# Patient Record
Sex: MALE | Race: WHITE | NOT HISPANIC OR LATINO | Employment: FULL TIME | URBAN - NONMETROPOLITAN AREA
[De-identification: names, ages, dates, MRNs, and addresses within clinical notes are randomized per-mention and may not be internally consistent; named-entity substitution may affect disease eponyms.]

---

## 2017-06-28 ENCOUNTER — TELEPHONE (OUTPATIENT)
Dept: GASTROENTEROLOGY | Facility: CLINIC | Age: 57
End: 2017-06-28

## 2017-06-28 ENCOUNTER — OFFICE VISIT (OUTPATIENT)
Dept: GASTROENTEROLOGY | Facility: CLINIC | Age: 57
End: 2017-06-28

## 2017-06-28 VITALS
RESPIRATION RATE: 16 BRPM | SYSTOLIC BLOOD PRESSURE: 135 MMHG | TEMPERATURE: 97.8 F | BODY MASS INDEX: 23.49 KG/M2 | WEIGHT: 141 LBS | HEART RATE: 77 BPM | HEIGHT: 65 IN | DIASTOLIC BLOOD PRESSURE: 81 MMHG

## 2017-06-28 DIAGNOSIS — E03.9 HYPOTHYROIDISM, UNSPECIFIED TYPE: Primary | ICD-10-CM

## 2017-06-28 PROCEDURE — 99215 OFFICE O/P EST HI 40 MIN: CPT | Performed by: INTERNAL MEDICINE

## 2017-06-28 RX ORDER — LEVOTHYROXINE SODIUM 0.05 MG/1
25 TABLET ORAL DAILY
Qty: 30 TABLET | Refills: 0 | Status: SHIPPED | OUTPATIENT
Start: 2017-06-28 | End: 2017-07-26

## 2017-06-28 RX ORDER — PANTOPRAZOLE SODIUM 40 MG/1
TABLET, DELAYED RELEASE ORAL
Refills: 0 | COMMUNITY
Start: 2017-06-05 | End: 2017-12-27

## 2017-06-28 RX ORDER — FERROUS SULFATE 325(65) MG
325 TABLET ORAL
Refills: 0 | COMMUNITY
Start: 2017-06-19 | End: 2017-08-04 | Stop reason: HOSPADM

## 2017-06-28 RX ORDER — MELOXICAM 15 MG/1
TABLET ORAL
Refills: 0 | COMMUNITY
Start: 2017-05-12 | End: 2017-12-27

## 2017-06-28 RX ORDER — LEVOTHYROXINE SODIUM 0.12 MG/1
TABLET ORAL
Refills: 0 | COMMUNITY
Start: 2017-06-19

## 2017-06-28 RX ORDER — TESTOSTERONE CYPIONATE 200 MG/ML
INJECTION, SOLUTION INTRAMUSCULAR
Refills: 0 | COMMUNITY
Start: 2017-06-26

## 2017-06-28 RX ORDER — DICLOFENAC SODIUM 75 MG/1
TABLET, DELAYED RELEASE ORAL
Refills: 0 | Status: ON HOLD | COMMUNITY
Start: 2017-06-26 | End: 2017-08-02

## 2017-06-28 NOTE — PATIENT INSTRUCTIONS
1. Obtain records.  The patient claims that he had some laboratory tests done about a month ago and Dr. Corbett's office.  2. The patient may need a CMP, TSH and B12 as well as MMA.  3. We'll restart levothyroxine.  Started at 50 µg by mouth every morning on an empty stomach.  The patient has been advised to wait for at least half hour and then take pantoprazole 40 mg, wait for half hour and then eat.  4. Pantoprazole 40 mg 1 by mouth every morning one half hour before breakfast.  5. Generally is recommended that the patient should avoid NSAIDs unless the patient cannot do without in terms of arthritis.  The patient may try taking Tylenol arthritis 650 mg one every 8 hours.  6. Colonoscopy: Description of the procedure, risks benefits alternatives and options including non-operative options were discussed with the patient in detail.  The patient understands and wishes to proceed.  7. Discussed with the patient in detail.  Opportunity was given to ask questions.      Note:  Total time spent 40 minutes.  More than half of the time was spent face-to-face discussion.

## 2017-06-28 NOTE — PROGRESS NOTES
"Chief Complaint   Patient presents with   • Follow-up     History of Present Illness     The patient came back for follow visit today. He feels okay.   He empties his ileostomy bag perhaps 10-15 times a day (just for precautions). Is no bright red blood in the ileostomy bag. There is no history suggestive of melena. The patient denies dysphagia or odynophagia. There is history of some mucoid discharge per rectum. There is no further bright red blood per rectum. There is no rectal pain. There is no recent weight loss. Rather the patient has gained about 5 pounds since his last visit in January 2015.    The patient has history of hypothyroidism.  Previously the patient was noted to have significant elevation of TSH.  Unfortunately, the patient ran out of Synthyroid about a month or so ago and currently not taking one.    The patient has a history of reflux off and on for the last several years perhaps 10-12 years.  The reflux is moderately severe.  Symptoms are described as retrosternal burning sensation, and indigestion.  There is history of occasional regurgitative symptoms.  Frequency being several times per week.  The symptoms are worse at night.  The patient takes acid suppressive therapy with reasonable control of his symptoms.    As you recall , the patient has history of long-standing \"Crohn's disease\" perhaps from early 90s. The patient has been treated with sulfasalazine in the past. He also has received steroids. The patient was followed up by Dr. Sow. The patient was followed up in [place]. In 1995 the patient claims that he got very sick he was followed up by Dr. Kris talavera. In 2000 the patient claims that he ruptured his colon. He was treated by Dr. Lew. The patient had small bowel resection, and ileostomy. The patient has also undergone hemorrhoidectomy by Dr. Jewell a few years ago. The patient has history of some bright red blood per rectum, and mucoid discharge from the rectum. The patient " "underwent a colonoscopy by Dr. Lew in August 2014. According to the notes colonoscope could not be used. An EGD scope was used. The patient was noted to have \"significant inflammatory changes\" in the colon all the way to \"transverse colon\". It appears that the patient had left-sided diverticulosis. According to the report no polyps were seen. The patient had undergone \"random biopsies from the colon\". Biopsies were negative for inflammatory changes. The patient was noted to have hyperplastic polyp without dysplastic change. The patient was noted to have significant retroperitoneal air. He was treated conservatively with resolution. The patient was told to have \"residual colon polyps\". The patient has not received B12 supplements as such other than what he consumes orally as over-the-counter vitamins. According to the patient is B12 is good.     Review of Systems   Constitutional: Negative for appetite change, chills, fatigue, fever and unexpected weight change.   HENT: Negative for mouth sores, nosebleeds and trouble swallowing.    Eyes: Negative for discharge and redness.   Respiratory: Negative for apnea, cough and shortness of breath.    Cardiovascular: Negative for chest pain, palpitations and leg swelling.   Gastrointestinal: Negative for abdominal distention, abdominal pain, anal bleeding, blood in stool, constipation, diarrhea, nausea and vomiting.   Endocrine: Negative for cold intolerance, heat intolerance and polydipsia.   Genitourinary: Negative for dysuria, hematuria and urgency.   Musculoskeletal: Positive for arthralgias. Negative for joint swelling and myalgias.   Skin: Negative for rash.   Allergic/Immunologic: Negative for food allergies and immunocompromised state.   Neurological: Negative for dizziness, seizures, syncope and headaches.   Hematological: Negative for adenopathy. Does not bruise/bleed easily.   Psychiatric/Behavioral: Negative for dysphoric mood. The patient is not nervous/anxious " "and is not hyperactive.      There is no problem list on file for this patient.    Past Medical History:   Diagnosis Date   • Anemia    • Hypothyroid      Past Surgical History:   Procedure Laterality Date   • HERNIA REPAIR     • SMALL INTESTINE SURGERY     • WRIST SURGERY       Family History   Problem Relation Age of Onset   • Colon cancer Neg Hx      Social History   Substance Use Topics   • Smoking status: Former Smoker   • Smokeless tobacco: Never Used      Comment: QUIT 30 PLUS YEARS AGO   • Alcohol use No      Comment: QUIT 30 PLUS YEARS       Current Outpatient Prescriptions:   •  aspirin 81 MG tablet, Take 81 mg by mouth Daily., Disp: , Rfl:   •  diclofenac (VOLTAREN) 75 MG EC tablet, take 1 tablet by mouth twice a day, Disp: , Rfl: 0  •  ferrous sulfate 325 (65 FE) MG tablet, , Disp: , Rfl: 0  •  levothyroxine (SYNTHROID, LEVOTHROID) 200 MCG tablet, take 1 tablet by mouth once daily, Disp: , Rfl: 0  •  meloxicam (MOBIC) 15 MG tablet, take 1 tablet by mouth once daily, Disp: , Rfl: 0  •  Multiple Vitamin (MULTI VITAMIN MENS PO), Take  by mouth., Disp: , Rfl:   •  pantoprazole (PROTONIX) 40 MG EC tablet, take 1 tablet by mouth once daily, Disp: , Rfl: 0  •  Testosterone Cypionate (DEPOTESTOTERONE CYPIONATE) 200 MG/ML injection, inject 1 milliliter every 2 weeks, Disp: , Rfl: 0  •  levothyroxine (SYNTHROID, LEVOTHROID) 50 MCG tablet, Take 0.5 tablets by mouth Daily., Disp: 30 tablet, Rfl: 0    Allergies   Allergen Reactions   • Latex Rash       Blood pressure 135/81, pulse 77, temperature 97.8 °F (36.6 °C), resp. rate 16, height 65\" (165.1 cm), weight 141 lb (64 kg).    Physical Exam   Constitutional: He is oriented to person, place, and time. He appears well-developed and well-nourished. No distress.   HENT:   Head: Normocephalic and atraumatic.   Right Ear: Hearing and external ear normal.   Left Ear: Hearing and external ear normal.   Nose: Nose normal.   Mouth/Throat: Oropharynx is clear and moist and " mucous membranes are normal. Mucous membranes are not pale, not dry and not cyanotic. No oral lesions. No oropharyngeal exudate.   Eyes: Conjunctivae and EOM are normal. Right eye exhibits no discharge. Left eye exhibits no discharge. No scleral icterus.   Neck: Trachea normal. Neck supple. No JVD present. No edema present. No thyroid mass and no thyromegaly present.   Cardiovascular: Normal rate, regular rhythm, S2 normal and normal heart sounds.  Exam reveals no gallop, no S3 and no friction rub.    No murmur heard.  Pulmonary/Chest: Effort normal and breath sounds normal. No respiratory distress. He has no wheezes. He has no rales. He exhibits no tenderness.   Abdominal: Soft. Normal appearance and bowel sounds are normal. He exhibits no distension, no ascites and no mass. There is no splenomegaly or hepatomegaly. There is no tenderness. There is no rigidity, no rebound and no guarding. No hernia.   Ostomy bag in place.   Musculoskeletal: He exhibits no tenderness or deformity.       Vascular Status -  His exam exhibits no right foot edema. His exam exhibits no left foot edema.  Lymphadenopathy:     He has no cervical adenopathy.        Left: No supraclavicular adenopathy present.   Neurological: He is alert and oriented to person, place, and time. He has normal strength. No cranial nerve deficit or sensory deficit. He exhibits normal muscle tone. Coordination normal.   Skin: No rash noted. He is not diaphoretic. No cyanosis. No pallor. Nails show no clubbing.   Psychiatric: He has a normal mood and affect. His behavior is normal. Judgment and thought content normal.   Nursing note and vitals reviewed.    Stigmata of chronic liver disease:  None.  Asterixis:  None.    Assessment and Plan:      Jorge was seen today for follow-up.    Diagnoses and all orders for this visit:    Hypothyroidism, unspecified type  -     levothyroxine (SYNTHROID, LEVOTHROID) 50 MCG tablet; Take 0.5 tablets by mouth Daily.        Plan   and Patient Instructions:    Patient Instructions     1. Obtain records.  The patient claims that he had some laboratory tests done about a month ago and Dr. Corbett's office.  2. The patient may need a CMP, TSH and B12 as well as MMA.  3. We'll restart levothyroxine.  Started at 50 µg by mouth every morning on an empty stomach.  The patient has been advised to wait for at least half hour and then take pantoprazole 40 mg, wait for half hour and then eat.  4. Pantoprazole 40 mg 1 by mouth every morning one half hour before breakfast.  5. Generally is recommended that the patient should avoid NSAIDs unless the patient cannot do without in terms of arthritis.  The patient may try taking Tylenol arthritis 650 mg one every 8 hours.  6. Colonoscopy: Description of the procedure, risks benefits alternatives and options including non-operative options were discussed with the patient in detail.  The patient understands and wishes to proceed.  7. Discussed with the patient in detail.  Opportunity was given to ask questions.      Note:  Total time spent 40 minutes.  More than half of the time was spent face-to-face discussion.        Vincenzo Galan MD

## 2017-06-28 NOTE — TELEPHONE ENCOUNTER
Called and spoke with Diann at pharmacy.  Changed original script of Levothyroxine 50 mcg 1/2 tablet daily to Levothyroxine 50 mcg, 1 tablet daily.

## 2017-07-18 ENCOUNTER — PREP FOR SURGERY (OUTPATIENT)
Dept: OTHER | Facility: HOSPITAL | Age: 57
End: 2017-07-18

## 2017-07-18 DIAGNOSIS — Z12.11 COLON CANCER SCREENING: ICD-10-CM

## 2017-07-18 DIAGNOSIS — Z87.19 HISTORY OF CROHN'S DISEASE: Primary | ICD-10-CM

## 2017-07-18 RX ORDER — SODIUM CHLORIDE 0.9 % (FLUSH) 0.9 %
1-10 SYRINGE (ML) INJECTION AS NEEDED
Status: CANCELLED | OUTPATIENT
Start: 2017-07-18

## 2017-07-18 RX ORDER — SODIUM CHLORIDE 9 MG/ML
70 INJECTION, SOLUTION INTRAVENOUS CONTINUOUS PRN
Status: CANCELLED | OUTPATIENT
Start: 2017-07-18

## 2017-07-31 ENCOUNTER — HOSPITAL ENCOUNTER (INPATIENT)
Facility: HOSPITAL | Age: 57
LOS: 4 days | Discharge: HOME | End: 2017-08-04
Attending: INTERNAL MEDICINE | Admitting: INTERNAL MEDICINE

## 2017-07-31 ENCOUNTER — ANESTHESIA (OUTPATIENT)
Dept: GASTROENTEROLOGY | Facility: HOSPITAL | Age: 57
End: 2017-07-31

## 2017-07-31 ENCOUNTER — ANESTHESIA EVENT (OUTPATIENT)
Dept: GASTROENTEROLOGY | Facility: HOSPITAL | Age: 57
End: 2017-07-31

## 2017-07-31 ENCOUNTER — APPOINTMENT (OUTPATIENT)
Dept: GENERAL RADIOLOGY | Facility: HOSPITAL | Age: 57
End: 2017-07-31

## 2017-07-31 ENCOUNTER — APPOINTMENT (OUTPATIENT)
Dept: CT IMAGING | Facility: HOSPITAL | Age: 57
End: 2017-07-31

## 2017-07-31 DIAGNOSIS — Z12.11 COLON CANCER SCREENING: ICD-10-CM

## 2017-07-31 DIAGNOSIS — Z87.19 HISTORY OF CROHN'S DISEASE: ICD-10-CM

## 2017-07-31 LAB
ALBUMIN SERPL-MCNC: 3.9 G/DL (ref 3.5–5)
ALBUMIN/GLOB SERPL: 1.5 G/DL (ref 1–2)
ALP SERPL-CCNC: 61 U/L (ref 38–126)
ALT SERPL W P-5'-P-CCNC: 45 U/L (ref 13–69)
ANION GAP SERPL CALCULATED.3IONS-SCNC: 13.5 MMOL/L
AST SERPL-CCNC: 41 U/L (ref 15–46)
BASOPHILS # BLD AUTO: 0.03 10*3/MM3 (ref 0–0.2)
BASOPHILS NFR BLD AUTO: 0.2 % (ref 0–2.5)
BILIRUB SERPL-MCNC: 0.8 MG/DL (ref 0.2–1.3)
BUN BLD-MCNC: 13 MG/DL (ref 7–20)
BUN/CREAT SERPL: 8.7 (ref 6.3–21.9)
CALCIUM SPEC-SCNC: 8.6 MG/DL (ref 8.4–10.2)
CHLORIDE SERPL-SCNC: 106 MMOL/L (ref 98–107)
CO2 SERPL-SCNC: 23 MMOL/L (ref 26–30)
CREAT BLD-MCNC: 1.5 MG/DL (ref 0.6–1.3)
DEPRECATED RDW RBC AUTO: 46.4 FL (ref 37–54)
EOSINOPHIL # BLD AUTO: 0.03 10*3/MM3 (ref 0–0.7)
EOSINOPHIL NFR BLD AUTO: 0.2 % (ref 0–7)
ERYTHROCYTE [DISTWIDTH] IN BLOOD BY AUTOMATED COUNT: 13.5 % (ref 11.5–14.5)
GFR SERPL CREATININE-BSD FRML MDRD: 49 ML/MIN/1.73
GLOBULIN UR ELPH-MCNC: 2.6 GM/DL
GLUCOSE BLD-MCNC: 103 MG/DL (ref 74–98)
HCT VFR BLD AUTO: 44 % (ref 42–52)
HGB BLD-MCNC: 15.1 G/DL (ref 14–18)
IMM GRANULOCYTES # BLD: 0.03 10*3/MM3 (ref 0–0.06)
IMM GRANULOCYTES NFR BLD: 0.2 % (ref 0–0.6)
LYMPHOCYTES # BLD AUTO: 0.4 10*3/MM3 (ref 0.6–3.4)
LYMPHOCYTES NFR BLD AUTO: 2.9 % (ref 10–50)
MCH RBC QN AUTO: 32.1 PG (ref 27–31)
MCHC RBC AUTO-ENTMCNC: 34.3 G/DL (ref 30–37)
MCV RBC AUTO: 93.6 FL (ref 80–94)
MONOCYTES # BLD AUTO: 0.52 10*3/MM3 (ref 0–0.9)
MONOCYTES NFR BLD AUTO: 3.8 % (ref 0–12)
NEUTROPHILS # BLD AUTO: 12.72 10*3/MM3 (ref 2–6.9)
NEUTROPHILS NFR BLD AUTO: 92.7 % (ref 37–80)
NRBC BLD MANUAL-RTO: 0 /100 WBC (ref 0–0)
PLATELET # BLD AUTO: 153 10*3/MM3 (ref 130–400)
PMV BLD AUTO: 10.4 FL (ref 6–12)
POTASSIUM BLD-SCNC: 3.5 MMOL/L (ref 3.5–5.1)
PROT SERPL-MCNC: 6.5 G/DL (ref 6.3–8.2)
RBC # BLD AUTO: 4.7 10*6/MM3 (ref 4.7–6.1)
SODIUM BLD-SCNC: 139 MMOL/L (ref 137–145)
TSH SERPL DL<=0.05 MIU/L-ACNC: 0.79 MIU/ML (ref 0.47–4.68)
WBC NRBC COR # BLD: 13.73 10*3/MM3 (ref 4.8–10.8)

## 2017-07-31 PROCEDURE — 25010000002 PROPOFOL 200 MG/20ML EMULSION: Performed by: NURSE ANESTHETIST, CERTIFIED REGISTERED

## 2017-07-31 PROCEDURE — 71020 HC CHEST PA AND LATERAL: CPT

## 2017-07-31 PROCEDURE — 74000 HC ABDOMEN KUB: CPT

## 2017-07-31 PROCEDURE — 25010000002 ONDANSETRON PER 1 MG: Performed by: INTERNAL MEDICINE

## 2017-07-31 PROCEDURE — 84443 ASSAY THYROID STIM HORMONE: CPT | Performed by: INTERNAL MEDICINE

## 2017-07-31 PROCEDURE — 44382 SMALL BOWEL ENDOSCOPY: CPT | Performed by: INTERNAL MEDICINE

## 2017-07-31 PROCEDURE — 25010000002 MORPHINE PER 10 MG: Performed by: INTERNAL MEDICINE

## 2017-07-31 PROCEDURE — 45380 COLONOSCOPY AND BIOPSY: CPT | Performed by: INTERNAL MEDICINE

## 2017-07-31 PROCEDURE — 80053 COMPREHEN METABOLIC PANEL: CPT | Performed by: INTERNAL MEDICINE

## 2017-07-31 PROCEDURE — 99223 1ST HOSP IP/OBS HIGH 75: CPT | Performed by: INTERNAL MEDICINE

## 2017-07-31 PROCEDURE — 0DBB8ZX EXCISION OF ILEUM, VIA NATURAL OR ARTIFICIAL OPENING ENDOSCOPIC, DIAGNOSTIC: ICD-10-PCS | Performed by: INTERNAL MEDICINE

## 2017-07-31 PROCEDURE — S0260 H&P FOR SURGERY: HCPCS | Performed by: INTERNAL MEDICINE

## 2017-07-31 PROCEDURE — 25010000002 LEVOFLOXACIN PER 250 MG: Performed by: INTERNAL MEDICINE

## 2017-07-31 PROCEDURE — 0DBM8ZX EXCISION OF DESCENDING COLON, VIA NATURAL OR ARTIFICIAL OPENING ENDOSCOPIC, DIAGNOSTIC: ICD-10-PCS | Performed by: INTERNAL MEDICINE

## 2017-07-31 PROCEDURE — 0DBN8ZX EXCISION OF SIGMOID COLON, VIA NATURAL OR ARTIFICIAL OPENING ENDOSCOPIC, DIAGNOSTIC: ICD-10-PCS | Performed by: INTERNAL MEDICINE

## 2017-07-31 PROCEDURE — 85025 COMPLETE CBC W/AUTO DIFF WBC: CPT | Performed by: INTERNAL MEDICINE

## 2017-07-31 PROCEDURE — 0DBP8ZX EXCISION OF RECTUM, VIA NATURAL OR ARTIFICIAL OPENING ENDOSCOPIC, DIAGNOSTIC: ICD-10-PCS | Performed by: INTERNAL MEDICINE

## 2017-07-31 PROCEDURE — 74176 CT ABD & PELVIS W/O CONTRAST: CPT

## 2017-07-31 RX ORDER — PANTOPRAZOLE SODIUM 40 MG/10ML
40 INJECTION, POWDER, LYOPHILIZED, FOR SOLUTION INTRAVENOUS
Status: DISCONTINUED | OUTPATIENT
Start: 2017-07-31 | End: 2017-08-04 | Stop reason: HOSPADM

## 2017-07-31 RX ORDER — SODIUM CHLORIDE 0.9 % (FLUSH) 0.9 %
1-10 SYRINGE (ML) INJECTION AS NEEDED
Status: DISCONTINUED | OUTPATIENT
Start: 2017-07-31 | End: 2017-08-04 | Stop reason: HOSPADM

## 2017-07-31 RX ORDER — DEXTROSE, SODIUM CHLORIDE, AND POTASSIUM CHLORIDE 5; .45; .15 G/100ML; G/100ML; G/100ML
100 INJECTION INTRAVENOUS CONTINUOUS
Status: DISCONTINUED | OUTPATIENT
Start: 2017-07-31 | End: 2017-08-04 | Stop reason: HOSPADM

## 2017-07-31 RX ORDER — PROPOFOL 10 MG/ML
INJECTION, EMULSION INTRAVENOUS AS NEEDED
Status: DISCONTINUED | OUTPATIENT
Start: 2017-07-31 | End: 2017-07-31 | Stop reason: SURG

## 2017-07-31 RX ORDER — MORPHINE SULFATE 2 MG/ML
1 INJECTION, SOLUTION INTRAMUSCULAR; INTRAVENOUS EVERY 4 HOURS PRN
Status: DISCONTINUED | OUTPATIENT
Start: 2017-07-31 | End: 2017-07-31 | Stop reason: SDUPTHER

## 2017-07-31 RX ORDER — SODIUM CHLORIDE 9 MG/ML
70 INJECTION, SOLUTION INTRAVENOUS CONTINUOUS PRN
Status: DISCONTINUED | OUTPATIENT
Start: 2017-07-31 | End: 2017-08-04 | Stop reason: HOSPADM

## 2017-07-31 RX ORDER — MORPHINE SULFATE 2 MG/ML
1 INJECTION, SOLUTION INTRAMUSCULAR; INTRAVENOUS EVERY 4 HOURS PRN
Status: DISCONTINUED | OUTPATIENT
Start: 2017-07-31 | End: 2017-08-01

## 2017-07-31 RX ORDER — LEVOFLOXACIN 5 MG/ML
500 INJECTION, SOLUTION INTRAVENOUS EVERY 24 HOURS
Status: DISCONTINUED | OUTPATIENT
Start: 2017-07-31 | End: 2017-08-04 | Stop reason: HOSPADM

## 2017-07-31 RX ORDER — MORPHINE SULFATE 2 MG/ML
2 INJECTION, SOLUTION INTRAMUSCULAR; INTRAVENOUS EVERY 4 HOURS PRN
Status: DISCONTINUED | OUTPATIENT
Start: 2017-07-31 | End: 2017-07-31

## 2017-07-31 RX ORDER — NALOXONE HCL 0.4 MG/ML
0.4 VIAL (ML) INJECTION
Status: DISCONTINUED | OUTPATIENT
Start: 2017-07-31 | End: 2017-07-31 | Stop reason: SDUPTHER

## 2017-07-31 RX ORDER — NALOXONE HCL 0.4 MG/ML
0.4 VIAL (ML) INJECTION
Status: DISCONTINUED | OUTPATIENT
Start: 2017-07-31 | End: 2017-08-04 | Stop reason: HOSPADM

## 2017-07-31 RX ORDER — ONDANSETRON 2 MG/ML
4 INJECTION INTRAMUSCULAR; INTRAVENOUS EVERY 6 HOURS PRN
Status: DISCONTINUED | OUTPATIENT
Start: 2017-07-31 | End: 2017-08-04 | Stop reason: HOSPADM

## 2017-07-31 RX ADMIN — PROPOFOL 50 MG: 10 INJECTION, EMULSION INTRAVENOUS at 10:25

## 2017-07-31 RX ADMIN — PROPOFOL 50 MG: 10 INJECTION, EMULSION INTRAVENOUS at 10:15

## 2017-07-31 RX ADMIN — METRONIDAZOLE 500 MG: 500 INJECTION, SOLUTION INTRAVENOUS at 22:59

## 2017-07-31 RX ADMIN — ONDANSETRON 4 MG: 2 INJECTION INTRAMUSCULAR; INTRAVENOUS at 20:19

## 2017-07-31 RX ADMIN — PANTOPRAZOLE SODIUM 40 MG: 40 INJECTION, POWDER, FOR SOLUTION INTRAVENOUS at 15:54

## 2017-07-31 RX ADMIN — PROPOFOL 50 MG: 10 INJECTION, EMULSION INTRAVENOUS at 10:20

## 2017-07-31 RX ADMIN — METRONIDAZOLE 500 MG: 500 INJECTION, SOLUTION INTRAVENOUS at 15:44

## 2017-07-31 RX ADMIN — PROPOFOL 50 MG: 10 INJECTION, EMULSION INTRAVENOUS at 10:00

## 2017-07-31 RX ADMIN — DEXTROSE MONOHYDRATE, SODIUM CHLORIDE, AND POTASSIUM CHLORIDE 100 ML/HR: 50; 4.5; 1.49 INJECTION, SOLUTION INTRAVENOUS at 15:43

## 2017-07-31 RX ADMIN — LEVOFLOXACIN 500 MG: 5 INJECTION, SOLUTION INTRAVENOUS at 15:44

## 2017-07-31 RX ADMIN — SODIUM CHLORIDE 70 ML/HR: 9 INJECTION, SOLUTION INTRAVENOUS at 08:42

## 2017-07-31 RX ADMIN — ONDANSETRON 4 MG: 2 INJECTION INTRAMUSCULAR; INTRAVENOUS at 17:42

## 2017-07-31 RX ADMIN — MORPHINE SULFATE 1 MG: 2 INJECTION, SOLUTION INTRAMUSCULAR; INTRAVENOUS at 20:19

## 2017-07-31 RX ADMIN — PROPOFOL 50 MG: 10 INJECTION, EMULSION INTRAVENOUS at 10:06

## 2017-07-31 RX ADMIN — PROPOFOL 50 MG: 10 INJECTION, EMULSION INTRAVENOUS at 10:30

## 2017-07-31 RX ADMIN — MORPHINE SULFATE 1 MG: 2 INJECTION, SOLUTION INTRAMUSCULAR; INTRAVENOUS at 22:58

## 2017-07-31 RX ADMIN — PROPOFOL 50 MG: 10 INJECTION, EMULSION INTRAVENOUS at 10:10

## 2017-07-31 RX ADMIN — MORPHINE SULFATE 1 MG: 2 INJECTION, SOLUTION INTRAMUSCULAR; INTRAVENOUS at 15:58

## 2017-07-31 NOTE — ANESTHESIA POSTPROCEDURE EVALUATION
Patient: Jorge Mclaughlin    Procedure Summary     Date Anesthesia Start Anesthesia Stop Room / Location    07/31/17 0958 1048 [facility] ENDOSCOPY 2 / [facility] ENDOSCOPY       Procedure Diagnosis Surgeon Provider    Colonoscopy through ostomy site and rectum with biopsies (N/A Anus) Colon cancer screening; History of Crohn's disease  (Colon cancer screening [Z12.11]; History of Crohn's disease [Z87.19]) MD Piotr Wallace CRNA          Anesthesia Type: MAC  Last vitals  BP        Temp        Pulse       Resp        SpO2          Post Anesthesia Care and Evaluation    Patient location during evaluation: PACU  Patient participation: complete - patient participated  Level of consciousness: awake and alert  Pain score: 0  Pain management: satisfactory to patient  Airway patency: patent  Anesthetic complications: No anesthetic complications  PONV Status: none  Cardiovascular status: stable and acceptable  Respiratory status: acceptable  Hydration status: acceptable

## 2017-07-31 NOTE — ANESTHESIA PREPROCEDURE EVALUATION
Anesthesia Evaluation     Patient summary reviewed and Nursing notes reviewed   NPO Solid Status: > 8 hours  NPO Liquid Status: > 8 hours     Airway   Mallampati: II  TM distance: >3 FB  Neck ROM: full  no difficulty expected  Dental      Pulmonary - normal exam   Cardiovascular   Exercise tolerance: good (4-7 METS)    Rhythm: regular  Rate: normal        Neuro/Psych  (+) headaches,    GI/Hepatic/Renal/Endo    (+)  GERD, hypothyroidism,     Musculoskeletal     Abdominal    Substance History      OB/GYN          Other   (+) arthritis                                     Anesthesia Plan    ASA 2     MAC     intravenous induction   Anesthetic plan and risks discussed with patient.    Plan discussed with CRNA.

## 2017-08-01 ENCOUNTER — APPOINTMENT (OUTPATIENT)
Dept: GENERAL RADIOLOGY | Facility: HOSPITAL | Age: 57
End: 2017-08-01

## 2017-08-01 LAB
ALBUMIN SERPL-MCNC: 3.2 G/DL (ref 3.5–5)
ALBUMIN/GLOB SERPL: 1.3 G/DL (ref 1–2)
ALP SERPL-CCNC: 44 U/L (ref 38–126)
ALT SERPL W P-5'-P-CCNC: 39 U/L (ref 13–69)
ANION GAP SERPL CALCULATED.3IONS-SCNC: 12.7 MMOL/L
AST SERPL-CCNC: 20 U/L (ref 15–46)
BASOPHILS # BLD AUTO: 0.02 10*3/MM3 (ref 0–0.2)
BASOPHILS NFR BLD AUTO: 0.2 % (ref 0–2.5)
BILIRUB SERPL-MCNC: 0.9 MG/DL (ref 0.2–1.3)
BUN BLD-MCNC: 13 MG/DL (ref 7–20)
BUN/CREAT SERPL: 7.6 (ref 6.3–21.9)
CALCIUM SPEC-SCNC: 7.9 MG/DL (ref 8.4–10.2)
CHLORIDE SERPL-SCNC: 109 MMOL/L (ref 98–107)
CO2 SERPL-SCNC: 23 MMOL/L (ref 26–30)
CREAT BLD-MCNC: 1.7 MG/DL (ref 0.6–1.3)
DEPRECATED RDW RBC AUTO: 48.2 FL (ref 37–54)
EOSINOPHIL # BLD AUTO: 0.14 10*3/MM3 (ref 0–0.7)
EOSINOPHIL NFR BLD AUTO: 1.4 % (ref 0–7)
ERYTHROCYTE [DISTWIDTH] IN BLOOD BY AUTOMATED COUNT: 14 % (ref 11.5–14.5)
GFR SERPL CREATININE-BSD FRML MDRD: 42 ML/MIN/1.73
GLOBULIN UR ELPH-MCNC: 2.4 GM/DL
GLUCOSE BLD-MCNC: 101 MG/DL (ref 74–98)
HCT VFR BLD AUTO: 37.9 % (ref 42–52)
HGB BLD-MCNC: 13.1 G/DL (ref 14–18)
IMM GRANULOCYTES # BLD: 0.04 10*3/MM3 (ref 0–0.06)
IMM GRANULOCYTES NFR BLD: 0.4 % (ref 0–0.6)
LYMPHOCYTES # BLD AUTO: 0.62 10*3/MM3 (ref 0.6–3.4)
LYMPHOCYTES NFR BLD AUTO: 6.2 % (ref 10–50)
MCH RBC QN AUTO: 32.3 PG (ref 27–31)
MCHC RBC AUTO-ENTMCNC: 34.6 G/DL (ref 30–37)
MCV RBC AUTO: 93.3 FL (ref 80–94)
MONOCYTES # BLD AUTO: 0.61 10*3/MM3 (ref 0–0.9)
MONOCYTES NFR BLD AUTO: 6.1 % (ref 0–12)
NEUTROPHILS # BLD AUTO: 8.62 10*3/MM3 (ref 2–6.9)
NEUTROPHILS NFR BLD AUTO: 85.7 % (ref 37–80)
NRBC BLD MANUAL-RTO: 0 /100 WBC (ref 0–0)
PLATELET # BLD AUTO: 135 10*3/MM3 (ref 130–400)
PMV BLD AUTO: 11.1 FL (ref 6–12)
POTASSIUM BLD-SCNC: 3.7 MMOL/L (ref 3.5–5.1)
PROT SERPL-MCNC: 5.6 G/DL (ref 6.3–8.2)
RBC # BLD AUTO: 4.06 10*6/MM3 (ref 4.7–6.1)
SODIUM BLD-SCNC: 141 MMOL/L (ref 137–145)
WBC NRBC COR # BLD: 10.05 10*3/MM3 (ref 4.8–10.8)

## 2017-08-01 PROCEDURE — 25010000002 MORPHINE PER 10 MG: Performed by: INTERNAL MEDICINE

## 2017-08-01 PROCEDURE — 85025 COMPLETE CBC W/AUTO DIFF WBC: CPT | Performed by: INTERNAL MEDICINE

## 2017-08-01 PROCEDURE — 71020 HC CHEST PA AND LATERAL: CPT

## 2017-08-01 PROCEDURE — 80053 COMPREHEN METABOLIC PANEL: CPT | Performed by: INTERNAL MEDICINE

## 2017-08-01 PROCEDURE — 25010000002 LEVOFLOXACIN PER 250 MG: Performed by: INTERNAL MEDICINE

## 2017-08-01 PROCEDURE — 74000 HC ABDOMEN KUB: CPT

## 2017-08-01 PROCEDURE — 25010000002 ONDANSETRON PER 1 MG: Performed by: INTERNAL MEDICINE

## 2017-08-01 PROCEDURE — 99232 SBSQ HOSP IP/OBS MODERATE 35: CPT | Performed by: INTERNAL MEDICINE

## 2017-08-01 PROCEDURE — 25010000002 MEPERIDINE PER 100 MG: Performed by: INTERNAL MEDICINE

## 2017-08-01 RX ORDER — MEPERIDINE HYDROCHLORIDE 50 MG/ML
50 INJECTION INTRAMUSCULAR; INTRAVENOUS; SUBCUTANEOUS
Status: DISCONTINUED | OUTPATIENT
Start: 2017-08-01 | End: 2017-08-04 | Stop reason: HOSPADM

## 2017-08-01 RX ADMIN — MEPERIDINE HYDROCHLORIDE 50 MG: 50 INJECTION, SOLUTION INTRAMUSCULAR; INTRAVENOUS; SUBCUTANEOUS at 20:43

## 2017-08-01 RX ADMIN — METRONIDAZOLE 500 MG: 500 INJECTION, SOLUTION INTRAVENOUS at 15:26

## 2017-08-01 RX ADMIN — MORPHINE SULFATE 1 MG: 2 INJECTION, SOLUTION INTRAMUSCULAR; INTRAVENOUS at 00:59

## 2017-08-01 RX ADMIN — MEPERIDINE HYDROCHLORIDE 50 MG: 50 INJECTION, SOLUTION INTRAMUSCULAR; INTRAVENOUS; SUBCUTANEOUS at 23:35

## 2017-08-01 RX ADMIN — METRONIDAZOLE 500 MG: 500 INJECTION, SOLUTION INTRAVENOUS at 22:39

## 2017-08-01 RX ADMIN — MORPHINE SULFATE 1 MG: 2 INJECTION, SOLUTION INTRAMUSCULAR; INTRAVENOUS at 15:25

## 2017-08-01 RX ADMIN — PANTOPRAZOLE SODIUM 40 MG: 40 INJECTION, POWDER, FOR SOLUTION INTRAVENOUS at 05:05

## 2017-08-01 RX ADMIN — ONDANSETRON 4 MG: 2 INJECTION INTRAMUSCULAR; INTRAVENOUS at 15:35

## 2017-08-01 RX ADMIN — LEVOFLOXACIN 500 MG: 5 INJECTION, SOLUTION INTRAVENOUS at 18:16

## 2017-08-01 RX ADMIN — MORPHINE SULFATE 1 MG: 2 INJECTION, SOLUTION INTRAMUSCULAR; INTRAVENOUS at 02:45

## 2017-08-01 RX ADMIN — METRONIDAZOLE 500 MG: 500 INJECTION, SOLUTION INTRAVENOUS at 07:00

## 2017-08-01 RX ADMIN — MORPHINE SULFATE 1 MG: 2 INJECTION, SOLUTION INTRAMUSCULAR; INTRAVENOUS at 06:51

## 2017-08-01 RX ADMIN — ONDANSETRON 4 MG: 2 INJECTION INTRAMUSCULAR; INTRAVENOUS at 23:43

## 2017-08-01 RX ADMIN — MORPHINE SULFATE 1 MG: 2 INJECTION, SOLUTION INTRAMUSCULAR; INTRAVENOUS at 10:50

## 2017-08-01 RX ADMIN — MEPERIDINE HYDROCHLORIDE 50 MG: 50 INJECTION, SOLUTION INTRAMUSCULAR; INTRAVENOUS; SUBCUTANEOUS at 18:16

## 2017-08-01 RX ADMIN — Medication 10 ML: at 18:17

## 2017-08-01 NOTE — DISCHARGE INSTR - OTHER ORDERS
If you have any questions about your recovery, please call the [facility] Nurse Call Center at [phone]. A registered nurse is available 24 hours a day 7 days a week to assist you.

## 2017-08-01 NOTE — PROGRESS NOTES
Discharge Planning Assessment  [facility]     Patient Name: Jorge Mclaughlin  MRN: [id number]  Today's Date: 8/1/2017    Admit Date: 7/31/2017          Discharge Needs Assessment       08/01/17 1459    Living Environment    Lives With child(loan), dependent;spouse    Living Arrangements house    Home Accessibility no concerns    Stair Railings at Home none    Type of Financial/Environmental Concern none    Transportation Available family or friend will provide;car    Living Environment    Provides Primary Care For no one    Quality Of Family Relationships supportive    Able to Return to Prior Living Arrangements yes    Discharge Needs Assessment    Concerns To Be Addressed denies needs/concerns at this time    Readmission Within The Last 30 Days no previous admission in last 30 days    Anticipated Changes Related to Illness none    Equipment Currently Used at Home colostomy/ostomy supplies    Equipment Needed After Discharge none            Discharge Plan       08/01/17 1528    Case Management/Social Work Plan    Plan Home    Patient/Family In Agreement With Plan yes    Final Note    Final Note Spoke with pt and wife in room regarding DCP.  Pt states he lives in a house with his wife and son.  He has an ileostomy for 18 years, but denies any needs at this time.  He reports he is independent and plans to return home at discharge.  He denies affiliations with HH and  DME.  CM will continue to follow and assist with discharge as needed.        Discharge Placement     No information found        Expected Discharge Date and Time     Expected Discharge Date Expected Discharge Time    Jul 31, 2017               Demographic Summary       08/01/17 1451    Referral Information    Admission Type inpatient    Arrived From home or self-care    Referral Source admission list    Reason For Consult discharge planning    Primary Care Physician Information    Name Yazan Corbett MD            Functional Status       08/01/17 1455     Functional Status Prior    Ambulation 0-->independent    Transferring 0-->independent    Toileting 0-->independent    Bathing 0-->independent    Dressing 0-->independent    Eating 0-->independent    Communication 0-->understands/communicates without difficulty    Swallowing 0-->swallows foods/liquids without difficulty    IADL    Medications independent    Meal Preparation independent    Housekeeping independent    Laundry independent    Shopping independent    Oral Care independent            Psychosocial     None            Abuse/Neglect     None            Legal     None            Substance Abuse     None            Patient Forms     None          Chiquita Medel

## 2017-08-01 NOTE — PAYOR COMM NOTE
"TO:PADMA MK  FROM:CHARLEEN MATOS PHONE [phone]  INPT NOTIFICATION AND CLINICALS    Jorge Ramos (57 y.o. Male)     Date of Birth Social Security Number Address Home Phone MRN    1960  [address] [place] [address] [phone] [id number]    [employer] Mar[employer] Status          Muslim        Admission Date Admission Type Admitting Provider Attending Provider Department, Room/Bed    7/31/17 Elective Vincenzo Galan MD Khan, Nadeem A, MD [facility] MED SURG  4, 424/1    Discharge Date Discharge Disposition Discharge Destination         Home or Self Care             Attending Provider: Vincenzo Galan MD     Allergies:  Latex    Isolation:  None   Infection:  MRSA/History Only (07/31/17)   Code Status:  FULL    Ht:  65\" (165.1 cm)   Wt:  151 lb (68.5 kg)    Admission Cmt:  None   Principal Problem:  None                Active Insurance as of 7/31/2017     Primary Coverage     Payor Plan Insurance Group Employer/Plan Group    [employer] MEDICAID [id number]     Payor Plan Address Payor Plan Phone Number Effective From Effective To    PO  [phone] 7/26/2017     [place] [address]       Subscriber Name Subscriber Birth Date Member ID       JORGE RAMOS 1960 [id number]                 Emergency Contacts      (Rel.) Home Phone Work Phone Mobile Phone    Evelio Ramos (Son) [phone] -- --               History & Physical      Vincenzo Galan MD at 7/31/2017  9:54 AM             He empties his ileostomy bag perhaps 10-15 times a day (just for precautions). Is no bright red blood in the ileostomy bag. There is no history suggestive of melena. The patient denies dysphagia or odynophagia. There is history of some mucoid discharge per rectum. There is no further bright red blood per rectum. There is no rectal pain. There is no recent weight loss. Rather the patient has gained about 5 pounds since his last visit in " "January 2015.     The patient has history of hypothyroidism.  Previously the patient was noted to have significant elevation of TSH.  Unfortunately, the patient ran out of Synthyroid about a month or so ago and currently not taking one.     The patient has a history of reflux off and on for the last several years perhaps 10-12 years.  The reflux is moderately severe.  Symptoms are described as retrosternal burning sensation, and indigestion.  There is history of occasional regurgitative symptoms.  Frequency being several times per week.  The symptoms are worse at night.  The patient takes acid suppressive therapy with reasonable control of his symptoms.     As you recall , the patient has history of long-standing \"Crohn's disease\" perhaps from early 90s. The patient has been treated with sulfasalazine in the past. He also has received steroids. The patient was followed up by Dr. Sow. The patient was followed up in [place]. In 1995 the patient claims that he got very sick he was followed up by Dr. Kris talavera. In 2000 the patient claims that he ruptured his colon. He was treated by Dr. Lew. The patient had small bowel resection, and ileostomy. The patient has also undergone hemorrhoidectomy by Dr. Jewell a few years ago. The patient has history of some bright red blood per rectum, and mucoid discharge from the rectum. The patient underwent a colonoscopy by Dr. Lew in August 2014. According to the notes colonoscope could not be used. An EGD scope was used. The patient was noted to have \"significant inflammatory changes\" in the colon all the way to \"transverse colon\". It appears that the patient had left-sided diverticulosis. According to the report no polyps were seen. The patient had undergone \"random biopsies from the colon\". Biopsies were negative for inflammatory changes. The patient was noted to have hyperplastic polyp without dysplastic change. The patient was noted to have significant retroperitoneal " "air. He was treated conservatively with resolution. The patient was told to have \"residual colon polyps\". The patient has not received B12 supplements as such other than what he consumes orally as over-the-counter vitamins. According to the patient is B12 is good.      Review of Systems   Constitutional: Negative for appetite change, chills, fatigue, fever and unexpected weight change.   HENT: Negative for mouth sores, nosebleeds and trouble swallowing.    Eyes: Negative for discharge and redness.   Respiratory: Negative for apnea, cough and shortness of breath.    Cardiovascular: Negative for chest pain, palpitations and leg swelling.   Gastrointestinal: Negative for abdominal distention, abdominal pain, anal bleeding, blood in stool, constipation, diarrhea, nausea and vomiting.   Endocrine: Negative for cold intolerance, heat intolerance and polydipsia.   Genitourinary: Negative for dysuria, hematuria and urgency.   Musculoskeletal: Positive for arthralgias. Negative for joint swelling and myalgias.   Skin: Negative for rash.   Allergic/Immunologic: Negative for food allergies and immunocompromised state.   Neurological: Negative for dizziness, seizures, syncope and headaches.   Hematological: Negative for adenopathy. Does not bruise/bleed easily.   Psychiatric/Behavioral: Negative for dysphoric mood. The patient is not nervous/anxious and is not hyperactive.       There is no problem list on file for this patient.      Medical History         Past Medical History:   Diagnosis Date   • Anemia     • Hypothyroid            Surgical History          Past Surgical History:   Procedure Laterality Date   • HERNIA REPAIR       • SMALL INTESTINE SURGERY       • WRIST SURGERY                   Family History   Problem Relation Age of Onset   • Colon cancer Neg Hx               Social History   Substance Use Topics   • Smoking status: Former Smoker   • Smokeless tobacco: Never Used         Comment: QUIT 30 PLUS YEARS AGO " "  • Alcohol use No         Comment: QUIT 30 PLUS YEARS         Current Outpatient Prescriptions:   •  aspirin 81 MG tablet, Take 81 mg by mouth Daily., Disp: , Rfl:   •  diclofenac (VOLTAREN) 75 MG EC tablet, take 1 tablet by mouth twice a day, Disp: , Rfl: 0  •  ferrous sulfate 325 (65 FE) MG tablet, , Disp: , Rfl: 0  •  levothyroxine (SYNTHROID, LEVOTHROID) 200 MCG tablet, take 1 tablet by mouth once daily, Disp: , Rfl: 0  •  meloxicam (MOBIC) 15 MG tablet, take 1 tablet by mouth once daily, Disp: , Rfl: 0  •  Multiple Vitamin (MULTI VITAMIN MENS PO), Take  by mouth., Disp: , Rfl:   •  pantoprazole (PROTONIX) 40 MG EC tablet, take 1 tablet by mouth once daily, Disp: , Rfl: 0  •  Testosterone Cypionate (DEPOTESTOTERONE CYPIONATE) 200 MG/ML injection, inject 1 milliliter every 2 weeks, Disp: , Rfl: 0  •  levothyroxine (SYNTHROID, LEVOTHROID) 50 MCG tablet, Take 0.5 tablets by mouth Daily., Disp: 30 tablet, Rfl: 0          Allergies   Allergen Reactions   • Latex Rash      Blood pressure 147/84, pulse 95, temperature 97 °F (36.1 °C), temperature source Temporal Artery , resp. rate 20, height 65.5\" (166.4 cm), weight 150 lb (68 kg), SpO2 98 %.         Physical Exam   Constitutional: He is oriented to person, place, and time. He appears well-developed and well-nourished. No distress.   HENT:   Head: Normocephalic and atraumatic.   Right Ear: Hearing and external ear normal.   Left Ear: Hearing and external ear normal.   Nose: Nose normal.   Mouth/Throat: Oropharynx is clear and moist and mucous membranes are normal. Mucous membranes are not pale, not dry and not cyanotic. No oral lesions. No oropharyngeal exudate.   Eyes: Conjunctivae and EOM are normal. Right eye exhibits no discharge. Left eye exhibits no discharge. No scleral icterus.   Neck: Trachea normal. Neck supple. No JVD present. No edema present. No thyroid mass and no thyromegaly present.   Cardiovascular: Normal rate, regular rhythm, S2 normal and normal " heart sounds.  Exam reveals no gallop, no S3 and no friction rub.    No murmur heard.  Pulmonary/Chest: Effort normal and breath sounds normal. No respiratory distress. He has no wheezes. He has no rales. He exhibits no tenderness.   Abdominal: Soft. Normal appearance and bowel sounds are normal. He exhibits no distension, no ascites and no mass. There is no splenomegaly or hepatomegaly. There is no tenderness. There is no rigidity, no rebound and no guarding. No hernia.   Ostomy bag in place.   Musculoskeletal: He exhibits no tenderness or deformity.       Vascular Status -  His exam exhibits no right foot edema. His exam exhibits no left foot edema.  Lymphadenopathy:     He has no cervical adenopathy.        Left: No supraclavicular adenopathy present.   Neurological: He is alert and oriented to person, place, and time. He has normal strength. No cranial nerve deficit or sensory deficit. He exhibits normal muscle tone. Coordination normal.   Skin: No rash noted. He is not diaphoretic. No cyanosis. No pallor. Nails show no clubbing.   Psychiatric: He has a normal mood and affect. His behavior is normal. Judgment and thought content normal.   Nursing note and vitals reviewed.     Stigmata of chronic liver disease:  None.  Asterixis:  None.     Assessment and Plan:      Hypothyroidism, unspecified type.  The patient has been taking thyroid replacement therapy.  History of Crohn's disease.  The patient has diversion ileostomy.  History of some mucoid secretions and bright red blood per rectum.  Colon cancer screening.    Plan         1. Colonoscopy: Description of the procedure, risks benefits alternatives and options including non-operative options were discussed with the patient in detail.  The patient understands and wishes to proceed.  The patient will need ileoscopy colostomy.   2. Discussed with the patient in detail.  Opportunity was given to ask questions.  3. Of interest the patient has been ordered enema  "which was not given earlier on.  The patient was explained again.  He wishes to follow the instructions and was seen at the test.         Electronically signed by Vincenzo Galan MD at 7/31/2017 10:00 AM        Hospital Medications (active)       Dose Frequency Start End    dextrose 5 % and sodium chloride 0.45 % with KCl 20 mEq/L infusion 100 mL/hr Continuous 7/31/2017     Sig - Route: Infuse 100 mL/hr into a venous catheter Continuous. - Intravenous    levoFLOXacin (LEVAQUIN) 500 mg/100 mL D5W (premix) (LEVAQUIN) 500 mg 500 mg Every 24 Hours 7/31/2017     Sig - Route: Infuse 100 mL into a venous catheter Daily. - Intravenous    metroNIDAZOLE (FLAGYL) IVPB 500 mg 500 mg Every 8 Hours 7/31/2017     Sig - Route: Infuse 100 mL into a venous catheter Every 8 (Eight) Hours. - Intravenous    morphine injection 1 mg 1 mg Every 4 Hours PRN 7/31/2017 8/10/2017    Sig - Route: Infuse 0.5 mL into a venous catheter Every 4 (Four) Hours As Needed for Moderate Pain (4-6). - Intravenous    Linked Group 1:  \"And\" Linked Group Details        naloxone (NARCAN) injection 0.4 mg 0.4 mg Every 5 Minutes PRN 7/31/2017     Sig - Route: Infuse 1 mL into a venous catheter Every 5 (Five) Minutes As Needed for Respiratory Depression. - Intravenous    Linked Group 1:  \"And\" Linked Group Details        ondansetron (ZOFRAN) injection 4 mg 4 mg Every 6 Hours PRN 7/31/2017     Sig - Route: Infuse 2 mL into a venous catheter Every 6 (Six) Hours As Needed for Nausea or Vomiting. - Intravenous    pantoprazole (PROTONIX) injection 40 mg 40 mg Every Early Morning 7/31/2017     Sig - Route: Infuse 10 mL into a venous catheter Every Morning. - Intravenous    sodium chloride 0.9 % flush 1-10 mL 1-10 mL As Needed 7/31/2017     Sig - Route: Infuse 1-10 mL into a venous catheter As Needed for Line Care. - Intravenous    sodium chloride 0.9 % flush 1-10 mL 1-10 mL As Needed 7/31/2017     Sig - Route: Infuse 1-10 mL into a venous catheter As Needed for Line " "Care. - Intravenous    sodium chloride 0.9 % infusion 70 mL/hr Continuous PRN 7/31/2017     Sig - Route: Infuse 70 mL/hr into a venous catheter Continuous As Needed (Start Prior to Procedure). - Intravenous    morphine injection 2 mg (Discontinued) 2 mg Every 4 Hours PRN 7/31/2017 7/31/2017    Sig - Route: Infuse 1 mL into a venous catheter Every 4 (Four) Hours As Needed for Moderate Pain (4-6) or Severe Pain  (7-10). - Intravenous    Morphine sulfate (PF) injection 1 mg (Discontinued) 1 mg Every 4 Hours PRN 7/31/2017 7/31/2017    Sig - Route: Infuse 0.5 mL into a venous catheter Every 4 (Four) Hours As Needed for Moderate Pain (4-6). - Intravenous    Reason for Discontinue: Reorder    Linked Group 2:  \"And\" Linked Group Details        naloxone (NARCAN) injection 0.4 mg (Discontinued) 0.4 mg Every 5 Minutes PRN 7/31/2017 7/31/2017    Sig - Route: Infuse 1 mL into a venous catheter Every 5 (Five) Minutes As Needed for Respiratory Depression. - Intravenous    Reason for Discontinue: Reorder    Linked Group 2:  \"And\" Linked Group Details                Lab Results (last 24 hours)     Procedure Component Value Units Date/Time    Tissue Pathology Exam [[id number]] Collected:  07/31/17 1008    Specimen:  Tissue from Small Intestine, Ileum; Tissue from Large Intestine, Left / Descending Colon; Tissue from Large Intestine, Sigmoid Colon; Tissue from Large Intestine, Rectum Updated:  07/31/17 1458    CBC Auto Differential [[id number]]  (Abnormal) Collected:  07/31/17 1445    Specimen:  Blood Updated:  07/31/17 1501     WBC 13.73 (H) 10*3/mm3      RBC 4.70 10*6/mm3      Hemoglobin 15.1 g/dL      Hematocrit 44.0 %      MCV 93.6 fL      MCH 32.1 (H) pg      MCHC 34.3 g/dL      RDW 13.5 %      RDW-SD 46.4 fl      MPV 10.4 fL      Platelets 153 10*3/mm3      Neutrophil % 92.7 (H) %      Lymphocyte % 2.9 (L) %      Monocyte % 3.8 %      Eosinophil % 0.2 %      Basophil % 0.2 %      Immature Grans % 0.2 %      Neutrophils, Absolute " 12.72 (H) 10*3/mm3      Lymphocytes, Absolute 0.40 (L) 10*3/mm3      Monocytes, Absolute 0.52 10*3/mm3      Eosinophils, Absolute 0.03 10*3/mm3      Basophils, Absolute 0.03 10*3/mm3      Immature Grans, Absolute 0.03 10*3/mm3      nRBC 0.0 /100 WBC     Comprehensive Metabolic Panel [[id number]]  (Abnormal) Collected:  07/31/17 1445    Specimen:  Blood Updated:  07/31/17 1515     Glucose 103 (H) mg/dL      BUN 13 mg/dL      Creatinine 1.50 (H) mg/dL      Sodium 139 mmol/L      Potassium 3.5 mmol/L      Chloride 106 mmol/L      CO2 23.0 (L) mmol/L      Calcium 8.6 mg/dL      Total Protein 6.5 g/dL      Albumin 3.90 g/dL      ALT (SGPT) 45 U/L      AST (SGOT) 41 U/L      Alkaline Phosphatase 61 U/L      Total Bilirubin 0.8 mg/dL      eGFR Non African Amer 49 (L) mL/min/1.73      Globulin 2.6 gm/dL      A/G Ratio 1.5 g/dL      BUN/Creatinine Ratio 8.7     Anion Gap 13.5 mmol/L     Narrative:       Abnormal estimated GFR should be followed by more specific studies to confirm end stage chronic renal disease. The equation used for calculation may not be accurate for patients less than 19 years old, greater than 70 years old, patients at extremes of weight, malnutrition, or with acute renal dysfunction.    TSH [[id number]]  (Normal) Collected:  07/31/17 1445    Specimen:  Blood Updated:  07/31/17 1557     TSH 0.789 mIU/mL     CBC & Differential [[id number]] Collected:  08/01/17 0522    Specimen:  Blood Updated:  08/01/17 0647    Narrative:       The following orders were created for panel order CBC & Differential.  Procedure                               Abnormality         Status                     ---------                               -----------         ------                     CBC Auto Differential[[id number]]        Abnormal            Final result                 Please view results for these tests on the individual orders.    CBC Auto Differential [[id number]]  (Abnormal) Collected:  08/01/17 0522    Specimen:   Blood Updated:  08/01/17 0647     WBC 10.05 10*3/mm3      RBC 4.06 (L) 10*6/mm3      Hemoglobin 13.1 (L) g/dL      Hematocrit 37.9 (L) %      MCV 93.3 fL      MCH 32.3 (H) pg      MCHC 34.6 g/dL      RDW 14.0 %      RDW-SD 48.2 fl      MPV 11.1 fL      Platelets 135 10*3/mm3      Neutrophil % 85.7 (H) %      Lymphocyte % 6.2 (L) %      Monocyte % 6.1 %      Eosinophil % 1.4 %      Basophil % 0.2 %      Immature Grans % 0.4 %      Neutrophils, Absolute 8.62 (H) 10*3/mm3      Lymphocytes, Absolute 0.62 10*3/mm3      Monocytes, Absolute 0.61 10*3/mm3      Eosinophils, Absolute 0.14 10*3/mm3      Basophils, Absolute 0.02 10*3/mm3      Immature Grans, Absolute 0.04 10*3/mm3      nRBC 0.0 /100 WBC     Comprehensive Metabolic Panel [[id number]]  (Abnormal) Collected:  08/01/17 0522    Specimen:  Blood Updated:  08/01/17 0649     Glucose 101 (H) mg/dL      BUN 13 mg/dL      Creatinine 1.70 (H) mg/dL      Sodium 141 mmol/L      Potassium 3.7 mmol/L      Chloride 109 (H) mmol/L      CO2 23.0 (L) mmol/L      Calcium 7.9 (L) mg/dL      Total Protein 5.6 (L) g/dL      Albumin 3.20 (L) g/dL      ALT (SGPT) 39 U/L      AST (SGOT) 20 U/L      Alkaline Phosphatase 44 U/L      Total Bilirubin 0.9 mg/dL      eGFR Non African Amer 42 (L) mL/min/1.73      Globulin 2.4 gm/dL      A/G Ratio 1.3 g/dL      BUN/Creatinine Ratio 7.6     Anion Gap 12.7 mmol/L     Narrative:       Abnormal estimated GFR should be followed by more specific studies to confirm end stage chronic renal disease. The equation used for calculation may not be accurate for patients less than 19 years old, greater than 70 years old, patients at extremes of weight, malnutrition, or with acute renal dysfunction.        Operative/Procedure Notes (last 24 hours) (Notes from 7/31/2017 12:18 PM through 8/1/2017 12:18 PM)     No notes of this type exist for this encounter.        Physician Progress Notes (last 24 hours) (Notes from 7/31/2017 12:18 PM through 8/1/2017 12:18 PM)      No notes of this type exist for this encounter.        Consult Notes (last 24 hours) (Notes from 7/31/2017 12:18 PM through 8/1/2017 12:18 PM)     No notes of this type exist for this encounter.

## 2017-08-01 NOTE — PROGRESS NOTES
"  SUBJECTIVE:  Feels better.  The patient has been passing flatus.  Nausea has improved.  Abdominal pain has also improved. There is no history of overt GI bleed (Hematemesis, melena or hematochezia).  The patient denies reflux.  No dysphagia or odynophagia.    UNDERLYING CHRONIC ILLNESS:  Hypothyroidism.    REVIEW OF SYSTEMS:    Constitutional: No fever or chills.  Complains of some headache.  CNS: No seizures or stroke.  Cardiovascular: No chest pains.  No palpitations.  Pulmonary: No shortness of breath.  No cough.  Liver: No jaundice.  Rheumatologic: Some right wrist pain.  Skin: No skin rash.  Renal: Making urine.  Denies hematuria or dysuria.  Psychiatric: Denies depression or anxiety.  Nutrition: Nothing by mouth.  Activities: The patient has used the bathroom and has walked in the hallway.    OBJECTIVE:  Alert and oriented ×3.  No apparent distress.    Vital signs:    Blood pressure 137/67, pulse 98, temperature 98.6 °F (37 °C), temperature source Oral, resp. rate 16, height 65\" (165.1 cm), weight 151 lb (68.5 kg), SpO2 100 %.    HEENT: No icterus.  No pallor.  Oral mucosa moist.  Neck: Supple.  Chest: Clear to auscultation.  Cardiovascular: No S3 no murmurs.  Abdomen: Soft.  Non distended.  No significant abdominal tenderness.  No guarding or rigidity.  No rebound tenderness..  No hepatosplenomegaly.  No mass.  No ascites.Bowel sounds present.  Right-sided ileostomy intact.   Extremities: No edema.  Rectal: Deferred.  Stigmata of chronic liver disease: None.  Neurological: No focal motor neurological deficit.  Rheumatologic: No obvious joint swelling.    Laboratory tests:  Sodium 141 potassium 3.7 chloride 109 CO2 23 BUN 13 creatinine 1.7 and glucose 101.  T bili 0.9 AST 20 ALT 39 alkaline phosphatase 44.  WBC is 10.05 hemoglobin 13.1 hematocrit 37.9 and platelet count 135 K.    Abdominal imaging:  Dated July 31, 2017 CT of abdomen and pelvis without contrast revealed clear lung bases. The heart size is " normal. Note is made of numerous mediastinum. The limited noncontrast images of the  liver are normal. Stones are present within the gallbladder. The spleen is normal. No adrenal masses are seen.  The pancreas has an unremarkable unenhanced appearance.. The aorta is normal in caliber. There is no significant free fluid or adenopathy.  There is a suspected 3.8 cm mass in the superior pole of the right kidney. There is a non obstructing stone in the inferior pole of the left kidney. There is no  nephrolithiasis on the right. There is no hydronephrosis. There are postsurgical changes with ileostomy formation in the right hemiabdomen. There is extensive retroperitoneal and free intraperitoneal air. There  is pneumatosis involving the small bowel. The patient is status post prior posterior right hemicolectomy.  The remainder of the colon is normal. The urinary bladder is unremarkable. There is no significant fluid or adenopathy. Evaluation of the extraperitoneal soft tissues reveals subcutaneous emphysema in the  left anterior and lateral body wall. This is seen to a lesser extent on the right.      KUB:  Dated August 1, 2017 persistent retroperitoneal and free intra-abdominal air.    Assessment and Plan:       1. Retroperitoneal and Free intra-abdominal air.  Post-ileoscopy and limited colonoscopy.  The patient is noted to have mucosal friability and easy mucosal bleeding.  A possible translocation of air across the mucosa remains a possibility.  Clinically it does not appear to be adolph perforation with leakage of fluids.  The patient does not have peritoneal signs.    2. Hypothyroidism, unspecified type.  The patient has been taking thyroid replacement therapy.  3. History of Crohn's disease.  The patient has diversion ileostomy.  4. Left-sided diverticulosis.    5. Mucosal friability and easy bleeding within the left colon, and distal ileum.       Plan      1. Nothing by mouth  2. Unfortunately, despite a long  discussion with the patient and his wife last evening  (spent over 20 minutes) the patient refused NG tube placement.    3. IV fluids.  4. Anti-emetics.  5. Pain control.  6. IV antibiotics.  7. Acid suppressive therapy.  8. Follow-up labs.  9. Close clinical follow-up, if needed a possible surgical evaluation.  10. Discussed with the patient and his wife in detail.  Opportunity was given to ask questions.

## 2017-08-02 ENCOUNTER — APPOINTMENT (OUTPATIENT)
Dept: GENERAL RADIOLOGY | Facility: HOSPITAL | Age: 57
End: 2017-08-02

## 2017-08-02 LAB
ALBUMIN SERPL-MCNC: 3.2 G/DL (ref 3.5–5)
ALBUMIN/GLOB SERPL: 1.2 G/DL (ref 1–2)
ALP SERPL-CCNC: 48 U/L (ref 38–126)
ALT SERPL W P-5'-P-CCNC: 36 U/L (ref 13–69)
ANION GAP SERPL CALCULATED.3IONS-SCNC: 12.9 MMOL/L
AST SERPL-CCNC: 16 U/L (ref 15–46)
BASOPHILS # BLD AUTO: 0.01 10*3/MM3 (ref 0–0.2)
BASOPHILS NFR BLD AUTO: 0.1 % (ref 0–2.5)
BILIRUB SERPL-MCNC: 0.9 MG/DL (ref 0.2–1.3)
BUN BLD-MCNC: 12 MG/DL (ref 7–20)
BUN/CREAT SERPL: 7.5 (ref 6.3–21.9)
CALCIUM SPEC-SCNC: 8.5 MG/DL (ref 8.4–10.2)
CHLORIDE SERPL-SCNC: 107 MMOL/L (ref 98–107)
CO2 SERPL-SCNC: 25 MMOL/L (ref 26–30)
CREAT BLD-MCNC: 1.6 MG/DL (ref 0.6–1.3)
DEPRECATED RDW RBC AUTO: 49.6 FL (ref 37–54)
EOSINOPHIL # BLD AUTO: 0.16 10*3/MM3 (ref 0–0.7)
EOSINOPHIL NFR BLD AUTO: 1.8 % (ref 0–7)
ERYTHROCYTE [DISTWIDTH] IN BLOOD BY AUTOMATED COUNT: 14 % (ref 11.5–14.5)
GFR SERPL CREATININE-BSD FRML MDRD: 45 ML/MIN/1.73
GLOBULIN UR ELPH-MCNC: 2.7 GM/DL
GLUCOSE BLD-MCNC: 118 MG/DL (ref 74–98)
HCT VFR BLD AUTO: 38.1 % (ref 42–52)
HGB BLD-MCNC: 12.8 G/DL (ref 14–18)
IMM GRANULOCYTES # BLD: 0.05 10*3/MM3 (ref 0–0.06)
IMM GRANULOCYTES NFR BLD: 0.6 % (ref 0–0.6)
LYMPHOCYTES # BLD AUTO: 0.52 10*3/MM3 (ref 0.6–3.4)
LYMPHOCYTES NFR BLD AUTO: 5.8 % (ref 10–50)
MCH RBC QN AUTO: 32 PG (ref 27–31)
MCHC RBC AUTO-ENTMCNC: 33.6 G/DL (ref 30–37)
MCV RBC AUTO: 95.3 FL (ref 80–94)
MONOCYTES # BLD AUTO: 0.84 10*3/MM3 (ref 0–0.9)
MONOCYTES NFR BLD AUTO: 9.3 % (ref 0–12)
NEUTROPHILS # BLD AUTO: 7.46 10*3/MM3 (ref 2–6.9)
NEUTROPHILS NFR BLD AUTO: 82.4 % (ref 37–80)
NRBC BLD MANUAL-RTO: 0 /100 WBC (ref 0–0)
PLATELET # BLD AUTO: 137 10*3/MM3 (ref 130–400)
PMV BLD AUTO: 10.7 FL (ref 6–12)
POTASSIUM BLD-SCNC: 3.9 MMOL/L (ref 3.5–5.1)
PROT SERPL-MCNC: 5.9 G/DL (ref 6.3–8.2)
RBC # BLD AUTO: 4 10*6/MM3 (ref 4.7–6.1)
SODIUM BLD-SCNC: 141 MMOL/L (ref 137–145)
WBC NRBC COR # BLD: 9.04 10*3/MM3 (ref 4.8–10.8)

## 2017-08-02 PROCEDURE — 25010000002 ONDANSETRON PER 1 MG: Performed by: INTERNAL MEDICINE

## 2017-08-02 PROCEDURE — 74000 HC ABDOMEN KUB: CPT

## 2017-08-02 PROCEDURE — 93005 ELECTROCARDIOGRAM TRACING: CPT | Performed by: INTERNAL MEDICINE

## 2017-08-02 PROCEDURE — 99225 PR SBSQ OBSERVATION CARE/DAY 25 MINUTES: CPT | Performed by: INTERNAL MEDICINE

## 2017-08-02 PROCEDURE — 80053 COMPREHEN METABOLIC PANEL: CPT | Performed by: INTERNAL MEDICINE

## 2017-08-02 PROCEDURE — 85025 COMPLETE CBC W/AUTO DIFF WBC: CPT | Performed by: INTERNAL MEDICINE

## 2017-08-02 PROCEDURE — 25010000002 MEPERIDINE PER 100 MG: Performed by: INTERNAL MEDICINE

## 2017-08-02 PROCEDURE — 25010000002 LEVOFLOXACIN PER 250 MG: Performed by: INTERNAL MEDICINE

## 2017-08-02 RX ADMIN — MEPERIDINE HYDROCHLORIDE 50 MG: 50 INJECTION, SOLUTION INTRAMUSCULAR; INTRAVENOUS; SUBCUTANEOUS at 18:09

## 2017-08-02 RX ADMIN — ONDANSETRON 4 MG: 2 INJECTION INTRAMUSCULAR; INTRAVENOUS at 18:30

## 2017-08-02 RX ADMIN — DEXTROSE MONOHYDRATE, SODIUM CHLORIDE, AND POTASSIUM CHLORIDE 100 ML/HR: 50; 4.5; 1.49 INJECTION, SOLUTION INTRAVENOUS at 21:04

## 2017-08-02 RX ADMIN — ONDANSETRON 4 MG: 2 INJECTION INTRAMUSCULAR; INTRAVENOUS at 05:46

## 2017-08-02 RX ADMIN — MEPERIDINE HYDROCHLORIDE 50 MG: 50 INJECTION, SOLUTION INTRAMUSCULAR; INTRAVENOUS; SUBCUTANEOUS at 08:35

## 2017-08-02 RX ADMIN — METRONIDAZOLE 500 MG: 500 INJECTION, SOLUTION INTRAVENOUS at 23:39

## 2017-08-02 RX ADMIN — MEPERIDINE HYDROCHLORIDE 50 MG: 50 INJECTION, SOLUTION INTRAMUSCULAR; INTRAVENOUS; SUBCUTANEOUS at 03:02

## 2017-08-02 RX ADMIN — METRONIDAZOLE 500 MG: 500 INJECTION, SOLUTION INTRAVENOUS at 14:52

## 2017-08-02 RX ADMIN — MEPERIDINE HYDROCHLORIDE 50 MG: 50 INJECTION, SOLUTION INTRAMUSCULAR; INTRAVENOUS; SUBCUTANEOUS at 05:47

## 2017-08-02 RX ADMIN — LEVOFLOXACIN 500 MG: 5 INJECTION, SOLUTION INTRAVENOUS at 16:48

## 2017-08-02 RX ADMIN — ONDANSETRON 4 MG: 2 INJECTION INTRAMUSCULAR; INTRAVENOUS at 11:37

## 2017-08-02 RX ADMIN — MEPERIDINE HYDROCHLORIDE 50 MG: 50 INJECTION, SOLUTION INTRAMUSCULAR; INTRAVENOUS; SUBCUTANEOUS at 21:24

## 2017-08-02 RX ADMIN — DEXTROSE MONOHYDRATE, SODIUM CHLORIDE, AND POTASSIUM CHLORIDE 100 ML/HR: 50; 4.5; 1.49 INJECTION, SOLUTION INTRAVENOUS at 08:37

## 2017-08-02 RX ADMIN — MEPERIDINE HYDROCHLORIDE 50 MG: 50 INJECTION, SOLUTION INTRAMUSCULAR; INTRAVENOUS; SUBCUTANEOUS at 11:37

## 2017-08-02 RX ADMIN — PANTOPRAZOLE SODIUM 40 MG: 40 INJECTION, POWDER, FOR SOLUTION INTRAVENOUS at 05:46

## 2017-08-02 RX ADMIN — ONDANSETRON 4 MG: 2 INJECTION INTRAMUSCULAR; INTRAVENOUS at 21:24

## 2017-08-02 RX ADMIN — METRONIDAZOLE 500 MG: 500 INJECTION, SOLUTION INTRAVENOUS at 07:37

## 2017-08-02 RX ADMIN — MEPERIDINE HYDROCHLORIDE 50 MG: 50 INJECTION, SOLUTION INTRAMUSCULAR; INTRAVENOUS; SUBCUTANEOUS at 14:52

## 2017-08-02 NOTE — PROGRESS NOTES
"SUBJECTIVE:  Overall feels better.  The patient has been passing flatus.  Nausea has improved.  Abdominal pain has also improved. There is no history of overt GI bleed (Hematemesis, melena or hematochezia). The patient denies reflux.  No dysphagia or odynophagia.      UNDERLYING CHRONIC ILLNESS:  Hypothyroidism.     REVIEW OF SYSTEMS:     Constitutional: No fever or chills.  Complains of some headache.  CNS: No seizures or stroke.  Cardiovascular: No chest pains.  No palpitations.  History of irregularity.  Pulmonary: No shortness of breath.  No cough.  Liver: No jaundice.  Rheumatologic: Pain in both wrists.  Skin: No skin rash.  Renal: Making urine.  Denies hematuria or dysuria.  Psychiatric: Denies depression or anxiety.  Nutrition: Nothing by mouth.  Activities: The patient has used the bathroom and has walked in the hallway.     OBJECTIVE:  Alert and oriented ×3.  No apparent distress.     Vital signs:    Blood pressure 114/65, pulse 91, temperature 97.9 °F (36.6 °C), temperature source Oral, resp. rate 16, height 65\" (165.1 cm), weight 151 lb (68.5 kg), SpO2 100 %.       HEENT: No icterus.  No pallor.  Oral mucosa moist.  Neck: Supple.  Chest: Clear to auscultation.  Cardiovascular: No S3 no murmurs.  Abdomen: Soft.  Non distended.  No significant abdominal tenderness.  No guarding or rigidity.  No rebound tenderness..  No hepatosplenomegaly.  No mass.  No ascites.Bowel sounds present.  Right-sided ileostomy intact.   Extremities: No edema.  Rectal: Deferred.  Stigmata of chronic liver disease: None.  Neurological: No focal motor neurological deficit.  Rheumatologic: No obvious joint swelling.     Laboratory tests:  Sodium 141 potassium 3.7 chloride 109 CO2 23 BUN 13 creatinine 1.7 and glucose 101.  T bili 0.9 AST 20 ALT 39 alkaline phosphatase 44.  WBC is 10.05 hemoglobin 13.1 hematocrit 37.9 and platelet count 135 K.     Abdominal imaging:  Dated July 31, 2017 CT of abdomen and pelvis without contrast " revealed clear lung bases. The heart size is normal. Note is made of numerous mediastinum. The limited noncontrast images of the  liver are normal. Stones are present within the gallbladder. The spleen is normal. No adrenal masses are seen.  The pancreas has an unremarkable unenhanced appearance.. The aorta is normal in caliber. There is no significant free fluid or adenopathy.  There is a suspected 3.8 cm mass in the superior pole of the right kidney. There is a non obstructing stone in the inferior pole of the left kidney. There is no  nephrolithiasis on the right. There is no hydronephrosis. There are postsurgical changes with ileostomy formation in the right hemiabdomen. There is extensive retroperitoneal and free intraperitoneal air. There  is pneumatosis involving the small bowel. The patient is status post prior posterior right hemicolectomy.  The remainder of the colon is normal. The urinary bladder is unremarkable. There is no significant fluid or adenopathy. Evaluation of the extraperitoneal soft tissues reveals subcutaneous emphysema in the  left anterior and lateral body wall. This is seen to a lesser extent on the right.      KUB:  Dated August 2, 2017 persistent retroperitoneal and free intra-abdominal air.    12-lead EKG:  Dated August 2, 2017 PVCs.     Assessment and Plan:        1. Retroperitoneal and Free intra-abdominal air.  Post-ileoscopy and limited colonoscopy.  The patient is noted to have mucosal friability and easy mucosal bleeding.  A possible translocation of air across the mucosa remains a possibility.  Clinically it does not appear to be adolph perforation with leakage of fluids.  The patient does not have peritoneal signs.    2. Hypothyroidism, unspecified type.  The patient has been taking thyroid replacement therapy.  3. History of Crohn's disease.  The patient has diversion ileostomy.  4. Left-sided diverticulosis.    5. Mucosal friability and easy bleeding within the left colon, and  distal ileum.  Biopsies pending.      Plan       1. Nothing by mouth  2. Unfortunately, the patient has refused NG tube.     3. IV fluids.  4. Anti-emetics.  5. Pain control.  6. IV antibiotics.  7. Acid suppressive therapy.  8. Follow-up labs.  9. CT of abdomen and pelvis with oral contrast only in a.m.  10. Close clinical follow-up, if needed a possible surgical evaluation.  11. Telemetry.  12. Discussed with the patient and his wife in detail.  Opportunity was given to ask questions.

## 2017-08-02 NOTE — PLAN OF CARE
Problem: Pain, Acute (Adult)  Goal: Identify Related Risk Factors and Signs and Symptoms  Outcome: Ongoing (interventions implemented as appropriate)    08/02/17 0142   Pain, Acute   Related Risk Factors (Acute Pain) persistent pain;procedure/treatment   Signs and Symptoms (Acute Pain) facial mask of pain/grimace;verbalization of pain descriptors;sleep pattern alteration       Goal: Acceptable Pain Control/Comfort Level  Outcome: Ongoing (interventions implemented as appropriate)    08/02/17 0142   Pain, Acute (Adult)   Acceptable Pain Control/Comfort Level making progress toward outcome

## 2017-08-02 NOTE — PROGRESS NOTES
"Adult Nutrition  Assessment/PES    Patient Name:  Jorge Mclaughlin  YOB: 1960  MRN: [id number]  Admit Date:  7/31/2017    Assessment Date:  8/2/2017        Reason for Assessment       08/02/17 1546    Reason for Assessment    Reason For Assessment/Visit NPO/Clr Policy    Diagnosis Diagnosis    Gastrointestinal Ileostomy;Crohn's disease                Anthropometrics       08/02/17 1547    Anthropometrics    Height Method Stated    Height 165.1 cm (65\")    Weight Method Bed scale    Weight 68.5 kg (151 lb)    Ideal Body Weight (IBW)    Ideal Body Weight (IBW), Male (kg) 62.51    % Ideal Body Weight 109.8    Body Mass Index (BMI)    BMI (kg/m2) 25.18    BMI Grade 25 - 29.9 - overweight            Labs/Tests/Procedures/Meds       08/02/17 1547    Labs/Tests/Procedures/Meds    Labs/Tests Review Reviewed   Low: Alb   High: Glu, Creat    Medication Review Reviewed, pertinent;Antibiotic              Estimated/Assessed Needs       08/02/17 1548    Calculation Measurements    Weight Used For Calculations 68.5 kg (151 lb)   Actual    Height Used for Calculations 1.651 m (5' 5\")    Estimated/Assessed Energy Needs    Energy Need Method Meitu-Xumiior    Age 57    RMR (Meitu-St. Daricor Equation) 1446.8    Activity Factors (Meitu St Daricor)  Confined to bed  1.2    Estimated Kcal Range  ~7328-9402 kcal    Estimated/Assessed Protein Needs    Weight Used for Protein Calculation 68.5 kg (151 lb)    Protein (gm/kg) 1.2    1.2 Gm Protein (gm) 82.19    Estimated Protein Range ~68-82 gm    Estimated/Assessed Fluid Needs    Fluid Need Method RDA method    RDA Method (mL)  2000            Nutrition Prescription Ordered       08/02/17 1549    Nutrition Prescription PO    Current PO Diet NPO   NPO x3 days            Evaluation of Received Nutrient/Fluid Intake       08/02/17 1548    PO Evaluation    Number of Days PO Intake Evaluated Insufficient Data   Pt NPOx3 days              Problem/Interventions:        Problem 1  "      08/02/17 1556    Nutrition Diagnoses Problem 1    Problem 1 Altered GI Function    Etiology (related to) Medical Diagnosis    Gastrointestinal Crohn's disease    Signs/Symptoms (evidenced by) Report/Observation    Reported GI Symptoms Other (comment)   mucoid discharge per rectum                    Intervention Goal       08/02/17 1601    Intervention Goal    General Meet nutritional needs for age/condition    PO Meet estimated needs;Advance diet    Weight Maintain weight            Nutrition Intervention       08/02/17 1601    Nutrition Intervention    RD/Tech Action Follow Tx progress;Care plan reviewd;Await begin PO;Recommend/ordered    Recommended/Ordered Diet            Nutrition Prescription       08/02/17 1601    Nutrition Prescription PO    PO Prescription Begin/change diet   When medically feasible    Begin/Change Diet to Clear Liquid    New PO Prescription Ordered? No, recommended    Other Orders    Obtain Weight Daily    Obtain Weight Ordered? No, recommended    Supplement Vitamin mineral supplement    Supplement Ordered? No, recommended            Education/Evaluation       08/02/17 1602    Education    Education Will Instruct as appropriate    Monitor/Evaluation    Monitor Per protocol;I&O;Pertinent labs;Weight        Comments:  Rec #1: Consider advancing diet to clear liquids when medically appropriate to clear liquids. Pt currently NPO x 3 days. If pt to remain NPO greater than 5 days, consider nutritional support. Rec #2: Consider MVI with minerals daily. RD to follow pt. Consult RD PRN.    Electronically signed by:  Leelee Perales RD  08/02/17 4:02 PM

## 2017-08-03 ENCOUNTER — APPOINTMENT (OUTPATIENT)
Dept: CT IMAGING | Facility: HOSPITAL | Age: 57
End: 2017-08-03

## 2017-08-03 ENCOUNTER — APPOINTMENT (OUTPATIENT)
Dept: GENERAL RADIOLOGY | Facility: HOSPITAL | Age: 57
End: 2017-08-03

## 2017-08-03 LAB
ANION GAP SERPL CALCULATED.3IONS-SCNC: 14.4 MMOL/L
BASOPHILS # BLD AUTO: 0.01 10*3/MM3 (ref 0–0.2)
BASOPHILS NFR BLD AUTO: 0.1 % (ref 0–2.5)
BUN BLD-MCNC: 14 MG/DL (ref 7–20)
BUN/CREAT SERPL: 10 (ref 6.3–21.9)
CALCIUM SPEC-SCNC: 8.8 MG/DL (ref 8.4–10.2)
CHLORIDE SERPL-SCNC: 106 MMOL/L (ref 98–107)
CO2 SERPL-SCNC: 24 MMOL/L (ref 26–30)
CREAT BLD-MCNC: 1.4 MG/DL (ref 0.6–1.3)
DEPRECATED RDW RBC AUTO: 49.9 FL (ref 37–54)
EOSINOPHIL # BLD AUTO: 0.25 10*3/MM3 (ref 0–0.7)
EOSINOPHIL NFR BLD AUTO: 3.6 % (ref 0–7)
ERYTHROCYTE [DISTWIDTH] IN BLOOD BY AUTOMATED COUNT: 13.9 % (ref 11.5–14.5)
GFR SERPL CREATININE-BSD FRML MDRD: 53 ML/MIN/1.73
GLUCOSE BLD-MCNC: 96 MG/DL (ref 74–98)
HCT VFR BLD AUTO: 38.9 % (ref 42–52)
HGB BLD-MCNC: 13.3 G/DL (ref 14–18)
IMM GRANULOCYTES # BLD: 0.02 10*3/MM3 (ref 0–0.06)
IMM GRANULOCYTES NFR BLD: 0.3 % (ref 0–0.6)
LAB AP CASE REPORT: NORMAL
LYMPHOCYTES # BLD AUTO: 0.39 10*3/MM3 (ref 0.6–3.4)
LYMPHOCYTES NFR BLD AUTO: 5.6 % (ref 10–50)
Lab: NORMAL
MCH RBC QN AUTO: 32.8 PG (ref 27–31)
MCHC RBC AUTO-ENTMCNC: 34.2 G/DL (ref 30–37)
MCV RBC AUTO: 96 FL (ref 80–94)
MONOCYTES # BLD AUTO: 0.61 10*3/MM3 (ref 0–0.9)
MONOCYTES NFR BLD AUTO: 8.7 % (ref 0–12)
NEUTROPHILS # BLD AUTO: 5.7 10*3/MM3 (ref 2–6.9)
NEUTROPHILS NFR BLD AUTO: 81.7 % (ref 37–80)
NRBC BLD MANUAL-RTO: 0 /100 WBC (ref 0–0)
NT-PROBNP SERPL-MCNC: 674 PG/ML (ref 0–125)
PATH REPORT.FINAL DX SPEC: NORMAL
PLATELET # BLD AUTO: 133 10*3/MM3 (ref 130–400)
PMV BLD AUTO: 10.4 FL (ref 6–12)
POTASSIUM BLD-SCNC: 4.4 MMOL/L (ref 3.5–5.1)
RBC # BLD AUTO: 4.05 10*6/MM3 (ref 4.7–6.1)
SODIUM BLD-SCNC: 140 MMOL/L (ref 137–145)
WBC NRBC COR # BLD: 6.98 10*3/MM3 (ref 4.8–10.8)

## 2017-08-03 PROCEDURE — 25010000002 ONDANSETRON PER 1 MG: Performed by: INTERNAL MEDICINE

## 2017-08-03 PROCEDURE — 0 DIATRIZOATE MEGLUMINE & SODIUM PER 1 ML: Performed by: INTERNAL MEDICINE

## 2017-08-03 PROCEDURE — 25010000002 LEVOFLOXACIN PER 250 MG: Performed by: INTERNAL MEDICINE

## 2017-08-03 PROCEDURE — 80048 BASIC METABOLIC PNL TOTAL CA: CPT | Performed by: INTERNAL MEDICINE

## 2017-08-03 PROCEDURE — 99232 SBSQ HOSP IP/OBS MODERATE 35: CPT | Performed by: INTERNAL MEDICINE

## 2017-08-03 PROCEDURE — 83880 ASSAY OF NATRIURETIC PEPTIDE: CPT | Performed by: INTERNAL MEDICINE

## 2017-08-03 PROCEDURE — 85025 COMPLETE CBC W/AUTO DIFF WBC: CPT | Performed by: INTERNAL MEDICINE

## 2017-08-03 PROCEDURE — 25010000002 MEPERIDINE PER 100 MG: Performed by: INTERNAL MEDICINE

## 2017-08-03 PROCEDURE — 74176 CT ABD & PELVIS W/O CONTRAST: CPT

## 2017-08-03 RX ORDER — OXYCODONE HYDROCHLORIDE AND ACETAMINOPHEN 5; 325 MG/1; MG/1
1 TABLET ORAL EVERY 6 HOURS PRN
Status: DISCONTINUED | OUTPATIENT
Start: 2017-08-03 | End: 2017-08-04 | Stop reason: HOSPADM

## 2017-08-03 RX ADMIN — METRONIDAZOLE 500 MG: 500 INJECTION, SOLUTION INTRAVENOUS at 22:36

## 2017-08-03 RX ADMIN — LEVOFLOXACIN 500 MG: 5 INJECTION, SOLUTION INTRAVENOUS at 16:44

## 2017-08-03 RX ADMIN — ONDANSETRON 4 MG: 2 INJECTION INTRAMUSCULAR; INTRAVENOUS at 10:04

## 2017-08-03 RX ADMIN — MEPERIDINE HYDROCHLORIDE 50 MG: 50 INJECTION, SOLUTION INTRAMUSCULAR; INTRAVENOUS; SUBCUTANEOUS at 13:05

## 2017-08-03 RX ADMIN — ONDANSETRON 4 MG: 2 INJECTION INTRAMUSCULAR; INTRAVENOUS at 16:45

## 2017-08-03 RX ADMIN — OXYCODONE AND ACETAMINOPHEN 1 TABLET: 5; 325 TABLET ORAL at 22:36

## 2017-08-03 RX ADMIN — MEPERIDINE HYDROCHLORIDE 50 MG: 50 INJECTION, SOLUTION INTRAMUSCULAR; INTRAVENOUS; SUBCUTANEOUS at 06:25

## 2017-08-03 RX ADMIN — METRONIDAZOLE 500 MG: 500 INJECTION, SOLUTION INTRAVENOUS at 06:25

## 2017-08-03 RX ADMIN — ONDANSETRON 4 MG: 2 INJECTION INTRAMUSCULAR; INTRAVENOUS at 03:26

## 2017-08-03 RX ADMIN — DIATRIZOATE MEGLUMINE AND DIATRIZOATE SODIUM 30 ML: 660; 100 LIQUID ORAL; RECTAL at 10:00

## 2017-08-03 RX ADMIN — PANTOPRAZOLE SODIUM 40 MG: 40 INJECTION, POWDER, FOR SOLUTION INTRAVENOUS at 06:24

## 2017-08-03 RX ADMIN — MEPERIDINE HYDROCHLORIDE 50 MG: 50 INJECTION, SOLUTION INTRAMUSCULAR; INTRAVENOUS; SUBCUTANEOUS at 09:52

## 2017-08-03 RX ADMIN — MEPERIDINE HYDROCHLORIDE 50 MG: 50 INJECTION, SOLUTION INTRAMUSCULAR; INTRAVENOUS; SUBCUTANEOUS at 03:27

## 2017-08-03 RX ADMIN — METRONIDAZOLE 500 MG: 500 INJECTION, SOLUTION INTRAVENOUS at 15:32

## 2017-08-03 RX ADMIN — MEPERIDINE HYDROCHLORIDE 50 MG: 50 INJECTION, SOLUTION INTRAMUSCULAR; INTRAVENOUS; SUBCUTANEOUS at 00:29

## 2017-08-03 RX ADMIN — OXYCODONE AND ACETAMINOPHEN 1 TABLET: 5; 325 TABLET ORAL at 16:44

## 2017-08-03 RX ADMIN — ONDANSETRON 4 MG: 2 INJECTION INTRAMUSCULAR; INTRAVENOUS at 22:34

## 2017-08-03 RX ADMIN — DEXTROSE MONOHYDRATE, SODIUM CHLORIDE, AND POTASSIUM CHLORIDE 100 ML/HR: 50; 4.5; 1.49 INJECTION, SOLUTION INTRAVENOUS at 22:35

## 2017-08-03 RX ADMIN — DEXTROSE MONOHYDRATE, SODIUM CHLORIDE, AND POTASSIUM CHLORIDE 100 ML/HR: 50; 4.5; 1.49 INJECTION, SOLUTION INTRAVENOUS at 09:54

## 2017-08-03 NOTE — PROGRESS NOTES
"SUBJECTIVE:  Overall feels better.  The patient has been passing flatus.  Nausea has improved.  The patient is emptied the ileostomy bag.  Abdominal pain has also improved. There is no history of overt GI bleed (Hematemesis, melena or hematochezia). The patient denies reflux.  No dysphagia or odynophagia.       UNDERLYING CHRONIC ILLNESS:  Hypothyroidism.      REVIEW OF SYSTEMS:      Constitutional: No fever or chills.  The patient had significant headache earlier on.  Now improved.  CNS: No seizures or stroke.  Cardiovascular: No chest pains.  No palpitations.  History of irregularity.  Pulmonary: No shortness of breath.  No cough.  Liver: No jaundice.  Rheumatologic: Pain in both wrists.  Improved.  Skin: No skin rash.  Renal: Making urine.  Denies hematuria or dysuria.  Psychiatric: Denies depression or anxiety.  Nutrition: Clear liquid diet.  Activities: The patient has used the bathroom and has walked in the hallway.      OBJECTIVE:  Alert and oriented ×3.  No apparent distress.      Blood pressure 140/90, pulse 76, temperature 97.6 °F (36.4 °C), temperature source Oral, resp. rate 16, height 65\" (165.1 cm), weight 143 lb 1.6 oz (64.9 kg), SpO2 97 %.     HEENT: No icterus.  No pallor.  Oral mucosa moist.  Neck: Supple.  Chest: Clear to auscultation.  Cardiovascular: No S3 no murmurs.  Abdomen: Soft.  Non distended.  No significant abdominal tenderness.  No guarding or rigidity.  No rebound tenderness..  No hepatosplenomegaly.  No mass.  No ascites.Bowel sounds present.  Right-sided ileostomy intact.   Extremities: No edema.  Rectal: Deferred.  Stigmata of chronic liver disease: None.  Neurological: No focal motor neurological deficit.  Rheumatologic: No obvious joint swelling.      Laboratory tests:  Sodium 141 potassium 3.7 chloride 109 CO2 23 BUN 13 creatinine 1.7 and glucose 101.  T bili 0.9 AST 20 ALT 39 alkaline phosphatase 44.  WBC is 10.05 hemoglobin 13.1 hematocrit 37.9 and platelet count 135 " K.     Dated August 3, 2017 sodium 140 potassium 4.4 chloride 106 CO2 24 BUN 14 creatinine 1.4 and glucose 96.  White count 6.98 hemoglobin 13.3 hematocrit 38.9 and platelet count 133.       Abdominal imaging:  Dated July 31, 2017 CT of abdomen and pelvis without contrast revealed clear lung bases. The heart size is normal. Note is made of numerous mediastinum. The limited noncontrast images of the  liver are normal. Stones are present within the gallbladder. The spleen is normal. No adrenal masses are seen.  The pancreas has an unremarkable unenhanced appearance.. The aorta is normal in caliber. There is no significant free fluid or adenopathy.  There is a suspected 3.8 cm mass in the superior pole of the right kidney. There is a non obstructing stone in the inferior pole of the left kidney. There is no  nephrolithiasis on the right. There is no hydronephrosis. There are postsurgical changes with ileostomy formation in the right hemiabdomen. There is extensive retroperitoneal and free intraperitoneal air. There  is pneumatosis involving the small bowel. The patient is status post prior posterior right hemicolectomy.  The remainder of the colon is normal. The urinary bladder is unremarkable. There is no significant fluid or adenopathy. Evaluation of the extraperitoneal soft tissues reveals subcutaneous emphysema in the  left anterior and lateral body wall. This is seen to a lesser extent on the right.      Dated August 2, 2017 KUB revealed persistent retroperitoneal and free intra-abdominal air.    Dated August 3, 2017 CT of abdomen and pelvis with oral contrast only revealed interval improvement in the patient's small bowel pneumatosis and  some improvement in the retroperitoneal and mediastinal air. There is persistent free intraperitoneal air which has not changed significantly.  Areas of wall thickening involving the distal small bowel consistent with the given history of Crohn's disease.  Gallstones.  3.8 cm  right renal mass       12-lead EKG:  Dated August 2, 2017 PVCs.    Pathology:  Dated 7/31/2017 Biopsies from terminal ileum revealed inflamed granulation ulcerated tissue intermixed with fragments of benign mucosa showing changes consistent with treated Crohn's disease.  No mucosal dysplasia or neoplasia was identified.  Areas of normal small bowel mucosa were also seen.  Biopsies from descending colon stricture, sigmoid colon and rectal biopsies revealed changes benign colonic mucosa with histological changes consistent with treated Crohn's disease.  No mucosal dysplasia or invasive neoplasia was seen.      Assessment and Plan:        1. Retroperitoneal and Free intra-abdominal air.  Post-ileoscopy and limited colonoscopy.  The patient is noted to have mucosal friability and easy mucosal bleeding.  A possible translocation of air across the mucosa remains a possibility.  Clinically it does not appear to be adolph perforation with leakage of fluids. The patient does not have peritoneal signs.    2. Hypothyroidism, unspecified type.  The patient has been taking thyroid replacement therapy.  3. History of Crohn's disease.  The patient has diversion ileostomy.  4. Left-sided diverticulosis.    5. Mucosal friability and easy bleeding within the left colon, and distal ileum.   6. Acute kidney injury.  Improved.  Plan       1. Clear liquid diet.  2. IV fluids.  3. Anti-emetics as needed.   4. Pain control.  5. IV antibiotics.  6. Acid suppressive therapy.  7. Follow-up labs.  8. Close clinical follow-up, if needed a possible surgical evaluation.  9. Telemetry.  10. Hopefully if the patient continues to do well he might be able to go home tomorrow.  11. Discussed with the patient in detail.  Opportunity was given to ask questions.

## 2017-08-03 NOTE — PLAN OF CARE
Problem: Pain, Acute (Adult)  Goal: Identify Related Risk Factors and Signs and Symptoms  Outcome: Outcome(s) achieved Date Met:  08/03/17 08/03/17 0601   Pain, Acute   Related Risk Factors (Acute Pain) disease process;persistent pain   Signs and Symptoms (Acute Pain) nausea/vomiting/anorexia;sleep pattern alteration;verbalization of pain descriptors;facial mask of pain/grimace       Goal: Acceptable Pain Control/Comfort Level  Outcome: Ongoing (interventions implemented as appropriate)

## 2017-08-04 ENCOUNTER — APPOINTMENT (OUTPATIENT)
Dept: GENERAL RADIOLOGY | Facility: HOSPITAL | Age: 57
End: 2017-08-04

## 2017-08-04 VITALS
BODY MASS INDEX: 23.84 KG/M2 | TEMPERATURE: 97.7 F | SYSTOLIC BLOOD PRESSURE: 138 MMHG | HEIGHT: 65 IN | WEIGHT: 143.1 LBS | DIASTOLIC BLOOD PRESSURE: 80 MMHG | RESPIRATION RATE: 18 BRPM | HEART RATE: 84 BPM | OXYGEN SATURATION: 95 %

## 2017-08-04 LAB
ANION GAP SERPL CALCULATED.3IONS-SCNC: 12 MMOL/L
BUN BLD-MCNC: 9 MG/DL (ref 7–20)
BUN/CREAT SERPL: 6.4 (ref 6.3–21.9)
CALCIUM SPEC-SCNC: 8.5 MG/DL (ref 8.4–10.2)
CHLORIDE SERPL-SCNC: 100 MMOL/L (ref 98–107)
CO2 SERPL-SCNC: 30 MMOL/L (ref 26–30)
CREAT BLD-MCNC: 1.4 MG/DL (ref 0.6–1.3)
DEPRECATED RDW RBC AUTO: 45.4 FL (ref 37–54)
ERYTHROCYTE [DISTWIDTH] IN BLOOD BY AUTOMATED COUNT: 13.2 % (ref 11.5–14.5)
GFR SERPL CREATININE-BSD FRML MDRD: 53 ML/MIN/1.73
GLUCOSE BLD-MCNC: 103 MG/DL (ref 74–98)
HCT VFR BLD AUTO: 38.5 % (ref 42–52)
HGB BLD-MCNC: 13.3 G/DL (ref 14–18)
MCH RBC QN AUTO: 32.4 PG (ref 27–31)
MCHC RBC AUTO-ENTMCNC: 34.5 G/DL (ref 30–37)
MCV RBC AUTO: 93.7 FL (ref 80–94)
PLATELET # BLD AUTO: 146 10*3/MM3 (ref 130–400)
PMV BLD AUTO: 10.6 FL (ref 6–12)
POTASSIUM BLD-SCNC: 4 MMOL/L (ref 3.5–5.1)
RBC # BLD AUTO: 4.11 10*6/MM3 (ref 4.7–6.1)
SODIUM BLD-SCNC: 138 MMOL/L (ref 137–145)
WBC NRBC COR # BLD: 4.73 10*3/MM3 (ref 4.8–10.8)

## 2017-08-04 PROCEDURE — 85027 COMPLETE CBC AUTOMATED: CPT | Performed by: INTERNAL MEDICINE

## 2017-08-04 PROCEDURE — 80048 BASIC METABOLIC PNL TOTAL CA: CPT | Performed by: INTERNAL MEDICINE

## 2017-08-04 PROCEDURE — 99239 HOSP IP/OBS DSCHRG MGMT >30: CPT | Performed by: INTERNAL MEDICINE

## 2017-08-04 PROCEDURE — 25010000002 PROMETHAZINE PER 50 MG: Performed by: INTERNAL MEDICINE

## 2017-08-04 PROCEDURE — 71010 HC CHEST PA OR AP: CPT

## 2017-08-04 PROCEDURE — 74000 HC ABDOMEN KUB: CPT

## 2017-08-04 RX ORDER — LEVOFLOXACIN 500 MG/1
500 TABLET, FILM COATED ORAL DAILY
Qty: 3 TABLET | Refills: 0 | Status: ON HOLD | OUTPATIENT
Start: 2017-08-04 | End: 2017-08-09 | Stop reason: SDUPTHER

## 2017-08-04 RX ORDER — METRONIDAZOLE 250 MG/1
250 TABLET ORAL 3 TIMES DAILY
Qty: 9 TABLET | Refills: 0 | Status: SHIPPED | OUTPATIENT
Start: 2017-08-04 | End: 2017-08-09 | Stop reason: HOSPADM

## 2017-08-04 RX ADMIN — PROMETHAZINE HYDROCHLORIDE 12.5 MG: 25 INJECTION INTRAMUSCULAR; INTRAVENOUS at 09:01

## 2017-08-04 RX ADMIN — PROMETHAZINE HYDROCHLORIDE 12.5 MG: 25 INJECTION INTRAMUSCULAR; INTRAVENOUS at 00:54

## 2017-08-04 RX ADMIN — METRONIDAZOLE 500 MG: 500 INJECTION, SOLUTION INTRAVENOUS at 06:48

## 2017-08-04 RX ADMIN — PANTOPRAZOLE SODIUM 40 MG: 40 INJECTION, POWDER, FOR SOLUTION INTRAVENOUS at 05:49

## 2017-08-04 NOTE — DISCHARGE SUMMARY
DISCHARGE SUMMARY.    Referring physician: MAYANK MCLEAN M.D.    DATE OF ADMISSION: July 31, 2017.  DATE OF DISCHARGE: August 4, 2017.    DISCHARGE DIAGNOSES:  1.  Free intra-abdominal and retroperitoneal air likely secondary to mucosal friability during insufflation.  Improving.  No no leakage of secretions or contrast.  2.  Strictures in the distal ileum, and proximal descending colon at splenic flexure.  3.  Crohn's disease.  4.  Arthritis.  5.  Headaches likely migraine headaches.  6.  Acute kidney injury.  Improved.  7.  Left-sided diverticulosis.  8.  Asymptomatic PVCs.  9.  Nausea secondary to headaches.  Improved.    PROCEDURES:  1.  Ileoscopy through the ileostomy with biopsies.  2.  Limited colonoscopy from the anus to splenic flexure with biopsies.    Consultants:  None.    History and hospital course:  This is a 57-year-old white male with history of long-standing Crohn's.  The patient has right-sided ileostomy done several years ago.  The patient had perforation at that time.  He had a colonoscopy done by Dr. Lew in 2014 which was complicated by free intra-abdominal air, and retroperitoneal air.  The patient has history of some mucoid secretions and bright red blood per rectum.  There is history of colon polyps.  The patient was told about a residual polyps.  On 7/31/2017 the patient underwent a elective colonoscopy and ileoscopy.  This was again complicated by free intra-abdominal air, and retroperitoneal and small mediastinal air.  The patient was hospitalized.  He did not have significant abdominal pain or peritoneal signs.  The patient was advised regarding NG tube suctioning however the patient despite long discussion refused it.  He was given IV fluids, acid suppressive therapy, and IV antibiotics.  The patient did well conservatively.  The patient had undergone a CT of abdomen and pelvis without contrast which did not reveal intra-abdominal or pelvic fluid.  The patient was followed with  "abdominal and chest films.  Another CT with oral contrast on 8/3/2017 revealed interval improvement of the area.  The patient again did not have free fluid.  His white count remained normal.  The patient had increased creatinine.  He was given IV fluids with improvement.  Of interest the patient was not taking supplemental thyroids with significant elevation of TSH.  He has started taking thyroid.  Currently his TSH is normal.  The patient has been complaining of some pain in his wrists which is old.  Of interest he takes Mobic and other NSAIDs off and on.  The patient also had some nausea and migraine headaches.  These have improved.  During his stay the patient was noted to have PVCs.  He has history of such irregularities in the past.  The patient remained asymptomatic.  He was monitored on telemetry.    On the day of discharge     SUBJECTIVE:  Overall feels better.  The patient has been passing flatus.  Nausea has improved.  The patient is emptied the ileostomy bag.  Abdominal pain has also improved. There is no history of overt GI bleed (Hematemesis, melena or hematochezia). The patient denies reflux.  No dysphagia or odynophagia.       UNDERLYING CHRONIC ILLNESS:  Hypothyroidism.      REVIEW OF SYSTEMS:      Constitutional: No fever or chills.  The patient had significant headache earlier on.  Now improved.  CNS: No seizures or stroke.  Cardiovascular: No chest pains.  No palpitations.  History of irregularity.  Pulmonary: No shortness of breath.  No cough.  Liver: No jaundice.  Rheumatologic: Pain in both wrists.  Improved.  Skin: No skin rash.  Renal: Making urine.  Denies hematuria or dysuria.  Psychiatric: Denies depression or anxiety.  Nutrition: Clear liquid diet.  Activities: The patient has used the bathroom and has walked in the hallway.      OBJECTIVE:  Alert and oriented ×3.  No apparent distress.    Blood pressure 138/80, pulse 84, temperature 97.7 °F (36.5 °C), resp. rate 18, height 65\" (165.1 " cm), weight 143 lb 1.6 oz (64.9 kg), SpO2 95 %.        HEENT: No icterus.  No pallor.  Oral mucosa moist.  Neck: Supple.  Chest: Clear to auscultation.  Cardiovascular: No S3 no murmurs.  Abdomen: Soft.  Non distended.  No significant abdominal tenderness.  No guarding or rigidity.  No rebound tenderness..  No hepatosplenomegaly.  No mass.  No ascites.Bowel sounds present.  Right-sided ileostomy intact.   Extremities: No edema.  Rectal: Deferred.  Stigmata of chronic liver disease: None.  Neurological: No focal motor neurological deficit.  Rheumatologic: No obvious joint swelling.      Laboratory tests:  Sodium 141 potassium 3.7 chloride 109 CO2 23 BUN 13 creatinine 1.7 and glucose 101.  T bili 0.9 AST 20 ALT 39 alkaline phosphatase 44.  WBC is 10.05 hemoglobin 13.1 hematocrit 37.9 and platelet count 135 K.     Dated August 3, 2017 sodium 140 potassium 4.4 chloride 106 CO2 24 BUN 14 creatinine 1.4 and glucose 96.  White count 6.98 hemoglobin 13.3 hematocrit 38.9 and platelet count 133.       Abdominal imaging:  Dated July 31, 2017 CT of abdomen and pelvis without contrast revealed clear lung bases. The heart size is normal. Note is made of numerous mediastinum. The limited noncontrast images of the  liver are normal. Stones are present within the gallbladder. The spleen is normal. No adrenal masses are seen.  The pancreas has an unremarkable unenhanced appearance.. The aorta is normal in caliber. There is no significant free fluid or adenopathy.  There is a suspected 3.8 cm mass in the superior pole of the right kidney. There is a non obstructing stone in the inferior pole of the left kidney. There is no  nephrolithiasis on the right. There is no hydronephrosis. There are postsurgical changes with ileostomy formation in the right hemiabdomen. There is extensive retroperitoneal and free intraperitoneal air. There  is pneumatosis involving the small bowel. The patient is status post prior posterior right  hemicolectomy.  The remainder of the colon is normal. The urinary bladder is unremarkable. There is no significant fluid or adenopathy. Evaluation of the extraperitoneal soft tissues reveals subcutaneous emphysema in the  left anterior and lateral body wall. This is seen to a lesser extent on the right.      Dated August 2, 2017 KUB revealed persistent retroperitoneal and free intra-abdominal air.    Dated August 3, 2017 CT of abdomen and pelvis with oral contrast only revealed interval improvement in the patient's small bowel pneumatosis and  some improvement in the retroperitoneal and mediastinal air. There is persistent free intraperitoneal air which has not changed significantly.  Areas of wall thickening involving the distal small bowel consistent with the given history of Crohn's disease.  Gallstones.  3.8 cm right renal mass       12-lead EKG:  Dated August 2, 2017 PVCs.    Pathology:  Dated 7/31/2017 Biopsies from terminal ileum revealed inflamed granulation ulcerated tissue intermixed with fragments of benign mucosa showing changes consistent with treated Crohn's disease.  No mucosal dysplasia or neoplasia was identified.  Areas of normal small bowel mucosa were also seen.  Biopsies from descending colon stricture, sigmoid colon and rectal biopsies revealed changes benign colonic mucosa with histological changes consistent with treated Crohn's disease.  No mucosal dysplasia or invasive neoplasia was seen.      Assessment and Plan:        1. Retroperitoneal and Free intra-abdominal air.  Post-ileoscopy and limited colonoscopy.  The patient is noted to have mucosal friability and easy mucosal bleeding.  A possible translocation of air across the mucosa remains a possibility.  Clinically it does not appear to be adolph perforation with leakage of fluids. The patient does not have peritoneal signs.  Improving.  2. Hypothyroidism, unspecified type.  The patient has been taking thyroid replacement  therapy.  3. History of Crohn's disease.  The patient has diversion ileostomy.  4. Left-sided diverticulosis.    5. Mucosal friability and easy bleeding within the left colon, and distal ileum.   6. Acute kidney injury.  Improved.  7. Migraine type headaches improved.    Plan       Low lactose-low residue diet for 1 week.   Levaquin 500 mg 1 by mouth daily for 3 days.  Flagyl 250 mg by mouth 3 times a day for 3 days.  Nexium 20 mg over-the-counter 1 by mouth daily half an hour before breakfast.  Phenergan 12.5 mg 1 by mouth every 8 hours when necessary nausea.    DIET: low lactose-low residue diet for 1 week.      ACTIVITIES: As tolerated. However the patient has been advised to avoid strenuous activity, excessive bending and lifting weights one week.      DISCHARGE MEDICATIONS:  As per conciliation form.      DISCONTINUED MEDICATIONS:  Iron supplements.    NEW MEDICATIONS STARTED:  1. Levaquin 500 mg 1 by mouth daily for 3 days.  2. Flagyl 250 mg by mouth 3 times a day for 3 days.  3. Pantoprazole 40 mg 1 by mouth every morning one half hour before breakfast.    4. Phenergan 12.5 mg 1 by mouth every 8 hours when necessary nausea.  5. The patient may take Tylenol 500 mg 1 by mouth every 6 hours when necessary pain.  6.   DISPOSITION: The patient will be discharged home in stable condition.    FOLLOW-UP:    Dr. CRYSTAL Galan August 7, 2017 with KUB, and chest x-ray.

## 2017-08-04 NOTE — PLAN OF CARE
Problem: Patient Care Overview (Adult)  Goal: Plan of Care Review    08/04/17 0357   Coping/Psychosocial Response Interventions   Plan Of Care Reviewed With patient;spouse   Patient Care Overview   Progress no change   Outcome Evaluation   Outcome Summary/Follow up Plan Patients headaches continue to come intermittently, other than that patient denies pain. continuing flagyl and protonics and clear liquid diet.          08/04/17 0357   Coping/Psychosocial Response Interventions   Plan Of Care Reviewed With patient;spouse   Patient Care Overview   Progress no change   Outcome Evaluation   Outcome Summary/Follow up Plan Patients headaches continue to come intermittently along with nausea, other than that patient denies pain. continuing flagyl and protonics and clear liquid diet.        Goal: Adult Individualization and Mutuality  Outcome: Ongoing (interventions implemented as appropriate)    08/04/17 0357   Individualization   Patient Specific Goals get headache pain and nausea under control   Patient Specific Interventions pain meds and nausea medication given       Goal: Discharge Needs Assessment  Outcome: Ongoing (interventions implemented as appropriate)    08/01/17 1459 08/04/17 0357   Discharge Needs Assessment   Concerns To Be Addressed --  denies needs/concerns at this time   Readmission Within The Last 30 Days --  no previous admission in last 30 days   Discharge Disposition --  still a patient   Current Health   Anticipated Changes Related to Illness --  none   Living Environment   Transportation Available family or friend will provide;car --    Self-Care   Equipment Currently Used at Home colostomy/ostomy supplies --          Problem: Pain, Acute (Adult)  Goal: Acceptable Pain Control/Comfort Level  Outcome: Ongoing (interventions implemented as appropriate)    08/04/17 0357   Pain, Acute (Adult)   Acceptable Pain Control/Comfort Level making progress toward outcome

## 2017-08-06 ENCOUNTER — HOSPITAL ENCOUNTER (INPATIENT)
Facility: HOSPITAL | Age: 57
LOS: 3 days | Discharge: HOME | End: 2017-08-09
Attending: EMERGENCY MEDICINE | Admitting: INTERNAL MEDICINE

## 2017-08-06 ENCOUNTER — APPOINTMENT (OUTPATIENT)
Dept: CT IMAGING | Facility: HOSPITAL | Age: 57
End: 2017-08-06

## 2017-08-06 ENCOUNTER — APPOINTMENT (OUTPATIENT)
Dept: GENERAL RADIOLOGY | Facility: HOSPITAL | Age: 57
End: 2017-08-06

## 2017-08-06 DIAGNOSIS — R10.13 EPIGASTRIC ABDOMINAL PAIN: Primary | ICD-10-CM

## 2017-08-06 DIAGNOSIS — J98.2 PNEUMOMEDIASTINUM (HCC): ICD-10-CM

## 2017-08-06 DIAGNOSIS — M25.572 ACUTE LEFT ANKLE PAIN: ICD-10-CM

## 2017-08-06 DIAGNOSIS — K66.8 PNEUMOPERITONEUM: ICD-10-CM

## 2017-08-06 DIAGNOSIS — N28.89 RIGHT RENAL MASS: ICD-10-CM

## 2017-08-06 LAB
ALBUMIN SERPL-MCNC: 3.9 G/DL (ref 3.5–5)
ALBUMIN/GLOB SERPL: 1.2 G/DL (ref 1–2)
ALP SERPL-CCNC: 104 U/L (ref 38–126)
ALT SERPL W P-5'-P-CCNC: 36 U/L (ref 13–69)
ANION GAP SERPL CALCULATED.3IONS-SCNC: 15.4 MMOL/L
AST SERPL-CCNC: 27 U/L (ref 15–46)
BACTERIA UR QL AUTO: ABNORMAL /HPF
BASOPHILS # BLD AUTO: 0.03 10*3/MM3 (ref 0–0.2)
BASOPHILS NFR BLD AUTO: 0.2 % (ref 0–2.5)
BILIRUB SERPL-MCNC: 0.8 MG/DL (ref 0.2–1.3)
BILIRUB UR QL STRIP: ABNORMAL
BUN BLD-MCNC: 22 MG/DL (ref 7–20)
BUN/CREAT SERPL: 14.7 (ref 6.3–21.9)
CALCIUM SPEC-SCNC: 9.6 MG/DL (ref 8.4–10.2)
CHLORIDE SERPL-SCNC: 101 MMOL/L (ref 98–107)
CLARITY UR: CLEAR
CO2 SERPL-SCNC: 23 MMOL/L (ref 26–30)
COLOR UR: YELLOW
CREAT BLD-MCNC: 1.5 MG/DL (ref 0.6–1.3)
DEPRECATED RDW RBC AUTO: 44.5 FL (ref 37–54)
EOSINOPHIL # BLD AUTO: 0.33 10*3/MM3 (ref 0–0.7)
EOSINOPHIL NFR BLD AUTO: 2.2 % (ref 0–7)
ERYTHROCYTE [DISTWIDTH] IN BLOOD BY AUTOMATED COUNT: 13.2 % (ref 11.5–14.5)
GFR SERPL CREATININE-BSD FRML MDRD: 49 ML/MIN/1.73
GLOBULIN UR ELPH-MCNC: 3.2 GM/DL
GLUCOSE BLD-MCNC: 115 MG/DL (ref 74–98)
GLUCOSE UR STRIP-MCNC: NEGATIVE MG/DL
HCT VFR BLD AUTO: 42 % (ref 42–52)
HGB BLD-MCNC: 14.7 G/DL (ref 14–18)
HGB UR QL STRIP.AUTO: NEGATIVE
HYALINE CASTS UR QL AUTO: ABNORMAL /LPF
IMM GRANULOCYTES # BLD: 0.1 10*3/MM3 (ref 0–0.06)
IMM GRANULOCYTES NFR BLD: 0.7 % (ref 0–0.6)
KETONES UR QL STRIP: ABNORMAL
LEUKOCYTE ESTERASE UR QL STRIP.AUTO: NEGATIVE
LIPASE SERPL-CCNC: 524 U/L (ref 23–300)
LYMPHOCYTES # BLD AUTO: 1.01 10*3/MM3 (ref 0.6–3.4)
LYMPHOCYTES NFR BLD AUTO: 6.7 % (ref 10–50)
MCH RBC QN AUTO: 32 PG (ref 27–31)
MCHC RBC AUTO-ENTMCNC: 35 G/DL (ref 30–37)
MCV RBC AUTO: 91.5 FL (ref 80–94)
MONOCYTES # BLD AUTO: 1.38 10*3/MM3 (ref 0–0.9)
MONOCYTES NFR BLD AUTO: 9.1 % (ref 0–12)
MUCOUS THREADS URNS QL MICRO: ABNORMAL /HPF
NEUTROPHILS # BLD AUTO: 12.25 10*3/MM3 (ref 2–6.9)
NEUTROPHILS NFR BLD AUTO: 81.1 % (ref 37–80)
NITRITE UR QL STRIP: NEGATIVE
NRBC BLD MANUAL-RTO: 0 /100 WBC (ref 0–0)
PH UR STRIP.AUTO: 6 [PH] (ref 5–8)
PLATELET # BLD AUTO: 200 10*3/MM3 (ref 130–400)
PMV BLD AUTO: 10 FL (ref 6–12)
POTASSIUM BLD-SCNC: 3.4 MMOL/L (ref 3.5–5.1)
PROT SERPL-MCNC: 7.1 G/DL (ref 6.3–8.2)
PROT UR QL STRIP: ABNORMAL
RBC # BLD AUTO: 4.59 10*6/MM3 (ref 4.7–6.1)
RBC # UR: ABNORMAL /HPF
REF LAB TEST METHOD: ABNORMAL
SODIUM BLD-SCNC: 136 MMOL/L (ref 137–145)
SP GR UR STRIP: 1.02 (ref 1–1.03)
SQUAMOUS #/AREA URNS HPF: ABNORMAL /HPF
TROPONIN I SERPL-MCNC: <0.012 NG/ML (ref 0–0.03)
URATE SERPL-MCNC: 6.6 MG/DL (ref 2.5–8.5)
UROBILINOGEN UR QL STRIP: ABNORMAL
WBC NRBC COR # BLD: 15.1 10*3/MM3 (ref 4.8–10.8)
WBC UR QL AUTO: ABNORMAL /HPF

## 2017-08-06 PROCEDURE — 25010000002 PROMETHAZINE PER 50 MG: Performed by: PHYSICIAN ASSISTANT

## 2017-08-06 PROCEDURE — 25010000002 PIPERACILLIN SOD-TAZOBACTAM PER 1 G: Performed by: PHYSICIAN ASSISTANT

## 2017-08-06 PROCEDURE — 93005 ELECTROCARDIOGRAM TRACING: CPT | Performed by: PHYSICIAN ASSISTANT

## 2017-08-06 PROCEDURE — 74000 HC ABDOMEN KUB: CPT

## 2017-08-06 PROCEDURE — 83690 ASSAY OF LIPASE: CPT | Performed by: EMERGENCY MEDICINE

## 2017-08-06 PROCEDURE — 80053 COMPREHEN METABOLIC PANEL: CPT | Performed by: EMERGENCY MEDICINE

## 2017-08-06 PROCEDURE — 84484 ASSAY OF TROPONIN QUANT: CPT | Performed by: PHYSICIAN ASSISTANT

## 2017-08-06 PROCEDURE — 74176 CT ABD & PELVIS W/O CONTRAST: CPT

## 2017-08-06 PROCEDURE — 85025 COMPLETE CBC W/AUTO DIFF WBC: CPT | Performed by: EMERGENCY MEDICINE

## 2017-08-06 PROCEDURE — 71010 HC CHEST PA OR AP: CPT

## 2017-08-06 PROCEDURE — 81003 URINALYSIS AUTO W/O SCOPE: CPT | Performed by: EMERGENCY MEDICINE

## 2017-08-06 PROCEDURE — 81001 URINALYSIS AUTO W/SCOPE: CPT | Performed by: EMERGENCY MEDICINE

## 2017-08-06 PROCEDURE — 99222 1ST HOSP IP/OBS MODERATE 55: CPT | Performed by: INTERNAL MEDICINE

## 2017-08-06 PROCEDURE — 99285 EMERGENCY DEPT VISIT HI MDM: CPT

## 2017-08-06 PROCEDURE — 84550 ASSAY OF BLOOD/URIC ACID: CPT | Performed by: EMERGENCY MEDICINE

## 2017-08-06 PROCEDURE — 25010000002 MORPHINE PER 10 MG: Performed by: EMERGENCY MEDICINE

## 2017-08-06 PROCEDURE — 73610 X-RAY EXAM OF ANKLE: CPT

## 2017-08-06 RX ORDER — ONDANSETRON 2 MG/ML
4 INJECTION INTRAMUSCULAR; INTRAVENOUS ONCE
Status: DISCONTINUED | OUTPATIENT
Start: 2017-08-06 | End: 2017-08-06

## 2017-08-06 RX ORDER — MORPHINE SULFATE 4 MG/ML
4 INJECTION, SOLUTION INTRAMUSCULAR; INTRAVENOUS ONCE
Status: COMPLETED | OUTPATIENT
Start: 2017-08-06 | End: 2017-08-06

## 2017-08-06 RX ORDER — PROMETHAZINE HYDROCHLORIDE 25 MG/ML
12.5 INJECTION, SOLUTION INTRAMUSCULAR; INTRAVENOUS ONCE
Status: COMPLETED | OUTPATIENT
Start: 2017-08-06 | End: 2017-08-06

## 2017-08-06 RX ADMIN — MORPHINE SULFATE 4 MG: 4 INJECTION, SOLUTION INTRAMUSCULAR; INTRAVENOUS at 22:08

## 2017-08-06 RX ADMIN — MORPHINE SULFATE 4 MG: 4 INJECTION, SOLUTION INTRAMUSCULAR; INTRAVENOUS at 20:17

## 2017-08-06 RX ADMIN — PIPERACILLIN SODIUM AND TAZOBACTAM SODIUM 3.38 G: 3; .375 INJECTION, POWDER, FOR SOLUTION INTRAVENOUS at 21:58

## 2017-08-06 RX ADMIN — PROMETHAZINE HYDROCHLORIDE 12.5 MG: 25 INJECTION INTRAMUSCULAR; INTRAVENOUS at 20:13

## 2017-08-07 ENCOUNTER — APPOINTMENT (OUTPATIENT)
Dept: ULTRASOUND IMAGING | Facility: HOSPITAL | Age: 57
End: 2017-08-07

## 2017-08-07 PROBLEM — E03.9 ACQUIRED HYPOTHYROIDISM: Status: ACTIVE | Noted: 2017-08-07

## 2017-08-07 PROBLEM — N18.30 CHRONIC KIDNEY DISEASE, STAGE III (MODERATE): Status: ACTIVE | Noted: 2017-08-07

## 2017-08-07 PROBLEM — K50.119 CROHN'S DISEASE OF LARGE INTESTINE WITH COMPLICATION: Status: ACTIVE | Noted: 2017-08-07

## 2017-08-07 PROBLEM — M19.90 INFLAMMATORY ARTHRITIS: Status: ACTIVE | Noted: 2017-08-07

## 2017-08-07 LAB
ANION GAP SERPL CALCULATED.3IONS-SCNC: 16 MMOL/L
BASOPHILS # BLD AUTO: 0.05 10*3/MM3 (ref 0–0.2)
BASOPHILS NFR BLD AUTO: 0.4 % (ref 0–2.5)
BUN BLD-MCNC: 24 MG/DL (ref 7–20)
BUN/CREAT SERPL: 14.1 (ref 6.3–21.9)
C DIFF TOX A+B STL QL IA: NEGATIVE
CALCIUM SPEC-SCNC: 9.3 MG/DL (ref 8.4–10.2)
CHLORIDE SERPL-SCNC: 99 MMOL/L (ref 98–107)
CO2 SERPL-SCNC: 27 MMOL/L (ref 26–30)
CREAT BLD-MCNC: 1.7 MG/DL (ref 0.6–1.3)
DEPRECATED RDW RBC AUTO: 46.1 FL (ref 37–54)
EOSINOPHIL # BLD AUTO: 0.36 10*3/MM3 (ref 0–0.7)
EOSINOPHIL NFR BLD AUTO: 2.7 % (ref 0–7)
ERYTHROCYTE [DISTWIDTH] IN BLOOD BY AUTOMATED COUNT: 13.5 % (ref 11.5–14.5)
GFR SERPL CREATININE-BSD FRML MDRD: 42 ML/MIN/1.73
GLUCOSE BLD-MCNC: 145 MG/DL (ref 74–98)
GLUCOSE BLDC GLUCOMTR-MCNC: 121 MG/DL (ref 70–130)
HCT VFR BLD AUTO: 43.8 % (ref 42–52)
HGB BLD-MCNC: 15.1 G/DL (ref 14–18)
IMM GRANULOCYTES # BLD: 0.1 10*3/MM3 (ref 0–0.06)
IMM GRANULOCYTES NFR BLD: 0.7 % (ref 0–0.6)
LYMPHOCYTES # BLD AUTO: 0.9 10*3/MM3 (ref 0.6–3.4)
LYMPHOCYTES NFR BLD AUTO: 6.7 % (ref 10–50)
MAGNESIUM SERPL-MCNC: 1 MG/DL (ref 1.6–2.3)
MCH RBC QN AUTO: 32.3 PG (ref 27–31)
MCHC RBC AUTO-ENTMCNC: 34.5 G/DL (ref 30–37)
MCV RBC AUTO: 93.6 FL (ref 80–94)
MONOCYTES # BLD AUTO: 1.41 10*3/MM3 (ref 0–0.9)
MONOCYTES NFR BLD AUTO: 10.5 % (ref 0–12)
NEUTROPHILS # BLD AUTO: 10.6 10*3/MM3 (ref 2–6.9)
NEUTROPHILS NFR BLD AUTO: 79 % (ref 37–80)
NRBC BLD MANUAL-RTO: 0 /100 WBC (ref 0–0)
PLATELET # BLD AUTO: 174 10*3/MM3 (ref 130–400)
PMV BLD AUTO: 10.4 FL (ref 6–12)
POTASSIUM BLD-SCNC: 4 MMOL/L (ref 3.5–5.1)
RBC # BLD AUTO: 4.68 10*6/MM3 (ref 4.7–6.1)
SODIUM BLD-SCNC: 138 MMOL/L (ref 137–145)
TROPONIN I SERPL-MCNC: <0.012 NG/ML (ref 0–0.03)
TSH SERPL DL<=0.05 MIU/L-ACNC: 29.2 MIU/ML (ref 0.47–4.68)
WBC NRBC COR # BLD: 13.42 10*3/MM3 (ref 4.8–10.8)
WBC STL QL MICRO: NORMAL

## 2017-08-07 PROCEDURE — 87324 CLOSTRIDIUM AG IA: CPT | Performed by: INTERNAL MEDICINE

## 2017-08-07 PROCEDURE — 84443 ASSAY THYROID STIM HORMONE: CPT | Performed by: INTERNAL MEDICINE

## 2017-08-07 PROCEDURE — 25010000002 ENOXAPARIN PER 10 MG: Performed by: INTERNAL MEDICINE

## 2017-08-07 PROCEDURE — 85025 COMPLETE CBC W/AUTO DIFF WBC: CPT | Performed by: INTERNAL MEDICINE

## 2017-08-07 PROCEDURE — 25010000002 MAGNESIUM SULFATE 2 GM/50ML SOLUTION: Performed by: INTERNAL MEDICINE

## 2017-08-07 PROCEDURE — 25010000002 MORPHINE PER 10 MG: Performed by: INTERNAL MEDICINE

## 2017-08-07 PROCEDURE — 80048 BASIC METABOLIC PNL TOTAL CA: CPT | Performed by: INTERNAL MEDICINE

## 2017-08-07 PROCEDURE — 83735 ASSAY OF MAGNESIUM: CPT | Performed by: INTERNAL MEDICINE

## 2017-08-07 PROCEDURE — 87045 FECES CULTURE AEROBIC BACT: CPT | Performed by: INTERNAL MEDICINE

## 2017-08-07 PROCEDURE — 87046 STOOL CULTR AEROBIC BACT EA: CPT | Performed by: INTERNAL MEDICINE

## 2017-08-07 PROCEDURE — 99232 SBSQ HOSP IP/OBS MODERATE 35: CPT | Performed by: INTERNAL MEDICINE

## 2017-08-07 PROCEDURE — 25010000002 PIPERACILLIN SOD-TAZOBACTAM PER 1 G: Performed by: INTERNAL MEDICINE

## 2017-08-07 PROCEDURE — 87106 FUNGI IDENTIFICATION YEAST: CPT | Performed by: INTERNAL MEDICINE

## 2017-08-07 PROCEDURE — 87205 SMEAR GRAM STAIN: CPT | Performed by: INTERNAL MEDICINE

## 2017-08-07 PROCEDURE — 25010000002 METHYLPREDNISOLONE PER 125 MG: Performed by: INTERNAL MEDICINE

## 2017-08-07 PROCEDURE — 84484 ASSAY OF TROPONIN QUANT: CPT | Performed by: INTERNAL MEDICINE

## 2017-08-07 PROCEDURE — 99254 IP/OBS CNSLTJ NEW/EST MOD 60: CPT | Performed by: INTERNAL MEDICINE

## 2017-08-07 PROCEDURE — 99254 IP/OBS CNSLTJ NEW/EST MOD 60: CPT | Performed by: SURGERY

## 2017-08-07 PROCEDURE — 76775 US EXAM ABDO BACK WALL LIM: CPT

## 2017-08-07 PROCEDURE — 82962 GLUCOSE BLOOD TEST: CPT

## 2017-08-07 PROCEDURE — 25010000002 METHYLPREDNISOLONE PER 40 MG: Performed by: INTERNAL MEDICINE

## 2017-08-07 RX ORDER — NALOXONE HCL 0.4 MG/ML
0.4 VIAL (ML) INJECTION
Status: DISCONTINUED | OUTPATIENT
Start: 2017-08-07 | End: 2017-08-09 | Stop reason: HOSPADM

## 2017-08-07 RX ORDER — ONDANSETRON 2 MG/ML
4 INJECTION INTRAMUSCULAR; INTRAVENOUS EVERY 6 HOURS PRN
Status: DISCONTINUED | OUTPATIENT
Start: 2017-08-07 | End: 2017-08-09 | Stop reason: HOSPADM

## 2017-08-07 RX ORDER — PANTOPRAZOLE SODIUM 40 MG/1
40 TABLET, DELAYED RELEASE ORAL
Status: DISCONTINUED | OUTPATIENT
Start: 2017-08-07 | End: 2017-08-09 | Stop reason: HOSPADM

## 2017-08-07 RX ORDER — MORPHINE SULFATE 2 MG/ML
1 INJECTION, SOLUTION INTRAMUSCULAR; INTRAVENOUS EVERY 4 HOURS PRN
Status: DISCONTINUED | OUTPATIENT
Start: 2017-08-07 | End: 2017-08-09 | Stop reason: HOSPADM

## 2017-08-07 RX ORDER — IBUPROFEN 400 MG/1
400 TABLET ORAL ONCE
Status: DISCONTINUED | OUTPATIENT
Start: 2017-08-07 | End: 2017-08-07

## 2017-08-07 RX ORDER — ONDANSETRON 4 MG/1
4 TABLET, FILM COATED ORAL EVERY 6 HOURS PRN
Status: DISCONTINUED | OUTPATIENT
Start: 2017-08-07 | End: 2017-08-09 | Stop reason: HOSPADM

## 2017-08-07 RX ORDER — MAGNESIUM SULFATE HEPTAHYDRATE 40 MG/ML
2 INJECTION, SOLUTION INTRAVENOUS ONCE
Status: DISCONTINUED | OUTPATIENT
Start: 2017-08-07 | End: 2017-08-07 | Stop reason: SDUPTHER

## 2017-08-07 RX ORDER — METHYLPREDNISOLONE SODIUM SUCCINATE 125 MG/2ML
60 INJECTION, POWDER, LYOPHILIZED, FOR SOLUTION INTRAMUSCULAR; INTRAVENOUS ONCE
Status: COMPLETED | OUTPATIENT
Start: 2017-08-07 | End: 2017-08-07

## 2017-08-07 RX ORDER — IBUPROFEN 400 MG/1
400 TABLET ORAL ONCE
Status: COMPLETED | OUTPATIENT
Start: 2017-08-07 | End: 2017-08-07

## 2017-08-07 RX ORDER — SODIUM CHLORIDE 0.9 % (FLUSH) 0.9 %
1-10 SYRINGE (ML) INJECTION AS NEEDED
Status: DISCONTINUED | OUTPATIENT
Start: 2017-08-07 | End: 2017-08-09 | Stop reason: HOSPADM

## 2017-08-07 RX ORDER — OXYCODONE HYDROCHLORIDE AND ACETAMINOPHEN 5; 325 MG/1; MG/1
1 TABLET ORAL EVERY 4 HOURS PRN
Status: DISCONTINUED | OUTPATIENT
Start: 2017-08-07 | End: 2017-08-08

## 2017-08-07 RX ORDER — MELOXICAM 7.5 MG/1
15 TABLET ORAL DAILY
Status: DISCONTINUED | OUTPATIENT
Start: 2017-08-07 | End: 2017-08-09 | Stop reason: HOSPADM

## 2017-08-07 RX ORDER — MAGNESIUM SULFATE HEPTAHYDRATE 40 MG/ML
2 INJECTION, SOLUTION INTRAVENOUS ONCE
Status: COMPLETED | OUTPATIENT
Start: 2017-08-07 | End: 2017-08-07

## 2017-08-07 RX ORDER — ONDANSETRON 4 MG/1
4 TABLET, ORALLY DISINTEGRATING ORAL EVERY 6 HOURS PRN
Status: DISCONTINUED | OUTPATIENT
Start: 2017-08-07 | End: 2017-08-09 | Stop reason: HOSPADM

## 2017-08-07 RX ORDER — ACETAMINOPHEN 325 MG/1
650 TABLET ORAL EVERY 4 HOURS PRN
Status: DISCONTINUED | OUTPATIENT
Start: 2017-08-07 | End: 2017-08-09 | Stop reason: HOSPADM

## 2017-08-07 RX ORDER — LEVOTHYROXINE SODIUM 0.1 MG/1
200 TABLET ORAL
Status: DISCONTINUED | OUTPATIENT
Start: 2017-08-07 | End: 2017-08-09 | Stop reason: HOSPADM

## 2017-08-07 RX ORDER — METHYLPREDNISOLONE SODIUM SUCCINATE 40 MG/ML
40 INJECTION, POWDER, LYOPHILIZED, FOR SOLUTION INTRAMUSCULAR; INTRAVENOUS EVERY 8 HOURS
Status: DISCONTINUED | OUTPATIENT
Start: 2017-08-07 | End: 2017-08-08

## 2017-08-07 RX ADMIN — PIPERACILLIN SODIUM AND TAZOBACTAM SODIUM 3.38 G: 3; .375 INJECTION, POWDER, FOR SOLUTION INTRAVENOUS at 22:34

## 2017-08-07 RX ADMIN — MORPHINE SULFATE 1 MG: 2 INJECTION, SOLUTION INTRAMUSCULAR; INTRAVENOUS at 06:34

## 2017-08-07 RX ADMIN — PANTOPRAZOLE SODIUM 40 MG: 40 TABLET, DELAYED RELEASE ORAL at 05:32

## 2017-08-07 RX ADMIN — OXYCODONE AND ACETAMINOPHEN 1 TABLET: 5; 325 TABLET ORAL at 18:04

## 2017-08-07 RX ADMIN — MAGNESIUM SULFATE HEPTAHYDRATE 2 G: 40 INJECTION, SOLUTION INTRAVENOUS at 08:43

## 2017-08-07 RX ADMIN — MORPHINE SULFATE 1 MG: 2 INJECTION, SOLUTION INTRAMUSCULAR; INTRAVENOUS at 23:06

## 2017-08-07 RX ADMIN — MELOXICAM 15 MG: 7.5 TABLET ORAL at 08:41

## 2017-08-07 RX ADMIN — PIPERACILLIN SODIUM AND TAZOBACTAM SODIUM 3.38 G: 3; .375 INJECTION, POWDER, FOR SOLUTION INTRAVENOUS at 05:33

## 2017-08-07 RX ADMIN — OXYCODONE AND ACETAMINOPHEN 1 TABLET: 5; 325 TABLET ORAL at 00:47

## 2017-08-07 RX ADMIN — MORPHINE SULFATE 1 MG: 2 INJECTION, SOLUTION INTRAMUSCULAR; INTRAVENOUS at 19:04

## 2017-08-07 RX ADMIN — OXYCODONE AND ACETAMINOPHEN 1 TABLET: 5; 325 TABLET ORAL at 21:55

## 2017-08-07 RX ADMIN — OXYCODONE AND ACETAMINOPHEN 1 TABLET: 5; 325 TABLET ORAL at 04:38

## 2017-08-07 RX ADMIN — OXYCODONE AND ACETAMINOPHEN 1 TABLET: 5; 325 TABLET ORAL at 14:03

## 2017-08-07 RX ADMIN — OXYCODONE AND ACETAMINOPHEN 1 TABLET: 5; 325 TABLET ORAL at 08:42

## 2017-08-07 RX ADMIN — METHYLPREDNISOLONE SODIUM SUCCINATE 40 MG: 40 INJECTION, POWDER, FOR SOLUTION INTRAMUSCULAR; INTRAVENOUS at 18:04

## 2017-08-07 RX ADMIN — PIPERACILLIN SODIUM AND TAZOBACTAM SODIUM 3.38 G: 3; .375 INJECTION, POWDER, FOR SOLUTION INTRAVENOUS at 18:05

## 2017-08-07 RX ADMIN — MORPHINE SULFATE 1 MG: 2 INJECTION, SOLUTION INTRAMUSCULAR; INTRAVENOUS at 14:53

## 2017-08-07 RX ADMIN — MORPHINE SULFATE 1 MG: 2 INJECTION, SOLUTION INTRAMUSCULAR; INTRAVENOUS at 10:44

## 2017-08-07 RX ADMIN — IBUPROFEN 400 MG: 400 TABLET, FILM COATED ORAL at 01:33

## 2017-08-07 RX ADMIN — LEVOTHYROXINE SODIUM 200 MCG: 100 TABLET ORAL at 05:32

## 2017-08-07 RX ADMIN — MORPHINE SULFATE 1 MG: 2 INJECTION, SOLUTION INTRAMUSCULAR; INTRAVENOUS at 02:36

## 2017-08-07 RX ADMIN — ENOXAPARIN SODIUM 40 MG: 40 INJECTION SUBCUTANEOUS at 08:45

## 2017-08-07 RX ADMIN — PIPERACILLIN SODIUM AND TAZOBACTAM SODIUM 3.38 G: 3; .375 INJECTION, POWDER, FOR SOLUTION INTRAVENOUS at 12:02

## 2017-08-07 RX ADMIN — METHYLPREDNISOLONE SODIUM SUCCINATE 60 MG: 125 INJECTION, POWDER, FOR SOLUTION INTRAMUSCULAR; INTRAVENOUS at 10:46

## 2017-08-07 NOTE — ED PROVIDER NOTES
"Subjective   HPI Comments: 57-year-old male with history of Crohn's disease.  Status post colonoscopy with an apparent perforation causing pneumoperitoneum and pneumomediastinum on Monday, July 31.  He spent several days in the hospital and was discharged on August 4 17.  He is now here with a one-day history of epigastric burning into his lower sternum described as indigestion as well as severe, sharp, achy type pain to his left lateral deltoid region as well as similar pain to his left anterior ankle and foot dorsum with associated redness and heat to the left foot and ankle.  No injury to his shoulder or ankle/foot.  Also complains of being short of breath.  No nausea, vomiting or diaphoresis.  He also complains of chronic diarrhea in his ileostomy bag.  No history of gout.    Aggravating factors: Deep breathing.  Alleviating factors: None.  Treatment prior to arrival: OTC medications.      History provided by:  Patient  History limited by:  Acuity of condition   used: No        Review of Systems   Constitutional: Negative.    HENT: Negative.    Eyes: Negative.    Respiratory: Positive for shortness of breath.    Cardiovascular: Positive for chest pain.   Gastrointestinal: Positive for abdominal pain.   Endocrine: Negative.    Genitourinary: Negative for dysuria.   Musculoskeletal: Positive for joint swelling.        Redness and heat to left foot/ankle.   Skin: Negative.    Allergic/Immunologic: Negative.    Neurological: Negative.    Hematological: Negative.    Psychiatric/Behavioral: Negative.    All other systems reviewed and are negative.      Past Medical History:   Diagnosis Date   • Altered bowel elimination due to intestinal ostomy     PATIENT REPORTS SECONDARY TO A COLON RUPTURE APPROXIMATELY 16-17 YEARS AGO   • Anemia    • Arthritis     WRIST   • GERD (gastroesophageal reflux disease)    • History of MRSA infection     REPORTS HX OF \"NOSE\" 2-3 YEARS AGO, TREATED.   • History of " pneumonia    • Hypothyroid    • Spinal headache    • Wears glasses        Allergies   Allergen Reactions   • Latex Rash       Past Surgical History:   Procedure Laterality Date   • COLONOSCOPY     • COLONOSCOPY N/A 7/31/2017    Procedure: Colonoscopy through ostomy site and rectum with biopsies;  Surgeon: Vincenzo Galan MD;  Location: [facility] ENDOSCOPY;  Service:    • ENDOSCOPY     • HEMORRHOIDECTOMY      REPORTS APPROXIMATELY 20 YEARS AGO   • HERNIA REPAIR      UMBILICAL   • SMALL INTESTINE SURGERY      HX OF COLON RUPTURE WITH PLACEMENT OF OSTOMY   • WISDOM TOOTH EXTRACTION     • WRIST SURGERY Right        Family History   Problem Relation Age of Onset   • Colon cancer Neg Hx        Social History     Social History   • Marital status:      Spouse name: N/A   • Number of children: N/A   • Years of education: N/A     Social History Main Topics   • Smoking status: Former Smoker     Types: Cigarettes   • Smokeless tobacco: Never Used      Comment: QUIT 30 PLUS YEARS AGO   • Alcohol use No      Comment: QUIT 30 PLUS YEARS   • Drug use: No   • Sexual activity: Defer     Other Topics Concern   • None     Social History Narrative           Objective   Physical Exam   Constitutional: He is oriented to person, place, and time. He appears well-developed and well-nourished. No distress.   HENT:   Head: Normocephalic and atraumatic.   Right Ear: External ear normal.   Left Ear: External ear normal.   Eyes: EOM are normal. Pupils are equal, round, and reactive to light.   Neck: Normal range of motion. Neck supple.   Cardiovascular: Normal rate, regular rhythm and normal heart sounds.    Pulmonary/Chest: Effort normal and breath sounds normal. No stridor. He has no wheezes. He exhibits no tenderness.   Abdominal: Soft. He exhibits no distension. There is tenderness (epigastric region is tender to palpation.). There is no rebound and no guarding.   The patient has an ileostomy bag in the right lower quadrant.    Musculoskeletal: Normal range of motion. He exhibits no edema.   The left shoulder is tender to palpation over the lateral deltoid.  No redness or heat to the left shoulder.  Some pain on range of motion.  The patient has erythema and heat to the left anterior ankle and left dorsal foot and it is exquisitely tender to palpation.  No bony tenderness.  Neurovascular status is intact to the left arm and leg.   Neurological: He is alert and oriented to person, place, and time.   Skin: Skin is warm and dry. No rash noted. He is not diaphoretic.   Psychiatric: He has a normal mood and affect. His behavior is normal. Judgment and thought content normal.   Nursing note and vitals reviewed.      ECG 12 Lead    Date/Time: 8/6/2017 11:44 PM  Performed by: GREGORY MCDERMOTT  Authorized by: GREGORY MCDERMOTT   Comments: EKG: Sinus tachycardia rate of 118.  Nonspecific ST and T-wave changes.  No infarction or ischemia.  No ectopy.               ED Course  ED Course   Comment By Time   Nicole:  ct abd and pelvis with no contrast. Call back if fluid collection. Continue cipro and flagyl.  Zosyn here. Pt noncompliant with ng tube and other interventions in the past. Gregory Mcdermott PA-C 08/06 2137   I discussed the case with Dr. Rivera: He is aware of the CT report and renal mass.  No fluid collections.  Persistent pneumomediastinum and pneumoperitoneum.  Admit, Zosyn, NG tube, Phenergan.  IV fluids.  Nothing by mouth.  Admit to hospitalist service. Gregory Mcdermott PA-C 08/06 2302   I spoke to Dr. Johnson: Consult surgery first if they will see him in consultation, he will be happy to admit. Gregory Mcdermott PA-C 08/06 2312   I spoke with Dr. Garza: Will be available for consultation.  If Leslee Rivera thinks the patient needs surgery, he may need to be referred to [place]. Gregory Mcdermott PA-C 08/06 2320   Elizabeth: admit, tele. Gregory Mcdermott PA-C 08/06 2322   Patient refusing to have an NG tube inserted. Gregory Mcdermott PA-C 08/06 0844                   MDM  Number of Diagnoses or Management Options  Acute left ankle pain: new and requires workup  Epigastric abdominal pain: new and requires workup  Pneumomediastinum: new and requires workup  Pneumoperitoneum: new and requires workup  Right renal mass: new and requires workup     Amount and/or Complexity of Data Reviewed  Clinical lab tests: reviewed and ordered  Tests in the radiology section of CPT®: ordered and reviewed  Tests in the medicine section of CPT®: ordered and reviewed  Decide to obtain previous medical records or to obtain history from someone other than the patient: yes  Discuss the patient with other providers: yes  Independent visualization of images, tracings, or specimens: yes    Risk of Complications, Morbidity, and/or Mortality  Presenting problems: moderate  Management options: moderate  General comments: Patient being admitted for the diagnoses mentioned.  He was unaware of the right renal mass, which I made him aware of today.  He understands that he may need to have a biopsy of this at some point to rule out cancer.  Dr. Candelario and I have discussed this case in detail.  The left ankle pain may well very be from gout or cellulitis.  Left shoulder pain is likely from diaphragmatic irritation from pneumoperitoneum.    Patient Progress  Patient progress: stable      Final diagnoses:   Epigastric abdominal pain   Pneumoperitoneum   Pneumomediastinum   Acute left ankle pain   Right renal mass            Louie Gore PA-C  08/06/17 3158       Louie Gore PA-C  08/06/17 5706

## 2017-08-07 NOTE — CONSULTS
Jorge Mclaughlin    1960    Primary Care Provider: Yazan Corbett MD    Reason for Consultation: retroperitoneal air    Chief Complaint:   Chief Complaint   Patient presents with   • Ankle Pain   • Shoulder Pain       Subjective .     History of present illness:  I did see the patient today as a consultation for evaluation of retroperitoneal air.  Apparently the patient has a history significant for Crohn's disease and has had a ileostomy for the past 17 years after an episode of complicated Crohn's disease.  He has had previous colonoscopy in the past several years and did have an episode of free air and retroperitoneal air after endoscopy after it was found that he had a very friable mucosa at that time.    He did have a colonoscopy last week per Dr. Galan and was noted to have retroperitoneal air, he was discharged last week after appropriate treatment and observation.  He represented to the ER last pm with complaints of chest pain and arthritic type pain.  I have been asked to see the patient in referral.    Review of Systems:  Constitutional:  Negative for chills, fever, and unexpected weight change.  HENT: Negative for trouble swallowing and voice change.  Eyes:  Negative for visual disturbance.  Respiratory:  Negative for apnea, cough, chest tightness, shortness of breath, and wheezing.  Cardiovascular:  Negative for chest pain, palpitations, and leg swelling.  Gastrointestinal:  Negative for abdominal distention, abdominal pain, anal bleeding, blood in stool, constipation, diarrhea, nausea, rectal pain, and vomiting.  Musculoskeletal:  There is right wrist and shoulder pain, there is left ankle pain, there is associated swelling noted  Skin:  Negative for color change, rash, and wound  Neurological:  Negative for dizziness, syncope, speech difficulty, weakness, numbness, and headaches.  Hematological:  Negative for adenopathy.  Does not bruise/bleed easily.  Psychiatric/Behavioral:  Negative for  "confusion.  The patient is not nervous/anxious.        History:    Past Medical History:   Diagnosis Date   • Altered bowel elimination due to intestinal ostomy     PATIENT REPORTS SECONDARY TO A COLON RUPTURE APPROXIMATELY 16-17 YEARS AGO   • Anemia    • Arthritis     WRIST   • GERD (gastroesophageal reflux disease)    • History of MRSA infection     REPORTS HX OF \"NOSE\" 2-3 YEARS AGO, TREATED.   • History of pneumonia    • Hypothyroid    • Spinal headache    • Wears glasses        Past Surgical History:   Procedure Laterality Date   • COLONOSCOPY     • COLONOSCOPY N/A 7/31/2017    Procedure: Colonoscopy through ostomy site and rectum with biopsies;  Surgeon: Vincenzo Galan MD;  Location: [facility] ENDOSCOPY;  Service:    • ENDOSCOPY     • HEMORRHOIDECTOMY      REPORTS APPROXIMATELY 20 YEARS AGO   • HERNIA REPAIR      UMBILICAL   • SMALL INTESTINE SURGERY      HX OF COLON RUPTURE WITH PLACEMENT OF OSTOMY   • WISDOM TOOTH EXTRACTION     • WRIST SURGERY Right        Family History   Problem Relation Age of Onset   • Colon cancer Neg Hx        Social History     Social History   • Marital status:      Spouse name: N/A   • Number of children: N/A   • Years of education: N/A     Occupational History   • Not on file.     Social History Main Topics   • Smoking status: Former Smoker     Types: Cigarettes   • Smokeless tobacco: Never Used      Comment: QUIT 30 PLUS YEARS AGO   • Alcohol use No      Comment: QUIT 30 PLUS YEARS   • Drug use: No   • Sexual activity: Defer     Other Topics Concern   • Not on file     Social History Narrative       Allergies:  Allergies   Allergen Reactions   • Latex Rash       Medications:    Current Facility-Administered Medications:   •  acetaminophen (TYLENOL) tablet 650 mg, 650 mg, Oral, Q4H PRN, Yifan Johnson MD  •  enoxaparin (LOVENOX) syringe 40 mg, 40 mg, Subcutaneous, Daily, Yifan Johnson MD, 40 mg at 08/07/17 0845  •  levothyroxine (SYNTHROID, LEVOTHROID) tablet 200 mcg, 200 mcg, " Oral, Q AM, Yifan Johnson MD, 200 mcg at 08/07/17 0532  •  meloxicam (MOBIC) tablet 15 mg, 15 mg, Oral, Daily, Yifan Johnson MD, 15 mg at 08/07/17 0841  •  morphine injection 1 mg, 1 mg, Intravenous, Q4H PRN, 1 mg at 08/07/17 1453 **AND** naloxone (NARCAN) injection 0.4 mg, 0.4 mg, Intravenous, Q5 Min PRN, Yifan Johnson MD  •  ondansetron (ZOFRAN) tablet 4 mg, 4 mg, Oral, Q6H PRN **OR** ondansetron ODT (ZOFRAN-ODT) disintegrating tablet 4 mg, 4 mg, Oral, Q6H PRN **OR** ondansetron (ZOFRAN) injection 4 mg, 4 mg, Intravenous, Q6H PRN, Yifan Johnson MD  •  oxyCODONE-acetaminophen (PERCOCET) 5-325 MG per tablet 1 tablet, 1 tablet, Oral, Q4H PRN, Yifan Johnson MD, 1 tablet at 08/07/17 1403  •  pantoprazole (PROTONIX) EC tablet 40 mg, 40 mg, Oral, Q AM, Yifan Johnson MD, 40 mg at 08/07/17 0532  •  Pharmacy Meds to Bed Consult, , Does not apply, Daily, Daisy Jose, [facility]  •  piperacillin-tazobactam (ZOSYN) IVPB 3.375 g in 100 mL NS, 3.375 g, Intravenous, Q6H, Yifan Johnson MD, 3.375 g at 08/07/17 1202  •  sodium chloride 0.9 % flush 1-10 mL, 1-10 mL, Intravenous, PRN, Yifan Johnson MD    Objective     Vital Signs:   Temp:  [98.2 °F (36.8 °C)-99.8 °F (37.7 °C)] 98.8 °F (37.1 °C)  Heart Rate:  [] 83  Resp:  [16-20] 18  BP: ()/(60-95) 129/74    Physical Exam:   General Appearance:    Alert, cooperative, in no acute distress   Head:    Normocephalic, without obvious abnormality, atraumatic   Eyes:            Lids and lashes normal, conjunctivae and sclerae normal, no   icterus, no pallor, corneas clear, PERRLA   Throat:   No oral lesions, no thrush, oral mucosa moist   Neck:   No adenopathy, supple, trachea midline, no thyromegaly, no   carotid bruit, no JVD   Lungs:     Clear to auscultation,respirations regular, even and                  unlabored    Heart:    Regular rhythm and normal rate, normal S1 and S2, no            murmur, no gallop, no rub, no click   Chest Wall:    No abnormalities observed   Abdomen:      Normal bowel sounds, no masses, no organomegaly, soft        non-tender, non-distended, no guarding, no rebound                tenderness   Extremities:   Moves all extremities well, no edema, no cyanosis, no             redness   Pulses:   Pulses palpable and equal bilaterally   Skin:   No bleeding, bruising or rash, no evidence of subcutaneous emphysema noted   Lymph nodes:   No palpable adenopathy   Neurologic:   Cranial nerves 2 - 12 grossly intact, sensation intact, DTR       present and equal bilaterally   Results Review:   I reviewed the patient's new clinical results.  I reviewed the patient's new imaging results and agree with the interpretation.  I reviewed the patient's other test results and agree with the interpretation    Assessment/Plan     1. Epigastric abdominal pain    2. Pneumoperitoneum    3. Pneumomediastinum    4. Acute left ankle pain    5. Right renal mass        I did have a detailed discussion with the patient today and I do not think that he needs any surgical intervention at this time. He probably has a very thin and friable colon mucosa from chronic diversion and Crohn's disease, he may need future subtotal colectomy with possible proctectomy.    He does also have a right renal lesion that will need to be worked up further, there is no need for acute surgical intervention at this time and I think that the patient will be able to be treated in a conservative manner.    I discussed the patients findings and my recommendations with patient, nursing staff and primary care team.    Omega Garza MD  08/07/17  4:18 PM

## 2017-08-07 NOTE — PROGRESS NOTES
"Adult Nutrition  Assessment/PES    Patient Name:  Jorge Mclaughlin  YOB: 1960  MRN: [id number]  Admit Date:  8/6/2017    Assessment Date:  8/7/2017        Reason for Assessment       08/07/17 1359    Reason for Assessment    Reason For Assessment/Visit admission assessment    Identified At Risk By Screening Criteria diagnosis    Diagnosis Diagnosis    Endocrine Hypothyroid    Gastrointestinal Crohn's disease;Other (comment)   Pneumoperitoneum    Renal CKD;Other (comment)   Renal Mass                Anthropometrics       08/07/17 1401    Anthropometrics    Height Method Stated    Height 165.1 cm (65\")    Weight Method Bed scale    Weight 61.2 kg (134 lb 14.7 oz)    Ideal Body Weight (IBW)    Ideal Body Weight (IBW), Male (kg) 62.51    % Ideal Body Weight 98.11    Body Mass Index (BMI)    BMI (kg/m2) 22.5    BMI Grade 19.1 - 24.9 - normal            Labs/Tests/Procedures/Meds       08/07/17 1401    Labs/Tests/Procedures/Meds    Labs/Tests Review Reviewed;BUN;Creat;Mg++   High: BUN, Cr, TSH    Medication Review Reviewed, pertinent;Antibiotic              Estimated/Assessed Needs       08/07/17 1402    Calculation Measurements    Weight Used For Calculations 62.5 kg (137 lb 13 oz)    Height Used for Calculations 1.651 m (5' 5\")    Estimated/Assessed Energy Needs    Energy Need Method Collier-St Jeor    Age 57    RMR (Collier-St. Jeor Equation) 1386.98    Activity Factors (Collier St Jeor)  Out of bed, ambulatory  1.3    Estimated Kcal Range  ~2252-1036    Estimated/Assessed Protein Needs    Weight Used for Protein Calculation 62.5 kg (137 lb 13 oz)    Protein (gm/kg) 0.6    0.6 Gm Protein (gm) 37.51    Estimated Protein Range ~37.51-46.88 gm    Estimated/Assessed Fluid Needs    Fluid Need Method --   output + 1000 ml            Nutrition Prescription Ordered       08/07/17 1403    Nutrition Prescription PO    Current PO Diet Regular    Common Modifiers Cardiac            Evaluation of Received " Nutrient/Fluid Intake       08/07/17 1403    PO Evaluation    Number of Days PO Intake Evaluated 2 days    Number of Meals 3    % PO Intake 100              Problem/Interventions:        Problem 1       08/07/17 1404    Nutrition Diagnoses Problem 1    Problem 1 Altered Nutrition Related to Labs    Etiology (related to) Medical Diagnosis    Renal CKD    Signs/Symptoms (evidenced by) Biochemical    Labs Reviewed Done    Specific Labs Noted Mg++;BUN;Creatinine                    Intervention Goal       08/07/17 1404    Intervention Goal    General Meet nutritional needs for age/condition    PO PO intake (%)    PO Intake % 75 %    Weight No significant weight loss            Nutrition Intervention       08/07/17 1404    Nutrition Intervention    RD/Tech Action Recommend/ordered;Supplement provided;Follow Tx progress    Recommended/Ordered Supplement            Nutrition Prescription       08/07/17 1405    Nutrition Prescription PO    PO Prescription Begin/change supplement;Begin/change diet    Supplement Nepro    Supplement Frequency 2 times a day    Common Modifiers Renal    Other Orders    Obtain Weight Daily    Obtain Weight Ordered? No, recommended    Supplement Vitamin mineral supplement    Supplement Ordered? No, recommended    Other Continue to monitor/replace electrolytes            Education/Evaluation       08/07/17 1406    Education    Education Will Instruct as appropriate    Monitor/Evaluation    Monitor Per protocol;PO intake;Supplement intake;Pertinent labs;Weight        Comments:  Rec. #1: Consider adding renal to current diet order. Pt. Consuming ~100% of meals on average per NSG. Rec. #2: Once medically feasible consider adding renal MVI. RD added nepro BID. Rec. #3: Continue to monitor/replace electrolytes. RD to follow pt. Consult RD PRN.     Electronically signed by:  Halina Mclaughlin RD  08/07/17 2:06 PM

## 2017-08-07 NOTE — CONSULTS
Referring provider:  Harvey Chaney MD    Primary care provider: Yazan Corbett MD    Date of consultation:  August 7, 2017.    Reason for consultation : Abdominal air.  Crohn's disease.    History:  This is a 57-year-old white male who was recently discharged from [facility] on 8/4/2017 with history of free intra-peritoneal and retroperitoneal air.  The patient did not have peritoneal signs and was treated conservatively after Limited colonoscopy from anus and ileoscopy through ileostomy.  The patient was found to have a stricture at the ileum and what appeared to be splenic flexure.  The mucosa was noted to be friable with easy bleeding.  The patient continued to do fine in terms of abdomen however he had some indigestion.  The patient had significant pain in his left ankle, and left shoulder as well as right wrist.  The patient could not do weightbearing which prompted him to seek medical attention in the emergency room.  With significant pain in the left ankle the patient felt somewhat nauseated.  His left shoulder pain is mostly at the tip of the shoulder at a probably acromioclavicular joint which is significantly tender to touch.  The patient also had low-grade temperature of 99.4.  He denied vomiting.  The patient has been passing liquidy stools through the ileostomy bag.  He has also noticed small amount of rectal discharge. There is no history of overt GI bleed (Hematemesis, melena or hematochezia).  The patient also felt somewhat hot and uneasy before his presentation.    The patient has a history of long-standing Crohn's.  The patient has right-sided ileostomy done several years ago.  The patient had perforation at that time.  He had a colonoscopy done by Dr. Lew in 2014 which was complicated by free intra-abdominal air, and retroperitoneal air.  The patient has history of some mucoid secretions and bright red blood per rectum.  There is history of colon polyps.  The patient was told  about a residual polyps.  On 7/31/2017 the patient underwent a elective colonoscopy and ileoscopy.  This was again complicated by free intra-abdominal air, and retroperitoneal and small mediastinal air.  The patient was hospitalized.  He did not have significant abdominal pain or peritoneal signs.  The patient was advised regarding NG tube suctioning however the patient despite long discussion refused it.  He was given IV fluids, acid suppressive therapy, and IV antibiotics.  The patient did well conservatively.  The patient had undergone a CT of abdomen and pelvis without contrast which did not reveal intra-abdominal or pelvic fluid.  The patient was followed with abdominal and chest films.  Another CT with oral contrast on 8/3/2017 revealed interval improvement of the area.  The patient again did not have free fluid.  His white count remained normal.  The patient had increased creatinine.  He was given IV fluids with improvement.  Of interest the patient was not taking supplemental thyroids with significant elevation of TSH.  He has started taking thyroid.  Currently his TSH is normal.  The patient has been complaining of some pain in his wrists which is old.  Of interest he takes Mobic and other NSAIDs off and on.  The patient also had some nausea and migraine headaches.  These have improved.  During his stay the patient was noted to have PVCs.  He has history of such irregularities in the past.  The patient remained asymptomatic.  He was monitored on telemetry.  The CT also revealed renal mass unfortunately without contrast this could not be well evaluated.  Previously in 1990s the patient also had free intra-abdominal area.    There is no history of trauma to the ankle or shoulder.  There is no back pain.  The patient denies significant rash.  He denies eye symptoms.    Past Medical History:   Diagnosis Date   • Altered bowel elimination due to intestinal ostomy     PATIENT REPORTS SECONDARY TO A COLON RUPTURE  "APPROXIMATELY 16-17 YEARS AGO   • Anemia    • Arthritis     WRIST   • GERD (gastroesophageal reflux disease)    • History of MRSA infection     REPORTS HX OF \"NOSE\" 2-3 YEARS AGO, TREATED.   • History of pneumonia    • Hypothyroid    • Spinal headache    • Wears glasses        Past Surgical History:   Procedure Laterality Date   • COLONOSCOPY     • COLONOSCOPY N/A 7/31/2017    Procedure: Colonoscopy through ostomy site and rectum with biopsies;  Surgeon: Vincenzo Galan MD;  Location: [facility] ENDOSCOPY;  Service:    • ENDOSCOPY     • HEMORRHOIDECTOMY      REPORTS APPROXIMATELY 20 YEARS AGO   • HERNIA REPAIR      UMBILICAL   • SMALL INTESTINE SURGERY      HX OF COLON RUPTURE WITH PLACEMENT OF OSTOMY   • WISDOM TOOTH EXTRACTION     • WRIST SURGERY Right        Family History   Problem Relation Age of Onset   • Colon cancer Neg Hx        Social History   Substance Use Topics   • Smoking status: Former Smoker     Types: Cigarettes   • Smokeless tobacco: Never Used      Comment: QUIT 30 PLUS YEARS AGO   • Alcohol use No      Comment: QUIT 30 PLUS YEARS         Review of Systems   Constitutional: Negative for appetite change, chills, fatigue, fever and unexpected weight change.   HENT: Negative for mouth sores, nosebleeds and trouble swallowing.    Eyes: Negative for discharge and redness.   Respiratory: Negative for apnea, cough and shortness of breath.    Cardiovascular: Negative for chest pain, palpitations and leg swelling.   Gastrointestinal: Negative for abdominal distention, abdominal pain, anal bleeding, blood in stool, constipation, diarrhea, nausea and vomiting.   Endocrine: Negative for cold intolerance, heat intolerance and polydipsia.   Genitourinary: Negative for dysuria, hematuria and urgency.   Musculoskeletal: Positive for arthralgias. Negative for joint swelling and myalgias.   Skin: Negative for rash.   Allergic/Immunologic: Negative for food allergies and immunocompromised state.   Neurological: Negative " "for dizziness, seizures, syncope and headaches.   Hematological: Negative for adenopathy. Does not bruise/bleed easily.   Psychiatric/Behavioral: Negative for dysphoric mood. The patient is not nervous/anxious and is not hyperactive.        Allergies   Allergen Reactions   • Latex Rash         Current Facility-Administered Medications:   •  acetaminophen (TYLENOL) tablet 650 mg, 650 mg, Oral, Q4H PRN, Yifan Johnson MD  •  enoxaparin (LOVENOX) syringe 40 mg, 40 mg, Subcutaneous, Daily, Yifan Johnson MD, 40 mg at 08/07/17 0845  •  levothyroxine (SYNTHROID, LEVOTHROID) tablet 200 mcg, 200 mcg, Oral, Q AM, Yifan Johnson MD, 200 mcg at 08/07/17 0532  •  meloxicam (MOBIC) tablet 15 mg, 15 mg, Oral, Daily, Yifan Johnson MD, 15 mg at 08/07/17 0841  •  methylPREDNISolone sodium succinate (SOLU-Medrol) injection 40 mg, 40 mg, Intravenous, Q8H, Harvey Chaney MD, 40 mg at 08/07/17 1804  •  morphine injection 1 mg, 1 mg, Intravenous, Q4H PRN, 1 mg at 08/07/17 1453 **AND** naloxone (NARCAN) injection 0.4 mg, 0.4 mg, Intravenous, Q5 Min PRN, Yifan Johnson MD  •  ondansetron (ZOFRAN) tablet 4 mg, 4 mg, Oral, Q6H PRN **OR** ondansetron ODT (ZOFRAN-ODT) disintegrating tablet 4 mg, 4 mg, Oral, Q6H PRN **OR** ondansetron (ZOFRAN) injection 4 mg, 4 mg, Intravenous, Q6H PRN, Yifan Johnson MD  •  oxyCODONE-acetaminophen (PERCOCET) 5-325 MG per tablet 1 tablet, 1 tablet, Oral, Q4H PRN, Yifan Johnson MD, 1 tablet at 08/07/17 1804  •  pantoprazole (PROTONIX) EC tablet 40 mg, 40 mg, Oral, Q AM, Yifan Johnson MD, 40 mg at 08/07/17 0532  •  Pharmacy Meds to Bed Consult, , Does not apply, Daily, Daisy Jose, [facility]  •  piperacillin-tazobactam (ZOSYN) IVPB 3.375 g in 100 mL NS, 3.375 g, Intravenous, Q6H, Yifan Johnson MD, 3.375 g at 08/07/17 1805  •  sodium chloride 0.9 % flush 1-10 mL, 1-10 mL, Intravenous, PRN, Yifan Johnson MD    Blood pressure 124/81, pulse 103, temperature 98.7 °F (37.1 °C), temperature source Oral, resp. rate 18, height 65\" " (165.1 cm), weight 134 lb 14.7 oz (61.2 kg), SpO2 98 %.    Physical Exam   Constitutional: He is oriented to person, place, and time. He appears well-developed and well-nourished. No distress.   HENT:   Head: Normocephalic and atraumatic.   Right Ear: Hearing and external ear normal.   Left Ear: Hearing and external ear normal.   Nose: Nose normal.   Mouth/Throat: Oropharynx is clear and moist and mucous membranes are normal. Mucous membranes are not pale, not dry and not cyanotic. No oral lesions. No oropharyngeal exudate.   Eyes: Conjunctivae and EOM are normal. Right eye exhibits no discharge. Left eye exhibits no discharge. No scleral icterus.   Neck: Trachea normal. Neck supple. No JVD present. No edema present. No thyroid mass and no thyromegaly present.   Cardiovascular: Normal rate, regular rhythm, S2 normal and normal heart sounds.  Exam reveals no gallop, no S3 and no friction rub.    No murmur heard.  Pulmonary/Chest: Effort normal and breath sounds normal. No respiratory distress. He has no wheezes. He has no rales. He exhibits no tenderness.   Abdominal: Soft. Normal appearance and bowel sounds are normal. He exhibits no distension, no ascites and no mass. There is no splenomegaly or hepatomegaly. Tenderness: mild soreness in epigastrium and adjacent left upper quadrant. There is no rigidity, no rebound and no guarding. No hernia.       Musculoskeletal: He exhibits no tenderness or deformity.   Significant tenderness was noted in the left ankle, and left shoulder at acromioclavicular joint and right wrist.  No definite swelling of the joint was noted.       Vascular Status -  His exam exhibits no right foot edema. His exam exhibits no left foot edema.  Lymphadenopathy:     He has no cervical adenopathy.        Left: No supraclavicular adenopathy present.   Neurological: He is alert and oriented to person, place, and time. He has normal strength. No cranial nerve deficit or sensory deficit. He exhibits  normal muscle tone. Coordination normal.   Skin: No rash noted. He is not diaphoretic. No cyanosis. No pallor. Nails show no clubbing.   Mild erythema on left ankle which is warm to touch.   Psychiatric: He has a normal mood and affect. His behavior is normal. Judgment and thought content normal.   Nursing note and vitals reviewed.  Stigmata of chronic liver disease: None.  Asterixis: None.    Laboratory Tests:      Dated August 6, 2017 sodium 136 potassium 3.4 chloride 101 CO2 23 BUN 22 creatinine 1.5 and glucose 1:15.  Troponin 1 less than 0.012.  Calcium 9.6 albumen 3.9 unit acid 6.6 ALT 36 AST 20 7T bili 0.8.  Lipase 524.  White count 15.1 hemoglobin 14.7 hematocrit 42 and platelet count 200.  UA negative for nitrite and leukocyte esterase.  Ileostomy drainage negative for C. Difficile.    Dated August 7, 2017 sodium 138 potassium 4.0 chloride 99 CO2 27 BUN 24 creatinine 1.7 and glucose 145.  TSH 29.2.  Magnesium 1.0.  White count 13.4, hemoglobin 15.1 hematocrit 43 and platelet count 174.        Abdominal Imaging:    Dated August 6, 2017 CT of abdomen and pelvis without contrast which revealed pneumomediastinum, pneumoperitoneum, and retroperitoneal air.  No fluid collection noted.    Procedures:    Dated 7/31/2017 the patient had undergone ileoscopy which revealed a stricture and colonoscopy from anorectal area to what appears to be splenic flexure where a stricture was noted.  The patient was found to have left-sided diverticulosis.  Mucosa was noted to be friable, and  with tendency to bleed.  Biopsies from terminal ileum revealed inflamed granulation ulcerated tissue intermixed with fragments of benign mucosa showing changes consistent with treated Crohn's disease.  No mucosal dysplasia or neoplasia was identified.  Areas of normal small bowel mucosa were also seen.  Biopsies from descending colon stricture, sigmoid colon and rectal biopsies revealed changes benign colonic mucosa with histological changes  consistent with treated Crohn's disease.  No mucosal dysplasia or invasive neoplasia was seen.    1. Retroperitoneal and Free intra-abdominal air.  Post-ileoscopy and limited colonoscopy.  The patient is noted to have mucosal friability and easy mucosal bleeding.  A possible translocation of air across the mucosa remains a possibility.  Clinically it does not appear to be adolph perforation with leakage of fluids. The patient does not have peritoneal signs.    2. Hypothyroidism, unspecified type.  .  3. History of Crohn's disease.  The patient has diversion ileostomy.  4. Left-sided diverticulosis.    5. Mucosal friability and easy bleeding within the left colon, and distal ileum.   6. Acute kidney injury.   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  This type of arthritis can be associated with underlying Crohn's disease and may run parallel to disease activity.  8.  History of mildly elevated lipase (less than 2 times upper limit normal).  Significance is unclear.  This may represent tailend acute pancreatitis, however, time course is not consistent with acute pancreatitis.  If at all this appears to be mild and clinically resolved.    Plan   1. Currently the patient is eating solid food.    2. IV fluids.  3. Anti-emetics as needed.   4. Pain control.  5. IV antibiotics.  6. Acid suppressive therapy.  7. Follow-up labs.  8. Close clinical follow-up.  9. Surgical evaluation.    10. The patient has received a dose of Solu-Medrol earlier on.  This may be continued for 3 doses.  The ideal dose amount would be around 60 mg IV daily for 3 days in terms of active Crohn's.    11. He has also been getting NSAIDs.  12. Rheumatology consult would be beneficial.  13. Replacement of magnesium and potassium.    Discussed with the patient and his son in detail.  Opportunity was given to ask questions.    Vincenzo Galan MD.

## 2017-08-07 NOTE — PLAN OF CARE
Problem: Patient Care Overview (Adult)  Goal: Plan of Care Review  Outcome: Ongoing (interventions implemented as appropriate)    08/07/17 0504   Coping/Psychosocial Response Interventions   Plan Of Care Reviewed With patient;spouse   Patient Care Overview   Progress no change   Outcome Evaluation   Outcome Summary/Follow up Plan Continue to monitor pain measures, continue to complain of pain.       Goal: Discharge Needs Assessment  Outcome: Ongoing (interventions implemented as appropriate)    Problem: Pain, Acute (Adult)  Goal: Identify Related Risk Factors and Signs and Symptoms  Outcome: Outcome(s) achieved Date Met:  08/07/17  Goal: Acceptable Pain Control/Comfort Level  Outcome: Ongoing (interventions implemented as appropriate)

## 2017-08-07 NOTE — H&P
[facility]   HISTORY AND PHYSICAL      Name:  Jorge Mclaughlin   Age:  57 y.o.  Sex:  male  :  1960  MRN:  [id number]   Visit Number:  [id number]  Admission Date:  2017  Date Of Service:  17  Primary Care Physician:  Yazan Corbett MD   Primary Gastroenterologist: Vincenzo Galan MD    Chief Complaint:     Epigastric, left shoulder and left ankle pain since 3-4 days.    History Of Presenting Illness:      This is a 57-year-old unfortunate male with history of Crohn's disease, arthritis, gastroesophageal reflux disease and hypothyroidism was brought to the emergency room by his wife with above complaints.  Patient was recently in the hospital admitted on 2017 after perforation of the colon following colonoscopy.  Patient had pneumoperitoneum and pneumomediastinum.  He was managed conservatively especially since he has ileostomy for the last 18 years.  Patient was subsequently discharged home on 2017.    Patient states that he has been off his Mobic for the last 7-10 days.  He started having some right wrist pain several days ago followed by left ankle and left shoulder pain.  The patient states that the pain is really excruciating 10 out of 10 in intensity.  There is no involvement of the left big toe and the pain is mainly on the dorsum of the left foot.  No history of any small joint pains on the hands.  No history of trauma to the left ankle or left shoulder.  No history of fever.  He is also been complaining of some epigastric pain.  Patient was subsequently brought to the emergency room.    Patient was evaluated in the emergency room.  He was hemodynamically stable except for mild tachycardia in the 110s.  He was given morphine for his joint pain.  CT of the abdomen and pelvis done showed pneumomediastinum and pneumoperitoneum which was similar to his previous CT scan.  There was no fluid collection.  A call was placed to Dr. Galan who recommended IV  "antibiotic therapy with Zosyn and admission to the hospitalist service.  ED provider did call Dr. Garza from surgical services and he said that he was available in case consultation is needed.    Patient was seen and examined in the emergency room.  He is in mild distress because of the pain.  He states that his abdomen is doing better since discharge.  He has had an ileostomy for the last 18 years.  His biopsy from his recent colonoscopy did come back positive for Crohn's disease.    Review Of Systems:     General ROS: Patient denies any fevers, chills or loss of consciousness. Complains of generalized weakness.  Psychological ROS: No history of any hallucinations and delusions.  Ophthalmic ROS: No history of any diplopia or transient loss of vision.  ENT ROS: No history of sore throat, nasal congestion or ear pain.   Allergy and Immunology ROS: No history of rash or itching.  Hematological and Lymphatic ROS: No history of neck swelling or easy bleeding.  Endocrine ROS: No history of any recent unintentional weight gain or loss.  Respiratory ROS: No history of cough or shortness of breath.  Cardiovascular ROS: No history of chest pain or palpitations.  Gastrointestinal ROS: As per history of present illness.  Genito-Urinary ROS: No history of dysuria or hematuria.  Musculoskeletal ROS: Complains of left foot and left shoulder pain.  Complains of chronic back pain.  Neurological ROS: No history of any focal weakness. No loss of consciousness. Denies any numbness. Denies neck pain.  Dermatological ROS: No history of any redness or pruritis.     Past Medical History:    Past Medical History:   Diagnosis Date   • Altered bowel elimination due to intestinal ostomy     PATIENT REPORTS SECONDARY TO A COLON RUPTURE APPROXIMATELY 16-17 YEARS AGO   • Anemia    • Arthritis     WRIST   • GERD (gastroesophageal reflux disease)    • History of MRSA infection     REPORTS HX OF \"NOSE\" 2-3 YEARS AGO, TREATED.   • History of " pneumonia    • Hypothyroid    • Spinal headache    • Wears glasses        Past Surgical history:    Past Surgical History:   Procedure Laterality Date   • COLONOSCOPY     • COLONOSCOPY N/A 7/31/2017    Procedure: Colonoscopy through ostomy site and rectum with biopsies;  Surgeon: Vincenzo Galan MD;  Location: [facility] ENDOSCOPY;  Service:    • ENDOSCOPY     • HEMORRHOIDECTOMY      REPORTS APPROXIMATELY 20 YEARS AGO   • HERNIA REPAIR      UMBILICAL   • SMALL INTESTINE SURGERY      HX OF COLON RUPTURE WITH PLACEMENT OF OSTOMY   • WISDOM TOOTH EXTRACTION     • WRIST SURGERY Right        Social History:    Social History     Social History   • Marital status:      Spouse name: N/A   • Number of children: N/A   • Years of education: N/A     Occupational History   • Not on file.     Social History Main Topics   • Smoking status: Former Smoker     Types: Cigarettes   • Smokeless tobacco: Never Used      Comment: QUIT 30 PLUS YEARS AGO   • Alcohol use No      Comment: QUIT 30 PLUS YEARS   • Drug use: No   • Sexual activity: Defer     Other Topics Concern   • Not on file     Social History Narrative       Family History:    Family History   Problem Relation Age of Onset   • Colon cancer Neg Hx        Allergies:      Latex    Home Medications:    Prior to Admission Medications     Prescriptions Last Dose Informant Patient Reported? Taking?    levoFLOXacin (LEVAQUIN) 500 MG tablet   No No    Take 1 tablet by mouth Daily.    levothyroxine (SYNTHROID, LEVOTHROID) 200 MCG tablet  Self Yes No    take 1 tablet by mouth once daily    meloxicam (MOBIC) 15 MG tablet  Self Yes No    take 1 tablet by mouth once daily    metroNIDAZOLE (FLAGYL) 250 MG tablet   No No    Take 1 tablet by mouth 3 (Three) Times a Day.    Multiple Vitamin (MULTI VITAMIN MENS PO)  Self Yes No    Take 1 tablet by mouth Daily.    pantoprazole (PROTONIX) 40 MG EC tablet  Self Yes No    take 1 tablet by mouth once daily    promethazine (PHENERGAN) 12.5 MG  tablet   No No    Take 1 tablet by mouth Every 8 (Eight) Hours As Needed.    sodium chloride 0.9 % solution 50 mL with promethazine 25 MG/ML solution 12.5 mg   No No    Infuse 12.5 mg into a venous catheter Every 8 (Eight) Hours As Needed (Nausea).    Testosterone Cypionate (DEPOTESTOTERONE CYPIONATE) 200 MG/ML injection  Self Yes No    inject 1 milliliter every 2 weeks             ED Medications:    Medications   Morphine injection 4 mg (4 mg Intravenous Given 8/6/17 2017)   promethazine (PHENERGAN) injection 12.5 mg (12.5 mg Intravenous Given 8/6/17 2013)   piperacillin-tazobactam (ZOSYN) IVPB 3.375 g in 100 mL NS (0 g Intravenous Stopped 8/6/17 2236)   Morphine injection 4 mg (4 mg Intravenous Given 8/6/17 2208)       Vital Signs:    Temp:  [98.2 °F (36.8 °C)-99.8 °F (37.7 °C)] 99.8 °F (37.7 °C)  Heart Rate:  [101-118] 103  Resp:  [20] 20  BP: (120-128)/(82-95) 124/84    Last 3 weights    08/06/17 1939   Weight: 146 lb (66.2 kg)       Body mass index is 24.3 kg/(m^2).    Physical Exam:    General Appearance:  Alert and cooperative, in mild distress due to joint pains.   Head:  Atraumatic and normocephalic, without obvious abnormality.   Eyes:          PERRLA, conjunctivae and sclerae normal, no Icterus. No pallor. Extra-occular movements are within normal limits.   Ears:  Ears appear intact with no abnormalities noted.   Throat: No oral lesions, no thrush, oral mucosa moist.   Neck: Supple, trachea midline, no thyromegaly, no carotid bruit.   Back:   No kyphoscoliosis present. No tenderness to palpation,   range of motion normal.   Lungs:   Chest shape is normal. Breath sounds heard bilaterally equally.  No crackles or wheezing. No Pleural rub or bronchial breathing.   Heart:  Normal S1 and S2, no murmur, no gallop, no rub. No JVD.   Abdomen:   Normal bowel sounds, no masses, no organomegaly. Soft non-tender, non-distended, no guarding, no rebound tenderness.  Midline surgical scar is noted in the lower abdomen.   Ileostomy bag noted in the right lower quadrant.     Extremities: Moves all extremities well, no edema, no cyanosis, no clubbing.  Severe tenderness on minimal touch noted on the dorsum of the left foot with minimal redness.  Tenderness noted in the left shoulder without any swelling or redness.     Pulses: Pulses palpable and equal bilaterally.   Skin: No bleeding, bruising or rash.   Neurologic: Alert and oriented x 3. Moves all four limbs equally. No tremors. No facial asymetry.     Laboratory data:    I have reviewed the labs done in the emergency room.      Results from last 7 days  Lab Units 08/06/17 2013 08/04/17  0637 08/03/17  1208 08/02/17  0522 08/01/17  0522   SODIUM mmol/L 136* 138 140 141 141   POTASSIUM mmol/L 3.4* 4.0 4.4 3.9 3.7   CHLORIDE mmol/L 101 100 106 107 109*   CO2 mmol/L 23.0* 30.0 24.0* 25.0* 23.0*   BUN mg/dL 22* 9 14 12 13   CREATININE mg/dL 1.50* 1.40* 1.40* 1.60* 1.70*   CALCIUM mg/dL 9.6 8.5 8.8 8.5 7.9*   BILIRUBIN mg/dL 0.8  --   --  0.9 0.9   ALK PHOS U/L 104  --   --  48 44   ALT (SGPT) U/L 36  --   --  36 39   AST (SGOT) U/L 27  --   --  16 20   GLUCOSE mg/dL 115* 103* 96 118* 101*       Results from last 7 days  Lab Units 08/06/17 2013 08/04/17  0637 08/03/17  0737   WBC 10*3/mm3 15.10* 4.73* 6.98   HEMOGLOBIN g/dL 14.7 13.3* 13.3*   HEMATOCRIT % 42.0 38.5* 38.9*   PLATELETS 10*3/mm3 200 146 133           Results from last 7 days  Lab Units 08/06/17 2013   TROPONIN I ng/mL <0.012       Results from last 7 days  Lab Units 08/03/17  0737   PROBNP pg/mL 674.0*           Results from last 7 days  Lab Units 08/06/17 2013   LIPASE U/L 524*           Results from last 7 days  Lab Units 08/06/17  2250   COLOR UA  Yellow   GLUCOSE UA  Negative   KETONES UA  Trace*   LEUKOCYTES UA  Negative   PH, URINE  6.0   BILIRUBIN UA  Small (1+)*   UROBILINOGEN UA  0.2 E.U./dL           EKG:      EKG done in the emergency room was reviewed by.  It shows sinus tachycardia at 118 bpm.  Normal  axis.  Nonspecific ST-T changes noted in the lateral leads.    Radiology:    Imaging Results (last 72 hours)     Procedure Component Value Units Date/Time    XR Abdomen KUB [[id number]] Resulted:  08/06/17 2241     Updated:  08/06/17 2242    XR Ankle 3+ View Left [[id number]] Resulted:  08/06/17 2242     Updated:  08/06/17 2242    XR Chest 1 View [[id number]] Resulted:  08/06/17 2242     Updated:  08/06/17 2243    CT Abdomen Pelvis Without Contrast [[id number]] Collected:  08/06/17 2250     Updated:  08/06/17 2253    Narrative:       FINAL REPORT    TECHNIQUE:  Axial images through the abdomen and pelvis were obtained by computed tomography.  IV contrast was withheld.    CLINICAL HISTORY:  EPIGASTRIC ABDOMINAL PAIN, ELEVATED LIPASE, LEUKOCYTOSIS, KNOWN FREE AIR IN ABDOMEN AFTER COLONOSCOPY. LOOK FOR FLUID COLLECTIONS PLEASE    PRIOR SCAN 8-3-17 SENT    FINDINGS:  Abdomen: There is pneumomediastinum, but no pneumothorax or pleural effusion. There is large amount of pneumoperitoneum and extensive air within the retroperitoneal space of the abdomen and pelvis. There are gallstones in an otherwise normal gallbladder.   There is a nonobstructing left renal calculus. In the upper medial right kidney there is a solid-appearing mass worrisome for renal cell carcinoma approximately 3 cm in diameter. If not already evaluated, repeat CT with contrast is recommended for   further evaluation. The solid abdominal organs and ureters are otherwise unremarkable. There are surgical clips adjacent to the cecum. There is a right lower quadrant ileostomy. The GI tract is otherwise unremarkable.    Pelvis: The urinary bladder is unremarkable. Air dissects from the anterior abdominal wall to the scrotum. There is no pelvic or abdominal ascites, adenopathy or significant osseous abnormality.      Impression:       Pneumomediastinum, pneumoperitoneum and extensive pneumoretroperitoneum. No fluid collection is seen. ER was called and notified of  these findings.    Authenticated by Godwin Mejía M.D. on 08/06/2017 10:50:46 PM        Chest x-ray done in the emergency room was reviewed by me.  It shows emphysematous lung fields without any infiltrates.  Air under the diaphragm noted.    Left ankle x-ray was reviewed by me and it does not show any obvious fractures.  An official report is currently pending.    Assessment:    1.  Inflammatory arthropathy associated with Crohn's disease.  2.  Pneumoperitoneum and pneumomediastinum secondary to colonic perforation one week ago.  3.  Crohn's disease.  4.  Acquired hypothyroidism.  5.  Chronic kidney disease stage III.  6.  Right renal 3 cm mass, noted on CT scan, needs outpatient urology follow-up.    Plan:     Patient is currently being admitted to the medical floor with telemetry.  The exact etiology office joint pain is uncertain but likely related to inflammatory arthropathy related to inflammatory bowel disease.  Moreover, patient has been off his NSAIDs in the last one week which might be contributing to the flare.  I do not suspect gout or septic arthritis at this time.  I will hold off on steroid therapy at this time in view of the suspected infection. He will be placed on Percocet and morphine for pain control.  We will also restart his Mobic.    Patient does have leukocytosis but is afebrile.  However, in view of his recent colonic perforation and pneumoperitoneum, we will place him on IV antibiotic therapy with Zosyn.  A consult will be obtained from Dr. Galan from gastroenterology for further recommendations.  Since he has a nonfunctioning colon, I do not think he needs any surgical intervention at this time.  No abscesses or fluid collection was noted on the CT scan.  He may benefit from outpatient follow-up with rheumatology for the treatment of his Crohn's disease as well as arthritis with newer agents.  I have discussed the patient's condition with his wife and his sons were at the bedside.        Yifan  MD Elizabeth  08/06/17  11:28 PM    Portions of this note may have been completed with Dragon, a voice recognition program. Not all errors in transcription may have been detected prior to signing.

## 2017-08-07 NOTE — PROGRESS NOTES
[facility]    Name:  Jorge Mclaughlin   Age:  57 y.o.  Sex:  male  :  1960  MRN:  [id number]   Visit Number:  [id number]  Admission Date:  2017  Date Of Service:  17  Primary Care Physician:  Yazan Corbett MD     LOS: 1 day :  Patient Care Team:  Yazan Corbett MD as PCP - General:    Chief Complaint:      Left ankle pain, bilateral shoulder/hand pain.      Subjective / Interval History:     Patient with only mild diffuse abdominal soreness.  Patient with arthritic pain of the bilateral shoulders/hand and left ankle with no associated erythema/swelling.  He has had no fevers.  He reports the left ankle pain is so bad that he cant move his foot.  Xray of the left ankle showed no fracture.  Likely Enteropathic pain vs Pseudogout.  Will initiate a dose of IV Solumedrol.  Dr. Galan, GI to further evaluate.      I have reviewed labs/imaging/records from this hospitalization, including ER staff and admitting/attending physicians H/P's and progress notes to establish a comprehensive understanding of this patient's clinical hospital course, as well as to establish a transition of care appropriately.    Vital Signs:    Temp:  [98.2 °F (36.8 °C)-99.8 °F (37.7 °C)] 98.9 °F (37.2 °C)  Heart Rate:  [100-118] 104  Resp:  [16-20] 18  BP: ()/(60-95) 126/82    Intake and output:    Intake/Output       17 0700 - 17 0659    Intake (ml) 150    Output (ml) --    Net (ml) 150    Last Weight 135 lb (61.2 kg)          Physical Examination:    General Appearance:  Alert and cooperative, tenderness of the left ankle   Head:  Atraumatic and normocephalic, without obvious abnormality.   Eyes:      PERRLA, Extra-occular movements are within normal limits.   Lungs:   Chest shape is normal. Breath sounds heard bilaterally equally.  No crackles or wheezing.   Heart:  Normal S1 and S2, no murmur, no gallop, no rub.   Abdomen:   Mildly decreased bowel sounds,   Soft non-tender, distended, no guarding, no rebound tenderness, ileostomy with liquid stool   Extremities: No edema or erythema.  Tenderness to touch with decreased ROM of the shoulder, hands, and left ankle.                 Laboratory results:      Results from last 7 days  Lab Units 08/07/17  0559 08/06/17 2013 08/04/17 0637 08/02/17  0522 08/01/17  0522   SODIUM mmol/L 138 136* 138  < > 141 141   POTASSIUM mmol/L 4.0 3.4* 4.0  < > 3.9 3.7   CHLORIDE mmol/L 99 101 100  < > 107 109*   CO2 mmol/L 27.0 23.0* 30.0  < > 25.0* 23.0*   BUN mg/dL 24* 22* 9  < > 12 13   CREATININE mg/dL 1.70* 1.50* 1.40*  < > 1.60* 1.70*   CALCIUM mg/dL 9.3 9.6 8.5  < > 8.5 7.9*   BILIRUBIN mg/dL  --  0.8  --   --  0.9 0.9   ALK PHOS U/L  --  104  --   --  48 44   ALT (SGPT) U/L  --  36  --   --  36 39   AST (SGOT) U/L  --  27  --   --  16 20   GLUCOSE mg/dL 145* 115* 103*  < > 118* 101*   < > = values in this interval not displayed.    Results from last 7 days  Lab Units 08/07/17 0559 08/06/17 2013 08/04/17 0637   WBC 10*3/mm3 13.42* 15.10* 4.73*   HEMOGLOBIN g/dL 15.1 14.7 13.3*   HEMATOCRIT % 43.8 42.0 38.5*   PLATELETS 10*3/mm3 174 200 146       Results from last 7 days  Lab Units 08/07/17 0559 08/06/17 2013   TROPONIN I ng/mL <0.012 <0.012           I have reviewed the patient's laboratory results.    Radiology results:    Imaging Results (last 24 hours)     Procedure Component Value Units Date/Time    CT Abdomen Pelvis Without Contrast [[id number]] Collected:  08/06/17 2250     Updated:  08/06/17 2253    Narrative:       FINAL REPORT    TECHNIQUE:  Axial images through the abdomen and pelvis were obtained by computed tomography.  IV contrast was withheld.    CLINICAL HISTORY:  EPIGASTRIC ABDOMINAL PAIN, ELEVATED LIPASE, LEUKOCYTOSIS, KNOWN FREE AIR IN ABDOMEN AFTER COLONOSCOPY. LOOK FOR FLUID COLLECTIONS PLEASE    PRIOR SCAN 8-3-17 SENT    FINDINGS:  Abdomen: There is pneumomediastinum, but no pneumothorax or pleural effusion.  There is large amount of pneumoperitoneum and extensive air within the retroperitoneal space of the abdomen and pelvis. There are gallstones in an otherwise normal gallbladder.   There is a nonobstructing left renal calculus. In the upper medial right kidney there is a solid-appearing mass worrisome for renal cell carcinoma approximately 3 cm in diameter. If not already evaluated, repeat CT with contrast is recommended for   further evaluation. The solid abdominal organs and ureters are otherwise unremarkable. There are surgical clips adjacent to the cecum. There is a right lower quadrant ileostomy. The GI tract is otherwise unremarkable.    Pelvis: The urinary bladder is unremarkable. Air dissects from the anterior abdominal wall to the scrotum. There is no pelvic or abdominal ascites, adenopathy or significant osseous abnormality.      Impression:       Pneumomediastinum, pneumoperitoneum and extensive pneumoretroperitoneum. No fluid collection is seen. ER was called and notified of these findings.    Authenticated by Godwin Mejía M.D. on 08/06/2017 10:50:46 PM    XR Abdomen KUB [[id number]] Collected:  08/07/17 0729     Updated:  08/07/17 0736    Narrative:       PROCEDURE: XR ABDOMEN KUB-     HISTORY: Known pneumomediastinum and pneumoperitoneum after recent  colonoscopy and perforation     COMPARISON: August 2, 2017.     FINDINGS: An AP view of the abdomen and pelvis demonstrates extensive  pneumoperitoneum. Overlying the left kidney is a 5 to 6 mm calcification  likely representing a stone within the collecting system. Surgical clips  are seen overlying the right flank. There is extensive subcutaneous gas  also extending into the scrotum. Osseous structures are intact. No  abnormally dilated loops of bowel are appreciated..           Impression:       1. Extensive pneumoperitoneum   2. 5-6 mm calcification overlying the left renal shadow.  3. Extensive subcutaneous emphysema in the region of the scrotum     This  report was finalized on 8/7/2017 7:32 AM by Grayson Madison DO.    XR Chest 1 View [[id number]] Collected:  08/07/17 0733     Updated:  08/07/17 0737    Narrative:       PROCEDURE: XR CHEST 1 VW-     HISTORY: Known pneumomediastinum and pneumoperitoneum after recent  colonoscopy and perforation     COMPARISON: August 4, 2017.     FINDINGS: The heart is normal in size. There is evidence of  pneumomediastinum and free air beneath the diaphragm.. The lungs are  clear. Electrodes overlie the chest..  There are no acute osseous  abnormalities. Evidence of old calcified granulomatous disease.       Impression:       Pneumomediastinum and free air beneath the diaphragm..     Continued followup is recommended.     This report was finalized on 8/7/2017 7:34 AM by Grayson Madison DO.    XR Ankle 3+ View Left [[id number]] Collected:  08/07/17 0735     Updated:  08/07/17 0738    Narrative:       PROCEDURE: XR ANKLE 3+ VW LEFT-     History: Redness and swelling as well as pain     COMPARISON: None.     FINDINGS:  A 3 view exam demonstrates no acute fracture or dislocation.  The joint spaces are preserved. Mild soft tissue swelling is noted about  the ankle..           Impression:       No acute fracture.         If symptoms persist consider follow-up MRI.     This report was finalized on 8/7/2017 7:36 AM by Grayson Madison DO.          I have reviewed the patient's radiology reports.    Medication Review:     I have reviewed the patients active and prn medications.       Assessment/Plan:  -Chron's disease with ileostomy for which he follows Dr. Galan, GI  -Enteropathic arthritis, will initiate a course of steroids.    -Pneumoperitoneum and pneumomediastinum from perforation of the colon following colonoscopy on 7/31/2017, surgery to evaluate.  -Hypothyroidism, uncontrolled.  TSH is 29.  Patient reports that he has missed his synthroid for the past week, will restart, need repeat TFT's in 4-6 weeks.   -Left 3.8 cm right solid renal mass ,  worrisome for renal carcinoma.  Urology evaluation.   -Chronic gallstones, stable  -Non obstructing right renal calculus, stable       Harvey Chaney MD  08/07/17  10:18 AM

## 2017-08-07 NOTE — NURSING NOTE
Patient received via stretcher from ED. Alert and anxious. Patient complains of pain of 10/10 on pain scale. Oriented to unit and room. Discussion of MD orders completed. Food offered and accepted. Patient tolerating well.

## 2017-08-07 NOTE — PAYOR COMM NOTE
"TO:[facility]  FROM:CHARLEEN MATOS PHONE [phone]  INPT NOTIFICATION AND CLINICALS    Jorge Ramos (57 y.o. Male)     Date of Birth Social Security Number Address Home Phone MRN    1960  [address] [place] [address] [phone] [id number]    Confucianist Marital Status          Cheondoism        Admission Date Admission Type Admitting Provider Attending Provider Department, Room/Bed    17 Emergency Yifan Johnson MD Gaspar, Daniel F., MD [facility] MED SURG  3, 327/1    Discharge Date Discharge Disposition Discharge Destination                      Attending Provider: Harvey Chaney MD     Allergies:  Latex    Isolation:  None   Infection:  MRSA/History Only (17)   Code Status:  FULL    Ht:  65\" (165.1 cm)   Wt:  135 lb (61.2 kg)    Admission Cmt:  None   Principal Problem:  Inflammatory arthritis [M19.90]                 Active Insurance as of 2017     Primary Coverage     Payor Plan Insurance Group Employer/Plan Group    [employer]ICAID [employer] [facility]     Payor Plan Address Payor Plan Phone Number Effective From Effective To    PO  [phone] 2017     [place] [address]       Subscriber Name Subscriber Birth Date Member ID       JORGE RAMOS 1960 [id number]                 Emergency Contacts      (Rel.) Home Phone Work Phone Mobile Phone    Evelio Ramos (Son) [phone] -- --    Mellisa Ramos (Spouse) [phone] -- --               History & Physical      Yifan Johnson MD at 2017 11:28 PM              [facility] HOSPITALIST   HISTORY AND PHYSICAL      Name:  Jorge Ramos   Age:  57 y.o.  Sex:  male  :  1960  MRN:  [id number]   Visit Number:  [id number]  Admission Date:  2017  Date Of Service:  17  Primary Care Physician:  Yazan Corbett MD   Primary Gastroenterologist: Vincenzo Galan MD    Chief Complaint:     Epigastric, left shoulder " and left ankle pain since 3-4 days.    History Of Presenting Illness:      This is a 57-year-old unfortunate male with history of Crohn's disease, arthritis, gastroesophageal reflux disease and hypothyroidism was brought to the emergency room by his wife with above complaints.  Patient was recently in the hospital admitted on 7/31/2017 after perforation of the colon following colonoscopy.  Patient had pneumoperitoneum and pneumomediastinum.  He was managed conservatively especially since he has ileostomy for the last 18 years.  Patient was subsequently discharged home on 8/4/2017.    Patient states that he has been off his Mobic for the last 7-10 days.  He started having some right wrist pain several days ago followed by left ankle and left shoulder pain.  The patient states that the pain is really excruciating 10 out of 10 in intensity.  There is no involvement of the left big toe and the pain is mainly on the dorsum of the left foot.  No history of any small joint pains on the hands.  No history of trauma to the left ankle or left shoulder.  No history of fever.  He is also been complaining of some epigastric pain.  Patient was subsequently brought to the emergency room.    Patient was evaluated in the emergency room.  He was hemodynamically stable except for mild tachycardia in the 110s.  He was given morphine for his joint pain.  CT of the abdomen and pelvis done showed pneumomediastinum and pneumoperitoneum which was similar to his previous CT scan.  There was no fluid collection.  A call was placed to Dr. Galan who recommended IV antibiotic therapy with Zosyn and admission to the hospitalist service.  ED provider did call Dr. Garza from surgical services and he said that he was available in case consultation is needed.    Patient was seen and examined in the emergency room.  He is in mild distress because of the pain.  He states that his abdomen is doing better since discharge.  He has had an ileostomy for  "the last 18 years.  His biopsy from his recent colonoscopy did come back positive for Crohn's disease.    Review Of Systems:     General ROS: Patient denies any fevers, chills or loss of consciousness. Complains of generalized weakness.  Psychological ROS: No history of any hallucinations and delusions.  Ophthalmic ROS: No history of any diplopia or transient loss of vision.  ENT ROS: No history of sore throat, nasal congestion or ear pain.   Allergy and Immunology ROS: No history of rash or itching.  Hematological and Lymphatic ROS: No history of neck swelling or easy bleeding.  Endocrine ROS: No history of any recent unintentional weight gain or loss.  Respiratory ROS: No history of cough or shortness of breath.  Cardiovascular ROS: No history of chest pain or palpitations.  Gastrointestinal ROS: As per history of present illness.  Genito-Urinary ROS: No history of dysuria or hematuria.  Musculoskeletal ROS: Complains of left foot and left shoulder pain.  Complains of chronic back pain.  Neurological ROS: No history of any focal weakness. No loss of consciousness. Denies any numbness. Denies neck pain.  Dermatological ROS: No history of any redness or pruritis.     Past Medical History:    Past Medical History:   Diagnosis Date   • Altered bowel elimination due to intestinal ostomy     PATIENT REPORTS SECONDARY TO A COLON RUPTURE APPROXIMATELY 16-17 YEARS AGO   • Anemia    • Arthritis     WRIST   • GERD (gastroesophageal reflux disease)    • History of MRSA infection     REPORTS HX OF \"NOSE\" 2-3 YEARS AGO, TREATED.   • History of pneumonia    • Hypothyroid    • Spinal headache    • Wears glasses        Past Surgical history:    Past Surgical History:   Procedure Laterality Date   • COLONOSCOPY     • COLONOSCOPY N/A 7/31/2017    Procedure: Colonoscopy through ostomy site and rectum with biopsies;  Surgeon: Vincenzo Galan MD;  Location: [facility] ENDOSCOPY;  Service:    • ENDOSCOPY     • HEMORRHOIDECTOMY      REPORTS " APPROXIMATELY 20 YEARS AGO   • HERNIA REPAIR      UMBILICAL   • SMALL INTESTINE SURGERY      HX OF COLON RUPTURE WITH PLACEMENT OF OSTOMY   • WISDOM TOOTH EXTRACTION     • WRIST SURGERY Right        Social History:    Social History     Social History   • Marital status:      Spouse name: N/A   • Number of children: N/A   • Years of education: N/A     Occupational History   • Not on file.     Social History Main Topics   • Smoking status: Former Smoker     Types: Cigarettes   • Smokeless tobacco: Never Used      Comment: QUIT 30 PLUS YEARS AGO   • Alcohol use No      Comment: QUIT 30 PLUS YEARS   • Drug use: No   • Sexual activity: Defer     Other Topics Concern   • Not on file     Social History Narrative       Family History:    Family History   Problem Relation Age of Onset   • Colon cancer Neg Hx        Allergies:      Latex    Home Medications:    Prior to Admission Medications     Prescriptions Last Dose Informant Patient Reported? Taking?    levoFLOXacin (LEVAQUIN) 500 MG tablet   No No    Take 1 tablet by mouth Daily.    levothyroxine (SYNTHROID, LEVOTHROID) 200 MCG tablet  Self Yes No    take 1 tablet by mouth once daily    meloxicam (MOBIC) 15 MG tablet  Self Yes No    take 1 tablet by mouth once daily    metroNIDAZOLE (FLAGYL) 250 MG tablet   No No    Take 1 tablet by mouth 3 (Three) Times a Day.    Multiple Vitamin (MULTI VITAMIN MENS PO)  Self Yes No    Take 1 tablet by mouth Daily.    pantoprazole (PROTONIX) 40 MG EC tablet  Self Yes No    take 1 tablet by mouth once daily    promethazine (PHENERGAN) 12.5 MG tablet   No No    Take 1 tablet by mouth Every 8 (Eight) Hours As Needed.    sodium chloride 0.9 % solution 50 mL with promethazine 25 MG/ML solution 12.5 mg   No No    Infuse 12.5 mg into a venous catheter Every 8 (Eight) Hours As Needed (Nausea).    Testosterone Cypionate (DEPOTESTOTERONE CYPIONATE) 200 MG/ML injection  Self Yes No    inject 1 milliliter every 2 weeks             ED  Medications:    Medications   Morphine injection 4 mg (4 mg Intravenous Given 8/6/17 2017)   promethazine (PHENERGAN) injection 12.5 mg (12.5 mg Intravenous Given 8/6/17 2013)   piperacillin-tazobactam (ZOSYN) IVPB 3.375 g in 100 mL NS (0 g Intravenous Stopped 8/6/17 2236)   Morphine injection 4 mg (4 mg Intravenous Given 8/6/17 2208)       Vital Signs:    Temp:  [98.2 °F (36.8 °C)-99.8 °F (37.7 °C)] 99.8 °F (37.7 °C)  Heart Rate:  [101-118] 103  Resp:  [20] 20  BP: (120-128)/(82-95) 124/84    Last 3 weights    08/06/17 1939   Weight: 146 lb (66.2 kg)       Body mass index is 24.3 kg/(m^2).    Physical Exam:    General Appearance:  Alert and cooperative, in mild distress due to joint pains.   Head:  Atraumatic and normocephalic, without obvious abnormality.   Eyes:          PERRLA, conjunctivae and sclerae normal, no Icterus. No pallor. Extra-occular movements are within normal limits.   Ears:  Ears appear intact with no abnormalities noted.   Throat: No oral lesions, no thrush, oral mucosa moist.   Neck: Supple, trachea midline, no thyromegaly, no carotid bruit.   Back:   No kyphoscoliosis present. No tenderness to palpation,   range of motion normal.   Lungs:   Chest shape is normal. Breath sounds heard bilaterally equally.  No crackles or wheezing. No Pleural rub or bronchial breathing.   Heart:  Normal S1 and S2, no murmur, no gallop, no rub. No JVD.   Abdomen:   Normal bowel sounds, no masses, no organomegaly. Soft non-tender, non-distended, no guarding, no rebound tenderness.  Midline surgical scar is noted in the lower abdomen.  Ileostomy bag noted in the right lower quadrant.     Extremities: Moves all extremities well, no edema, no cyanosis, no clubbing.  Severe tenderness on minimal touch noted on the dorsum of the left foot with minimal redness.  Tenderness noted in the left shoulder without any swelling or redness.     Pulses: Pulses palpable and equal bilaterally.   Skin: No bleeding, bruising or rash.    Neurologic: Alert and oriented x 3. Moves all four limbs equally. No tremors. No facial asymetry.     Laboratory data:    I have reviewed the labs done in the emergency room.      Results from last 7 days  Lab Units 08/06/17 2013 08/04/17  0637 08/03/17  1208 08/02/17  0522 08/01/17  0522   SODIUM mmol/L 136* 138 140 141 141   POTASSIUM mmol/L 3.4* 4.0 4.4 3.9 3.7   CHLORIDE mmol/L 101 100 106 107 109*   CO2 mmol/L 23.0* 30.0 24.0* 25.0* 23.0*   BUN mg/dL 22* 9 14 12 13   CREATININE mg/dL 1.50* 1.40* 1.40* 1.60* 1.70*   CALCIUM mg/dL 9.6 8.5 8.8 8.5 7.9*   BILIRUBIN mg/dL 0.8  --   --  0.9 0.9   ALK PHOS U/L 104  --   --  48 44   ALT (SGPT) U/L 36  --   --  36 39   AST (SGOT) U/L 27  --   --  16 20   GLUCOSE mg/dL 115* 103* 96 118* 101*       Results from last 7 days  Lab Units 08/06/17 2013 08/04/17  0637 08/03/17  0737   WBC 10*3/mm3 15.10* 4.73* 6.98   HEMOGLOBIN g/dL 14.7 13.3* 13.3*   HEMATOCRIT % 42.0 38.5* 38.9*   PLATELETS 10*3/mm3 200 146 133           Results from last 7 days  Lab Units 08/06/17 2013   TROPONIN I ng/mL <0.012       Results from last 7 days  Lab Units 08/03/17  0737   PROBNP pg/mL 674.0*           Results from last 7 days  Lab Units 08/06/17 2013   LIPASE U/L 524*           Results from last 7 days  Lab Units 08/06/17  2250   COLOR UA  Yellow   GLUCOSE UA  Negative   KETONES UA  Trace*   LEUKOCYTES UA  Negative   PH, URINE  6.0   BILIRUBIN UA  Small (1+)*   UROBILINOGEN UA  0.2 E.U./dL           EKG:      EKG done in the emergency room was reviewed by.  It shows sinus tachycardia at 118 bpm.  Normal axis.  Nonspecific ST-T changes noted in the lateral leads.    Radiology:    Imaging Results (last 72 hours)     Procedure Component Value Units Date/Time    XR Abdomen KUB [[id number]] Resulted:  08/06/17 2241     Updated:  08/06/17 2242    XR Ankle 3+ View Left [[id number]] Resulted:  08/06/17 2242     Updated:  08/06/17 2242    XR Chest 1 View [[id number]] Resulted:  08/06/17 2242      Updated:  08/06/17 2243    CT Abdomen Pelvis Without Contrast [[id number]] Collected:  08/06/17 2250     Updated:  08/06/17 2253    Narrative:       FINAL REPORT    TECHNIQUE:  Axial images through the abdomen and pelvis were obtained by computed tomography.  IV contrast was withheld.    CLINICAL HISTORY:  EPIGASTRIC ABDOMINAL PAIN, ELEVATED LIPASE, LEUKOCYTOSIS, KNOWN FREE AIR IN ABDOMEN AFTER COLONOSCOPY. LOOK FOR FLUID COLLECTIONS PLEASE    PRIOR SCAN 8-3-17 SENT    FINDINGS:  Abdomen: There is pneumomediastinum, but no pneumothorax or pleural effusion. There is large amount of pneumoperitoneum and extensive air within the retroperitoneal space of the abdomen and pelvis. There are gallstones in an otherwise normal gallbladder.   There is a nonobstructing left renal calculus. In the upper medial right kidney there is a solid-appearing mass worrisome for renal cell carcinoma approximately 3 cm in diameter. If not already evaluated, repeat CT with contrast is recommended for   further evaluation. The solid abdominal organs and ureters are otherwise unremarkable. There are surgical clips adjacent to the cecum. There is a right lower quadrant ileostomy. The GI tract is otherwise unremarkable.    Pelvis: The urinary bladder is unremarkable. Air dissects from the anterior abdominal wall to the scrotum. There is no pelvic or abdominal ascites, adenopathy or significant osseous abnormality.      Impression:       Pneumomediastinum, pneumoperitoneum and extensive pneumoretroperitoneum. No fluid collection is seen. ER was called and notified of these findings.    Authenticated by Godwin Mejía M.D. on 08/06/2017 10:50:46 PM        Chest x-ray done in the emergency room was reviewed by me.  It shows emphysematous lung fields without any infiltrates.  Air under the diaphragm noted.    Left ankle x-ray was reviewed by me and it does not show any obvious fractures.  An official report is currently pending.    Assessment:    1.   Inflammatory arthropathy associated with Crohn's disease.  2.  Pneumoperitoneum and pneumomediastinum secondary to colonic perforation one week ago.  3.  Crohn's disease.  4.  Acquired hypothyroidism.  5.  Chronic kidney disease stage III.  6.  Right renal 3 cm mass, noted on CT scan, needs outpatient urology follow-up.    Plan:     Patient is currently being admitted to the medical floor with telemetry.  The exact etiology office joint pain is uncertain but likely related to inflammatory arthropathy related to inflammatory bowel disease.  Moreover, patient has been off his NSAIDs in the last one week which might be contributing to the flare.  I do not suspect gout or septic arthritis at this time.  I will hold off on steroid therapy at this time in view of the suspected infection. He will be placed on Percocet and morphine for pain control.  We will also restart his Mobic.    Patient does have leukocytosis but is afebrile.  However, in view of his recent colonic perforation and pneumoperitoneum, we will place him on IV antibiotic therapy with Zosyn.  A consult will be obtained from Dr. Galan from gastroenterology for further recommendations.  Since he has a nonfunctioning colon, I do not think he needs any surgical intervention at this time.  No abscesses or fluid collection was noted on the CT scan.  He may benefit from outpatient follow-up with rheumatology for the treatment of his Crohn's disease as well as arthritis with newer agents.  I have discussed the patient's condition with his wife and his sons were at the bedside.        Yifan Johnson MD  08/06/17  11:28 PM    Portions of this note may have been completed with Dragon, a voice recognition program. Not all errors in transcription may have been detected prior to signing.     Electronically signed by Yifan Johnson MD at 8/7/2017  6:59 AM           Emergency Department Notes      Louie Gore PA-C at 8/6/2017  8:04 PM      Procedure Orders:    1. ECG 12 Lead  [[id number]] ordered by Louie Gore PA-C at 08/06/17 2007           Attestation signed by Lissette Candelario MD at 8/7/2017 12:41 AM              For this patient encounter, I reviewed the NP or PA documentation, treatment plan, and medical decision making. Lissette Candelario MD 8/7/2017 12:41 AM                               Subjective   HPI Comments: 57-year-old male with history of Crohn's disease.  Status post colonoscopy with an apparent perforation causing pneumoperitoneum and pneumomediastinum on Monday, July 31.  He spent several days in the hospital and was discharged on August 4 17.  He is now here with a one-day history of epigastric burning into his lower sternum described as indigestion as well as severe, sharp, achy type pain to his left lateral deltoid region as well as similar pain to his left anterior ankle and foot dorsum with associated redness and heat to the left foot and ankle.  No injury to his shoulder or ankle/foot.  Also complains of being short of breath.  No nausea, vomiting or diaphoresis.  He also complains of chronic diarrhea in his ileostomy bag.  No history of gout.    Aggravating factors: Deep breathing.  Alleviating factors: None.  Treatment prior to arrival: OTC medications.      History provided by:  Patient  History limited by:  Acuity of condition   used: No        Review of Systems   Constitutional: Negative.    HENT: Negative.    Eyes: Negative.    Respiratory: Positive for shortness of breath.    Cardiovascular: Positive for chest pain.   Gastrointestinal: Positive for abdominal pain.   Endocrine: Negative.    Genitourinary: Negative for dysuria.   Musculoskeletal: Positive for joint swelling.        Redness and heat to left foot/ankle.   Skin: Negative.    Allergic/Immunologic: Negative.    Neurological: Negative.    Hematological: Negative.    Psychiatric/Behavioral: Negative.    All other systems reviewed and are negative.      Past Medical  "History:   Diagnosis Date   • Altered bowel elimination due to intestinal ostomy     PATIENT REPORTS SECONDARY TO A COLON RUPTURE APPROXIMATELY 16-17 YEARS AGO   • Anemia    • Arthritis     WRIST   • GERD (gastroesophageal reflux disease)    • History of MRSA infection     REPORTS HX OF \"NOSE\" 2-3 YEARS AGO, TREATED.   • History of pneumonia    • Hypothyroid    • Spinal headache    • Wears glasses        Allergies   Allergen Reactions   • Latex Rash       Past Surgical History:   Procedure Laterality Date   • COLONOSCOPY     • COLONOSCOPY N/A 7/31/2017    Procedure: Colonoscopy through ostomy site and rectum with biopsies;  Surgeon: Vincenzo Galan MD;  Location: [facility] ENDOSCOPY;  Service:    • ENDOSCOPY     • HEMORRHOIDECTOMY      REPORTS APPROXIMATELY 20 YEARS AGO   • HERNIA REPAIR      UMBILICAL   • SMALL INTESTINE SURGERY      HX OF COLON RUPTURE WITH PLACEMENT OF OSTOMY   • WISDOM TOOTH EXTRACTION     • WRIST SURGERY Right        Family History   Problem Relation Age of Onset   • Colon cancer Neg Hx        Social History     Social History   • Marital status:      Spouse name: N/A   • Number of children: N/A   • Years of education: N/A     Social History Main Topics   • Smoking status: Former Smoker     Types: Cigarettes   • Smokeless tobacco: Never Used      Comment: QUIT 30 PLUS YEARS AGO   • Alcohol use No      Comment: QUIT 30 PLUS YEARS   • Drug use: No   • Sexual activity: Defer     Other Topics Concern   • None     Social History Narrative           Objective   Physical Exam   Constitutional: He is oriented to person, place, and time. He appears well-developed and well-nourished. No distress.   HENT:   Head: Normocephalic and atraumatic.   Right Ear: External ear normal.   Left Ear: External ear normal.   Eyes: EOM are normal. Pupils are equal, round, and reactive to light.   Neck: Normal range of motion. Neck supple.   Cardiovascular: Normal rate, regular rhythm and normal heart sounds.  "   Pulmonary/Chest: Effort normal and breath sounds normal. No stridor. He has no wheezes. He exhibits no tenderness.   Abdominal: Soft. He exhibits no distension. There is tenderness (epigastric region is tender to palpation.). There is no rebound and no guarding.   The patient has an ileostomy bag in the right lower quadrant.   Musculoskeletal: Normal range of motion. He exhibits no edema.   The left shoulder is tender to palpation over the lateral deltoid.  No redness or heat to the left shoulder.  Some pain on range of motion.  The patient has erythema and heat to the left anterior ankle and left dorsal foot and it is exquisitely tender to palpation.  No bony tenderness.  Neurovascular status is intact to the left arm and leg.   Neurological: He is alert and oriented to person, place, and time.   Skin: Skin is warm and dry. No rash noted. He is not diaphoretic.   Psychiatric: He has a normal mood and affect. His behavior is normal. Judgment and thought content normal.   Nursing note and vitals reviewed.      ECG 12 Lead    Date/Time: 8/6/2017 11:44 PM  Performed by: GREGORY MCDERMOTT  Authorized by: GREGORY MCDERMOTT   Comments: EKG: Sinus tachycardia rate of 118.  Nonspecific ST and T-wave changes.  No infarction or ischemia.  No ectopy.              ED Course  ED Course   Comment By Time   Nicole:  ct abd and pelvis with no contrast. Call back if fluid collection. Continue cipro and flagyl.  Zosyn here. Pt noncompliant with ng tube and other interventions in the past. Gregory Mcdermott PA-C 08/06 8017   I discussed the case with Dr. Rivera: He is aware of the CT report and renal mass.  No fluid collections.  Persistent pneumomediastinum and pneumoperitoneum.  Admit, Zosyn, NG tube, Phenergan.  IV fluids.  Nothing by mouth.  Admit to hospitalist service. Gregory Mcdermott PA-C 08/06 2302   I spoke to Dr. Johnson: Consult surgery first if they will see him in consultation, he will be happy to admit. Gregory Mcdermott PA-C 08/06 1839    I spoke with Dr. Garza: Will be available for consultation.  If / Nicole thinks the patient needs surgery, he may need to be referred to [facility]. Louie Gore PA-C 08/06 2320   Elizabeth: admit, tele. Louie Gore PA-C 08/06 2322   Patient refusing to have an NG tube inserted. Louie Gore PA-C 08/06 2345                  MDM  Number of Diagnoses or Management Options  Acute left ankle pain: new and requires workup  Epigastric abdominal pain: new and requires workup  Pneumomediastinum: new and requires workup  Pneumoperitoneum: new and requires workup  Right renal mass: new and requires workup     Amount and/or Complexity of Data Reviewed  Clinical lab tests: reviewed and ordered  Tests in the radiology section of CPT®:  ordered and reviewed  Tests in the medicine section of CPT®:  ordered and reviewed  Decide to obtain previous medical records or to obtain history from someone other than the patient: yes  Discuss the patient with other providers: yes  Independent visualization of images, tracings, or specimens: yes    Risk of Complications, Morbidity, and/or Mortality  Presenting problems: moderate  Management options: moderate  General comments: Patient being admitted for the diagnoses mentioned.  He was unaware of the right renal mass, which I made him aware of today.  He understands that he may need to have a biopsy of this at some point to rule out cancer.  Dr. Candelario and I have discussed this case in detail.  The left ankle pain may well very be from gout or cellulitis.  Left shoulder pain is likely from diaphragmatic irritation from pneumoperitoneum.    Patient Progress  Patient progress: stable      Final diagnoses:   Epigastric abdominal pain   Pneumoperitoneum   Pneumomediastinum   Acute left ankle pain   Right renal mass            Louie Gore PA-C  08/06/17 2344       Louie Gore PA-C  08/06/17 2345       Electronically signed by Lissette Candelario MD at 8/7/2017 12:41 AM      April  "Gracemichael Brantley at 8/6/2017  9:34 PM          Contacted Dr. Galan per Yuan's request.     April Grace Brantley  08/06/17 2134       Electronically signed by April Grace Brantley at 8/6/2017  9:34 PM      April Grace Brantley at 8/6/2017 10:58 PM          Contacted Dr. Galan again per Yuan's request.     April Grace Fercho  08/06/17 2257       Electronically signed by April Grace Brantley at 8/6/2017 10:58 PM      April Grace Fercho at 8/6/2017 11:15 PM          Contacted Dr. Garza per Yuan Gore.     April Grace Fercho  08/06/17 2310       Electronically signed by April Grace Brantley at 8/6/2017 11:15 PM        Hospital Medications (active)       Dose Frequency Start End    acetaminophen (TYLENOL) tablet 650 mg 650 mg Every 4 Hours PRN 8/7/2017     Sig - Route: Take 2 tablets by mouth Every 4 (Four) Hours As Needed for Mild Pain (1-3). - Oral    enoxaparin (LOVENOX) syringe 40 mg 40 mg Daily 8/7/2017     Sig - Route: Inject 0.4 mL under the skin Daily. - Subcutaneous    ibuprofen (ADVIL,MOTRIN) tablet 400 mg 400 mg Once 8/7/2017 8/7/2017    Sig - Route: Take 1 tablet by mouth 1 (One) Time. - Oral    levothyroxine (SYNTHROID, LEVOTHROID) tablet 200 mcg 200 mcg Every Early Morning 8/7/2017     Sig - Route: Take 2 tablets by mouth Every Morning. - Oral    magnesium sulfate 2g/50 mL (PREMIX) infusion 2 g Once 8/7/2017 8/7/2017    Sig - Route: Infuse 50 mL into a venous catheter 1 (One) Time. - Intravenous    meloxicam (MOBIC) tablet 15 mg 15 mg Daily 8/7/2017     Sig - Route: Take 2 tablets by mouth Daily. - Oral    methylPREDNISolone sodium succinate (SOLU-Medrol) injection 60 mg 60 mg Once 8/7/2017     Sig - Route: Infuse 0.96 mL into a venous catheter 1 (One) Time. - Intravenous    morphine injection 1 mg 1 mg Every 4 Hours PRN 8/7/2017 8/17/2017    Sig - Route: Infuse 0.5 mL into a venous catheter Every 4 (Four) Hours As Needed for Moderate Pain (4-6). - Intravenous    Linked Group 1:  \"And\" " "Linked Group Details        Morphine injection 4 mg 4 mg Once 8/6/2017 8/6/2017    Sig - Route: Infuse 1 mL into a venous catheter 1 (One) Time. - Intravenous    Morphine injection 4 mg 4 mg Once 8/6/2017 8/6/2017    Sig - Route: Infuse 1 mL into a venous catheter 1 (One) Time. - Intravenous    naloxone (NARCAN) injection 0.4 mg 0.4 mg Every 5 Minutes PRN 8/7/2017     Sig - Route: Infuse 1 mL into a venous catheter Every 5 (Five) Minutes As Needed for Respiratory Depression. - Intravenous    Linked Group 1:  \"And\" Linked Group Details        ondansetron (ZOFRAN) injection 4 mg 4 mg Every 6 Hours PRN 8/7/2017     Sig - Route: Infuse 2 mL into a venous catheter Every 6 (Six) Hours As Needed for Nausea or Vomiting. - Intravenous    Linked Group 2:  \"Or\" Linked Group Details        ondansetron (ZOFRAN) tablet 4 mg 4 mg Every 6 Hours PRN 8/7/2017     Sig - Route: Take 1 tablet by mouth Every 6 (Six) Hours As Needed for Nausea or Vomiting. - Oral    Linked Group 2:  \"Or\" Linked Group Details        ondansetron ODT (ZOFRAN-ODT) disintegrating tablet 4 mg 4 mg Every 6 Hours PRN 8/7/2017     Sig - Route: Take 1 tablet by mouth Every 6 (Six) Hours As Needed for Nausea or Vomiting. - Oral    Linked Group 2:  \"Or\" Linked Group Details        oxyCODONE-acetaminophen (PERCOCET) 5-325 MG per tablet 1 tablet 1 tablet Every 4 Hours PRN 8/7/2017 8/17/2017    Sig - Route: Take 1 tablet by mouth Every 4 (Four) Hours As Needed for Moderate Pain (4-6). - Oral    pantoprazole (PROTONIX) EC tablet 40 mg 40 mg Every Early Morning 8/7/2017     Sig - Route: Take 1 tablet by mouth Every Morning. - Oral    piperacillin-tazobactam (ZOSYN) IVPB 3.375 g in 100 mL NS 3.375 g Once 8/6/2017 8/6/2017    Sig - Route: Infuse 3.375 g into a venous catheter 1 (One) Time. - Intravenous    Cosign for Ordering: Accepted by Lissette Candelario MD on 8/6/2017  9:47 PM    piperacillin-tazobactam (ZOSYN) IVPB 3.375 g in 100 mL NS 3.375 g Every 6 Hours " 8/7/2017     Sig - Route: Infuse 3.375 g into a venous catheter Every 6 (Six) Hours. - Intravenous    promethazine (PHENERGAN) injection 12.5 mg 12.5 mg Once 8/6/2017 8/6/2017    Sig - Route: Infuse 0.5 mL into a venous catheter 1 (One) Time. - Intravenous    Cosign for Ordering: Accepted by Lissette Candelario MD on 8/6/2017  9:30 PM    sodium chloride 0.9 % flush 1-10 mL 1-10 mL As Needed 8/7/2017     Sig - Route: Infuse 1-10 mL into a venous catheter As Needed for Line Care. - Intravenous    ibuprofen (ADVIL,MOTRIN) tablet 400 mg (Discontinued) 400 mg Once 8/7/2017 8/7/2017    Sig - Route: Take 1 tablet by mouth 1 (One) Time. - Oral    magnesium sulfate 2g/50 mL (PREMIX) infusion (Discontinued) 2 g Once 8/7/2017 8/7/2017    Sig - Route: Infuse 50 mL into a venous catheter 1 (One) Time. - Intravenous    Reason for Discontinue: Duplicate order    ondansetron (ZOFRAN) injection 4 mg (Discontinued) 4 mg Once 8/6/2017 8/6/2017    Sig - Route: Infuse 2 mL into a venous catheter 1 (One) Time. - Intravenous            Lab Results (last 24 hours)     Procedure Component Value Units Date/Time    CBC & Differential [[id number]] Collected:  08/06/17 2013    Specimen:  Blood Updated:  08/06/17 2033    Narrative:       The following orders were created for panel order CBC & Differential.  Procedure                               Abnormality         Status                     ---------                               -----------         ------                     CBC Auto Differential[[id number]]        Abnormal            Final result                 Please view results for these tests on the individual orders.    CBC Auto Differential [[id number]]  (Abnormal) Collected:  08/06/17 2013    Specimen:  Blood Updated:  08/06/17 2033     WBC 15.10 (H) 10*3/mm3      RBC 4.59 (L) 10*6/mm3      Hemoglobin 14.7 g/dL      Hematocrit 42.0 %      MCV 91.5 fL      MCH 32.0 (H) pg      MCHC 35.0 g/dL      RDW 13.2 %      RDW-SD 44.5 fl       MPV 10.0 fL      Platelets 200 10*3/mm3      Neutrophil % 81.1 (H) %      Lymphocyte % 6.7 (L) %      Monocyte % 9.1 %      Eosinophil % 2.2 %      Basophil % 0.2 %      Immature Grans % 0.7 (H) %      Neutrophils, Absolute 12.25 (H) 10*3/mm3      Lymphocytes, Absolute 1.01 10*3/mm3      Monocytes, Absolute 1.38 (H) 10*3/mm3      Eosinophils, Absolute 0.33 10*3/mm3      Basophils, Absolute 0.03 10*3/mm3      Immature Grans, Absolute 0.10 (H) 10*3/mm3      nRBC 0.0 /100 WBC     Lipase [[id number]]  (Abnormal) Collected:  08/06/17 2013    Specimen:  Blood Updated:  08/06/17 2050     Lipase 524 (H) U/L     Uric Acid [[id number]]  (Normal) Collected:  08/06/17 2013    Specimen:  Blood Updated:  08/06/17 2050     Uric Acid 6.6 mg/dL     Troponin [[id number]]  (Normal) Collected:  08/06/17 2013    Specimen:  Blood Updated:  08/06/17 2050     Troponin I <0.012 ng/mL     Narrative:       Normal Patient Upper Reference Limit (URL) (99th Percentile)=0.03 ng/mL   Non-AMI Illness Reference Limit=0.03-0.11 ng/mL   AMI Confirmation=0.12 ng/mL and above    Comprehensive Metabolic Panel [[id number]]  (Abnormal) Collected:  08/06/17 2013    Specimen:  Blood Updated:  08/06/17 2051     Glucose 115 (H) mg/dL      BUN 22 (H) mg/dL      Creatinine 1.50 (H) mg/dL      Sodium 136 (L) mmol/L      Potassium 3.4 (L) mmol/L      Chloride 101 mmol/L      CO2 23.0 (L) mmol/L      Calcium 9.6 mg/dL      Total Protein 7.1 g/dL      Albumin 3.90 g/dL      ALT (SGPT) 36 U/L      AST (SGOT) 27 U/L      Alkaline Phosphatase 104 U/L      Total Bilirubin 0.8 mg/dL      eGFR Non African Amer 49 (L) mL/min/1.73      Globulin 3.2 gm/dL      A/G Ratio 1.2 g/dL      BUN/Creatinine Ratio 14.7     Anion Gap 15.4 mmol/L     Narrative:       Abnormal estimated GFR should be followed by more specific studies to confirm end stage chronic renal disease. The equation used for calculation may not be accurate for patients less than 19 years old, greater than 70 years  old, patients at extremes of weight, malnutrition, or with acute renal dysfunction.    Urinalysis With / Culture If Indicated [[id number]]  (Abnormal) Collected:  08/06/17 2250    Specimen:  Urine from Urine, Clean Catch Updated:  08/06/17 2301     Color, UA Yellow     Appearance, UA Clear     pH, UA 6.0     Specific Gravity, UA 1.025     Glucose, UA Negative     Ketones, UA Trace (A)     Bilirubin, UA Small (1+) (A)     Blood, UA Negative     Protein,  mg/dL (2+) (A)     Leuk Esterase, UA Negative     Nitrite, UA Negative     Urobilinogen, UA 0.2 E.U./dL    Urinalysis, Microscopic Only [[id number]]  (Abnormal) Collected:  08/06/17 2250    Specimen:  Urine from Urine, Clean Catch Updated:  08/06/17 2313     RBC, UA None Seen /HPF      WBC, UA 0-2 (A) /HPF      Bacteria, UA None Seen /HPF      Squamous Epithelial Cells, UA None Seen /HPF      Hyaline Casts, UA None Seen /LPF      Mucus, UA Small/1+ (A) /HPF      Methodology Manual Light Microscopy    CBC Auto Differential [[id number]]  (Abnormal) Collected:  08/07/17 0559    Specimen:  Blood Updated:  08/07/17 0627     WBC 13.42 (H) 10*3/mm3      RBC 4.68 (L) 10*6/mm3      Hemoglobin 15.1 g/dL      Hematocrit 43.8 %      MCV 93.6 fL      MCH 32.3 (H) pg      MCHC 34.5 g/dL      RDW 13.5 %      RDW-SD 46.1 fl      MPV 10.4 fL      Platelets 174 10*3/mm3      Neutrophil % 79.0 %      Lymphocyte % 6.7 (L) %      Monocyte % 10.5 %      Eosinophil % 2.7 %      Basophil % 0.4 %      Immature Grans % 0.7 (H) %      Neutrophils, Absolute 10.60 (H) 10*3/mm3      Lymphocytes, Absolute 0.90 10*3/mm3      Monocytes, Absolute 1.41 (H) 10*3/mm3      Eosinophils, Absolute 0.36 10*3/mm3      Basophils, Absolute 0.05 10*3/mm3      Immature Grans, Absolute 0.10 (H) 10*3/mm3      nRBC 0.0 /100 WBC     POC Glucose Fingerstick [[id number]]  (Normal) Collected:  08/07/17 0623    Specimen:  Blood Updated:  08/07/17 0631     Glucose 121 mg/dL       Serial Number: [id number]:   [id number]       Basic Metabolic Panel [[id number]]  (Abnormal) Collected:  08/07/17 0559    Specimen:  Blood Updated:  08/07/17 0649     Glucose 145 (H) mg/dL      BUN 24 (H) mg/dL      Creatinine 1.70 (H) mg/dL      Sodium 138 mmol/L      Potassium 4.0 mmol/L      Chloride 99 mmol/L      CO2 27.0 mmol/L      Calcium 9.3 mg/dL      eGFR Non African Amer 42 (L) mL/min/1.73      BUN/Creatinine Ratio 14.1     Anion Gap 16.0 mmol/L     Narrative:       Abnormal estimated GFR should be followed by more specific studies to confirm end stage chronic renal disease. The equation used for calculation may not be accurate for patients less than 19 years old, greater than 70 years old, patients at extremes of weight, malnutrition, or with acute renal dysfunction.    Troponin [[id number]]  (Normal) Collected:  08/07/17 0559    Specimen:  Blood Updated:  08/07/17 0649     Troponin I <0.012 ng/mL     Narrative:       Normal Patient Upper Reference Limit (URL) (99th Percentile)=0.03 ng/mL   Non-AMI Illness Reference Limit=0.03-0.11 ng/mL   AMI Confirmation=0.12 ng/mL and above    Magnesium [[id number]]  (Abnormal) Collected:  08/07/17 0559    Specimen:  Blood Updated:  08/07/17 0653     Magnesium 1.0 (C) mg/dL     TSH [[id number]]  (Abnormal) Collected:  08/07/17 0559    Specimen:  Blood Updated:  08/07/17 0716     TSH 29.200 (H) mIU/mL         Imaging Results (last 24 hours)     Procedure Component Value Units Date/Time    CT Abdomen Pelvis Without Contrast [[id number]] Collected:  08/06/17 2250     Updated:  08/06/17 2253    Narrative:       FINAL REPORT    TECHNIQUE:  Axial images through the abdomen and pelvis were obtained by computed tomography.  IV contrast was withheld.    CLINICAL HISTORY:  EPIGASTRIC ABDOMINAL PAIN, ELEVATED LIPASE, LEUKOCYTOSIS, KNOWN FREE AIR IN ABDOMEN AFTER COLONOSCOPY. LOOK FOR FLUID COLLECTIONS PLEASE    PRIOR SCAN 8-3-17 SENT    FINDINGS:  Abdomen: There is pneumomediastinum, but no pneumothorax or pleural  effusion. There is large amount of pneumoperitoneum and extensive air within the retroperitoneal space of the abdomen and pelvis. There are gallstones in an otherwise normal gallbladder.   There is a nonobstructing left renal calculus. In the upper medial right kidney there is a solid-appearing mass worrisome for renal cell carcinoma approximately 3 cm in diameter. If not already evaluated, repeat CT with contrast is recommended for   further evaluation. The solid abdominal organs and ureters are otherwise unremarkable. There are surgical clips adjacent to the cecum. There is a right lower quadrant ileostomy. The GI tract is otherwise unremarkable.    Pelvis: The urinary bladder is unremarkable. Air dissects from the anterior abdominal wall to the scrotum. There is no pelvic or abdominal ascites, adenopathy or significant osseous abnormality.      Impression:       Pneumomediastinum, pneumoperitoneum and extensive pneumoretroperitoneum. No fluid collection is seen. ER was called and notified of these findings.    Authenticated by Godwin Mejía M.D. on 08/06/2017 10:50:46 PM    XR Abdomen KUB [[id number]] Collected:  08/07/17 0729     Updated:  08/07/17 0736    Narrative:       PROCEDURE: XR ABDOMEN KUB-     HISTORY: Known pneumomediastinum and pneumoperitoneum after recent  colonoscopy and perforation     COMPARISON: August 2, 2017.     FINDINGS: An AP view of the abdomen and pelvis demonstrates extensive  pneumoperitoneum. Overlying the left kidney is a 5 to 6 mm calcification  likely representing a stone within the collecting system. Surgical clips  are seen overlying the right flank. There is extensive subcutaneous gas  also extending into the scrotum. Osseous structures are intact. No  abnormally dilated loops of bowel are appreciated..           Impression:       1. Extensive pneumoperitoneum   2. 5-6 mm calcification overlying the left renal shadow.  3. Extensive subcutaneous emphysema in the region of the  scrotum     This report was finalized on 8/7/2017 7:32 AM by Grayson Madison DO.    XR Chest 1 View [[id number]] Collected:  08/07/17 0733     Updated:  08/07/17 0737    Narrative:       PROCEDURE: XR CHEST 1 VW-     HISTORY: Known pneumomediastinum and pneumoperitoneum after recent  colonoscopy and perforation     COMPARISON: August 4, 2017.     FINDINGS: The heart is normal in size. There is evidence of  pneumomediastinum and free air beneath the diaphragm.. The lungs are  clear. Electrodes overlie the chest..  There are no acute osseous  abnormalities. Evidence of old calcified granulomatous disease.       Impression:       Pneumomediastinum and free air beneath the diaphragm..     Continued followup is recommended.     This report was finalized on 8/7/2017 7:34 AM by Grayson Madison DO.    XR Ankle 3+ View Left [[id number]] Collected:  08/07/17 0735     Updated:  08/07/17 0738    Narrative:       PROCEDURE: XR ANKLE 3+ VW LEFT-     History: Redness and swelling as well as pain     COMPARISON: None.     FINDINGS:  A 3 view exam demonstrates no acute fracture or dislocation.  The joint spaces are preserved. Mild soft tissue swelling is noted about  the ankle..           Impression:       No acute fracture.         If symptoms persist consider follow-up MRI.     This report was finalized on 8/7/2017 7:36 AM by Grayson Madison DO.        Physician Progress Notes (last 24 hours) (Notes from 8/6/2017 10:37 AM through 8/7/2017 10:37 AM)     No notes of this type exist for this encounter.        Consult Notes (last 24 hours) (Notes from 8/6/2017 10:37 AM through 8/7/2017 10:37 AM)     No notes of this type exist for this encounter.

## 2017-08-07 NOTE — PROGRESS NOTES
Continued Stay Note  [facility]     Patient Name: Jorge Mclaughlin  MRN: [id number]  Today's Date: 8/7/2017    Admit Date: 8/6/2017          Discharge Plan       08/07/17 1000    Case Management/Social Work Plan    Plan Pt awake and alert at time of visit.  Reports readmitted due to left foot pain.  Lives with spouse in single level house.  Does not have any medical equipment other than ostomy supplies.  Is not followed by any  agency.  Plans to return home at DC.  Informed CM will continue to follow for DC needs.    Patient/Family In Agreement With Plan yes              Discharge Codes     None            Kay Bojorquez

## 2017-08-08 LAB
ANION GAP SERPL CALCULATED.3IONS-SCNC: 14.6 MMOL/L
BASOPHILS # BLD AUTO: 0.05 10*3/MM3 (ref 0–0.2)
BASOPHILS NFR BLD AUTO: 0.2 % (ref 0–2.5)
BUN BLD-MCNC: 32 MG/DL (ref 7–20)
BUN/CREAT SERPL: 20 (ref 6.3–21.9)
CALCIUM SPEC-SCNC: 8.9 MG/DL (ref 8.4–10.2)
CHLORIDE SERPL-SCNC: 104 MMOL/L (ref 98–107)
CO2 SERPL-SCNC: 23 MMOL/L (ref 26–30)
CREAT BLD-MCNC: 1.6 MG/DL (ref 0.6–1.3)
DEPRECATED RDW RBC AUTO: 45.7 FL (ref 37–54)
EOSINOPHIL # BLD AUTO: 0 10*3/MM3 (ref 0–0.7)
EOSINOPHIL NFR BLD AUTO: 0 % (ref 0–7)
ERYTHROCYTE [DISTWIDTH] IN BLOOD BY AUTOMATED COUNT: 13.3 % (ref 11.5–14.5)
GFR SERPL CREATININE-BSD FRML MDRD: 45 ML/MIN/1.73
GLUCOSE BLD-MCNC: 157 MG/DL (ref 74–98)
HCT VFR BLD AUTO: 39.4 % (ref 42–52)
HGB BLD-MCNC: 13.8 G/DL (ref 14–18)
IMM GRANULOCYTES # BLD: 0.24 10*3/MM3 (ref 0–0.06)
IMM GRANULOCYTES NFR BLD: 1.1 % (ref 0–0.6)
LYMPHOCYTES # BLD AUTO: 0.79 10*3/MM3 (ref 0.6–3.4)
LYMPHOCYTES NFR BLD AUTO: 3.8 % (ref 10–50)
MCH RBC QN AUTO: 32.6 PG (ref 27–31)
MCHC RBC AUTO-ENTMCNC: 35 G/DL (ref 30–37)
MCV RBC AUTO: 93.1 FL (ref 80–94)
MONOCYTES # BLD AUTO: 0.85 10*3/MM3 (ref 0–0.9)
MONOCYTES NFR BLD AUTO: 4.1 % (ref 0–12)
NEUTROPHILS # BLD AUTO: 18.94 10*3/MM3 (ref 2–6.9)
NEUTROPHILS NFR BLD AUTO: 90.8 % (ref 37–80)
NRBC BLD MANUAL-RTO: 0 /100 WBC (ref 0–0)
PLATELET # BLD AUTO: 189 10*3/MM3 (ref 130–400)
PMV BLD AUTO: 10.1 FL (ref 6–12)
POTASSIUM BLD-SCNC: 4.6 MMOL/L (ref 3.5–5.1)
RBC # BLD AUTO: 4.23 10*6/MM3 (ref 4.7–6.1)
SODIUM BLD-SCNC: 137 MMOL/L (ref 137–145)
WBC NRBC COR # BLD: 20.87 10*3/MM3 (ref 4.8–10.8)

## 2017-08-08 PROCEDURE — 25010000002 PIPERACILLIN SOD-TAZOBACTAM PER 1 G: Performed by: INTERNAL MEDICINE

## 2017-08-08 PROCEDURE — 99253 IP/OBS CNSLTJ NEW/EST LOW 45: CPT | Performed by: UROLOGY

## 2017-08-08 PROCEDURE — 85025 COMPLETE CBC W/AUTO DIFF WBC: CPT | Performed by: INTERNAL MEDICINE

## 2017-08-08 PROCEDURE — 25010000002 ENOXAPARIN PER 10 MG: Performed by: INTERNAL MEDICINE

## 2017-08-08 PROCEDURE — 25010000002 METHYLPREDNISOLONE PER 125 MG: Performed by: INTERNAL MEDICINE

## 2017-08-08 PROCEDURE — 99232 SBSQ HOSP IP/OBS MODERATE 35: CPT | Performed by: INTERNAL MEDICINE

## 2017-08-08 PROCEDURE — 25010000002 METHYLPREDNISOLONE PER 40 MG: Performed by: INTERNAL MEDICINE

## 2017-08-08 PROCEDURE — 25010000002 MORPHINE PER 10 MG: Performed by: INTERNAL MEDICINE

## 2017-08-08 PROCEDURE — 25010000002 LEVOFLOXACIN PER 250 MG: Performed by: INTERNAL MEDICINE

## 2017-08-08 PROCEDURE — 80048 BASIC METABOLIC PNL TOTAL CA: CPT | Performed by: INTERNAL MEDICINE

## 2017-08-08 RX ORDER — METHYLPREDNISOLONE SODIUM SUCCINATE 125 MG/2ML
60 INJECTION, POWDER, LYOPHILIZED, FOR SOLUTION INTRAMUSCULAR; INTRAVENOUS EVERY 8 HOURS
Status: DISCONTINUED | OUTPATIENT
Start: 2017-08-08 | End: 2017-08-09 | Stop reason: HOSPADM

## 2017-08-08 RX ORDER — LEVOFLOXACIN 5 MG/ML
500 INJECTION, SOLUTION INTRAVENOUS EVERY 24 HOURS
Status: DISCONTINUED | OUTPATIENT
Start: 2017-08-08 | End: 2017-08-09 | Stop reason: HOSPADM

## 2017-08-08 RX ORDER — OXYCODONE AND ACETAMINOPHEN 7.5; 325 MG/1; MG/1
1 TABLET ORAL EVERY 4 HOURS PRN
Status: DISCONTINUED | OUTPATIENT
Start: 2017-08-08 | End: 2017-08-09 | Stop reason: HOSPADM

## 2017-08-08 RX ADMIN — MORPHINE SULFATE 1 MG: 2 INJECTION, SOLUTION INTRAMUSCULAR; INTRAVENOUS at 15:25

## 2017-08-08 RX ADMIN — MORPHINE SULFATE 1 MG: 2 INJECTION, SOLUTION INTRAMUSCULAR; INTRAVENOUS at 10:34

## 2017-08-08 RX ADMIN — METRONIDAZOLE 500 MG: 500 INJECTION, SOLUTION INTRAVENOUS at 18:32

## 2017-08-08 RX ADMIN — MELOXICAM 15 MG: 7.5 TABLET ORAL at 10:43

## 2017-08-08 RX ADMIN — PIPERACILLIN SODIUM AND TAZOBACTAM SODIUM 3.38 G: 3; .375 INJECTION, POWDER, FOR SOLUTION INTRAVENOUS at 05:11

## 2017-08-08 RX ADMIN — Medication 10 ML: at 09:56

## 2017-08-08 RX ADMIN — OXYCODONE AND ACETAMINOPHEN 1 TABLET: 5; 325 TABLET ORAL at 01:59

## 2017-08-08 RX ADMIN — METRONIDAZOLE 500 MG: 500 INJECTION, SOLUTION INTRAVENOUS at 09:55

## 2017-08-08 RX ADMIN — OXYCODONE AND ACETAMINOPHEN 1 TABLET: 5; 325 TABLET ORAL at 06:35

## 2017-08-08 RX ADMIN — MORPHINE SULFATE 1 MG: 2 INJECTION, SOLUTION INTRAMUSCULAR; INTRAVENOUS at 05:11

## 2017-08-08 RX ADMIN — PANTOPRAZOLE SODIUM 40 MG: 40 TABLET, DELAYED RELEASE ORAL at 05:11

## 2017-08-08 RX ADMIN — OXYCODONE HYDROCHLORIDE AND ACETAMINOPHEN 1 TABLET: 7.5; 325 TABLET ORAL at 13:19

## 2017-08-08 RX ADMIN — Medication 10 ML: at 15:25

## 2017-08-08 RX ADMIN — METHYLPREDNISOLONE SODIUM SUCCINATE 60 MG: 125 INJECTION, POWDER, FOR SOLUTION INTRAMUSCULAR; INTRAVENOUS at 10:44

## 2017-08-08 RX ADMIN — Medication 10 ML: at 18:30

## 2017-08-08 RX ADMIN — MORPHINE SULFATE 1 MG: 2 INJECTION, SOLUTION INTRAMUSCULAR; INTRAVENOUS at 22:11

## 2017-08-08 RX ADMIN — ENOXAPARIN SODIUM 40 MG: 40 INJECTION SUBCUTANEOUS at 10:43

## 2017-08-08 RX ADMIN — METHYLPREDNISOLONE SODIUM SUCCINATE 60 MG: 125 INJECTION, POWDER, FOR SOLUTION INTRAMUSCULAR; INTRAVENOUS at 18:29

## 2017-08-08 RX ADMIN — METHYLPREDNISOLONE SODIUM SUCCINATE 40 MG: 40 INJECTION, POWDER, FOR SOLUTION INTRAMUSCULAR; INTRAVENOUS at 01:59

## 2017-08-08 RX ADMIN — LEVOFLOXACIN 500 MG: 5 INJECTION, SOLUTION INTRAVENOUS at 09:55

## 2017-08-08 RX ADMIN — OXYCODONE HYDROCHLORIDE AND ACETAMINOPHEN 1 TABLET: 7.5; 325 TABLET ORAL at 18:30

## 2017-08-08 RX ADMIN — LEVOTHYROXINE SODIUM 200 MCG: 100 TABLET ORAL at 05:11

## 2017-08-08 NOTE — PLAN OF CARE
Problem: Patient Care Overview (Adult)  Goal: Plan of Care Review  Outcome: Ongoing (interventions implemented as appropriate)    08/08/17 0421   Coping/Psychosocial Response Interventions   Plan Of Care Reviewed With patient;spouse   Patient Care Overview   Progress progress toward functional goals as expected   Outcome Evaluation   Outcome Summary/Follow up Plan Patient has been cooperative throughout the shift. Medications administered for pain. Patient appears to have been able to sleep this shift. Complains of tenderness to top of left foot. No erythema or edema to foot. Able to stand on it and ambulate 10 steps.

## 2017-08-08 NOTE — PROGRESS NOTES
"  SUBJECTIVE:  Feels better.  Denies abdominal pain.  There is no nausea or vomiting.  No increased ileostomy drainage.  There is no history of overt GI bleed (Hematemesis, melena or hematochezia).  Indigestion has improved.  No dysphagia or odynophagia.      UNDERLYING CHRONIC ILLNESS:  Hypothyroidism.    REVIEW OF SYSTEMS:    Constitutional: No fever or chills.  CNS: No seizures or stroke.  Cardiovascular: No chest pains.  No palpitations.  Pulmonary: No shortness of breath.  No cough.  Liver: No jaundice.  Rheumatologic: Left ankle, and left shoulder as well as right wrist pain have improved.  The patient could do weightbearing today.    Skin: No skin rash.  Renal: Making urine.  Denies hematuria or dysuria.  Psychiatric: Denies depression or anxiety.  Nutrition: Low lactose low residue diet.  Activities: Mostly staying in the bed.    OBJECTIVE:  Alert and oriented ×3.  No apparent distress.    Vital signs:    Blood pressure 137/90, pulse 101, temperature 97.7 °F (36.5 °C), temperature source Oral, resp. rate 18, height 65\" (165.1 cm), weight 135 lb (61.2 kg), SpO2 99 %.    HEENT: No icterus.  No pallor.  Oral mucosa moist.  Neck: Supple.  Chest: Clear to auscultation.  Cardiovascular: No S3 no murmurs.  Abdomen: Soft.  Non distended.  Non tender.  No hepatosplenomegaly.  No mass.  No ascites.Bowel sounds present.  Right-sided ileostomy in place.  Extremities: No edema.  Rectal: Deferred.  Stigmata of chronic liver disease: None.  Neurological: No focal motor neurological deficit.  Rheumatologic: No obvious joint swelling. Mild tenderness of the left shoulder tape and left ankle.      Laboratory Tests:      Dated August 6, 2017 sodium 136 potassium 3.4 chloride 101 CO2 23 BUN 22 creatinine 1.5 and glucose 1:15.  Troponin 1 less than 0.012.  Calcium 9.6 albumen 3.9 unit acid 6.6 ALT 36 AST 20 7T bili 0.8.  Lipase 524.  White count 15.1 hemoglobin 14.7 hematocrit 42 and platelet count 200.  UA negative for nitrite " and leukocyte esterase.  Ileostomy drainage negative for C. Difficile.     Dated August 7, 2017 sodium 138 potassium 4.0 chloride 99 CO2 27 BUN 24 creatinine 1.7 and glucose 145.  TSH 29.2.  Magnesium 1.0.  White count 13.4, hemoglobin 15.1 hematocrit 43 and platelet count 174.    Dated August 8, 2017 sodium 137 potassium 4.6 chloride 104 CO2 23 BUN 32 creatinine 1.6 and glucose 157.  White count 20.87 hemoglobin 13.8 hematocrit 39.4 and a platelet count 189,000.       Abdominal Imaging:    Dated August 6, 2017 CT of abdomen and pelvis without contrast which revealed pneumomediastinum, pneumoperitoneum, and retroperitoneal air.  No fluid collection noted.    Renal ultrasound:  Dated 8/7/2017 revealed a 3.0 x 2.7 cm echogenic mass in the right kidney. Concerning for neoplasm.     Procedures:    Dated 7/31/2017 the patient had undergone ileoscopy which revealed a stricture and colonoscopy from anorectal area to what appears to be splenic flexure where a stricture was noted.  The patient was found to have left-sided diverticulosis.  Mucosa was noted to be friable, and  with tendency to bleed.  Biopsies from terminal ileum revealed inflamed granulation ulcerated tissue intermixed with fragments of benign mucosa showing changes consistent with treated Crohn's disease.  No mucosal dysplasia or neoplasia was identified.  Areas of normal small bowel mucosa were also seen.  Biopsies from descending colon stricture, sigmoid colon and rectal biopsies revealed changes benign colonic mucosa with histological changes consistent with treated Crohn's disease.  No mucosal dysplasia or invasive neoplasia was seen.     1. Retroperitoneal and Free intra-abdominal air.  Post-ileoscopy and limited colonoscopy.  The patient is noted to have mucosal friability and easy mucosal bleeding.  A possible translocation of air across the mucosa remains a possibility.  Clinically it does not appear to be adolph perforation with leakage of fluids.  The patient does not have peritoneal signs.    2. Hypothyroidism, unspecified type.  .  3. History of Crohn's disease.  The patient has diversion ileostomy.  4. Left-sided diverticulosis.    5. Mucosal friability and easy bleeding within the left colon, and distal ileum.   6. Acute kidney injury.  Improved.  There appears to be an underlying renal insufficiency.    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  This type of arthritis can be associated with underlying Crohn's disease and may run parallel to disease activity.  8. History of mildly elevated lipase (less than 2 times upper limit normal).  Significance is unclear.  This may represent tailend acute pancreatitis, however, time course is not consistent with acute pancreatitis.  If at all this appears to be mild and clinically resolved.  9. Elevated white count is likely secondary to steroids. right renal mass.  The patient has been evaluated by Dr. Mcgarry-urology service.  He will need removal in the future.    10.      Plan   1. Currently the patient is eating solid food.    2. IV fluids.  3. Anti-emetics as needed.   4. Pain control.  5. IV antibiotics.  6. Acid suppressive therapy.  7. Follow-up labs.  8. Close clinical follow-up.  9. Surgical evaluation.    10. The patient has received a dose of Solu-Medrol earlier on.  This may be continued for 3 doses.  The ideal dose amount would be around 60 mg IV daily for 3 days in terms of active Crohn's.    11. He has also been getting NSAIDs.  12. Rheumatology evaluation as an outpatient.    If the patient continues to do well he would be able to go home from GI point of view in a.m.    13.  Discussed with the patient and his wife.  Opportunity was given to ask questions.

## 2017-08-08 NOTE — PROGRESS NOTES
[facility]    Name:  Jorge Mclaughlin   Age:  57 y.o.  Sex:  male  :  1960  MRN:  [id number]   Visit Number:  [id number]  Admission Date:  2017  Date Of Service:  17  Primary Care Physician:  Yazan Corbett MD     LOS: 2 days :  Patient Care Team:  Yazan Corbett MD as PCP - General:    Chief Complaint:      Foot pain    Subjective / Interval History:     Patient reports improved ROM of his left foot with foot pain.  He still having significant pain of the foot hands and shoulders.  Currently 7/10.  No current n/v, abdominal pain, soa, or cp.      I have reviewed labs/imaging/records from this hospitalization, including ER staff and admitting/attending physicians H/P's and progress notes to establish a comprehensive understanding of this patient's clinical hospital course, as well as to establish a transition of care appropriately.    Vital Signs:    Temp:  [97.8 °F (36.6 °C)-98.9 °F (37.2 °C)] 97.8 °F (36.6 °C)  Heart Rate:  [] 106  Resp:  [18] 18  BP: (124-150)/(52-92) 150/92    Intake and output:    Intake/Output       17 0700 - 17 0659    Intake (ml) 940    Output (ml) 2350    Net (ml) -1410    Last Weight 135 lb (61.2 kg)          Physical Examination:    General Appearance:  Alert and cooperative, not in any acute distress.   Head:  Atraumatic and normocephalic, without obvious abnormality.   Eyes:      PERRLA, Extra-occular movements are within normal limits.   Lungs:   Chest shape is normal. Breath sounds heard bilaterally equally.  No crackles or wheezing.   Heart:  Normal S1 and S2, no murmur, no gallop, no rub.   Abdomen:   Normal bowel sounds,  Soft non-tender, non-distended, no guarding, no rebound tenderness   Extremities: Decreased ROM of left foot with tenderness on movement, improved from yesterday on steroids.  No erythema or swelling.                     Laboratory results:      Results from last 7 days  Lab  Units 08/08/17  0610 08/07/17  0559 08/06/17 2013 08/02/17  0522   SODIUM mmol/L 137 138 136*  < > 141   POTASSIUM mmol/L 4.6 4.0 3.4*  < > 3.9   CHLORIDE mmol/L 104 99 101  < > 107   CO2 mmol/L 23.0* 27.0 23.0*  < > 25.0*   BUN mg/dL 32* 24* 22*  < > 12   CREATININE mg/dL 1.60* 1.70* 1.50*  < > 1.60*   CALCIUM mg/dL 8.9 9.3 9.6  < > 8.5   BILIRUBIN mg/dL  --   --  0.8  --  0.9   ALK PHOS U/L  --   --  104  --  48   ALT (SGPT) U/L  --   --  36  --  36   AST (SGOT) U/L  --   --  27  --  16   GLUCOSE mg/dL 157* 145* 115*  < > 118*   < > = values in this interval not displayed.    Results from last 7 days  Lab Units 08/08/17 0610 08/07/17 0559 08/06/17 2013   WBC 10*3/mm3 20.87* 13.42* 15.10*   HEMOGLOBIN g/dL 13.8* 15.1 14.7   HEMATOCRIT % 39.4* 43.8 42.0   PLATELETS 10*3/mm3 189 174 200       Results from last 7 days  Lab Units 08/07/17 0559 08/06/17 2013   TROPONIN I ng/mL <0.012 <0.012           I have reviewed the patient's laboratory results.    Radiology results:    Imaging Results (last 24 hours)     Procedure Component Value Units Date/Time    US Renal Limited [[id number]] Collected:  08/08/17 0920     Updated:  08/08/17 0939    Narrative:       PROCEDURE: US RENAL LIMITED-     HISTORY: Upper pole right renal mass; R10.13-Epigastric pain;  K66.8-Other specified disorders of peritoneum; J98.2-Interstitial  emphysema; M25.572-Pain in left ankle and joints of left foot;  N28.89-Other specified disorders of kidney and ureter     PROCEDURE: Ultrasound images of the kidneys were obtained.     COMPARISON: August 6, 2017     FINDINGS:.    Within the right kidney there is a 3.0 x 2.7 cm echogenic mass  concerning for neoplasm. Consultation with urology is recommended. No  evidence of hydronephrosis. The right kidney measures 8.2 x 4.6 x 4.3  cm. The left kidney measures 11.1 x 5.3 x 4.8 cm.       Impression:       3 cm right renal mass suspicious for malignancy.  Consultation with urology is recommended.     This  report was finalized on 8/8/2017 9:36 AM by Grayson Madison DO.          I have reviewed the patient's radiology reports.    Medication Review:     I have reviewed the patients active and prn medications.       Assessment/Plan:  -Chron's disease with ileostomy for which he follows Dr. Galan, GI  -Enteropathic arthritis, improving on steroids.    -Pneumoperitoneum and pneumomediastinum from perforation of the colon following colonoscopy on 7/31/2017, surgery felt its stable.  Surgery feels patient will likely benefit from a subtotal colectomy with possible proctectomy secondary to his very thin and friable colon mucosa from chronic diversion and Chron's disease.   -Hypothyroidism, uncontrolled.  TSH is 29.  Patient reports that he has missed his synthroid for a week, home dose of synthroid restarted. Will need to repeat TFT's in 4-6 weeks.   -Left 3.8 cm right solid renal mass , worrisome for renal carcinoma.  Patient will likely benefit from a partial nephrectomy with Dr. Vinay Ramirez.   -Chronic gallstones, stable  -Non obstructing right renal calculus, stable    Harvey Chaney MD  08/08/17  12:39 PM

## 2017-08-08 NOTE — PLAN OF CARE
Problem: Pain, Acute (Adult)  Goal: Acceptable Pain Control/Comfort Level  Outcome: Ongoing (interventions implemented as appropriate)    08/08/17 1940   Pain, Acute (Adult)   Acceptable Pain Control/Comfort Level making progress toward outcome

## 2017-08-09 VITALS
HEART RATE: 111 BPM | SYSTOLIC BLOOD PRESSURE: 145 MMHG | TEMPERATURE: 98.1 F | WEIGHT: 135 LBS | OXYGEN SATURATION: 98 % | HEIGHT: 65 IN | BODY MASS INDEX: 22.49 KG/M2 | DIASTOLIC BLOOD PRESSURE: 87 MMHG | RESPIRATION RATE: 16 BRPM

## 2017-08-09 LAB
ANION GAP SERPL CALCULATED.3IONS-SCNC: 15.5 MMOL/L
BASOPHILS # BLD AUTO: 0.03 10*3/MM3 (ref 0–0.2)
BASOPHILS NFR BLD AUTO: 0.2 % (ref 0–2.5)
BUN BLD-MCNC: 34 MG/DL (ref 7–20)
BUN/CREAT SERPL: 24.3 (ref 6.3–21.9)
CALCIUM SPEC-SCNC: 8.4 MG/DL (ref 8.4–10.2)
CHLORIDE SERPL-SCNC: 107 MMOL/L (ref 98–107)
CO2 SERPL-SCNC: 21 MMOL/L (ref 26–30)
CREAT BLD-MCNC: 1.4 MG/DL (ref 0.6–1.3)
DEPRECATED RDW RBC AUTO: 45.7 FL (ref 37–54)
EOSINOPHIL # BLD AUTO: 0 10*3/MM3 (ref 0–0.7)
EOSINOPHIL NFR BLD AUTO: 0 % (ref 0–7)
ERYTHROCYTE [DISTWIDTH] IN BLOOD BY AUTOMATED COUNT: 13.2 % (ref 11.5–14.5)
GFR SERPL CREATININE-BSD FRML MDRD: 53 ML/MIN/1.73
GLUCOSE BLD-MCNC: 180 MG/DL (ref 74–98)
HCT VFR BLD AUTO: 37.7 % (ref 42–52)
HGB BLD-MCNC: 12.8 G/DL (ref 14–18)
IMM GRANULOCYTES # BLD: 0.24 10*3/MM3 (ref 0–0.06)
IMM GRANULOCYTES NFR BLD: 1.3 % (ref 0–0.6)
LYMPHOCYTES # BLD AUTO: 0.56 10*3/MM3 (ref 0.6–3.4)
LYMPHOCYTES NFR BLD AUTO: 3 % (ref 10–50)
MCH RBC QN AUTO: 32.2 PG (ref 27–31)
MCHC RBC AUTO-ENTMCNC: 34 G/DL (ref 30–37)
MCV RBC AUTO: 95 FL (ref 80–94)
MONOCYTES # BLD AUTO: 0.69 10*3/MM3 (ref 0–0.9)
MONOCYTES NFR BLD AUTO: 3.7 % (ref 0–12)
NEUTROPHILS # BLD AUTO: 17.16 10*3/MM3 (ref 2–6.9)
NEUTROPHILS NFR BLD AUTO: 91.8 % (ref 37–80)
NRBC BLD MANUAL-RTO: 0 /100 WBC (ref 0–0)
PLATELET # BLD AUTO: 200 10*3/MM3 (ref 130–400)
PMV BLD AUTO: 10.5 FL (ref 6–12)
POTASSIUM BLD-SCNC: 4.5 MMOL/L (ref 3.5–5.1)
RBC # BLD AUTO: 3.97 10*6/MM3 (ref 4.7–6.1)
SODIUM BLD-SCNC: 139 MMOL/L (ref 137–145)
WBC NRBC COR # BLD: 18.68 10*3/MM3 (ref 4.8–10.8)

## 2017-08-09 PROCEDURE — 99239 HOSP IP/OBS DSCHRG MGMT >30: CPT | Performed by: INTERNAL MEDICINE

## 2017-08-09 PROCEDURE — 25010000002 ENOXAPARIN PER 10 MG: Performed by: INTERNAL MEDICINE

## 2017-08-09 PROCEDURE — 85025 COMPLETE CBC W/AUTO DIFF WBC: CPT | Performed by: INTERNAL MEDICINE

## 2017-08-09 PROCEDURE — 25010000002 MORPHINE PER 10 MG: Performed by: INTERNAL MEDICINE

## 2017-08-09 PROCEDURE — 25010000002 LEVOFLOXACIN PER 250 MG: Performed by: INTERNAL MEDICINE

## 2017-08-09 PROCEDURE — 80048 BASIC METABOLIC PNL TOTAL CA: CPT | Performed by: INTERNAL MEDICINE

## 2017-08-09 PROCEDURE — 25010000002 METHYLPREDNISOLONE PER 125 MG: Performed by: INTERNAL MEDICINE

## 2017-08-09 RX ORDER — METRONIDAZOLE 500 MG/1
500 TABLET ORAL 3 TIMES DAILY
Qty: 15 TABLET | Refills: 0 | Status: SHIPPED | OUTPATIENT
Start: 2017-08-09 | End: 2017-12-27

## 2017-08-09 RX ORDER — LEVOFLOXACIN 500 MG/1
500 TABLET, FILM COATED ORAL DAILY
Qty: 5 TABLET | Refills: 0 | Status: SHIPPED | OUTPATIENT
Start: 2017-08-09 | End: 2017-12-27

## 2017-08-09 RX ORDER — PREDNISONE 1 MG/1
5 TABLET ORAL DAILY
Qty: 21 TABLET | Refills: 0 | Status: SHIPPED | OUTPATIENT
Start: 2017-08-09 | End: 2017-12-27

## 2017-08-09 RX ORDER — OXYCODONE AND ACETAMINOPHEN 10; 325 MG/1; MG/1
1 TABLET ORAL EVERY 6 HOURS PRN
Qty: 16 TABLET | Refills: 0 | Status: SHIPPED | OUTPATIENT
Start: 2017-08-09 | End: 2017-12-27

## 2017-08-09 RX ADMIN — METRONIDAZOLE 500 MG: 500 INJECTION, SOLUTION INTRAVENOUS at 00:13

## 2017-08-09 RX ADMIN — OXYCODONE HYDROCHLORIDE AND ACETAMINOPHEN 1 TABLET: 7.5; 325 TABLET ORAL at 00:50

## 2017-08-09 RX ADMIN — OXYCODONE HYDROCHLORIDE AND ACETAMINOPHEN 1 TABLET: 7.5; 325 TABLET ORAL at 06:12

## 2017-08-09 RX ADMIN — METRONIDAZOLE 500 MG: 500 INJECTION, SOLUTION INTRAVENOUS at 08:12

## 2017-08-09 RX ADMIN — OXYCODONE HYDROCHLORIDE AND ACETAMINOPHEN 1 TABLET: 7.5; 325 TABLET ORAL at 14:16

## 2017-08-09 RX ADMIN — LEVOTHYROXINE SODIUM 200 MCG: 100 TABLET ORAL at 06:12

## 2017-08-09 RX ADMIN — PANTOPRAZOLE SODIUM 40 MG: 40 TABLET, DELAYED RELEASE ORAL at 06:11

## 2017-08-09 RX ADMIN — ENOXAPARIN SODIUM 40 MG: 40 INJECTION SUBCUTANEOUS at 08:12

## 2017-08-09 RX ADMIN — MELOXICAM 15 MG: 7.5 TABLET ORAL at 08:13

## 2017-08-09 RX ADMIN — LEVOFLOXACIN 500 MG: 5 INJECTION, SOLUTION INTRAVENOUS at 08:12

## 2017-08-09 RX ADMIN — METHYLPREDNISOLONE SODIUM SUCCINATE 60 MG: 125 INJECTION, POWDER, FOR SOLUTION INTRAMUSCULAR; INTRAVENOUS at 02:29

## 2017-08-09 RX ADMIN — METHYLPREDNISOLONE SODIUM SUCCINATE 60 MG: 125 INJECTION, POWDER, FOR SOLUTION INTRAMUSCULAR; INTRAVENOUS at 10:18

## 2017-08-09 RX ADMIN — MORPHINE SULFATE 1 MG: 2 INJECTION, SOLUTION INTRAMUSCULAR; INTRAVENOUS at 04:29

## 2017-08-09 RX ADMIN — MORPHINE SULFATE 1 MG: 2 INJECTION, SOLUTION INTRAMUSCULAR; INTRAVENOUS at 08:30

## 2017-08-09 NOTE — DISCHARGE INSTR - OTHER ORDERS
If you have any questions about your recovery, please call the [facility] Nurse Call Center at [phone]. A registered nurse is available 24 hours a day 7 days a week to assist you.

## 2017-08-09 NOTE — CONSULTS
Consults  22  Patient Care Team:  Yazan Corbett MD as PCP - General    Chief complaint:Renal Mass    Subjective     History of Present Illness  This patient was found to have a 3 cm. mass on the upper pole of his right kidney on recent CT scan. Contrast enhanced study was recommended for better definition of the mass but with an elevated creatinine was avoided. A renal US confirmed the solid nature of the lesion which is most likely a renal cell carcinoma. With the patients renal insufficiency a partial nephrectomy is preferable. A flank approach his problematic peritoneal cavity.  Review of Systems   General ROS: Patient denies any fevers, chills or loss of consciousness. Complains of generalized weakness.  Psychological ROS: No history of any hallucinations and delusions.  Ophthalmic ROS: No history of any diplopia or transient loss of vision.  ENT ROS: No history of sore throat, nasal congestion or ear pain.   Allergy and Immunology ROS: No history of rash or itching.  Hematological and Lymphatic ROS: No history of neck swelling or easy bleeding.  Endocrine ROS: No history of any recent unintentional weight gain or loss.  Respiratory ROS: No history of cough or shortness of breath.  Cardiovascular ROS: No history of chest pain or palpitations.  Gastrointestinal ROS: As per history of present illness.  Genito-Urinary ROS: No history of dysuria or hematuria.  Musculoskeletal ROS: Complains of left foot and left shoulder pain.  Complains of chronic back pain.  Neurological ROS: No history of any focal weakness. No loss of consciousness. Denies any numbness. Denies neck pain.  Dermatological ROS: No history of any redness or pruritis.  Past Medical History:   Diagnosis Date   • Altered bowel elimination due to intestinal ostomy     PATIENT REPORTS SECONDARY TO A COLON RUPTURE APPROXIMATELY 16-17 YEARS AGO   • Anemia    • Arthritis     WRIST   • GERD (gastroesophageal reflux disease)    • History of MRSA infection   "   REPORTS HX OF \"NOSE\" 2-3 YEARS AGO, TREATED.   • History of pneumonia    • Hypothyroid    • Spinal headache    • Wears glasses    ,   Past Surgical History:   Procedure Laterality Date   • COLONOSCOPY     • COLONOSCOPY N/A 7/31/2017    Procedure: Colonoscopy through ostomy site and rectum with biopsies;  Surgeon: Vincenzo Galan MD;  Location: [facility] ENDOSCOPY;  Service:    • ENDOSCOPY     • HEMORRHOIDECTOMY      REPORTS APPROXIMATELY 20 YEARS AGO   • HERNIA REPAIR      UMBILICAL   • SMALL INTESTINE SURGERY      HX OF COLON RUPTURE WITH PLACEMENT OF OSTOMY   • WISDOM TOOTH EXTRACTION     • WRIST SURGERY Right    ,   Family History   Problem Relation Age of Onset   • Colon cancer Neg Hx    ,   Social History   Substance Use Topics   • Smoking status: Former Smoker     Types: Cigarettes   • Smokeless tobacco: Never Used      Comment: QUIT 30 PLUS YEARS AGO   • Alcohol use No      Comment: QUIT 30 PLUS YEARS   ,   Prescriptions Prior to Admission   Medication Sig Dispense Refill Last Dose   • levoFLOXacin (LEVAQUIN) 500 MG tablet Take 1 tablet by mouth Daily. 3 tablet 0 8/5/2017   • levothyroxine (SYNTHROID, LEVOTHROID) 200 MCG tablet take 1 tablet by mouth once daily  0 8/5/2017   • meloxicam (MOBIC) 15 MG tablet take 1 tablet by mouth once daily  0 Unknown at Unknown time   • metroNIDAZOLE (FLAGYL) 250 MG tablet Take 1 tablet by mouth 3 (Three) Times a Day. 9 tablet 0 8/5/2017   • Multiple Vitamin (MULTI VITAMIN MENS PO) Take 1 tablet by mouth Daily.   8/5/2017   • pantoprazole (PROTONIX) 40 MG EC tablet take 1 tablet by mouth once daily  0 8/5/2017   • promethazine (PHENERGAN) 12.5 MG tablet Take 1 tablet by mouth Every 8 (Eight) Hours As Needed. 21 tablet 0 8/4/2017   • sodium chloride 0.9 % solution 50 mL with promethazine 25 MG/ML solution 12.5 mg Infuse 12.5 mg into a venous catheter Every 8 (Eight) Hours As Needed (Nausea). 21 tablet 0 Unknown at Unknown time   • Testosterone Cypionate (DEPOTESTOTERONE " CYPIONATE) 200 MG/ML injection inject 1 milliliter every 2 weeks  0 Unknown at Unknown time   , Scheduled Meds:    enoxaparin 40 mg Subcutaneous Daily   levoFLOXacin 500 mg Intravenous Q24H   levothyroxine 200 mcg Oral Q AM   meloxicam 15 mg Oral Daily   methylPREDNISolone sodium succinate 60 mg Intravenous Q8H   metroNIDAZOLE 500 mg Intravenous Q8H   pantoprazole 40 mg Oral Q AM   Pharmacy Meds to Bed Consult  Does not apply Daily   , Continuous Infusions:   , PRN Meds:  •  acetaminophen  •  Morphine **AND** naloxone  •  ondansetron **OR** ondansetron ODT **OR** ondansetron  •  oxyCODONE-acetaminophen  •  sodium chloride and Allergies:  Latex    Objective      Vital Signs  Temp:  [97.7 °F (36.5 °C)-98.9 °F (37.2 °C)] 97.7 °F (36.5 °C)  Heart Rate:  [101] 101  Resp:  [18] 18  BP: (137-150)/(52-92) 137/90    Physical Exam     General Appearance:  Alert and cooperative, in mild distress due to joint pains.   Head:  Atraumatic and normocephalic, without obvious abnormality.   Eyes:          PERRLA, conjunctivae and sclerae normal, no Icterus. No pallor. Extra-occular movements are within normal limits.   Ears:  Ears appear intact with no abnormalities noted.   Throat: No oral lesions, no thrush, oral mucosa moist.   Neck: Supple, trachea midline, no thyromegaly, no carotid bruit.   Back:   No kyphoscoliosis present. No tenderness to palpation,   range of motion normal.   Lungs:   Chest shape is normal. Breath sounds heard bilaterally equally.  No crackles or wheezing. No Pleural rub or bronchial breathing.   Heart:  Normal S1 and S2, no murmur, no gallop, no rub. No JVD.   Abdomen:   Normal bowel sounds, no masses, no organomegaly. Soft non-tender, non-distended, no guarding, no rebound tenderness.  Midline surgical scar is noted in the lower abdomen.  Ileostomy bag noted in the right lower quadrant.     Extremities: Moves all extremities well, no edema, no cyanosis, no clubbing.  Severe tenderness on minimal touch  noted on the dorsum of the left foot with minimal redness.  Tenderness noted in the left shoulder without any swelling or redness.     Pulses: Pulses palpable and equal bilaterally.   Skin: No bleeding, bruising or rash.   Neurologic: Alert and oriented x 3. Moves all four limbs equally. No tremors. No facial asymetry.        Results Review:    I reviewed the patient's new clinical results.  I reviewed the patient's new imaging results and agree with the interpretation.  I reviewed the patient's other test results and agree with the interpretation        Assessment/Plan     Principal Problem:    Inflammatory arthritis  Active Problems:    Epigastric abdominal pain    Crohn's disease of large intestine with complication    Acquired hypothyroidism    Chronic kidney disease, stage III (moderate)      Assessment & Plan  The patient may be getting a colectomy in the future and is asking if both procedures can be performed simultaneously. I have discussed his case with Dr. Vinay Ramirez who has agreed to see the patient in his office when he is here in [place]. The patient prefers to see him when he is here next Wednesday.     I discussed the patients findings and my recommendations with patient, nursing staff, primary care team and consulting provider    Zachariah Peng MD  08/08/17  8:46 PM    Time: Discharge 60 min

## 2017-08-09 NOTE — PLAN OF CARE
Problem: Patient Care Overview (Adult)  Goal: Plan of Care Review  Outcome: Ongoing (interventions implemented as appropriate)    08/09/17 0456   Coping/Psychosocial Response Interventions   Plan Of Care Reviewed With patient;spouse   Patient Care Overview   Progress progress toward functional goals as expected   Outcome Evaluation   Outcome Summary/Follow up Plan Patient has been up times 2 this shift ambulating in the hallway. Continues to complain of tenderness and occ pain to top of left foot. Foot elevated on 2 pillows. No erythema or edema at site.

## 2017-08-09 NOTE — DISCHARGE SUMMARY
[facility]   DISCHARGE SUMMARY      Name:  Jorge Mclaughlin   Age:  57 y.o.  Sex:  male  :  1960  MRN:  [id number]   Visit Number:  [id number]  Primary Care Physician:  Yazan Corbett MD  Date of Discharge:  2017  Admission Date:  2017      Discharge Diagnosis:     Principal Problem:    Inflammatory arthritis  Active Problems:    Epigastric abdominal pain    Crohn's disease of large intestine with complication    Acquired hypothyroidism    Chronic kidney disease, stage III (moderate)         Consults:     Consults     Date and Time Order Name Status Description    2017 1445 Inpatient Consult to Urology      2017 1152 Inpatient Consult to General Surgery Completed     2017 0109 Inpatient Consult to Gastroenterology               Hospital Course:  The patient was admitted on 2017, please see H&P for further details of HPI and ROS.    Patient is a 58 yo male with a pmhx of Crohn's disease, arthritis, gastroesophageal reflux disease, hypothyroidism, who was recently in the hospital on 2017 after perforation of the colon following colonoscopy.  Patient had pneumoperitoneum and pneumomediastinum.  He was managed conservatively especially since he has an ileostomy for the last 18 years who now comes in for shoulder, wrist, and left ankle pain with decreased ROM.  He was unable to bear weight on his left ankle secondary to the pain.  He had no associated erythema or swelling.  Xray of the left ankle showed no fracture.  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  Patient was started on IV solumedrol with significant improvements of his arthritic pain.  He now has good ROM of the joints and able to ambulate on the foot.  He feels the pain of the shoulder, wrist, and foot has significantly improved with steroids. On admission patient had a CT of the abdomen which showed pneumomediastinum, pneumoperitoneum and  extensive pneumoretroperitoneum along with a solid-appearing mass worrisome for renal cell carcinoma approximately 3 cm in diameter .  Surgery, Dr. Garza was consulted who felt the  pneumomediastinum, pneumoperitoneum was stable and did not feel he needed any surgical intervention at this time.  Dr. Garza felt that he had a very thin and friable colon mucosa from chronic diversion and Crohn's disease, which may need future subtotal colectomy with possible proctectomy.  US of the kidneys showed a 3 cm right renal mass suspicious for malignancy.  Urology was consulted who recommended follow up with Dr. Vinay Ramirez for this upcoming Wednesday for consideration for a partial nephrectomy.  Patient is aware of the importance of following up with his specialists including urology, surgery, and GI.  Patient is doing well today with no current n/v or abdominal pain.  He is tolerating po well.  He is up and ambulating and stable for DC home.     Vital Signs:    Temp:  [97.6 °F (36.4 °C)-98.3 °F (36.8 °C)] 98 °F (36.7 °C)  Heart Rate:  [] 72  Resp:  [16-18] 16  BP: (128-141)/(71-90) 128/71          Physical Examination:    General Appearance:    Alert and cooperative, not in any acute distress.   Head:    Atraumatic and normocephalic, without obvious abnormality.   Eyes:        PERRLA, Extra-occular movements are within normal limits.    Lungs:     Chest shape is normal. Breath sounds heard bilaterally equally.  No crackles or wheezing.    Heart:    Normal S1 and S2, no murmur, no gallop, no rub.   Abdomen:     Normal bowel sounds,  Soft non-tender, non-distended, no guarding, no rebound tenderness, ileostomy   Extremities:   Moves all extremities well, no edema             Skin:   No bruising or rash.   Neurologic:  Grossly non focal and moves all extremities equally.          Pertinent Lab Results:     Lab Results (last 24 hours)     Procedure Component Value Units Date/Time    CBC & Differential [[id number]]  Collected:  08/09/17 0557    Specimen:  Blood Updated:  08/09/17 0633    Narrative:       The following orders were created for panel order CBC & Differential.  Procedure                               Abnormality         Status                     ---------                               -----------         ------                     CBC Auto Differential[[id number]]        Abnormal            Final result                 Please view results for these tests on the individual orders.    CBC Auto Differential [[id number]]  (Abnormal) Collected:  08/09/17 0557    Specimen:  Blood Updated:  08/09/17 0633     WBC 18.68 (H) 10*3/mm3      RBC 3.97 (L) 10*6/mm3      Hemoglobin 12.8 (L) g/dL      Hematocrit 37.7 (L) %      MCV 95.0 (H) fL      MCH 32.2 (H) pg      MCHC 34.0 g/dL      RDW 13.2 %      RDW-SD 45.7 fl      MPV 10.5 fL      Platelets 200 10*3/mm3      Neutrophil % 91.8 (H) %      Lymphocyte % 3.0 (L) %      Monocyte % 3.7 %      Eosinophil % 0.0 %      Basophil % 0.2 %      Immature Grans % 1.3 (H) %      Neutrophils, Absolute 17.16 (H) 10*3/mm3      Lymphocytes, Absolute 0.56 (L) 10*3/mm3      Monocytes, Absolute 0.69 10*3/mm3      Eosinophils, Absolute 0.00 10*3/mm3      Basophils, Absolute 0.03 10*3/mm3      Immature Grans, Absolute 0.24 (H) 10*3/mm3      nRBC 0.0 /100 WBC     Basic Metabolic Panel [[id number]]  (Abnormal) Collected:  08/09/17 0557    Specimen:  Blood Updated:  08/09/17 0645     Glucose 180 (H) mg/dL      BUN 34 (H) mg/dL      Creatinine 1.40 (H) mg/dL      Sodium 139 mmol/L      Potassium 4.5 mmol/L      Chloride 107 mmol/L      CO2 21.0 (L) mmol/L      Calcium 8.4 mg/dL      eGFR Non African Amer 53 (L) mL/min/1.73      BUN/Creatinine Ratio 24.3 (H)     Anion Gap 15.5 mmol/L     Narrative:       Abnormal estimated GFR should be followed by more specific studies to confirm end stage chronic renal disease. The equation used for calculation may not be accurate for patients less than 19 years old,  greater than 70 years old, patients at extremes of weight, malnutrition, or with acute renal dysfunction.    Stool Culture [[id number]]  (Abnormal) Collected:  08/07/17 1048    Specimen:  Stool from Per Rectum Updated:  08/09/17 0717     Stool Culture No Salmonella, Shigella, Campylobacter, E coli O157:H7, or Yersinia isolated      Heavy growth (4+) Yeast isolated (A)      Id to follow.         No Normal Fecal Allison          Pertinent Radiology Results:  Imaging Results (most recent)     Procedure Component Value Units Date/Time    CT Abdomen Pelvis Without Contrast [[id number]] Collected:  08/06/17 2250     Updated:  08/06/17 2253    Narrative:       FINAL REPORT    TECHNIQUE:  Axial images through the abdomen and pelvis were obtained by computed tomography.  IV contrast was withheld.    CLINICAL HISTORY:  EPIGASTRIC ABDOMINAL PAIN, ELEVATED LIPASE, LEUKOCYTOSIS, KNOWN FREE AIR IN ABDOMEN AFTER COLONOSCOPY. LOOK FOR FLUID COLLECTIONS PLEASE    PRIOR SCAN 8-3-17 SENT    FINDINGS:  Abdomen: There is pneumomediastinum, but no pneumothorax or pleural effusion. There is large amount of pneumoperitoneum and extensive air within the retroperitoneal space of the abdomen and pelvis. There are gallstones in an otherwise normal gallbladder.   There is a nonobstructing left renal calculus. In the upper medial right kidney there is a solid-appearing mass worrisome for renal cell carcinoma approximately 3 cm in diameter. If not already evaluated, repeat CT with contrast is recommended for   further evaluation. The solid abdominal organs and ureters are otherwise unremarkable. There are surgical clips adjacent to the cecum. There is a right lower quadrant ileostomy. The GI tract is otherwise unremarkable.    Pelvis: The urinary bladder is unremarkable. Air dissects from the anterior abdominal wall to the scrotum. There is no pelvic or abdominal ascites, adenopathy or significant osseous abnormality.      Impression:        Pneumomediastinum, pneumoperitoneum and extensive pneumoretroperitoneum. No fluid collection is seen. ER was called and notified of these findings.    Authenticated by Godwin Mejía M.D. on 08/06/2017 10:50:46 PM    XR Abdomen KUB [[id number]] Collected:  08/07/17 0729     Updated:  08/07/17 0736    Narrative:       PROCEDURE: XR ABDOMEN KUB-     HISTORY: Known pneumomediastinum and pneumoperitoneum after recent  colonoscopy and perforation     COMPARISON: August 2, 2017.     FINDINGS: An AP view of the abdomen and pelvis demonstrates extensive  pneumoperitoneum. Overlying the left kidney is a 5 to 6 mm calcification  likely representing a stone within the collecting system. Surgical clips  are seen overlying the right flank. There is extensive subcutaneous gas  also extending into the scrotum. Osseous structures are intact. No  abnormally dilated loops of bowel are appreciated..           Impression:       1. Extensive pneumoperitoneum   2. 5-6 mm calcification overlying the left renal shadow.  3. Extensive subcutaneous emphysema in the region of the scrotum     This report was finalized on 8/7/2017 7:32 AM by Grayson Madison DO.    XR Chest 1 View [[id number]] Collected:  08/07/17 0733     Updated:  08/07/17 0737    Narrative:       PROCEDURE: XR CHEST 1 VW-     HISTORY: Known pneumomediastinum and pneumoperitoneum after recent  colonoscopy and perforation     COMPARISON: August 4, 2017.     FINDINGS: The heart is normal in size. There is evidence of  pneumomediastinum and free air beneath the diaphragm.. The lungs are  clear. Electrodes overlie the chest..  There are no acute osseous  abnormalities. Evidence of old calcified granulomatous disease.       Impression:       Pneumomediastinum and free air beneath the diaphragm..     Continued followup is recommended.     This report was finalized on 8/7/2017 7:34 AM by Grayson Madison DO.    XR Ankle 3+ View Left [[id number]] Collected:  08/07/17 0735     Updated:  08/07/17 0738     Narrative:       PROCEDURE: XR ANKLE 3+ VW LEFT-     History: Redness and swelling as well as pain     COMPARISON: None.     FINDINGS:  A 3 view exam demonstrates no acute fracture or dislocation.  The joint spaces are preserved. Mild soft tissue swelling is noted about  the ankle..           Impression:       No acute fracture.         If symptoms persist consider follow-up MRI.     This report was finalized on 8/7/2017 7:36 AM by Grayson Madison DO.    US Renal Limited [[id number]] Collected:  08/08/17 0920     Updated:  08/08/17 0939    Narrative:       PROCEDURE: US RENAL LIMITED-     HISTORY: Upper pole right renal mass; R10.13-Epigastric pain;  K66.8-Other specified disorders of peritoneum; J98.2-Interstitial  emphysema; M25.572-Pain in left ankle and joints of left foot;  N28.89-Other specified disorders of kidney and ureter     PROCEDURE: Ultrasound images of the kidneys were obtained.     COMPARISON: August 6, 2017     FINDINGS:.    Within the right kidney there is a 3.0 x 2.7 cm echogenic mass  concerning for neoplasm. Consultation with urology is recommended. No  evidence of hydronephrosis. The right kidney measures 8.2 x 4.6 x 4.3  cm. The left kidney measures 11.1 x 5.3 x 4.8 cm.       Impression:       3 cm right renal mass suspicious for malignancy.  Consultation with urology is recommended.     This report was finalized on 8/8/2017 9:36 AM by Grayson Madison DO.            Code Status:  FULL     Discharge Disposition:        Discharge Medication:     Jorge Mclaughlin   Home Medication Instructions HAIDER:[id number]    Printed on:08/09/17 1217   Medication Information                      levoFLOXacin (LEVAQUIN) 500 MG tablet  Take 1 tablet by mouth Daily.             levothyroxine (SYNTHROID, LEVOTHROID) 200 MCG tablet  take 1 tablet by mouth once daily             meloxicam (MOBIC) 15 MG tablet  take 1 tablet by mouth once daily             metroNIDAZOLE (FLAGYL) 500 MG tablet  Take 1 tablet by mouth  3 (Three) Times a Day.             Multiple Vitamin (MULTI VITAMIN MENS PO)  Take 1 tablet by mouth Daily.             oxyCODONE-acetaminophen (PERCOCET)  MG per tablet  Take 1 tablet by mouth Every 6 (Six) Hours As Needed for Moderate Pain (4-6).             pantoprazole (PROTONIX) 40 MG EC tablet  take 1 tablet by mouth once daily             predniSONE (DELTASONE) 5 MG tablet  Take 1 tablet by mouth Daily. Take 4 tablets X 3 days, then 2 tablets X 3 days then 1 tablet X 3 days             promethazine (PHENERGAN) 12.5 MG tablet  Take 1 tablet by mouth Every 8 (Eight) Hours As Needed.             sodium chloride 0.9 % solution 50 mL with promethazine 25 MG/ML solution 12.5 mg  Infuse 12.5 mg into a venous catheter Every 8 (Eight) Hours As Needed (Nausea).             Testosterone Cypionate (DEPOTESTOTERONE CYPIONATE) 200 MG/ML injection  inject 1 milliliter every 2 weeks                 Discharge Diet:     Diet Instructions     Diet: Cardiac; Thin       Discharge Diet:  Cardiac   Fluid Consistency:  Thin   Low lactulose, low residual diet                 Activity at Discharge:     Activity Instructions     Activity as Tolerated                     Follow-up Appointments:    Follow-up Information     Follow up with Zachariah Peng MD .    Specialty:  Urology    Contact information:    [address]  [facility] [address] [phone]          Follow up with Vinay Mccracken MD .    Specialty:  Urology    Why:  FOLLOW UP WITH DR. MCCRACKEN ON WE AUGUST 16TH @ 11:00 IN THE [place] OFFICE    Contact information:    [address]  [facility] [id number] [phone]          Follow up with Yazan Corbett MD .    Specialty:  Internal Medicine    Why:  In one week with PCP for CBC and BMP check continued f/u of patients arhtritic pain related to his chrons disease.  F/u of patients renal lesion and pneumomediastinum, pneumoperitoneum    Contact information:    [address]  [facility] [address]  [phone]              Test Results Pending at Discharge:     Order Current Status    Stool Culture Preliminary result             Harvey Chaney MD  08/09/17  12:17 PM    Time: Discharge 32 min

## 2017-08-10 LAB
BACTERIA SPEC AEROBE CULT: ABNORMAL

## 2017-08-28 ENCOUNTER — TRANSCRIBE ORDERS (OUTPATIENT)
Dept: ADMINISTRATIVE | Facility: HOSPITAL | Age: 57
End: 2017-08-28

## 2017-08-28 DIAGNOSIS — K56.609 MECHANICAL ILEUS (HCC): Primary | ICD-10-CM

## 2017-09-05 ENCOUNTER — HOSPITAL ENCOUNTER (OUTPATIENT)
Dept: GENERAL RADIOLOGY | Facility: HOSPITAL | Age: 57
Discharge: HOME | End: 2017-09-05
Attending: COLON & RECTAL SURGERY | Admitting: COLON & RECTAL SURGERY

## 2017-09-05 DIAGNOSIS — K56.609 MECHANICAL ILEUS (HCC): ICD-10-CM

## 2017-09-05 PROCEDURE — 0 DIATRIZOATE MEGLUMINE & SODIUM PER 1 ML: Performed by: COLON & RECTAL SURGERY

## 2017-09-05 PROCEDURE — 74270 X-RAY XM COLON 1CNTRST STD: CPT

## 2017-09-05 RX ADMIN — DIATRIZOATE MEGLUMINE AND DIATRIZOATE SODIUM 480 ML: 660; 100 LIQUID ORAL; RECTAL at 09:46

## 2017-09-11 ENCOUNTER — OFFICE VISIT (OUTPATIENT)
Dept: SURGERY | Facility: CLINIC | Age: 57
End: 2017-09-11

## 2017-09-11 VITALS
SYSTOLIC BLOOD PRESSURE: 168 MMHG | OXYGEN SATURATION: 97 % | DIASTOLIC BLOOD PRESSURE: 98 MMHG | HEIGHT: 65 IN | BODY MASS INDEX: 22.49 KG/M2 | TEMPERATURE: 97.8 F | WEIGHT: 135 LBS | HEART RATE: 80 BPM

## 2017-09-11 DIAGNOSIS — C64.1 RENAL CANCER, RIGHT (HCC): ICD-10-CM

## 2017-09-11 DIAGNOSIS — K50.10 CROHN'S DISEASE OF LARGE INTESTINE WITHOUT COMPLICATION (HCC): Primary | ICD-10-CM

## 2017-09-11 PROCEDURE — 99214 OFFICE O/P EST MOD 30 MIN: CPT | Performed by: SURGERY

## 2017-09-11 RX ORDER — FERROUS SULFATE 325(65) MG
325 TABLET ORAL
COMMUNITY

## 2017-09-13 ENCOUNTER — TELEPHONE (OUTPATIENT)
Dept: SURGERY | Facility: CLINIC | Age: 57
End: 2017-09-13

## 2017-09-15 ENCOUNTER — HOSPITAL ENCOUNTER (OUTPATIENT)
Dept: GENERAL RADIOLOGY | Facility: HOSPITAL | Age: 57
Discharge: HOME | End: 2017-09-15
Attending: UROLOGY

## 2017-09-15 ENCOUNTER — HOSPITAL ENCOUNTER (OUTPATIENT)
Dept: CARDIOLOGY | Facility: HOSPITAL | Age: 57
Discharge: HOME | End: 2017-09-15
Attending: UROLOGY | Admitting: UROLOGY

## 2017-09-15 ENCOUNTER — TRANSCRIBE ORDERS (OUTPATIENT)
Dept: ADMINISTRATIVE | Facility: HOSPITAL | Age: 57
End: 2017-09-15

## 2017-09-15 ENCOUNTER — APPOINTMENT (OUTPATIENT)
Dept: LAB | Facility: HOSPITAL | Age: 57
End: 2017-09-15
Attending: UROLOGY

## 2017-09-15 DIAGNOSIS — N28.89 RENAL MASS: Primary | ICD-10-CM

## 2017-09-15 LAB
APTT PPP: 25 SECONDS (ref 25–36)
INR PPP: 1.03 (ref 0.9–1.1)
PROTHROMBIN TIME: 11.3 SECONDS (ref 9.3–12.1)

## 2017-09-15 PROCEDURE — 36415 COLL VENOUS BLD VENIPUNCTURE: CPT | Performed by: UROLOGY

## 2017-09-15 PROCEDURE — 71020 HC CHEST PA AND LATERAL: CPT

## 2017-09-15 PROCEDURE — 85730 THROMBOPLASTIN TIME PARTIAL: CPT | Performed by: UROLOGY

## 2017-09-15 PROCEDURE — 85610 PROTHROMBIN TIME: CPT | Performed by: UROLOGY

## 2017-09-15 PROCEDURE — 93005 ELECTROCARDIOGRAM TRACING: CPT | Performed by: UROLOGY

## 2017-12-26 ENCOUNTER — HOSPITAL ENCOUNTER (INPATIENT)
Facility: HOSPITAL | Age: 57
LOS: 1 days | Discharge: HOME | End: 2017-12-28
Attending: EMERGENCY MEDICINE | Admitting: FAMILY MEDICINE

## 2017-12-26 DIAGNOSIS — N17.9 ACUTE RENAL FAILURE, UNSPECIFIED ACUTE RENAL FAILURE TYPE (HCC): Primary | ICD-10-CM

## 2017-12-26 LAB
ALBUMIN SERPL-MCNC: 5.3 G/DL (ref 3.5–5)
ALBUMIN/GLOB SERPL: 1.6 G/DL (ref 1–2)
ALP SERPL-CCNC: 98 U/L (ref 38–126)
ALT SERPL W P-5'-P-CCNC: 100 U/L (ref 13–69)
ANION GAP SERPL CALCULATED.3IONS-SCNC: 23.2 MMOL/L
AST SERPL-CCNC: 71 U/L (ref 15–46)
BILIRUB SERPL-MCNC: 0.6 MG/DL (ref 0.2–1.3)
BUN BLD-MCNC: 38 MG/DL (ref 7–20)
BUN/CREAT SERPL: 6.6 (ref 6.3–21.9)
CALCIUM SPEC-SCNC: 10.3 MG/DL (ref 8.4–10.2)
CHLORIDE SERPL-SCNC: 97 MMOL/L (ref 98–107)
CO2 SERPL-SCNC: 18 MMOL/L (ref 26–30)
CREAT BLD-MCNC: 5.8 MG/DL (ref 0.6–1.3)
GFR SERPL CREATININE-BSD FRML MDRD: 10 ML/MIN/1.73
GLOBULIN UR ELPH-MCNC: 3.4 GM/DL
GLUCOSE BLD-MCNC: 117 MG/DL (ref 74–98)
MAGNESIUM SERPL-MCNC: 1.3 MG/DL (ref 1.6–2.3)
POTASSIUM BLD-SCNC: 4.2 MMOL/L (ref 3.5–5.1)
PROT SERPL-MCNC: 8.7 G/DL (ref 6.3–8.2)
SODIUM BLD-SCNC: 134 MMOL/L (ref 137–145)

## 2017-12-26 PROCEDURE — 80053 COMPREHEN METABOLIC PANEL: CPT | Performed by: EMERGENCY MEDICINE

## 2017-12-26 PROCEDURE — 99284 EMERGENCY DEPT VISIT MOD MDM: CPT

## 2017-12-26 PROCEDURE — 83735 ASSAY OF MAGNESIUM: CPT | Performed by: EMERGENCY MEDICINE

## 2017-12-27 PROBLEM — N17.9 ACUTE RENAL FAILURE: Status: RESOLVED | Noted: 2017-12-27 | Resolved: 2017-12-27

## 2017-12-27 PROBLEM — N18.30 ACUTE RENAL FAILURE SUPERIMPOSED ON STAGE 3 CHRONIC KIDNEY DISEASE: Status: ACTIVE | Noted: 2017-12-27

## 2017-12-27 PROBLEM — Z90.5 HX OF UNILATERAL NEPHRECTOMY: Chronic | Status: ACTIVE | Noted: 2017-12-27

## 2017-12-27 PROBLEM — M15.9 PRIMARY OSTEOARTHRITIS INVOLVING MULTIPLE JOINTS: Chronic | Status: ACTIVE | Noted: 2017-12-27

## 2017-12-27 PROBLEM — N17.9 ACUTE RENAL FAILURE: Status: ACTIVE | Noted: 2017-12-27

## 2017-12-27 PROBLEM — M15.0 PRIMARY OSTEOARTHRITIS INVOLVING MULTIPLE JOINTS: Chronic | Status: ACTIVE | Noted: 2017-12-27

## 2017-12-27 PROBLEM — N17.9 ACUTE RENAL FAILURE SUPERIMPOSED ON STAGE 3 CHRONIC KIDNEY DISEASE: Status: ACTIVE | Noted: 2017-12-27

## 2017-12-27 LAB
ALBUMIN SERPL-MCNC: 4.1 G/DL (ref 3.5–5)
AMORPH URATE CRY URNS QL MICRO: ABNORMAL /HPF
AMPHET+METHAMPHET UR QL: NEGATIVE
AMPHETAMINES UR QL: POSITIVE
ANION GAP SERPL CALCULATED.3IONS-SCNC: 18.8 MMOL/L
BACTERIA UR QL AUTO: ABNORMAL /HPF
BARBITURATES UR QL SCN: NEGATIVE
BASOPHILS # BLD AUTO: 0.05 10*3/MM3 (ref 0–0.2)
BASOPHILS # BLD AUTO: 0.05 10*3/MM3 (ref 0–0.2)
BASOPHILS NFR BLD AUTO: 0.4 % (ref 0–2.5)
BASOPHILS NFR BLD AUTO: 0.5 % (ref 0–2.5)
BENZODIAZ UR QL SCN: NEGATIVE
BILIRUB UR QL STRIP: ABNORMAL
BUN BLD-MCNC: 39 MG/DL (ref 7–20)
BUN/CREAT SERPL: 7.8 (ref 6.3–21.9)
BUPRENORPHINE SERPL-MCNC: NEGATIVE NG/ML
CALCIUM SPEC-SCNC: 9 MG/DL (ref 8.4–10.2)
CANNABINOIDS SERPL QL: NEGATIVE
CHLORIDE SERPL-SCNC: 100 MMOL/L (ref 98–107)
CLARITY UR: ABNORMAL
CO2 SERPL-SCNC: 19 MMOL/L (ref 26–30)
COCAINE UR QL: NEGATIVE
COLOR UR: YELLOW
CREAT BLD-MCNC: 5 MG/DL (ref 0.6–1.3)
DEPRECATED RDW RBC AUTO: 44.5 FL (ref 37–54)
DEPRECATED RDW RBC AUTO: 45.1 FL (ref 37–54)
EOSINOPHIL # BLD AUTO: 0.38 10*3/MM3 (ref 0–0.7)
EOSINOPHIL # BLD AUTO: 0.46 10*3/MM3 (ref 0–0.7)
EOSINOPHIL NFR BLD AUTO: 3.8 % (ref 0–7)
EOSINOPHIL NFR BLD AUTO: 4.1 % (ref 0–7)
ERYTHROCYTE [DISTWIDTH] IN BLOOD BY AUTOMATED COUNT: 13.2 % (ref 11.5–14.5)
ERYTHROCYTE [DISTWIDTH] IN BLOOD BY AUTOMATED COUNT: 13.3 % (ref 11.5–14.5)
GFR SERPL CREATININE-BSD FRML MDRD: 12 ML/MIN/1.73
GLUCOSE BLD-MCNC: 125 MG/DL (ref 74–98)
GLUCOSE UR STRIP-MCNC: NEGATIVE MG/DL
GRAN CASTS URNS QL MICRO: ABNORMAL /LPF
HCT VFR BLD AUTO: 43.6 % (ref 42–52)
HCT VFR BLD AUTO: 50.4 % (ref 42–52)
HGB BLD-MCNC: 14.8 G/DL (ref 14–18)
HGB BLD-MCNC: 17.3 G/DL (ref 14–18)
HGB UR QL STRIP.AUTO: ABNORMAL
HYALINE CASTS UR QL AUTO: ABNORMAL /LPF
IMM GRANULOCYTES # BLD: 0.03 10*3/MM3 (ref 0–0.06)
IMM GRANULOCYTES # BLD: 0.04 10*3/MM3 (ref 0–0.06)
IMM GRANULOCYTES NFR BLD: 0.3 % (ref 0–0.6)
IMM GRANULOCYTES NFR BLD: 0.3 % (ref 0–0.6)
KETONES UR QL STRIP: NEGATIVE
LEUKOCYTE ESTERASE UR QL STRIP.AUTO: NEGATIVE
LYMPHOCYTES # BLD AUTO: 1.08 10*3/MM3 (ref 0.6–3.4)
LYMPHOCYTES # BLD AUTO: 1.53 10*3/MM3 (ref 0.6–3.4)
LYMPHOCYTES NFR BLD AUTO: 11.7 % (ref 10–50)
LYMPHOCYTES NFR BLD AUTO: 12.7 % (ref 10–50)
MAGNESIUM SERPL-MCNC: 2.1 MG/DL (ref 1.6–2.3)
MCH RBC QN AUTO: 31 PG (ref 27–31)
MCH RBC QN AUTO: 31.3 PG (ref 27–31)
MCHC RBC AUTO-ENTMCNC: 33.9 G/DL (ref 30–37)
MCHC RBC AUTO-ENTMCNC: 34.3 G/DL (ref 30–37)
MCV RBC AUTO: 91.3 FL (ref 80–94)
MCV RBC AUTO: 91.4 FL (ref 80–94)
METHADONE UR QL SCN: NEGATIVE
MONOCYTES # BLD AUTO: 1.03 10*3/MM3 (ref 0–0.9)
MONOCYTES # BLD AUTO: 1.36 10*3/MM3 (ref 0–0.9)
MONOCYTES NFR BLD AUTO: 11.2 % (ref 0–12)
MONOCYTES NFR BLD AUTO: 11.3 % (ref 0–12)
NEUTROPHILS # BLD AUTO: 6.64 10*3/MM3 (ref 2–6.9)
NEUTROPHILS # BLD AUTO: 8.63 10*3/MM3 (ref 2–6.9)
NEUTROPHILS NFR BLD AUTO: 71.5 % (ref 37–80)
NEUTROPHILS NFR BLD AUTO: 72.2 % (ref 37–80)
NITRITE UR QL STRIP: NEGATIVE
NRBC BLD MANUAL-RTO: 0 /100 WBC (ref 0–0)
NRBC BLD MANUAL-RTO: 0 /100 WBC (ref 0–0)
OPIATES UR QL: NEGATIVE
OXYCODONE UR QL SCN: NEGATIVE
PCP UR QL SCN: NEGATIVE
PH UR STRIP.AUTO: <=5 [PH] (ref 5–8)
PHOSPHATE SERPL-MCNC: 5.2 MG/DL (ref 2.5–4.5)
PLATELET # BLD AUTO: 182 10*3/MM3 (ref 130–400)
PLATELET # BLD AUTO: 225 10*3/MM3 (ref 130–400)
PMV BLD AUTO: 10.8 FL (ref 6–12)
PMV BLD AUTO: 11.5 FL (ref 6–12)
POTASSIUM BLD-SCNC: 3.8 MMOL/L (ref 3.5–5.1)
PROPOXYPH UR QL: NEGATIVE
PROT UR QL STRIP: ABNORMAL
RBC # BLD AUTO: 4.77 10*6/MM3 (ref 4.7–6.1)
RBC # BLD AUTO: 5.52 10*6/MM3 (ref 4.7–6.1)
RBC # UR: ABNORMAL /HPF
REF LAB TEST METHOD: ABNORMAL
SODIUM BLD-SCNC: 134 MMOL/L (ref 137–145)
SP GR UR STRIP: >=1.03 (ref 1–1.03)
SQUAMOUS #/AREA URNS HPF: ABNORMAL /HPF
TRICYCLICS UR QL SCN: NEGATIVE
UROBILINOGEN UR QL STRIP: ABNORMAL
WBC NRBC COR # BLD: 12.07 10*3/MM3 (ref 4.8–10.8)
WBC NRBC COR # BLD: 9.21 10*3/MM3 (ref 4.8–10.8)
WBC UR QL AUTO: ABNORMAL /HPF

## 2017-12-27 PROCEDURE — 85025 COMPLETE CBC W/AUTO DIFF WBC: CPT | Performed by: EMERGENCY MEDICINE

## 2017-12-27 PROCEDURE — 99223 1ST HOSP IP/OBS HIGH 75: CPT | Performed by: FAMILY MEDICINE

## 2017-12-27 PROCEDURE — 25010000002 MAGNESIUM SULFATE 2 GM/50ML SOLUTION: Performed by: EMERGENCY MEDICINE

## 2017-12-27 PROCEDURE — 93005 ELECTROCARDIOGRAM TRACING: CPT | Performed by: EMERGENCY MEDICINE

## 2017-12-27 PROCEDURE — 80306 DRUG TEST PRSMV INSTRMNT: CPT | Performed by: EMERGENCY MEDICINE

## 2017-12-27 PROCEDURE — 87081 CULTURE SCREEN ONLY: CPT | Performed by: FAMILY MEDICINE

## 2017-12-27 PROCEDURE — 81001 URINALYSIS AUTO W/SCOPE: CPT | Performed by: EMERGENCY MEDICINE

## 2017-12-27 PROCEDURE — 25010000002 HEPARIN (PORCINE) PER 1000 UNITS: Performed by: FAMILY MEDICINE

## 2017-12-27 PROCEDURE — 80069 RENAL FUNCTION PANEL: CPT | Performed by: FAMILY MEDICINE

## 2017-12-27 PROCEDURE — 83735 ASSAY OF MAGNESIUM: CPT | Performed by: INTERNAL MEDICINE

## 2017-12-27 PROCEDURE — 85025 COMPLETE CBC W/AUTO DIFF WBC: CPT | Performed by: FAMILY MEDICINE

## 2017-12-27 RX ORDER — PANTOPRAZOLE SODIUM 40 MG/1
40 TABLET, DELAYED RELEASE ORAL DAILY
COMMUNITY

## 2017-12-27 RX ORDER — HYDROXYZINE PAMOATE 25 MG/1
25 CAPSULE ORAL ONCE
Status: COMPLETED | OUTPATIENT
Start: 2017-12-27 | End: 2017-12-27

## 2017-12-27 RX ORDER — ACETAMINOPHEN 500 MG
500 TABLET ORAL AS NEEDED
COMMUNITY
End: 2019-07-04 | Stop reason: HOSPADM

## 2017-12-27 RX ORDER — SODIUM CHLORIDE 0.9 % (FLUSH) 0.9 %
1-10 SYRINGE (ML) INJECTION AS NEEDED
Status: DISCONTINUED | OUTPATIENT
Start: 2017-12-27 | End: 2017-12-28 | Stop reason: HOSPADM

## 2017-12-27 RX ORDER — SODIUM CHLORIDE 9 MG/ML
100 INJECTION, SOLUTION INTRAVENOUS CONTINUOUS
Status: DISCONTINUED | OUTPATIENT
Start: 2017-12-27 | End: 2017-12-28 | Stop reason: HOSPADM

## 2017-12-27 RX ORDER — FERROUS SULFATE TAB EC 324 MG (65 MG FE EQUIVALENT) 324 (65 FE) MG
324 TABLET DELAYED RESPONSE ORAL
Status: DISCONTINUED | OUTPATIENT
Start: 2017-12-27 | End: 2017-12-28 | Stop reason: HOSPADM

## 2017-12-27 RX ORDER — FAMOTIDINE 20 MG/1
20 TABLET, FILM COATED ORAL DAILY
Status: DISCONTINUED | OUTPATIENT
Start: 2017-12-27 | End: 2017-12-28 | Stop reason: HOSPADM

## 2017-12-27 RX ORDER — FERROUS SULFATE 325(65) MG
325 TABLET ORAL
Status: DISCONTINUED | OUTPATIENT
Start: 2017-12-27 | End: 2017-12-27 | Stop reason: RX

## 2017-12-27 RX ORDER — ZOLPIDEM TARTRATE 5 MG/1
10 TABLET ORAL NIGHTLY
Status: DISCONTINUED | OUTPATIENT
Start: 2017-12-27 | End: 2017-12-28 | Stop reason: HOSPADM

## 2017-12-27 RX ORDER — ONDANSETRON 2 MG/ML
4 INJECTION INTRAMUSCULAR; INTRAVENOUS EVERY 6 HOURS PRN
Status: DISCONTINUED | OUTPATIENT
Start: 2017-12-27 | End: 2017-12-28 | Stop reason: HOSPADM

## 2017-12-27 RX ORDER — DIAZEPAM 2 MG/1
2 TABLET ORAL ONCE
Status: COMPLETED | OUTPATIENT
Start: 2017-12-27 | End: 2017-12-27

## 2017-12-27 RX ORDER — ACETAMINOPHEN 325 MG/1
325 TABLET ORAL EVERY 4 HOURS PRN
Status: DISCONTINUED | OUTPATIENT
Start: 2017-12-27 | End: 2017-12-28 | Stop reason: HOSPADM

## 2017-12-27 RX ORDER — MAGNESIUM SULFATE HEPTAHYDRATE 40 MG/ML
2 INJECTION, SOLUTION INTRAVENOUS ONCE
Status: COMPLETED | OUTPATIENT
Start: 2017-12-27 | End: 2017-12-27

## 2017-12-27 RX ORDER — HEPARIN SODIUM 5000 [USP'U]/ML
5000 INJECTION, SOLUTION INTRAVENOUS; SUBCUTANEOUS EVERY 12 HOURS SCHEDULED
Status: DISCONTINUED | OUTPATIENT
Start: 2017-12-27 | End: 2017-12-28 | Stop reason: HOSPADM

## 2017-12-27 RX ADMIN — HYDROXYZINE PAMOATE 25 MG: 25 CAPSULE ORAL at 15:00

## 2017-12-27 RX ADMIN — MAGNESIUM SULFATE HEPTAHYDRATE 2 G: 40 INJECTION, SOLUTION INTRAVENOUS at 00:43

## 2017-12-27 RX ADMIN — HEPARIN SODIUM 5000 UNITS: 5000 INJECTION, SOLUTION INTRAVENOUS; SUBCUTANEOUS at 08:40

## 2017-12-27 RX ADMIN — FAMOTIDINE 20 MG: 20 TABLET, FILM COATED ORAL at 08:40

## 2017-12-27 RX ADMIN — DIAZEPAM 2 MG: 2 TABLET ORAL at 04:05

## 2017-12-27 RX ADMIN — FERROUS SULFATE TAB EC 324 MG (65 MG FE EQUIVALENT) 324 MG: 324 (65 FE) TABLET DELAYED RESPONSE at 08:40

## 2017-12-27 RX ADMIN — SODIUM CHLORIDE 1000 ML: 9 INJECTION, SOLUTION INTRAVENOUS at 00:49

## 2017-12-27 RX ADMIN — LEVOTHYROXINE SODIUM 175 MCG: 100 TABLET ORAL at 06:36

## 2017-12-27 RX ADMIN — SODIUM CHLORIDE 100 ML/HR: 9 INJECTION, SOLUTION INTRAVENOUS at 14:45

## 2017-12-27 RX ADMIN — SODIUM CHLORIDE 75 ML/HR: 9 INJECTION, SOLUTION INTRAVENOUS at 03:04

## 2017-12-27 RX ADMIN — HEPARIN SODIUM 5000 UNITS: 5000 INJECTION, SOLUTION INTRAVENOUS; SUBCUTANEOUS at 20:37

## 2017-12-27 RX ADMIN — ZOLPIDEM TARTRATE 10 MG: 5 TABLET ORAL at 20:37

## 2017-12-28 VITALS
DIASTOLIC BLOOD PRESSURE: 68 MMHG | RESPIRATION RATE: 18 BRPM | BODY MASS INDEX: 22.03 KG/M2 | HEART RATE: 85 BPM | OXYGEN SATURATION: 97 % | HEIGHT: 66 IN | TEMPERATURE: 97.9 F | WEIGHT: 137.1 LBS | SYSTOLIC BLOOD PRESSURE: 113 MMHG

## 2017-12-28 LAB
ALBUMIN SERPL-MCNC: 3.3 G/DL (ref 3.5–5)
ALBUMIN/GLOB SERPL: 1.3 G/DL (ref 1–2)
ALP SERPL-CCNC: 60 U/L (ref 38–126)
ALT SERPL W P-5'-P-CCNC: 52 U/L (ref 13–69)
ANION GAP SERPL CALCULATED.3IONS-SCNC: 13.4 MMOL/L
AST SERPL-CCNC: 18 U/L (ref 15–46)
BASOPHILS # BLD AUTO: 0.02 10*3/MM3 (ref 0–0.2)
BASOPHILS NFR BLD AUTO: 0.3 % (ref 0–2.5)
BILIRUB SERPL-MCNC: 0.4 MG/DL (ref 0.2–1.3)
BUN BLD-MCNC: 38 MG/DL (ref 7–20)
BUN/CREAT SERPL: 12.7 (ref 6.3–21.9)
CALCIUM SPEC-SCNC: 8.8 MG/DL (ref 8.4–10.2)
CHLORIDE SERPL-SCNC: 112 MMOL/L (ref 98–107)
CO2 SERPL-SCNC: 21 MMOL/L (ref 26–30)
CREAT BLD-MCNC: 3 MG/DL (ref 0.6–1.3)
DEPRECATED RDW RBC AUTO: 45 FL (ref 37–54)
EOSINOPHIL # BLD AUTO: 0.24 10*3/MM3 (ref 0–0.7)
EOSINOPHIL NFR BLD AUTO: 3.9 % (ref 0–7)
ERYTHROCYTE [DISTWIDTH] IN BLOOD BY AUTOMATED COUNT: 13.2 % (ref 11.5–14.5)
GFR SERPL CREATININE-BSD FRML MDRD: 22 ML/MIN/1.73
GLOBULIN UR ELPH-MCNC: 2.5 GM/DL
GLUCOSE BLD-MCNC: 85 MG/DL (ref 74–98)
HCT VFR BLD AUTO: 37.6 % (ref 42–52)
HGB BLD-MCNC: 12.7 G/DL (ref 14–18)
IMM GRANULOCYTES # BLD: 0.02 10*3/MM3 (ref 0–0.06)
IMM GRANULOCYTES NFR BLD: 0.3 % (ref 0–0.6)
LYMPHOCYTES # BLD AUTO: 0.76 10*3/MM3 (ref 0.6–3.4)
LYMPHOCYTES NFR BLD AUTO: 12.5 % (ref 10–50)
MCH RBC QN AUTO: 31.2 PG (ref 27–31)
MCHC RBC AUTO-ENTMCNC: 33.8 G/DL (ref 30–37)
MCV RBC AUTO: 92.4 FL (ref 80–94)
MONOCYTES # BLD AUTO: 0.62 10*3/MM3 (ref 0–0.9)
MONOCYTES NFR BLD AUTO: 10.2 % (ref 0–12)
NEUTROPHILS # BLD AUTO: 4.44 10*3/MM3 (ref 2–6.9)
NEUTROPHILS NFR BLD AUTO: 72.8 % (ref 37–80)
NRBC BLD MANUAL-RTO: 0 /100 WBC (ref 0–0)
PHOSPHATE SERPL-MCNC: 3.3 MG/DL (ref 2.5–4.5)
PLATELET # BLD AUTO: 151 10*3/MM3 (ref 130–400)
PMV BLD AUTO: 10.9 FL (ref 6–12)
POTASSIUM BLD-SCNC: 4.4 MMOL/L (ref 3.5–5.1)
PROT SERPL-MCNC: 5.8 G/DL (ref 6.3–8.2)
RBC # BLD AUTO: 4.07 10*6/MM3 (ref 4.7–6.1)
SODIUM BLD-SCNC: 142 MMOL/L (ref 137–145)
WBC NRBC COR # BLD: 6.1 10*3/MM3 (ref 4.8–10.8)

## 2017-12-28 PROCEDURE — 25010000002 HEPARIN (PORCINE) PER 1000 UNITS: Performed by: FAMILY MEDICINE

## 2017-12-28 PROCEDURE — 80053 COMPREHEN METABOLIC PANEL: CPT | Performed by: INTERNAL MEDICINE

## 2017-12-28 PROCEDURE — G0009 ADMIN PNEUMOCOCCAL VACCINE: HCPCS | Performed by: FAMILY MEDICINE

## 2017-12-28 PROCEDURE — 84100 ASSAY OF PHOSPHORUS: CPT | Performed by: FAMILY MEDICINE

## 2017-12-28 PROCEDURE — 85025 COMPLETE CBC W/AUTO DIFF WBC: CPT | Performed by: FAMILY MEDICINE

## 2017-12-28 PROCEDURE — 25010000002 PNEUMOCOCCAL VAC POLYVALENT PER 0.5 ML: Performed by: FAMILY MEDICINE

## 2017-12-28 PROCEDURE — 90732 PPSV23 VACC 2 YRS+ SUBQ/IM: CPT | Performed by: FAMILY MEDICINE

## 2017-12-28 RX ADMIN — FAMOTIDINE 20 MG: 20 TABLET, FILM COATED ORAL at 08:45

## 2017-12-28 RX ADMIN — PNEUMOCOCCAL VACCINE POLYVALENT 0.5 ML
25; 25; 25; 25; 25; 25; 25; 25; 25; 25; 25; 25; 25; 25; 25; 25; 25; 25; 25; 25; 25; 25; 25 INJECTION, SOLUTION INTRAMUSCULAR; SUBCUTANEOUS at 12:20

## 2017-12-28 RX ADMIN — LEVOTHYROXINE SODIUM 175 MCG: 100 TABLET ORAL at 06:06

## 2017-12-28 RX ADMIN — FERROUS SULFATE TAB EC 324 MG (65 MG FE EQUIVALENT) 324 MG: 324 (65 FE) TABLET DELAYED RESPONSE at 08:45

## 2017-12-28 RX ADMIN — HEPARIN SODIUM 5000 UNITS: 5000 INJECTION, SOLUTION INTRAVENOUS; SUBCUTANEOUS at 08:45

## 2017-12-29 LAB
ACINETOBACTER SCREEN CX: NO GROWTH
VRE SPEC QL CULT: NORMAL

## 2018-01-02 LAB — MRSA SPEC QL CULT: ABNORMAL

## 2018-05-21 ENCOUNTER — TRANSCRIBE ORDERS (OUTPATIENT)
Dept: ADMINISTRATIVE | Facility: HOSPITAL | Age: 58
End: 2018-05-21

## 2018-05-21 ENCOUNTER — APPOINTMENT (OUTPATIENT)
Dept: LAB | Facility: HOSPITAL | Age: 58
End: 2018-05-21
Attending: INTERNAL MEDICINE

## 2018-05-21 DIAGNOSIS — E03.9 MYXEDEMA HEART DISEASE: Primary | ICD-10-CM

## 2018-05-21 DIAGNOSIS — I51.9 MYXEDEMA HEART DISEASE: Primary | ICD-10-CM

## 2018-05-21 LAB
ALBUMIN SERPL-MCNC: 4.4 G/DL (ref 3.5–5)
ALBUMIN/GLOB SERPL: 1.4 G/DL (ref 1–2)
ALP SERPL-CCNC: 81 U/L (ref 38–126)
ALT SERPL W P-5'-P-CCNC: 43 U/L (ref 13–69)
ANION GAP SERPL CALCULATED.3IONS-SCNC: 15.7 MMOL/L (ref 10–20)
AST SERPL-CCNC: 31 U/L (ref 15–46)
BILIRUB SERPL-MCNC: 0.4 MG/DL (ref 0.2–1.3)
BUN BLD-MCNC: 22 MG/DL (ref 7–20)
BUN/CREAT SERPL: 9.2 (ref 6.3–21.9)
CALCIUM SPEC-SCNC: 9.5 MG/DL (ref 8.4–10.2)
CHLORIDE SERPL-SCNC: 105 MMOL/L (ref 98–107)
CO2 SERPL-SCNC: 21 MMOL/L (ref 26–30)
CREAT BLD-MCNC: 2.4 MG/DL (ref 0.6–1.3)
GFR SERPL CREATININE-BSD FRML MDRD: 28 ML/MIN/1.73
GLOBULIN UR ELPH-MCNC: 3.2 GM/DL
GLUCOSE BLD-MCNC: 91 MG/DL (ref 74–98)
POTASSIUM BLD-SCNC: 3.7 MMOL/L (ref 3.5–5.1)
PROT SERPL-MCNC: 7.6 G/DL (ref 6.3–8.2)
SODIUM BLD-SCNC: 138 MMOL/L (ref 137–145)
T4 FREE SERPL-MCNC: 1.32 NG/DL (ref 0.78–2.19)
TSH SERPL DL<=0.05 MIU/L-ACNC: 0.53 MIU/ML (ref 0.47–4.68)

## 2018-05-21 PROCEDURE — 80053 COMPREHEN METABOLIC PANEL: CPT | Performed by: INTERNAL MEDICINE

## 2018-05-21 PROCEDURE — 84443 ASSAY THYROID STIM HORMONE: CPT | Performed by: INTERNAL MEDICINE

## 2018-05-21 PROCEDURE — 36415 COLL VENOUS BLD VENIPUNCTURE: CPT | Performed by: INTERNAL MEDICINE

## 2018-05-21 PROCEDURE — 84439 ASSAY OF FREE THYROXINE: CPT | Performed by: INTERNAL MEDICINE

## 2018-06-18 ENCOUNTER — LAB (OUTPATIENT)
Dept: LAB | Facility: HOSPITAL | Age: 58
End: 2018-06-18
Attending: INTERNAL MEDICINE

## 2018-06-18 ENCOUNTER — TRANSCRIBE ORDERS (OUTPATIENT)
Dept: ADMINISTRATIVE | Facility: HOSPITAL | Age: 58
End: 2018-06-18

## 2018-06-18 DIAGNOSIS — E29.1 3-OXO-5 ALPHA-STEROID DELTA 4-DEHYDROGENASE DEFICIENCY: ICD-10-CM

## 2018-06-18 DIAGNOSIS — E29.1 3-OXO-5 ALPHA-STEROID DELTA 4-DEHYDROGENASE DEFICIENCY: Primary | ICD-10-CM

## 2018-06-18 PROCEDURE — 36415 COLL VENOUS BLD VENIPUNCTURE: CPT

## 2018-06-18 PROCEDURE — 84403 ASSAY OF TOTAL TESTOSTERONE: CPT

## 2018-06-18 PROCEDURE — 84402 ASSAY OF FREE TESTOSTERONE: CPT

## 2018-06-20 LAB
TESTOST FREE SERPL-MCNC: 30.5 PG/ML (ref 7.2–24)
TESTOST SERPL-MCNC: 740 NG/DL (ref 264–916)

## 2018-06-26 ENCOUNTER — APPOINTMENT (OUTPATIENT)
Dept: GENERAL RADIOLOGY | Facility: HOSPITAL | Age: 58
End: 2018-06-26

## 2018-06-26 ENCOUNTER — HOSPITAL ENCOUNTER (EMERGENCY)
Facility: HOSPITAL | Age: 58
Discharge: HOME | End: 2018-06-26
Attending: EMERGENCY MEDICINE | Admitting: EMERGENCY MEDICINE

## 2018-06-26 VITALS
RESPIRATION RATE: 16 BRPM | WEIGHT: 163.2 LBS | TEMPERATURE: 98.1 F | BODY MASS INDEX: 27.19 KG/M2 | HEIGHT: 65 IN | SYSTOLIC BLOOD PRESSURE: 138 MMHG | HEART RATE: 87 BPM | DIASTOLIC BLOOD PRESSURE: 83 MMHG | OXYGEN SATURATION: 98 %

## 2018-06-26 DIAGNOSIS — M25.571 ACUTE RIGHT ANKLE PAIN: Primary | ICD-10-CM

## 2018-06-26 PROCEDURE — 99282 EMERGENCY DEPT VISIT SF MDM: CPT

## 2018-06-26 PROCEDURE — 73610 X-RAY EXAM OF ANKLE: CPT

## 2018-06-26 PROCEDURE — 73630 X-RAY EXAM OF FOOT: CPT

## 2018-08-23 ENCOUNTER — TRANSCRIBE ORDERS (OUTPATIENT)
Dept: LAB | Facility: HOSPITAL | Age: 58
End: 2018-08-23

## 2018-08-23 ENCOUNTER — LAB (OUTPATIENT)
Dept: LAB | Facility: HOSPITAL | Age: 58
End: 2018-08-23
Attending: INTERNAL MEDICINE

## 2018-08-23 DIAGNOSIS — R53.82 CHRONIC FATIGUE: ICD-10-CM

## 2018-08-23 DIAGNOSIS — R53.82 CHRONIC FATIGUE: Primary | ICD-10-CM

## 2018-08-23 DIAGNOSIS — E29.1 DEFICIENCY OF TESTOSTERONE BIOSYNTHESIS: ICD-10-CM

## 2018-08-23 DIAGNOSIS — E03.9 HYPOTHYROIDISM, UNSPECIFIED TYPE: ICD-10-CM

## 2018-08-23 LAB
ALBUMIN SERPL-MCNC: 4 G/DL (ref 3.5–5)
ALBUMIN/GLOB SERPL: 1.5 G/DL (ref 1–2)
ALP SERPL-CCNC: 52 U/L (ref 38–126)
ALT SERPL W P-5'-P-CCNC: 23 U/L (ref 13–69)
ANION GAP SERPL CALCULATED.3IONS-SCNC: 14.8 MMOL/L (ref 10–20)
AST SERPL-CCNC: 23 U/L (ref 15–46)
BILIRUB SERPL-MCNC: 0.4 MG/DL (ref 0.2–1.3)
BUN BLD-MCNC: 29 MG/DL (ref 7–20)
BUN/CREAT SERPL: 10.7 (ref 6.3–21.9)
CALCIUM SPEC-SCNC: 9.4 MG/DL (ref 8.4–10.2)
CHLORIDE SERPL-SCNC: 109 MMOL/L (ref 98–107)
CO2 SERPL-SCNC: 16 MMOL/L (ref 26–30)
CREAT BLD-MCNC: 2.7 MG/DL (ref 0.6–1.3)
GFR SERPL CREATININE-BSD FRML MDRD: 25 ML/MIN/1.73
GLOBULIN UR ELPH-MCNC: 2.7 GM/DL
GLUCOSE BLD-MCNC: 82 MG/DL (ref 74–98)
POTASSIUM BLD-SCNC: 3.8 MMOL/L (ref 3.5–5.1)
PROT SERPL-MCNC: 6.7 G/DL (ref 6.3–8.2)
SODIUM BLD-SCNC: 136 MMOL/L (ref 137–145)
T4 FREE SERPL-MCNC: 0.59 NG/DL (ref 0.78–2.19)

## 2018-08-23 PROCEDURE — 84403 ASSAY OF TOTAL TESTOSTERONE: CPT

## 2018-08-23 PROCEDURE — 84439 ASSAY OF FREE THYROXINE: CPT

## 2018-08-23 PROCEDURE — 84402 ASSAY OF FREE TESTOSTERONE: CPT

## 2018-08-23 PROCEDURE — 80053 COMPREHEN METABOLIC PANEL: CPT

## 2018-08-28 LAB
TESTOST FREE SERPL-MCNC: 5 PG/ML (ref 7.2–24)
TESTOST SERPL-MCNC: 137 NG/DL (ref 264–916)

## 2019-04-27 ENCOUNTER — APPOINTMENT (OUTPATIENT)
Dept: CT IMAGING | Facility: HOSPITAL | Age: 59
End: 2019-04-27

## 2019-04-27 ENCOUNTER — HOSPITAL ENCOUNTER (EMERGENCY)
Facility: HOSPITAL | Age: 59
Discharge: HOME | End: 2019-04-28
Attending: EMERGENCY MEDICINE | Admitting: EMERGENCY MEDICINE

## 2019-04-27 VITALS
BODY MASS INDEX: 25.07 KG/M2 | OXYGEN SATURATION: 100 % | TEMPERATURE: 97.9 F | SYSTOLIC BLOOD PRESSURE: 132 MMHG | RESPIRATION RATE: 20 BRPM | HEART RATE: 72 BPM | HEIGHT: 66 IN | DIASTOLIC BLOOD PRESSURE: 80 MMHG | WEIGHT: 156 LBS

## 2019-04-27 DIAGNOSIS — M54.9 BACK PAIN, UNSPECIFIED BACK LOCATION, UNSPECIFIED BACK PAIN LATERALITY, UNSPECIFIED CHRONICITY: Primary | ICD-10-CM

## 2019-04-27 LAB
BASOPHILS # BLD AUTO: 0.04 10*3/MM3 (ref 0–0.2)
BASOPHILS NFR BLD AUTO: 0.6 % (ref 0–1.5)
DEPRECATED RDW RBC AUTO: 47.8 FL (ref 37–54)
EOSINOPHIL # BLD AUTO: 0.24 10*3/MM3 (ref 0–0.4)
EOSINOPHIL NFR BLD AUTO: 3.8 % (ref 0.3–6.2)
ERYTHROCYTE [DISTWIDTH] IN BLOOD BY AUTOMATED COUNT: 14.3 % (ref 12.3–15.4)
HCT VFR BLD AUTO: 36.5 % (ref 37.5–51)
HGB BLD-MCNC: 12.6 G/DL (ref 13–17.7)
IMM GRANULOCYTES # BLD AUTO: 0.02 10*3/MM3 (ref 0–0.05)
IMM GRANULOCYTES NFR BLD AUTO: 0.3 % (ref 0–0.5)
LYMPHOCYTES # BLD AUTO: 1.39 10*3/MM3 (ref 0.7–3.1)
LYMPHOCYTES NFR BLD AUTO: 21.8 % (ref 19.6–45.3)
MCH RBC QN AUTO: 31.9 PG (ref 26.6–33)
MCHC RBC AUTO-ENTMCNC: 34.5 G/DL (ref 31.5–35.7)
MCV RBC AUTO: 92.4 FL (ref 79–97)
MONOCYTES # BLD AUTO: 0.63 10*3/MM3 (ref 0.1–0.9)
MONOCYTES NFR BLD AUTO: 9.9 % (ref 5–12)
NEUTROPHILS # BLD AUTO: 4.07 10*3/MM3 (ref 1.7–7)
NEUTROPHILS NFR BLD AUTO: 63.6 % (ref 42.7–76)
NRBC BLD AUTO-RTO: 0 /100 WBC (ref 0–0.2)
PLATELET # BLD AUTO: 185 10*3/MM3 (ref 140–450)
PMV BLD AUTO: 9.9 FL (ref 6–12)
RBC # BLD AUTO: 3.95 10*6/MM3 (ref 4.14–5.8)
WBC NRBC COR # BLD: 6.39 10*3/MM3 (ref 3.4–10.8)

## 2019-04-27 PROCEDURE — 74176 CT ABD & PELVIS W/O CONTRAST: CPT

## 2019-04-27 PROCEDURE — 80053 COMPREHEN METABOLIC PANEL: CPT | Performed by: EMERGENCY MEDICINE

## 2019-04-27 PROCEDURE — 85025 COMPLETE CBC W/AUTO DIFF WBC: CPT | Performed by: EMERGENCY MEDICINE

## 2019-04-27 PROCEDURE — 99283 EMERGENCY DEPT VISIT LOW MDM: CPT

## 2019-04-28 LAB
ALBUMIN SERPL-MCNC: 3.4 G/DL (ref 3.5–5)
ALBUMIN/GLOB SERPL: 1.3 G/DL (ref 1–2)
ALP SERPL-CCNC: 47 U/L (ref 38–126)
ALT SERPL W P-5'-P-CCNC: 26 U/L (ref 13–69)
ANION GAP SERPL CALCULATED.3IONS-SCNC: 13.7 MMOL/L (ref 10–20)
AST SERPL-CCNC: 21 U/L (ref 15–46)
BILIRUB SERPL-MCNC: 0.3 MG/DL (ref 0.2–1.3)
BILIRUB UR QL STRIP: NEGATIVE
BUN BLD-MCNC: 26 MG/DL (ref 7–20)
BUN/CREAT SERPL: 10.8 (ref 6.3–21.9)
CALCIUM SPEC-SCNC: 8.4 MG/DL (ref 8.4–10.2)
CHLORIDE SERPL-SCNC: 106 MMOL/L (ref 98–107)
CLARITY UR: CLEAR
CO2 SERPL-SCNC: 21 MMOL/L (ref 26–30)
COLOR UR: ABNORMAL
CREAT BLD-MCNC: 2.4 MG/DL (ref 0.6–1.3)
GFR SERPL CREATININE-BSD FRML MDRD: 28 ML/MIN/1.73
GLOBULIN UR ELPH-MCNC: 2.6 GM/DL
GLUCOSE BLD-MCNC: 89 MG/DL (ref 74–98)
GLUCOSE UR STRIP-MCNC: NEGATIVE MG/DL
HGB UR QL STRIP.AUTO: NEGATIVE
KETONES UR QL STRIP: NEGATIVE
LEUKOCYTE ESTERASE UR QL STRIP.AUTO: NEGATIVE
NITRITE UR QL STRIP: NEGATIVE
PH UR STRIP.AUTO: 5.5 [PH] (ref 5–8)
POTASSIUM BLD-SCNC: 3.7 MMOL/L (ref 3.5–5.1)
PROT SERPL-MCNC: 6 G/DL (ref 6.3–8.2)
PROT UR QL STRIP: NEGATIVE
SODIUM BLD-SCNC: 137 MMOL/L (ref 137–145)
SP GR UR STRIP: 1.01 (ref 1–1.03)
UROBILINOGEN UR QL STRIP: ABNORMAL

## 2019-04-28 PROCEDURE — 81003 URINALYSIS AUTO W/O SCOPE: CPT | Performed by: EMERGENCY MEDICINE

## 2019-06-24 ENCOUNTER — APPOINTMENT (OUTPATIENT)
Dept: CT IMAGING | Facility: HOSPITAL | Age: 59
End: 2019-06-24

## 2019-06-24 ENCOUNTER — HOSPITAL ENCOUNTER (EMERGENCY)
Facility: HOSPITAL | Age: 59
Discharge: HOME | End: 2019-06-24
Attending: EMERGENCY MEDICINE | Admitting: EMERGENCY MEDICINE

## 2019-06-24 VITALS
DIASTOLIC BLOOD PRESSURE: 66 MMHG | HEIGHT: 65 IN | TEMPERATURE: 98 F | HEART RATE: 74 BPM | RESPIRATION RATE: 18 BRPM | WEIGHT: 147 LBS | SYSTOLIC BLOOD PRESSURE: 118 MMHG | BODY MASS INDEX: 24.49 KG/M2 | OXYGEN SATURATION: 94 %

## 2019-06-24 DIAGNOSIS — N18.9 CHRONIC RENAL IMPAIRMENT, UNSPECIFIED CKD STAGE: ICD-10-CM

## 2019-06-24 DIAGNOSIS — Z85.528 HISTORY OF RENAL CELL CARCINOMA: ICD-10-CM

## 2019-06-24 DIAGNOSIS — R31.29 MICROSCOPIC HEMATURIA: ICD-10-CM

## 2019-06-24 DIAGNOSIS — Z90.5 HISTORY OF NEPHRECTOMY: ICD-10-CM

## 2019-06-24 DIAGNOSIS — R10.9 LEFT FLANK PAIN: Primary | ICD-10-CM

## 2019-06-24 LAB
ALBUMIN SERPL-MCNC: 3.8 G/DL (ref 3.5–5)
ALBUMIN/GLOB SERPL: 1.4 G/DL (ref 1–2)
ALP SERPL-CCNC: 56 U/L (ref 38–126)
ALT SERPL W P-5'-P-CCNC: 31 U/L (ref 13–69)
ANION GAP SERPL CALCULATED.3IONS-SCNC: 12.8 MMOL/L (ref 10–20)
AST SERPL-CCNC: 23 U/L (ref 15–46)
BACTERIA UR QL AUTO: ABNORMAL /HPF
BASOPHILS # BLD AUTO: 0.05 10*3/MM3 (ref 0–0.2)
BASOPHILS NFR BLD AUTO: 0.5 % (ref 0–1.5)
BILIRUB SERPL-MCNC: 0.5 MG/DL (ref 0.2–1.3)
BILIRUB UR QL STRIP: NEGATIVE
BUN BLD-MCNC: 33 MG/DL (ref 7–20)
BUN/CREAT SERPL: 11.8 (ref 6.3–21.9)
CALCIUM SPEC-SCNC: 8.9 MG/DL (ref 8.4–10.2)
CHLORIDE SERPL-SCNC: 104 MMOL/L (ref 98–107)
CLARITY UR: CLEAR
CO2 SERPL-SCNC: 26 MMOL/L (ref 26–30)
COLOR UR: YELLOW
CREAT BLD-MCNC: 2.8 MG/DL (ref 0.6–1.3)
DEPRECATED RDW RBC AUTO: 41.4 FL (ref 37–54)
EOSINOPHIL # BLD AUTO: 0.22 10*3/MM3 (ref 0–0.4)
EOSINOPHIL NFR BLD AUTO: 2.2 % (ref 0.3–6.2)
ERYTHROCYTE [DISTWIDTH] IN BLOOD BY AUTOMATED COUNT: 12.4 % (ref 12.3–15.4)
GFR SERPL CREATININE-BSD FRML MDRD: 23 ML/MIN/1.73
GLOBULIN UR ELPH-MCNC: 2.8 GM/DL
GLUCOSE BLD-MCNC: 110 MG/DL (ref 74–98)
GLUCOSE UR STRIP-MCNC: NEGATIVE MG/DL
HCT VFR BLD AUTO: 39.5 % (ref 37.5–51)
HGB BLD-MCNC: 13.5 G/DL (ref 13–17.7)
HGB UR QL STRIP.AUTO: ABNORMAL
HYALINE CASTS UR QL AUTO: ABNORMAL /LPF
IMM GRANULOCYTES # BLD AUTO: 0.02 10*3/MM3 (ref 0–0.05)
IMM GRANULOCYTES NFR BLD AUTO: 0.2 % (ref 0–0.5)
KETONES UR QL STRIP: NEGATIVE
LEUKOCYTE ESTERASE UR QL STRIP.AUTO: NEGATIVE
LIPASE SERPL-CCNC: 126 U/L (ref 23–300)
LYMPHOCYTES # BLD AUTO: 0.91 10*3/MM3 (ref 0.7–3.1)
LYMPHOCYTES NFR BLD AUTO: 9.2 % (ref 19.6–45.3)
MCH RBC QN AUTO: 30.9 PG (ref 26.6–33)
MCHC RBC AUTO-ENTMCNC: 34.2 G/DL (ref 31.5–35.7)
MCV RBC AUTO: 90.4 FL (ref 79–97)
MONOCYTES # BLD AUTO: 0.81 10*3/MM3 (ref 0.1–0.9)
MONOCYTES NFR BLD AUTO: 8.2 % (ref 5–12)
NEUTROPHILS # BLD AUTO: 7.85 10*3/MM3 (ref 1.7–7)
NEUTROPHILS NFR BLD AUTO: 79.7 % (ref 42.7–76)
NITRITE UR QL STRIP: NEGATIVE
NRBC BLD AUTO-RTO: 0 /100 WBC (ref 0–0.2)
PH UR STRIP.AUTO: <=5 [PH] (ref 5–8)
PLATELET # BLD AUTO: 180 10*3/MM3 (ref 140–450)
PMV BLD AUTO: 9.9 FL (ref 6–12)
POTASSIUM BLD-SCNC: 3.8 MMOL/L (ref 3.5–5.1)
PROT SERPL-MCNC: 6.6 G/DL (ref 6.3–8.2)
PROT UR QL STRIP: ABNORMAL
RBC # BLD AUTO: 4.37 10*6/MM3 (ref 4.14–5.8)
RBC # UR: ABNORMAL /HPF
REF LAB TEST METHOD: ABNORMAL
SODIUM BLD-SCNC: 139 MMOL/L (ref 137–145)
SP GR UR STRIP: 1.02 (ref 1–1.03)
SQUAMOUS #/AREA URNS HPF: ABNORMAL /HPF
UROBILINOGEN UR QL STRIP: ABNORMAL
WBC NRBC COR # BLD: 9.86 10*3/MM3 (ref 3.4–10.8)
WBC UR QL AUTO: ABNORMAL /HPF

## 2019-06-24 PROCEDURE — 83690 ASSAY OF LIPASE: CPT | Performed by: EMERGENCY MEDICINE

## 2019-06-24 PROCEDURE — 81001 URINALYSIS AUTO W/SCOPE: CPT | Performed by: EMERGENCY MEDICINE

## 2019-06-24 PROCEDURE — 96374 THER/PROPH/DIAG INJ IV PUSH: CPT

## 2019-06-24 PROCEDURE — 80053 COMPREHEN METABOLIC PANEL: CPT | Performed by: EMERGENCY MEDICINE

## 2019-06-24 PROCEDURE — 74176 CT ABD & PELVIS W/O CONTRAST: CPT

## 2019-06-24 PROCEDURE — 96375 TX/PRO/DX INJ NEW DRUG ADDON: CPT

## 2019-06-24 PROCEDURE — 87086 URINE CULTURE/COLONY COUNT: CPT | Performed by: EMERGENCY MEDICINE

## 2019-06-24 PROCEDURE — 85025 COMPLETE CBC W/AUTO DIFF WBC: CPT | Performed by: EMERGENCY MEDICINE

## 2019-06-24 PROCEDURE — 25010000002 MORPHINE PER 10 MG: Performed by: EMERGENCY MEDICINE

## 2019-06-24 PROCEDURE — 99283 EMERGENCY DEPT VISIT LOW MDM: CPT

## 2019-06-24 PROCEDURE — 25010000002 ONDANSETRON PER 1 MG: Performed by: EMERGENCY MEDICINE

## 2019-06-24 PROCEDURE — 25010000002 HYDROMORPHONE 1 MG/ML SOLUTION: Performed by: EMERGENCY MEDICINE

## 2019-06-24 RX ORDER — MORPHINE SULFATE 4 MG/ML
4 INJECTION, SOLUTION INTRAMUSCULAR; INTRAVENOUS ONCE
Status: COMPLETED | OUTPATIENT
Start: 2019-06-24 | End: 2019-06-24

## 2019-06-24 RX ORDER — ONDANSETRON 4 MG/1
4 TABLET, FILM COATED ORAL EVERY 8 HOURS PRN
Qty: 12 TABLET | Refills: 0 | Status: ON HOLD | OUTPATIENT
Start: 2019-06-24 | End: 2020-10-29

## 2019-06-24 RX ORDER — ONDANSETRON 2 MG/ML
4 INJECTION INTRAMUSCULAR; INTRAVENOUS ONCE
Status: COMPLETED | OUTPATIENT
Start: 2019-06-24 | End: 2019-06-24

## 2019-06-24 RX ORDER — HYDROCODONE BITARTRATE AND ACETAMINOPHEN 5; 325 MG/1; MG/1
1 TABLET ORAL EVERY 6 HOURS PRN
Qty: 12 TABLET | Refills: 0 | Status: SHIPPED | OUTPATIENT
Start: 2019-06-24 | End: 2019-07-04 | Stop reason: HOSPADM

## 2019-06-24 RX ORDER — SODIUM CHLORIDE 0.9 % (FLUSH) 0.9 %
10 SYRINGE (ML) INJECTION AS NEEDED
Status: DISCONTINUED | OUTPATIENT
Start: 2019-06-24 | End: 2019-06-24 | Stop reason: HOSPADM

## 2019-06-24 RX ADMIN — MORPHINE SULFATE 4 MG: 4 INJECTION INTRAVENOUS at 02:57

## 2019-06-24 RX ADMIN — ONDANSETRON 4 MG: 2 INJECTION INTRAMUSCULAR; INTRAVENOUS at 02:58

## 2019-06-24 RX ADMIN — HYDROMORPHONE HYDROCHLORIDE 1 MG: 1 INJECTION, SOLUTION INTRAMUSCULAR; INTRAVENOUS; SUBCUTANEOUS at 03:35

## 2019-06-25 LAB — BACTERIA SPEC AEROBE CULT: NO GROWTH

## 2019-07-02 ENCOUNTER — HOSPITAL ENCOUNTER (EMERGENCY)
Facility: HOSPITAL | Age: 59
Discharge: HOME | End: 2019-07-02
Attending: EMERGENCY MEDICINE | Admitting: EMERGENCY MEDICINE

## 2019-07-02 ENCOUNTER — APPOINTMENT (OUTPATIENT)
Dept: ULTRASOUND IMAGING | Facility: HOSPITAL | Age: 59
End: 2019-07-02

## 2019-07-02 ENCOUNTER — HOSPITAL ENCOUNTER (OUTPATIENT)
Facility: HOSPITAL | Age: 59
Discharge: HOME | End: 2019-07-04
Attending: EMERGENCY MEDICINE | Admitting: UROLOGY

## 2019-07-02 VITALS
RESPIRATION RATE: 18 BRPM | BODY MASS INDEX: 23.96 KG/M2 | TEMPERATURE: 97.9 F | DIASTOLIC BLOOD PRESSURE: 58 MMHG | SYSTOLIC BLOOD PRESSURE: 97 MMHG | HEART RATE: 87 BPM | HEIGHT: 65 IN | OXYGEN SATURATION: 93 % | WEIGHT: 143.8 LBS

## 2019-07-02 DIAGNOSIS — N18.30 CHRONIC KIDNEY DISEASE, STAGE III (MODERATE) (HCC): ICD-10-CM

## 2019-07-02 DIAGNOSIS — N20.0 RENAL STONES: ICD-10-CM

## 2019-07-02 DIAGNOSIS — N20.0 KIDNEY STONE: ICD-10-CM

## 2019-07-02 DIAGNOSIS — R52 INTRACTABLE PAIN: Primary | ICD-10-CM

## 2019-07-02 DIAGNOSIS — N17.9 AKI (ACUTE KIDNEY INJURY) (HCC): ICD-10-CM

## 2019-07-02 DIAGNOSIS — Z90.5 HX OF UNILATERAL NEPHRECTOMY: Chronic | ICD-10-CM

## 2019-07-02 DIAGNOSIS — N23 RENAL COLIC: ICD-10-CM

## 2019-07-02 DIAGNOSIS — R10.9 FLANK PAIN: Primary | ICD-10-CM

## 2019-07-02 LAB
ALBUMIN SERPL-MCNC: 3.1 G/DL (ref 3.5–5)
ALBUMIN SERPL-MCNC: 3.7 G/DL (ref 3.5–5)
ALBUMIN/GLOB SERPL: 1.2 G/DL (ref 1–2)
ALBUMIN/GLOB SERPL: 1.2 G/DL (ref 1–2)
ALP SERPL-CCNC: 55 U/L (ref 38–126)
ALP SERPL-CCNC: 55 U/L (ref 38–126)
ALT SERPL W P-5'-P-CCNC: 46 U/L (ref 13–69)
ALT SERPL W P-5'-P-CCNC: 59 U/L (ref 13–69)
ANION GAP SERPL CALCULATED.3IONS-SCNC: 10.8 MMOL/L (ref 10–20)
ANION GAP SERPL CALCULATED.3IONS-SCNC: 16 MMOL/L (ref 10–20)
AST SERPL-CCNC: 26 U/L (ref 15–46)
AST SERPL-CCNC: 46 U/L (ref 15–46)
BACTERIA UR QL AUTO: ABNORMAL /HPF
BACTERIA UR QL AUTO: ABNORMAL /HPF
BASOPHILS # BLD AUTO: 0.04 10*3/MM3 (ref 0–0.2)
BASOPHILS # BLD AUTO: 0.07 10*3/MM3 (ref 0–0.2)
BASOPHILS NFR BLD AUTO: 0.3 % (ref 0–1.5)
BASOPHILS NFR BLD AUTO: 0.5 % (ref 0–1.5)
BILIRUB SERPL-MCNC: 0.5 MG/DL (ref 0.2–1.3)
BILIRUB SERPL-MCNC: 0.6 MG/DL (ref 0.2–1.3)
BILIRUB UR QL STRIP: NEGATIVE
BILIRUB UR QL STRIP: NEGATIVE
BUN BLD-MCNC: 31 MG/DL (ref 7–20)
BUN BLD-MCNC: 33 MG/DL (ref 7–20)
BUN/CREAT SERPL: 11.8 (ref 6.3–21.9)
BUN/CREAT SERPL: 14.1 (ref 6.3–21.9)
CALCIUM SPEC-SCNC: 7.9 MG/DL (ref 8.4–10.2)
CALCIUM SPEC-SCNC: 8.1 MG/DL (ref 8.4–10.2)
CHLORIDE SERPL-SCNC: 101 MMOL/L (ref 98–107)
CHLORIDE SERPL-SCNC: 108 MMOL/L (ref 98–107)
CK SERPL-CCNC: 29 U/L (ref 30–170)
CLARITY UR: CLEAR
CLARITY UR: CLEAR
CO2 SERPL-SCNC: 19 MMOL/L (ref 26–30)
CO2 SERPL-SCNC: 21 MMOL/L (ref 26–30)
COLOR UR: YELLOW
COLOR UR: YELLOW
CREAT BLD-MCNC: 2.2 MG/DL (ref 0.6–1.3)
CREAT BLD-MCNC: 2.8 MG/DL (ref 0.6–1.3)
DEPRECATED RDW RBC AUTO: 37.9 FL (ref 37–54)
DEPRECATED RDW RBC AUTO: 38.7 FL (ref 37–54)
EOSINOPHIL # BLD AUTO: 0.2 10*3/MM3 (ref 0–0.4)
EOSINOPHIL # BLD AUTO: 0.31 10*3/MM3 (ref 0–0.4)
EOSINOPHIL NFR BLD AUTO: 1.3 % (ref 0.3–6.2)
EOSINOPHIL NFR BLD AUTO: 2 % (ref 0.3–6.2)
ERYTHROCYTE [DISTWIDTH] IN BLOOD BY AUTOMATED COUNT: 11.8 % (ref 12.3–15.4)
ERYTHROCYTE [DISTWIDTH] IN BLOOD BY AUTOMATED COUNT: 11.9 % (ref 12.3–15.4)
GFR SERPL CREATININE-BSD FRML MDRD: 23 ML/MIN/1.73
GFR SERPL CREATININE-BSD FRML MDRD: 31 ML/MIN/1.73
GLOBULIN UR ELPH-MCNC: 2.6 GM/DL
GLOBULIN UR ELPH-MCNC: 3 GM/DL
GLUCOSE BLD-MCNC: 92 MG/DL (ref 74–98)
GLUCOSE BLD-MCNC: 94 MG/DL (ref 74–98)
GLUCOSE UR STRIP-MCNC: NEGATIVE MG/DL
GLUCOSE UR STRIP-MCNC: NEGATIVE MG/DL
HCT VFR BLD AUTO: 36.4 % (ref 37.5–51)
HCT VFR BLD AUTO: 37.6 % (ref 37.5–51)
HGB BLD-MCNC: 12.4 G/DL (ref 13–17.7)
HGB BLD-MCNC: 13.2 G/DL (ref 13–17.7)
HGB UR QL STRIP.AUTO: ABNORMAL
HGB UR QL STRIP.AUTO: ABNORMAL
HOLD SPECIMEN: NORMAL
HYALINE CASTS UR QL AUTO: ABNORMAL /LPF
HYALINE CASTS UR QL AUTO: ABNORMAL /LPF
IMM GRANULOCYTES # BLD AUTO: 0.1 10*3/MM3 (ref 0–0.05)
IMM GRANULOCYTES # BLD AUTO: 0.11 10*3/MM3 (ref 0–0.05)
IMM GRANULOCYTES NFR BLD AUTO: 0.7 % (ref 0–0.5)
IMM GRANULOCYTES NFR BLD AUTO: 0.7 % (ref 0–0.5)
KETONES UR QL STRIP: NEGATIVE
KETONES UR QL STRIP: NEGATIVE
LEUKOCYTE ESTERASE UR QL STRIP.AUTO: ABNORMAL
LEUKOCYTE ESTERASE UR QL STRIP.AUTO: NEGATIVE
LIPASE SERPL-CCNC: 104 U/L (ref 23–300)
LYMPHOCYTES # BLD AUTO: 1.39 10*3/MM3 (ref 0.7–3.1)
LYMPHOCYTES # BLD AUTO: 1.57 10*3/MM3 (ref 0.7–3.1)
LYMPHOCYTES NFR BLD AUTO: 10.2 % (ref 19.6–45.3)
LYMPHOCYTES NFR BLD AUTO: 9.4 % (ref 19.6–45.3)
MCH RBC QN AUTO: 30.5 PG (ref 26.6–33)
MCH RBC QN AUTO: 31.2 PG (ref 26.6–33)
MCHC RBC AUTO-ENTMCNC: 34.1 G/DL (ref 31.5–35.7)
MCHC RBC AUTO-ENTMCNC: 35.1 G/DL (ref 31.5–35.7)
MCV RBC AUTO: 88.9 FL (ref 79–97)
MCV RBC AUTO: 89.7 FL (ref 79–97)
MONOCYTES # BLD AUTO: 1.22 10*3/MM3 (ref 0.1–0.9)
MONOCYTES # BLD AUTO: 1.24 10*3/MM3 (ref 0.1–0.9)
MONOCYTES NFR BLD AUTO: 8.1 % (ref 5–12)
MONOCYTES NFR BLD AUTO: 8.2 % (ref 5–12)
NEUTROPHILS # BLD AUTO: 11.89 10*3/MM3 (ref 1.7–7)
NEUTROPHILS # BLD AUTO: 12.1 10*3/MM3 (ref 1.7–7)
NEUTROPHILS NFR BLD AUTO: 78.5 % (ref 42.7–76)
NEUTROPHILS NFR BLD AUTO: 80.1 % (ref 42.7–76)
NITRITE UR QL STRIP: NEGATIVE
NITRITE UR QL STRIP: NEGATIVE
NRBC BLD AUTO-RTO: 0 /100 WBC (ref 0–0.2)
NRBC BLD AUTO-RTO: 0 /100 WBC (ref 0–0.2)
PH UR STRIP.AUTO: 6 [PH] (ref 5–8)
PH UR STRIP.AUTO: <=5 [PH] (ref 5–8)
PLATELET # BLD AUTO: 194 10*3/MM3 (ref 140–450)
PLATELET # BLD AUTO: 219 10*3/MM3 (ref 140–450)
PMV BLD AUTO: 9.2 FL (ref 6–12)
PMV BLD AUTO: 9.3 FL (ref 6–12)
POTASSIUM BLD-SCNC: 3.8 MMOL/L (ref 3.5–5.1)
POTASSIUM BLD-SCNC: 4 MMOL/L (ref 3.5–5.1)
PROT SERPL-MCNC: 5.7 G/DL (ref 6.3–8.2)
PROT SERPL-MCNC: 6.7 G/DL (ref 6.3–8.2)
PROT UR QL STRIP: NEGATIVE
PROT UR QL STRIP: NEGATIVE
RBC # BLD AUTO: 4.06 10*6/MM3 (ref 4.14–5.8)
RBC # BLD AUTO: 4.23 10*6/MM3 (ref 4.14–5.8)
RBC # UR: ABNORMAL /HPF
RBC # UR: ABNORMAL /HPF
REF LAB TEST METHOD: ABNORMAL
REF LAB TEST METHOD: ABNORMAL
SODIUM BLD-SCNC: 132 MMOL/L (ref 137–145)
SODIUM BLD-SCNC: 136 MMOL/L (ref 137–145)
SP GR UR STRIP: 1.01 (ref 1–1.03)
SP GR UR STRIP: 1.01 (ref 1–1.03)
SQUAMOUS #/AREA URNS HPF: ABNORMAL /HPF
SQUAMOUS #/AREA URNS HPF: ABNORMAL /HPF
UROBILINOGEN UR QL STRIP: ABNORMAL
UROBILINOGEN UR QL STRIP: ABNORMAL
WBC NRBC COR # BLD: 14.84 10*3/MM3 (ref 3.4–10.8)
WBC NRBC COR # BLD: 15.4 10*3/MM3 (ref 3.4–10.8)
WBC UR QL AUTO: ABNORMAL /HPF
WBC UR QL AUTO: ABNORMAL /HPF
WHOLE BLOOD HOLD SPECIMEN: NORMAL
WHOLE BLOOD HOLD SPECIMEN: NORMAL

## 2019-07-02 PROCEDURE — 96376 TX/PRO/DX INJ SAME DRUG ADON: CPT

## 2019-07-02 PROCEDURE — 81001 URINALYSIS AUTO W/SCOPE: CPT | Performed by: EMERGENCY MEDICINE

## 2019-07-02 PROCEDURE — 25010000002 KETOROLAC TROMETHAMINE PER 15 MG: Performed by: EMERGENCY MEDICINE

## 2019-07-02 PROCEDURE — 96361 HYDRATE IV INFUSION ADD-ON: CPT

## 2019-07-02 PROCEDURE — 82550 ASSAY OF CK (CPK): CPT | Performed by: EMERGENCY MEDICINE

## 2019-07-02 PROCEDURE — 96365 THER/PROPH/DIAG IV INF INIT: CPT

## 2019-07-02 PROCEDURE — 96375 TX/PRO/DX INJ NEW DRUG ADDON: CPT

## 2019-07-02 PROCEDURE — 85025 COMPLETE CBC W/AUTO DIFF WBC: CPT | Performed by: EMERGENCY MEDICINE

## 2019-07-02 PROCEDURE — 25010000002 KETOROLAC TROMETHAMINE PER 15 MG: Performed by: NURSE PRACTITIONER

## 2019-07-02 PROCEDURE — 25010000002 CEFTRIAXONE SODIUM-DEXTROSE 1-3.74 GM-%(50ML) RECONSTITUTED SOLUTION: Performed by: NURSE PRACTITIONER

## 2019-07-02 PROCEDURE — 25010000002 ONDANSETRON PER 1 MG: Performed by: EMERGENCY MEDICINE

## 2019-07-02 PROCEDURE — 96374 THER/PROPH/DIAG INJ IV PUSH: CPT

## 2019-07-02 PROCEDURE — 80053 COMPREHEN METABOLIC PANEL: CPT | Performed by: EMERGENCY MEDICINE

## 2019-07-02 PROCEDURE — G0378 HOSPITAL OBSERVATION PER HR: HCPCS

## 2019-07-02 PROCEDURE — 99284 EMERGENCY DEPT VISIT MOD MDM: CPT

## 2019-07-02 PROCEDURE — 25010000002 HYDROMORPHONE 1 MG/ML SOLUTION: Performed by: INTERNAL MEDICINE

## 2019-07-02 PROCEDURE — 76775 US EXAM ABDO BACK WALL LIM: CPT

## 2019-07-02 PROCEDURE — 83690 ASSAY OF LIPASE: CPT | Performed by: EMERGENCY MEDICINE

## 2019-07-02 PROCEDURE — 25010000002 HYDROMORPHONE 1 MG/ML SOLUTION: Performed by: EMERGENCY MEDICINE

## 2019-07-02 PROCEDURE — 87086 URINE CULTURE/COLONY COUNT: CPT | Performed by: NURSE PRACTITIONER

## 2019-07-02 RX ORDER — KETOROLAC TROMETHAMINE 30 MG/ML
15 INJECTION, SOLUTION INTRAMUSCULAR; INTRAVENOUS ONCE
Status: COMPLETED | OUTPATIENT
Start: 2019-07-02 | End: 2019-07-02

## 2019-07-02 RX ORDER — CEFTRIAXONE 1 G/50ML
1 INJECTION, SOLUTION INTRAVENOUS ONCE
Status: COMPLETED | OUTPATIENT
Start: 2019-07-02 | End: 2019-07-02

## 2019-07-02 RX ORDER — HYDROCODONE BITARTRATE AND ACETAMINOPHEN 5; 325 MG/1; MG/1
1 TABLET ORAL EVERY 6 HOURS PRN
Status: DISCONTINUED | OUTPATIENT
Start: 2019-07-02 | End: 2019-07-03

## 2019-07-02 RX ORDER — SODIUM CHLORIDE 0.9 % (FLUSH) 0.9 %
10 SYRINGE (ML) INJECTION AS NEEDED
Status: DISCONTINUED | OUTPATIENT
Start: 2019-07-02 | End: 2019-07-02 | Stop reason: HOSPADM

## 2019-07-02 RX ORDER — LEVOTHYROXINE SODIUM 0.1 MG/1
200 TABLET ORAL DAILY
Status: DISCONTINUED | OUTPATIENT
Start: 2019-07-02 | End: 2019-07-04 | Stop reason: HOSPADM

## 2019-07-02 RX ORDER — ONDANSETRON 4 MG/1
4 TABLET, FILM COATED ORAL EVERY 8 HOURS PRN
Status: DISCONTINUED | OUTPATIENT
Start: 2019-07-02 | End: 2019-07-04 | Stop reason: HOSPADM

## 2019-07-02 RX ORDER — CEFTRIAXONE 1 G/50ML
1 INJECTION, SOLUTION INTRAVENOUS EVERY 24 HOURS
Status: DISCONTINUED | OUTPATIENT
Start: 2019-07-03 | End: 2019-07-04 | Stop reason: HOSPADM

## 2019-07-02 RX ORDER — PANTOPRAZOLE SODIUM 40 MG/1
40 TABLET, DELAYED RELEASE ORAL DAILY
Status: DISCONTINUED | OUTPATIENT
Start: 2019-07-02 | End: 2019-07-04 | Stop reason: HOSPADM

## 2019-07-02 RX ORDER — SODIUM CHLORIDE 9 MG/ML
100 INJECTION, SOLUTION INTRAVENOUS CONTINUOUS
Status: DISCONTINUED | OUTPATIENT
Start: 2019-07-02 | End: 2019-07-03

## 2019-07-02 RX ORDER — ONDANSETRON 2 MG/ML
4 INJECTION INTRAMUSCULAR; INTRAVENOUS ONCE
Status: COMPLETED | OUTPATIENT
Start: 2019-07-02 | End: 2019-07-02

## 2019-07-02 RX ORDER — SODIUM CHLORIDE 0.9 % (FLUSH) 0.9 %
10 SYRINGE (ML) INJECTION AS NEEDED
Status: DISCONTINUED | OUTPATIENT
Start: 2019-07-02 | End: 2019-07-04 | Stop reason: HOSPADM

## 2019-07-02 RX ADMIN — SODIUM CHLORIDE 1000 ML: 9 INJECTION, SOLUTION INTRAVENOUS at 00:54

## 2019-07-02 RX ADMIN — SODIUM CHLORIDE 100 ML/HR: 9 INJECTION, SOLUTION INTRAVENOUS at 20:55

## 2019-07-02 RX ADMIN — SODIUM CHLORIDE 1000 ML: 9 INJECTION, SOLUTION INTRAVENOUS at 12:11

## 2019-07-02 RX ADMIN — LIDOCAINE HYDROCHLORIDE 100 MG: 10 INJECTION, SOLUTION INFILTRATION; PERINEURAL at 01:02

## 2019-07-02 RX ADMIN — ONDANSETRON 4 MG: 2 INJECTION INTRAMUSCULAR; INTRAVENOUS at 12:11

## 2019-07-02 RX ADMIN — HYDROMORPHONE HYDROCHLORIDE 0.5 MG: 1 INJECTION, SOLUTION INTRAMUSCULAR; INTRAVENOUS; SUBCUTANEOUS at 20:57

## 2019-07-02 RX ADMIN — HYDROMORPHONE HYDROCHLORIDE 0.5 MG: 1 INJECTION, SOLUTION INTRAMUSCULAR; INTRAVENOUS; SUBCUTANEOUS at 23:10

## 2019-07-02 RX ADMIN — HYDROMORPHONE HYDROCHLORIDE 0.5 MG: 1 INJECTION, SOLUTION INTRAMUSCULAR; INTRAVENOUS; SUBCUTANEOUS at 13:58

## 2019-07-02 RX ADMIN — CEFTRIAXONE 1 G: 1 INJECTION, SOLUTION INTRAVENOUS at 13:58

## 2019-07-02 RX ADMIN — HYDROMORPHONE HYDROCHLORIDE 0.5 MG: 1 INJECTION, SOLUTION INTRAMUSCULAR; INTRAVENOUS; SUBCUTANEOUS at 12:11

## 2019-07-02 RX ADMIN — KETOROLAC TROMETHAMINE 15 MG: 30 INJECTION, SOLUTION INTRAMUSCULAR at 14:51

## 2019-07-02 RX ADMIN — KETOROLAC TROMETHAMINE 15 MG: 30 INJECTION, SOLUTION INTRAMUSCULAR at 00:57

## 2019-07-02 RX ADMIN — ONDANSETRON 4 MG: 2 INJECTION INTRAMUSCULAR; INTRAVENOUS at 00:55

## 2019-07-02 NOTE — ED NOTES
Contacted Dr. Ramirez's office and was told Dr. Ramirez is currently in surgery. Notified MOHSEN Mccartney. Will call back in an hour.      Ruby Sutton  07/02/19 1580

## 2019-07-02 NOTE — ED NOTES
House Supervisor called for bed assignment. Advised she would call back with bed.      Matt Dacosta  07/02/19 8841

## 2019-07-02 NOTE — ED NOTES
Dr. Ramirez, Urologist, contacted MOHSEN Mccartney, aisha @ [phone].     Ruby Sutton  07/02/19 8188

## 2019-07-02 NOTE — ED NOTES
Pt called out and stated he was in a lot of pain. MOHSNE Mccartney notified. She stated she would be in to see him.      Lia Muro, RN  07/02/19 2724

## 2019-07-02 NOTE — ED NOTES
House Supervisor returned call. Patient will be going to room 432. RN notified     Matt Dacosta  07/02/19 4303

## 2019-07-02 NOTE — ED PROVIDER NOTES
Subjective   Patient presents to the emergency department with complaint of ongoing and recurring left flank pain.  Patient attributes the onset of this pain to approximately 2 to 3 weeks ago.  He recognizes this pain is similar in presentation but much greater in intensity since last night.  Patient has a history of nonobstructing kidney stones that are well-known to the patient and his urologist, Dr. Vinay Ramirez.  In fact, the patient was seen by Dr. Ramirez yesterday in his office during day hours.  Patient states that at the time of his office visit, he was not this uncomfortable.  The increase in intensity of his pain led to an ER visit late last night and CT scan showed nonobstructing stones.  Patient was made comfortable and discharged to outpatient follow-up.  However, patient states that his pain exacerbated with the wearing off of his given medications and he presents for intractable pain with nausea.  He denies any vomiting.        History provided by:  Patient   used: No    Flank Pain   Pain location:  L flank  Pain quality: aching    Pain radiates to:  Does not radiate  Pain severity:  Severe  Onset quality:  Gradual  Duration:  24 hours  Timing:  Constant  Chronicity:  Recurrent  Relieved by: Dilaudid.  Worsened by:  Nothing  Ineffective treatments: Percocet.  Associated symptoms: anorexia and nausea    Associated symptoms: no vomiting        Review of Systems   Constitutional: Negative.    HENT: Negative.    Respiratory: Negative.    Gastrointestinal: Positive for anorexia and nausea. Negative for abdominal pain and vomiting.   Endocrine: Negative.    Genitourinary: Positive for flank pain.   Skin: Negative for rash.   Allergic/Immunologic: Negative for immunocompromised state.   Hematological: Negative.    Psychiatric/Behavioral: Negative.    All other systems reviewed and are negative.      Past Medical History:   Diagnosis Date   • Altered bowel elimination due to intestinal  "ostomy (CMS/HCC)     PATIENT REPORTS SECONDARY TO A COLON RUPTURE APPROXIMATELY 16-17 YEARS AGO   • Anemia    • Arthritis     WRIST   • Cancer (CMS/HCC)     Kidney   • Crohn disease (CMS/HCC)    • GERD (gastroesophageal reflux disease)    • History of MRSA infection     REPORTS HX OF \"NOSE\" 2-3 YEARS AGO, TREATED.   • History of pneumonia    • Hypothyroid    • Spinal headache    • Wears glasses        Allergies   Allergen Reactions   • Latex Rash       Past Surgical History:   Procedure Laterality Date   • COLONOSCOPY     • COLONOSCOPY N/A 7/31/2017    Procedure: Colonoscopy through ostomy site and rectum with biopsies;  Surgeon: Vincenzo Galan MD;  Location: [facility] ENDOSCOPY;  Service:    • ENDOSCOPY     • HEMORRHOIDECTOMY      REPORTS APPROXIMATELY 20 YEARS AGO   • HERNIA REPAIR      UMBILICAL   • KIDNEY SURGERY      Right kidney removed   • NEPHRECTOMY Right 07/2017   • SMALL INTESTINE SURGERY      HX OF COLON RUPTURE WITH PLACEMENT OF OSTOMY   • WISDOM TOOTH EXTRACTION     • WRIST SURGERY Right        Family History   Problem Relation Age of Onset   • Colon cancer Neg Hx        Social History     Socioeconomic History   • Marital status:      Spouse name: Not on file   • Number of children: Not on file   • Years of education: Not on file   • Highest education level: Not on file   Tobacco Use   • Smoking status: Former Smoker     Types: Cigarettes   • Smokeless tobacco: Never Used   • Tobacco comment: QUIT 30 PLUS YEARS AGO   Substance and Sexual Activity   • Alcohol use: No     Comment: QUIT 30 PLUS YEARS   • Drug use: No   • Sexual activity: Defer           Objective   Physical Exam   Constitutional: He is oriented to person, place, and time. He appears well-developed and well-nourished. No distress.   Appears uncomfortable      HENT:   Head: Normocephalic.   Eyes: EOM are normal.   Neck: Normal range of motion. Neck supple.   Cardiovascular: Normal rate and regular rhythm.   Pulmonary/Chest: Effort " normal and breath sounds normal. He has no wheezes. He has no rales.   Abdominal: Soft. Bowel sounds are normal. He exhibits no distension. There is no rebound and no guarding.   Musculoskeletal: Normal range of motion. He exhibits no edema.   Neurological: He is alert and oriented to person, place, and time. No sensory deficit. Coordination normal.   Skin: Skin is warm and dry. Capillary refill takes less than 2 seconds. No rash noted. He is not diaphoretic. No erythema. No pallor.   Psychiatric: He has a normal mood and affect. His behavior is normal. Judgment and thought content normal.   Nursing note and vitals reviewed.      Procedures           ED Course  ED Course as of Jul 02 1909   Tue Jul 02, 2019   1227 WBC: (!) 14.84 [ML]   1227 Blood, UA: (!) Large (3+) [ML]   1245 RBC, UA: (!) 13-20 [ML]   1245 WBC, UA: (!) 6-12 [ML]   1245 Creatinine: (!) 2.80 [ML]   1245 Sodium: (!) 136 [ML]   1245 WBC, UA: (!) 6-12 [ML]   1245 Bacteria, UA: (!) 1+ [ML]   1350 I have conferred with patient's personal urologist, Dr. Vinay Ramirez who recalls the patient with familiarity.  Plan of care is to forego reimaging by CT and do an ultrasound today to ensure no hydronephrosis and then manage his pain outpatient with outpatient follow-up if indicated.  I have discussed this plan with the patient who understands and concurs with this plan of care.  Should the ultrasound show a different disposition I will call Dr. Ramirez once again today to his agreement.  [ML]   1706 Awaiting second page to Dr. Ramirez.   [ML]   1725 I discussed renal US findings with Dr. Ramirez as well as the variable of intractable pain; Dr. SERNA concurs with hospitalization for intractable pain.   [ML]   1900 I have conferred with the patient's physician, Dr. Corbett as well as urologist-on-call, Dr. Velazquez who agrees to the consultation adding for the patient to be NPO.  Patient understands and concurs.   [ML]   1907 In summary, the patient is being admitted for  intractable pain.  After his discussion with me it is clear that outpatient attempts to control his pain with Percocet have been unsuccessful.  Secondary imaging reveals no severe hydronephrosis in comparison to last night study.  Nevertheless, the patient's creatinine has slightly increased.  He has more WBCs on his microscopic exam and thus, antibiotics were initiated.  Leukocytosis is essentially unchanged.  Patient prefers to stay in [facility] and we have ensure that urology is available here for definitive intervention through Dr. Velazquez.  The patient is comfortable at the time of disposition his vital signs are stable.  [ML]      ED Course User Index  [ML] Sherrill Bender, MOHSEN      Recent Results (from the past 24 hour(s))   Comprehensive Metabolic Panel    Collection Time: 07/02/19 12:33 AM   Result Value Ref Range    Glucose 92 74 - 98 mg/dL    BUN 31 (H) 7 - 20 mg/dL    Creatinine 2.20 (H) 0.60 - 1.30 mg/dL    Sodium 132 (L) 137 - 145 mmol/L    Potassium 4.0 3.5 - 5.1 mmol/L    Chloride 101 98 - 107 mmol/L    CO2 19.0 (L) 26.0 - 30.0 mmol/L    Calcium 8.1 (L) 8.4 - 10.2 mg/dL    Total Protein 6.7 6.3 - 8.2 g/dL    Albumin 3.70 3.50 - 5.00 g/dL    ALT (SGPT) 59 13 - 69 U/L    AST (SGOT) 46 15 - 46 U/L    Alkaline Phosphatase 55 38 - 126 U/L    Total Bilirubin 0.6 0.2 - 1.3 mg/dL    eGFR Non African Amer 31 (L) >60 mL/min/1.73    Globulin 3.0 gm/dL    A/G Ratio 1.2 1.0 - 2.0 g/dL    BUN/Creatinine Ratio 14.1 6.3 - 21.9    Anion Gap 16.0 10.0 - 20.0 mmol/L   Lipase    Collection Time: 07/02/19 12:33 AM   Result Value Ref Range    Lipase 104 23 - 300 U/L   Light Blue Top    Collection Time: 07/02/19 12:33 AM   Result Value Ref Range    Extra Tube hold for add-on    Green Top (Gel)    Collection Time: 07/02/19 12:33 AM   Result Value Ref Range    Extra Tube Hold for add-ons.    Lavender Top    Collection Time: 07/02/19 12:33 AM   Result Value Ref Range    Extra Tube hold for add-on    Gold Top - SST     Collection Time: 07/02/19 12:33 AM   Result Value Ref Range    Extra Tube Hold for add-ons.    Green Top (No Gel)    Collection Time: 07/02/19 12:33 AM   Result Value Ref Range    Extra Tube Hold for add-ons.    CBC Auto Differential    Collection Time: 07/02/19 12:33 AM   Result Value Ref Range    WBC 15.40 (H) 3.40 - 10.80 10*3/mm3    RBC 4.23 4.14 - 5.80 10*6/mm3    Hemoglobin 13.2 13.0 - 17.7 g/dL    Hematocrit 37.6 37.5 - 51.0 %    MCV 88.9 79.0 - 97.0 fL    MCH 31.2 26.6 - 33.0 pg    MCHC 35.1 31.5 - 35.7 g/dL    RDW 11.9 (L) 12.3 - 15.4 %    RDW-SD 37.9 37.0 - 54.0 fl    MPV 9.2 6.0 - 12.0 fL    Platelets 219 140 - 450 10*3/mm3    Neutrophil % 78.5 (H) 42.7 - 76.0 %    Lymphocyte % 10.2 (L) 19.6 - 45.3 %    Monocyte % 8.1 5.0 - 12.0 %    Eosinophil % 2.0 0.3 - 6.2 %    Basophil % 0.5 0.0 - 1.5 %    Immature Grans % 0.7 (H) 0.0 - 0.5 %    Neutrophils, Absolute 12.10 (H) 1.70 - 7.00 10*3/mm3    Lymphocytes, Absolute 1.57 0.70 - 3.10 10*3/mm3    Monocytes, Absolute 1.24 (H) 0.10 - 0.90 10*3/mm3    Eosinophils, Absolute 0.31 0.00 - 0.40 10*3/mm3    Basophils, Absolute 0.07 0.00 - 0.20 10*3/mm3    Immature Grans, Absolute 0.11 (H) 0.00 - 0.05 10*3/mm3    nRBC 0.0 0.0 - 0.2 /100 WBC   Urinalysis With Microscopic If Indicated (No Culture) - Urine, Clean Catch    Collection Time: 07/02/19  1:38 AM   Result Value Ref Range    Color, UA Yellow Yellow, Straw    Appearance, UA Clear Clear    pH, UA <=5.0 5.0 - 8.0    Specific Gravity, UA 1.009 1.005 - 1.030    Glucose, UA Negative Negative    Ketones, UA Negative Negative    Bilirubin, UA Negative Negative    Blood, UA Small (1+) (A) Negative    Protein, UA Negative Negative    Leuk Esterase, UA Negative Negative    Nitrite, UA Negative Negative    Urobilinogen, UA 0.2 E.U./dL 0.2 - 1.0 E.U./dL   Urinalysis, Microscopic Only - Urine, Clean Catch    Collection Time: 07/02/19  1:38 AM   Result Value Ref Range    RBC, UA 3-5 (A) None Seen /HPF    WBC, UA 0-2 (A) None  Seen /HPF    Bacteria, UA Trace (A) None Seen /HPF    Squamous Epithelial Cells, UA 0-2 None Seen, 0-2 /HPF    Hyaline Casts, UA None Seen None Seen /LPF    Methodology Manual Light Microscopy    Comprehensive Metabolic Panel    Collection Time: 07/02/19 12:13 PM   Result Value Ref Range    Glucose 94 74 - 98 mg/dL    BUN 33 (H) 7 - 20 mg/dL    Creatinine 2.80 (H) 0.60 - 1.30 mg/dL    Sodium 136 (L) 137 - 145 mmol/L    Potassium 3.8 3.5 - 5.1 mmol/L    Chloride 108 (H) 98 - 107 mmol/L    CO2 21.0 (L) 26.0 - 30.0 mmol/L    Calcium 7.9 (L) 8.4 - 10.2 mg/dL    Total Protein 5.7 (L) 6.3 - 8.2 g/dL    Albumin 3.10 (L) 3.50 - 5.00 g/dL    ALT (SGPT) 46 13 - 69 U/L    AST (SGOT) 26 15 - 46 U/L    Alkaline Phosphatase 55 38 - 126 U/L    Total Bilirubin 0.5 0.2 - 1.3 mg/dL    eGFR Non African Amer 23 (L) >60 mL/min/1.73    Globulin 2.6 gm/dL    A/G Ratio 1.2 1.0 - 2.0 g/dL    BUN/Creatinine Ratio 11.8 6.3 - 21.9    Anion Gap 10.8 10.0 - 20.0 mmol/L   Urinalysis With Microscopic If Indicated (No Culture) - Urine, Clean Catch    Collection Time: 07/02/19 12:13 PM   Result Value Ref Range    Color, UA Yellow Yellow, Straw    Appearance, UA Clear Clear    pH, UA 6.0 5.0 - 8.0    Specific Gravity, UA 1.010 1.005 - 1.030    Glucose, UA Negative Negative    Ketones, UA Negative Negative    Bilirubin, UA Negative Negative    Blood, UA Large (3+) (A) Negative    Protein, UA Negative Negative    Leuk Esterase, UA Trace (A) Negative    Nitrite, UA Negative Negative    Urobilinogen, UA 0.2 E.U./dL 0.2 - 1.0 E.U./dL   CK    Collection Time: 07/02/19 12:13 PM   Result Value Ref Range    Creatine Kinase 29 (L) 30 - 170 U/L   CBC Auto Differential    Collection Time: 07/02/19 12:13 PM   Result Value Ref Range    WBC 14.84 (H) 3.40 - 10.80 10*3/mm3    RBC 4.06 (L) 4.14 - 5.80 10*6/mm3    Hemoglobin 12.4 (L) 13.0 - 17.7 g/dL    Hematocrit 36.4 (L) 37.5 - 51.0 %    MCV 89.7 79.0 - 97.0 fL    MCH 30.5 26.6 - 33.0 pg    MCHC 34.1 31.5 - 35.7  "g/dL    RDW 11.8 (L) 12.3 - 15.4 %    RDW-SD 38.7 37.0 - 54.0 fl    MPV 9.3 6.0 - 12.0 fL    Platelets 194 140 - 450 10*3/mm3    Neutrophil % 80.1 (H) 42.7 - 76.0 %    Lymphocyte % 9.4 (L) 19.6 - 45.3 %    Monocyte % 8.2 5.0 - 12.0 %    Eosinophil % 1.3 0.3 - 6.2 %    Basophil % 0.3 0.0 - 1.5 %    Immature Grans % 0.7 (H) 0.0 - 0.5 %    Neutrophils, Absolute 11.89 (H) 1.70 - 7.00 10*3/mm3    Lymphocytes, Absolute 1.39 0.70 - 3.10 10*3/mm3    Monocytes, Absolute 1.22 (H) 0.10 - 0.90 10*3/mm3    Eosinophils, Absolute 0.20 0.00 - 0.40 10*3/mm3    Basophils, Absolute 0.04 0.00 - 0.20 10*3/mm3    Immature Grans, Absolute 0.10 (H) 0.00 - 0.05 10*3/mm3    nRBC 0.0 0.0 - 0.2 /100 WBC   Urinalysis, Microscopic Only - Urine, Clean Catch    Collection Time: 07/02/19 12:13 PM   Result Value Ref Range    RBC, UA 13-20 (A) None Seen /HPF    WBC, UA 6-12 (A) None Seen /HPF    Bacteria, UA 1+ (A) None Seen /HPF    Squamous Epithelial Cells, UA 0-2 None Seen, 0-2 /HPF    Hyaline Casts, UA None Seen None Seen /LPF    Methodology Manual Light Microscopy      Note: In addition to lab results from this visit, the labs listed above may include labs taken at another facility or during a different encounter within the last 24 hours. Please correlate lab times with ED admission and discharge times for further clarification of the services performed during this visit.    US Renal Bilateral   Final Result   1. Findings are concerning for very minimal left-sided hydronephrosis.   2. Incomplete distention of the urinary bladder with limited exam, not   identifying the ureteral jet.       This report was finalized on 7/2/2019 3:54 PM by Higinio العلي MD.        Vitals:    07/02/19 1111 07/02/19 1400   BP: (!) 149/109    BP Location: Right arm    Patient Position: Sitting    Pulse: 107    Resp: 18    Temp: 98.1 °F (36.7 °C) 98.3 °F (36.8 °C)   TempSrc: Oral    SpO2: 97%    Weight: 66 kg (145 lb 9.6 oz)    Height: 165.1 cm (65\")      Medications "   sodium chloride 0.9 % flush 10 mL (not administered)   sodium chloride 0.9 % bolus 1,000 mL (0 mL Intravenous Stopped 7/2/19 1316)   HYDROmorphone (DILAUDID) injection 0.5 mg (0.5 mg Intravenous Given 7/2/19 1211)   ondansetron (ZOFRAN) injection 4 mg (4 mg Intravenous Given 7/2/19 1211)   cefTRIAXone (ROCEPHIN) IVPB 1 g/50ml dextrose (premix) (0 g Intravenous Stopped 7/2/19 1451)   HYDROmorphone (DILAUDID) injection 0.5 mg (0.5 mg Intravenous Given 7/2/19 1358)   ketorolac (TORADOL) injection 15 mg (15 mg Intravenous Given 7/2/19 1451)     ECG/EMG Results (last 24 hours)     ** No results found for the last 24 hours. **        No orders to display                   MDM      Final diagnoses:   Intractable pain   Renal stones   RAMSES (acute kidney injury) (CMS/[facility])   Hx of unilateral nephrectomy, right   Chronic kidney disease, stage III (moderate) (CMS/[facility])            Sherrill Bender, MOHSEN  07/02/19 1904

## 2019-07-02 NOTE — ED PROVIDER NOTES
"Subjective   59-year-old male with a past medical history significant for renal cell carcinoma presents to the ED with a chief complaint of flank pain.  Patient is complaining of left-sided into his left lower back.  He states he has a known history of kidney stones and has been having issues with this for the last week.  His pain worsened tonight despite treatment at home.  Pain is a dull constant ache with intermittent sharp stabbing pains.  No fever or chills.  Does complain of nausea with one episode of vomiting.  No abdominal pain.  No other complaints at this time.            Review of Systems   Constitutional: Negative for fatigue and fever.   Genitourinary: Positive for flank pain.       Past Medical History:   Diagnosis Date   • Altered bowel elimination due to intestinal ostomy (CMS/HCC)     PATIENT REPORTS SECONDARY TO A COLON RUPTURE APPROXIMATELY 16-17 YEARS AGO   • Anemia    • Arthritis     WRIST   • Cancer (CMS/HCC)     Kidney   • Crohn disease (CMS/HCC)    • GERD (gastroesophageal reflux disease)    • History of MRSA infection     REPORTS HX OF \"NOSE\" 2-3 YEARS AGO, TREATED.   • History of pneumonia    • Hypothyroid    • Spinal headache    • Wears glasses        Allergies   Allergen Reactions   • Latex Rash       Past Surgical History:   Procedure Laterality Date   • COLONOSCOPY     • COLONOSCOPY N/A 7/31/2017    Procedure: Colonoscopy through ostomy site and rectum with biopsies;  Surgeon: Vincenzo Galan MD;  Location: [facility] ENDOSCOPY;  Service:    • ENDOSCOPY     • HEMORRHOIDECTOMY      REPORTS APPROXIMATELY 20 YEARS AGO   • HERNIA REPAIR      UMBILICAL   • KIDNEY SURGERY      Right kidney removed   • NEPHRECTOMY Right 07/2017   • SMALL INTESTINE SURGERY      HX OF COLON RUPTURE WITH PLACEMENT OF OSTOMY   • WISDOM TOOTH EXTRACTION     • WRIST SURGERY Right        Family History   Problem Relation Age of Onset   • Colon cancer Neg Hx        Social History     Socioeconomic History   • Marital " status:      Spouse name: Not on file   • Number of children: Not on file   • Years of education: Not on file   • Highest education level: Not on file   Tobacco Use   • Smoking status: Former Smoker     Types: Cigarettes   • Smokeless tobacco: Never Used   • Tobacco comment: QUIT 30 PLUS YEARS AGO   Substance and Sexual Activity   • Alcohol use: No     Comment: QUIT 30 PLUS YEARS   • Drug use: No   • Sexual activity: Defer           Objective   Physical Exam   Constitutional: He is oriented to person, place, and time. He appears well-developed and well-nourished. No distress.   Uncomfortable appearing    HENT:   Head: Normocephalic and atraumatic.   Nose: Nose normal.   Eyes: Conjunctivae and EOM are normal. Pupils are equal, round, and reactive to light.   Cardiovascular: Normal rate, regular rhythm and intact distal pulses.   Pulmonary/Chest: Effort normal and breath sounds normal. No respiratory distress.   Abdominal: Soft. He exhibits no distension. There is no tenderness.   Left sided ileostomy    Musculoskeletal: He exhibits no edema or deformity.   Neurological: He is alert and oriented to person, place, and time. No cranial nerve deficit. Coordination normal.   Skin: He is not diaphoretic.   Nursing note and vitals reviewed.      Procedures           ED Course  ED Course as of Jul 02 0549   Tue Jul 02, 2019   0212 On reevaluation the patient is resting comfortably.  He is actually sleeping.  He states his pain is completely resolved.  Will discharge to follow-up with urology as previously scheduled.  He is agreeable to this plan.  [CG]      ED Course User Index  [CG] Leeroy Smyth,                   [facility]      Final diagnoses:   Flank pain   Renal colic            Leeroy Smyth,   07/02/19 0549       Leeroy Smyth,   07/02/19 0549

## 2019-07-03 ENCOUNTER — ANESTHESIA (OUTPATIENT)
Dept: PERIOP | Facility: HOSPITAL | Age: 59
End: 2019-07-03

## 2019-07-03 ENCOUNTER — ANESTHESIA EVENT (OUTPATIENT)
Dept: PERIOP | Facility: HOSPITAL | Age: 59
End: 2019-07-03

## 2019-07-03 LAB
ALBUMIN SERPL-MCNC: 2.5 G/DL (ref 3.5–5)
ALBUMIN/GLOB SERPL: 1 G/DL (ref 1–2)
ALP SERPL-CCNC: 46 U/L (ref 38–126)
ALT SERPL W P-5'-P-CCNC: 33 U/L (ref 13–69)
ANION GAP SERPL CALCULATED.3IONS-SCNC: 11.2 MMOL/L (ref 10–20)
AST SERPL-CCNC: 15 U/L (ref 15–46)
BASOPHILS # BLD AUTO: 0.03 10*3/MM3 (ref 0–0.2)
BASOPHILS NFR BLD AUTO: 0.2 % (ref 0–1.5)
BILIRUB SERPL-MCNC: 0.2 MG/DL (ref 0.2–1.3)
BUN BLD-MCNC: 38 MG/DL (ref 7–20)
BUN/CREAT SERPL: 8.6 (ref 6.3–21.9)
CALCIUM SPEC-SCNC: 7.5 MG/DL (ref 8.4–10.2)
CHLORIDE SERPL-SCNC: 109 MMOL/L (ref 98–107)
CO2 SERPL-SCNC: 20 MMOL/L (ref 26–30)
CREAT BLD-MCNC: 4.4 MG/DL (ref 0.6–1.3)
DEPRECATED RDW RBC AUTO: 41.7 FL (ref 37–54)
EOSINOPHIL # BLD AUTO: 0.26 10*3/MM3 (ref 0–0.4)
EOSINOPHIL NFR BLD AUTO: 2.2 % (ref 0.3–6.2)
ERYTHROCYTE [DISTWIDTH] IN BLOOD BY AUTOMATED COUNT: 12.3 % (ref 12.3–15.4)
GFR SERPL CREATININE-BSD FRML MDRD: 14 ML/MIN/1.73
GFR SERPL CREATININE-BSD FRML MDRD: ABNORMAL ML/MIN/1.73
GLOBULIN UR ELPH-MCNC: 2.4 GM/DL
GLUCOSE BLD-MCNC: 82 MG/DL (ref 74–98)
HCT VFR BLD AUTO: 32.3 % (ref 37.5–51)
HGB BLD-MCNC: 10.7 G/DL (ref 13–17.7)
IMM GRANULOCYTES # BLD AUTO: 0.08 10*3/MM3 (ref 0–0.05)
IMM GRANULOCYTES NFR BLD AUTO: 0.7 % (ref 0–0.5)
LYMPHOCYTES # BLD AUTO: 0.98 10*3/MM3 (ref 0.7–3.1)
LYMPHOCYTES NFR BLD AUTO: 8.1 % (ref 19.6–45.3)
MCH RBC QN AUTO: 30.6 PG (ref 26.6–33)
MCHC RBC AUTO-ENTMCNC: 33.1 G/DL (ref 31.5–35.7)
MCV RBC AUTO: 92.3 FL (ref 79–97)
MONOCYTES # BLD AUTO: 1.21 10*3/MM3 (ref 0.1–0.9)
MONOCYTES NFR BLD AUTO: 10 % (ref 5–12)
NEUTROPHILS # BLD AUTO: 9.53 10*3/MM3 (ref 1.7–7)
NEUTROPHILS NFR BLD AUTO: 78.8 % (ref 42.7–76)
NRBC BLD AUTO-RTO: 0 /100 WBC (ref 0–0.2)
PLATELET # BLD AUTO: 169 10*3/MM3 (ref 140–450)
PMV BLD AUTO: 10.1 FL (ref 6–12)
POTASSIUM BLD-SCNC: 4.2 MMOL/L (ref 3.5–5.1)
PROT SERPL-MCNC: 4.9 G/DL (ref 6.3–8.2)
RBC # BLD AUTO: 3.5 10*6/MM3 (ref 4.14–5.8)
SODIUM BLD-SCNC: 136 MMOL/L (ref 137–145)
WBC NRBC COR # BLD: 12.09 10*3/MM3 (ref 3.4–10.8)

## 2019-07-03 PROCEDURE — C1758 CATHETER, URETERAL: HCPCS | Performed by: UROLOGY

## 2019-07-03 PROCEDURE — G0378 HOSPITAL OBSERVATION PER HR: HCPCS

## 2019-07-03 PROCEDURE — C2617 STENT, NON-COR, TEM W/O DEL: HCPCS | Performed by: UROLOGY

## 2019-07-03 PROCEDURE — 25010000002 HYDROMORPHONE 1 MG/ML SOLUTION: Performed by: INTERNAL MEDICINE

## 2019-07-03 PROCEDURE — 25010000002 IOPAMIDOL 61 % SOLUTION: Performed by: UROLOGY

## 2019-07-03 PROCEDURE — 25010000002 DEXAMETHASONE PER 1 MG: Performed by: NURSE ANESTHETIST, CERTIFIED REGISTERED

## 2019-07-03 PROCEDURE — 96366 THER/PROPH/DIAG IV INF ADDON: CPT

## 2019-07-03 PROCEDURE — 25010000002 FENTANYL CITRATE (PF) 250 MCG/5ML SOLUTION: Performed by: NURSE ANESTHETIST, CERTIFIED REGISTERED

## 2019-07-03 PROCEDURE — 96361 HYDRATE IV INFUSION ADD-ON: CPT

## 2019-07-03 PROCEDURE — 74420 UROGRAPHY RTRGR +-KUB: CPT | Performed by: UROLOGY

## 2019-07-03 PROCEDURE — C1769 GUIDE WIRE: HCPCS | Performed by: UROLOGY

## 2019-07-03 PROCEDURE — 99244 OFF/OP CNSLTJ NEW/EST MOD 40: CPT | Performed by: UROLOGY

## 2019-07-03 PROCEDURE — 85025 COMPLETE CBC W/AUTO DIFF WBC: CPT | Performed by: INTERNAL MEDICINE

## 2019-07-03 PROCEDURE — 80053 COMPREHEN METABOLIC PANEL: CPT | Performed by: INTERNAL MEDICINE

## 2019-07-03 PROCEDURE — 96376 TX/PRO/DX INJ SAME DRUG ADON: CPT

## 2019-07-03 PROCEDURE — 25010000003 CEFAZOLIN PER 500 MG: Performed by: UROLOGY

## 2019-07-03 PROCEDURE — 25010000002 CEFTRIAXONE SODIUM-DEXTROSE 1-3.74 GM-%(50ML) RECONSTITUTED SOLUTION: Performed by: INTERNAL MEDICINE

## 2019-07-03 PROCEDURE — 25010000002 PROPOFOL 200 MG/20ML EMULSION: Performed by: NURSE ANESTHETIST, CERTIFIED REGISTERED

## 2019-07-03 PROCEDURE — 25010000002 ONDANSETRON PER 1 MG: Performed by: NURSE ANESTHETIST, CERTIFIED REGISTERED

## 2019-07-03 PROCEDURE — C1894 INTRO/SHEATH, NON-LASER: HCPCS | Performed by: UROLOGY

## 2019-07-03 PROCEDURE — 52356 CYSTO/URETERO W/LITHOTRIPSY: CPT | Performed by: UROLOGY

## 2019-07-03 PROCEDURE — 82360 CALCULUS ASSAY QUANT: CPT | Performed by: UROLOGY

## 2019-07-03 DEVICE — URETERAL STENT
Type: IMPLANTABLE DEVICE | Site: URETER | Status: FUNCTIONAL
Brand: CONTOUR™

## 2019-07-03 RX ORDER — OXYCODONE AND ACETAMINOPHEN 10; 325 MG/1; MG/1
1 TABLET ORAL EVERY 6 HOURS PRN
Status: DISCONTINUED | OUTPATIENT
Start: 2019-07-03 | End: 2019-07-03

## 2019-07-03 RX ORDER — OXYCODONE HYDROCHLORIDE 5 MG/1
5 TABLET ORAL EVERY 4 HOURS PRN
Status: DISCONTINUED | OUTPATIENT
Start: 2019-07-03 | End: 2019-07-04 | Stop reason: HOSPADM

## 2019-07-03 RX ORDER — ACETAMINOPHEN 325 MG/1
650 TABLET ORAL EVERY 6 HOURS
Status: DISCONTINUED | OUTPATIENT
Start: 2019-07-03 | End: 2019-07-04 | Stop reason: HOSPADM

## 2019-07-03 RX ORDER — TAMSULOSIN HYDROCHLORIDE 0.4 MG/1
0.4 CAPSULE ORAL DAILY
Status: DISCONTINUED | OUTPATIENT
Start: 2019-07-03 | End: 2019-07-04 | Stop reason: HOSPADM

## 2019-07-03 RX ORDER — PROPOFOL 10 MG/ML
INJECTION, EMULSION INTRAVENOUS AS NEEDED
Status: DISCONTINUED | OUTPATIENT
Start: 2019-07-03 | End: 2019-07-03 | Stop reason: SURG

## 2019-07-03 RX ORDER — OXYBUTYNIN CHLORIDE 5 MG/1
10 TABLET, EXTENDED RELEASE ORAL NIGHTLY
Status: DISCONTINUED | OUTPATIENT
Start: 2019-07-03 | End: 2019-07-04 | Stop reason: HOSPADM

## 2019-07-03 RX ORDER — TAMSULOSIN HYDROCHLORIDE 0.4 MG/1
1 CAPSULE ORAL NIGHTLY
Qty: 10 CAPSULE | Refills: 0 | Status: ON HOLD | OUTPATIENT
Start: 2019-07-03 | End: 2020-10-29

## 2019-07-03 RX ORDER — MAGNESIUM HYDROXIDE 1200 MG/15ML
LIQUID ORAL AS NEEDED
Status: DISCONTINUED | OUTPATIENT
Start: 2019-07-03 | End: 2019-07-03 | Stop reason: HOSPADM

## 2019-07-03 RX ORDER — FENTANYL CITRATE 50 UG/ML
INJECTION, SOLUTION INTRAMUSCULAR; INTRAVENOUS AS NEEDED
Status: DISCONTINUED | OUTPATIENT
Start: 2019-07-03 | End: 2019-07-03 | Stop reason: SURG

## 2019-07-03 RX ORDER — ONDANSETRON 2 MG/ML
INJECTION INTRAMUSCULAR; INTRAVENOUS AS NEEDED
Status: DISCONTINUED | OUTPATIENT
Start: 2019-07-03 | End: 2019-07-03 | Stop reason: SURG

## 2019-07-03 RX ORDER — OXYBUTYNIN CHLORIDE 10 MG/1
10 TABLET, EXTENDED RELEASE ORAL DAILY PRN
Qty: 10 TABLET | Refills: 0 | Status: ON HOLD | OUTPATIENT
Start: 2019-07-03 | End: 2020-10-29

## 2019-07-03 RX ORDER — DOCUSATE SODIUM 100 MG/1
100 CAPSULE, LIQUID FILLED ORAL 2 TIMES DAILY
Qty: 15 CAPSULE | Refills: 1 | Status: ON HOLD | OUTPATIENT
Start: 2019-07-03 | End: 2020-10-29

## 2019-07-03 RX ORDER — SODIUM CHLORIDE, SODIUM LACTATE, POTASSIUM CHLORIDE, CALCIUM CHLORIDE 600; 310; 30; 20 MG/100ML; MG/100ML; MG/100ML; MG/100ML
100 INJECTION, SOLUTION INTRAVENOUS CONTINUOUS
Status: DISCONTINUED | OUTPATIENT
Start: 2019-07-03 | End: 2019-07-04

## 2019-07-03 RX ORDER — CEPHALEXIN 500 MG/1
500 CAPSULE ORAL 2 TIMES DAILY
Qty: 6 CAPSULE | Refills: 0 | Status: SHIPPED | OUTPATIENT
Start: 2019-07-03 | End: 2019-07-06

## 2019-07-03 RX ORDER — LIDOCAINE HYDROCHLORIDE 20 MG/ML
INJECTION, SOLUTION EPIDURAL; INFILTRATION; INTRACAUDAL; PERINEURAL AS NEEDED
Status: DISCONTINUED | OUTPATIENT
Start: 2019-07-03 | End: 2019-07-03 | Stop reason: SURG

## 2019-07-03 RX ORDER — SODIUM CHLORIDE, SODIUM LACTATE, POTASSIUM CHLORIDE, CALCIUM CHLORIDE 600; 310; 30; 20 MG/100ML; MG/100ML; MG/100ML; MG/100ML
1000 INJECTION, SOLUTION INTRAVENOUS CONTINUOUS
Status: DISCONTINUED | OUTPATIENT
Start: 2019-07-03 | End: 2019-07-04

## 2019-07-03 RX ORDER — SODIUM CHLORIDE 9 MG/ML
INJECTION, SOLUTION INTRAVENOUS CONTINUOUS PRN
Status: DISCONTINUED | OUTPATIENT
Start: 2019-07-03 | End: 2019-07-03 | Stop reason: SURG

## 2019-07-03 RX ORDER — ACETAMINOPHEN 325 MG/1
650 TABLET ORAL EVERY 6 HOURS
Qty: 24 TABLET | Refills: 0 | Status: SHIPPED | OUTPATIENT
Start: 2019-07-03 | End: 2019-07-06

## 2019-07-03 RX ORDER — DEXAMETHASONE SODIUM PHOSPHATE 4 MG/ML
INJECTION, SOLUTION INTRA-ARTICULAR; INTRALESIONAL; INTRAMUSCULAR; INTRAVENOUS; SOFT TISSUE AS NEEDED
Status: DISCONTINUED | OUTPATIENT
Start: 2019-07-03 | End: 2019-07-03 | Stop reason: SURG

## 2019-07-03 RX ADMIN — LIDOCAINE HYDROCHLORIDE 60 MG: 20 INJECTION, SOLUTION EPIDURAL; INFILTRATION; INTRACAUDAL; PERINEURAL at 16:32

## 2019-07-03 RX ADMIN — CEFTRIAXONE 1 G: 1 INJECTION, SOLUTION INTRAVENOUS at 14:20

## 2019-07-03 RX ADMIN — ACETAMINOPHEN 650 MG: 325 TABLET, FILM COATED ORAL at 20:43

## 2019-07-03 RX ADMIN — FENTANYL CITRATE 100 MCG: 50 INJECTION, SOLUTION INTRAMUSCULAR; INTRAVENOUS at 16:54

## 2019-07-03 RX ADMIN — ONDANSETRON HYDROCHLORIDE 4 MG: 4 TABLET, FILM COATED ORAL at 00:14

## 2019-07-03 RX ADMIN — OXYBUTYNIN CHLORIDE 10 MG: 5 TABLET, EXTENDED RELEASE ORAL at 20:43

## 2019-07-03 RX ADMIN — PROPOFOL 150 MG: 10 INJECTION, EMULSION INTRAVENOUS at 16:30

## 2019-07-03 RX ADMIN — HYDROMORPHONE HYDROCHLORIDE 0.5 MG: 1 INJECTION, SOLUTION INTRAMUSCULAR; INTRAVENOUS; SUBCUTANEOUS at 03:27

## 2019-07-03 RX ADMIN — SODIUM CHLORIDE: 9 INJECTION, SOLUTION INTRAVENOUS at 17:14

## 2019-07-03 RX ADMIN — SODIUM CHLORIDE, POTASSIUM CHLORIDE, SODIUM LACTATE AND CALCIUM CHLORIDE 100 ML/HR: 600; 310; 30; 20 INJECTION, SOLUTION INTRAVENOUS at 16:20

## 2019-07-03 RX ADMIN — HYDROMORPHONE HYDROCHLORIDE 0.5 MG: 1 INJECTION, SOLUTION INTRAMUSCULAR; INTRAVENOUS; SUBCUTANEOUS at 23:41

## 2019-07-03 RX ADMIN — CEFAZOLIN 2 G: 1 INJECTION, POWDER, FOR SOLUTION INTRAVENOUS at 16:28

## 2019-07-03 RX ADMIN — OXYCODONE HYDROCHLORIDE 5 MG: 5 TABLET ORAL at 20:43

## 2019-07-03 RX ADMIN — SODIUM CHLORIDE, PRESERVATIVE FREE 10 ML: 5 INJECTION INTRAVENOUS at 13:47

## 2019-07-03 RX ADMIN — OXYCODONE HYDROCHLORIDE AND ACETAMINOPHEN 1 TABLET: 10; 325 TABLET ORAL at 06:09

## 2019-07-03 RX ADMIN — HYDROMORPHONE HYDROCHLORIDE 0.5 MG: 1 INJECTION, SOLUTION INTRAMUSCULAR; INTRAVENOUS; SUBCUTANEOUS at 19:03

## 2019-07-03 RX ADMIN — SODIUM CHLORIDE, PRESERVATIVE FREE 10 ML: 5 INJECTION INTRAVENOUS at 08:05

## 2019-07-03 RX ADMIN — ONDANSETRON 4 MG: 2 INJECTION INTRAMUSCULAR; INTRAVENOUS at 16:34

## 2019-07-03 RX ADMIN — SODIUM CHLORIDE 1000 ML: 9 INJECTION, SOLUTION INTRAVENOUS at 09:32

## 2019-07-03 RX ADMIN — SODIUM CHLORIDE, PRESERVATIVE FREE 10 ML: 5 INJECTION INTRAVENOUS at 10:17

## 2019-07-03 RX ADMIN — SODIUM CHLORIDE, PRESERVATIVE FREE 10 ML: 5 INJECTION INTRAVENOUS at 19:03

## 2019-07-03 RX ADMIN — HYDROMORPHONE HYDROCHLORIDE 0.5 MG: 1 INJECTION, SOLUTION INTRAMUSCULAR; INTRAVENOUS; SUBCUTANEOUS at 05:47

## 2019-07-03 RX ADMIN — SODIUM CHLORIDE 100 ML/HR: 9 INJECTION, SOLUTION INTRAVENOUS at 06:02

## 2019-07-03 RX ADMIN — HYDROMORPHONE HYDROCHLORIDE 0.5 MG: 1 INJECTION, SOLUTION INTRAMUSCULAR; INTRAVENOUS; SUBCUTANEOUS at 10:17

## 2019-07-03 RX ADMIN — FENTANYL CITRATE 50 MCG: 50 INJECTION, SOLUTION INTRAMUSCULAR; INTRAVENOUS at 16:44

## 2019-07-03 RX ADMIN — HYDROMORPHONE HYDROCHLORIDE 0.5 MG: 1 INJECTION, SOLUTION INTRAMUSCULAR; INTRAVENOUS; SUBCUTANEOUS at 01:11

## 2019-07-03 RX ADMIN — FENTANYL CITRATE 100 MCG: 50 INJECTION, SOLUTION INTRAMUSCULAR; INTRAVENOUS at 16:26

## 2019-07-03 RX ADMIN — DEXAMETHASONE SODIUM PHOSPHATE 4 MG: 4 INJECTION, SOLUTION INTRAMUSCULAR; INTRAVENOUS at 16:34

## 2019-07-03 RX ADMIN — HYDROMORPHONE HYDROCHLORIDE 0.5 MG: 1 INJECTION, SOLUTION INTRAMUSCULAR; INTRAVENOUS; SUBCUTANEOUS at 13:47

## 2019-07-03 RX ADMIN — OXYCODONE HYDROCHLORIDE AND ACETAMINOPHEN 1 TABLET: 10; 325 TABLET ORAL at 00:14

## 2019-07-03 RX ADMIN — OXYCODONE HYDROCHLORIDE AND ACETAMINOPHEN 1 TABLET: 10; 325 TABLET ORAL at 12:40

## 2019-07-03 RX ADMIN — TAMSULOSIN HYDROCHLORIDE 0.4 MG: 0.4 CAPSULE ORAL at 20:44

## 2019-07-03 RX ADMIN — SODIUM CHLORIDE, POTASSIUM CHLORIDE, SODIUM LACTATE AND CALCIUM CHLORIDE 100 ML/HR: 600; 310; 30; 20 INJECTION, SOLUTION INTRAVENOUS at 08:05

## 2019-07-03 RX ADMIN — HYDROMORPHONE HYDROCHLORIDE 0.5 MG: 1 INJECTION, SOLUTION INTRAMUSCULAR; INTRAVENOUS; SUBCUTANEOUS at 08:05

## 2019-07-03 NOTE — CONSULTS
"Chief Complaint  Kidney Stone    HPI  Mr. Mclaughlin is a 59 y.o. male who presents for further management of nephrolithiasis.    He presented to the ER yesterday and was diagnosed with a 5 mm left renal stone. He was admitted due to intractable pain.  He is still having a great deal of left constant nonradiating flank pain, not associated with fever or chills, mild nausea, no vomiting.  No dysuria.     He has a solitary left kidney.  He had a right nephrectomy by Dr. Ramirez in 2017 for RCC.  He has chronic kidney disease and his baseline creatinine rest somewhere around 2.4-2.5.    Past Medical History  Past Medical History:   Diagnosis Date   • Altered bowel elimination due to intestinal ostomy (CMS/HCC)     PATIENT REPORTS SECONDARY TO A COLON RUPTURE APPROXIMATELY 16-17 YEARS AGO   • Anemia    • Arthritis     WRIST   • Cancer (CMS/HCC)     Kidney   • Crohn disease (CMS/HCC)    • GERD (gastroesophageal reflux disease)    • History of MRSA infection     REPORTS HX OF \"NOSE\" 2-3 YEARS AGO, TREATED.   • History of pneumonia    • Hypothyroid    • Spinal headache    • Wears glasses        Past Surgical History  Past Surgical History:   Procedure Laterality Date   • COLONOSCOPY     • COLONOSCOPY N/A 7/31/2017    Procedure: Colonoscopy through ostomy site and rectum with biopsies;  Surgeon: Vincenzo Galan MD;  Location: [facility] ENDOSCOPY;  Service:    • ENDOSCOPY     • HEMORRHOIDECTOMY      REPORTS APPROXIMATELY 20 YEARS AGO   • HERNIA REPAIR      UMBILICAL   • KIDNEY SURGERY      Right kidney removed   • NEPHRECTOMY Right 07/2017   • SMALL INTESTINE SURGERY      HX OF COLON RUPTURE WITH PLACEMENT OF OSTOMY   • WISDOM TOOTH EXTRACTION     • WRIST SURGERY Right        Medications    Current Facility-Administered Medications:   •  cefTRIAXone (ROCEPHIN) IVPB 1 g/50ml dextrose (premix), 1 g, Intravenous, Q24H, Yazan Corbett MD  •  HYDROmorphone (DILAUDID) injection 0.5 mg, 0.5 mg, Intravenous, Q2H PRN, Yazan Corbett MD, " 0.5 mg at 07/03/19 0805  •  lactated ringers infusion, 100 mL/hr, Intravenous, Continuous, Lazaro Velazquez MD, Last Rate: 100 mL/hr at 07/03/19 0805, 100 mL/hr at 07/03/19 0805  •  levothyroxine (SYNTHROID, LEVOTHROID) tablet 200 mcg, 200 mcg, Oral, Daily, Yazan Corbett MD  •  ondansetron (ZOFRAN) tablet 4 mg, 4 mg, Oral, Q8H PRN, Yazan Corbett MD, 4 mg at 07/03/19 0014  •  oxyCODONE-acetaminophen (PERCOCET)  MG per tablet 1 tablet, 1 tablet, Oral, Q6H PRN, Yazan Corbett MD, 1 tablet at 07/03/19 0609  •  pantoprazole (PROTONIX) EC tablet 40 mg, 40 mg, Oral, Daily, Yazan Corbett MD  •  sodium chloride 0.9 % bolus 1,000 mL, 1,000 mL, Intravenous, Once, Yazan Corbett MD  •  [COMPLETED] Insert peripheral IV, , , Once **AND** sodium chloride 0.9 % flush 10 mL, 10 mL, Intravenous, PRN, Lissette Candelario MD, 10 mL at 07/03/19 0805    Allergies  Allergies   Allergen Reactions   • Latex Rash       Social History  Social History     Socioeconomic History   • Marital status:      Spouse name: Not on file   • Number of children: Not on file   • Years of education: Not on file   • Highest education level: Not on file   Tobacco Use   • Smoking status: Former Smoker     Types: Cigarettes   • Smokeless tobacco: Never Used   • Tobacco comment: QUIT 30 PLUS YEARS AGO   Substance and Sexual Activity   • Alcohol use: No     Comment: QUIT 30 PLUS YEARS   • Drug use: No   • Sexual activity: Yes     Partners: Female       Family History  Family History   Problem Relation Age of Onset   • Heart disease Mother    • Cancer Father    • No Known Problems Sister    • COPD Brother    • COPD Sister    • Colon cancer Neg Hx        Review of Systems  Constitutional: No fevers or chills  Skin: Negative for rash  Endocrine: No heat/cold intolerance   Cardiovascular: Negative for chest pain or dyspnea on exertion  Respiratory: Negative for shortness of breath or wheezing  Gastrointestinal: No constipation,  "nausea or vomiting  Genitourinary: No gross hematuria   Musculoskeletal: Negative for low back pain  Neurological:  Negative for frequent headaches or dizziness  Lymph/Heme: Negative for leg swelling or calf pain.    All other systems reviewed and negative    Physical Exam  Visit Vitals  /63 (BP Location: Right arm, Patient Position: Lying)   Pulse 84   Temp 98 °F (36.7 °C) (Oral)   Resp 17   Ht 165.1 cm (65\")   Wt 66.5 kg (146 lb 9.7 oz)   SpO2 96%   BMI 24.40 kg/m²     Constitutional: NAD, WDWN.   HEENT: NCAT. Conjunctivae normal.  MMM.  Endocrine: no clear thyromegaly    Cardiovascular: Regular rate.  Pulmonary/Chest: Respirations are even and non-labored bilaterally.  Back: Mild left CVA tenderness.  Neurological: A + O x 3.  Cranial Nerves II-XII grossly intact.  Extremities: RAMSEY x 4, Warm. No clubbing.  No cyanosis.    Skin: Pink, warm and dry.  No rashes noted.    Labs  Lab Results   Component Value Date    GLUCOSE 82 07/03/2019    BUN 38 (H) 07/03/2019    CREATININE 4.40 (H) 07/03/2019    EGFRIFNONA 14 (L) 07/03/2019    EGFRIFAFRI  07/03/2019      Comment:      <15 Indicative of kidney failure.    BCR 8.6 07/03/2019    K 4.2 07/03/2019    CO2 20.0 (L) 07/03/2019    CALCIUM 7.5 (L) 07/03/2019    ALBUMIN 2.50 (L) 07/03/2019    LABIL2 1.4 09/19/2016    AST 15 07/03/2019    ALT 33 07/03/2019       Lab Results   Component Value Date    WBC 12.09 (H) 07/03/2019    HGB 10.7 (L) 07/03/2019    HCT 32.3 (L) 07/03/2019    MCV 92.3 07/03/2019     07/03/2019       No results found for: LDH, URICACID    Lab Results   Component Value Date    CALCIUM 7.5 (L) 07/03/2019    PHOS 3.3 12/28/2017       Brief Urine Lab Results  (Last result in the past 365 days)      Color   Clarity   Blood   Leuk Est   Nitrite   Protein   CREAT   Urine HCG        07/02/19 1213 Yellow Clear Large (3+) Trace Negative Negative               )No components found for: STONEANALYSI      Radiologic Studies  Ct Abdomen Pelvis Without " Contrast    Result Date: 6/24/2019  Impression: 1. Postoperative changes.   2. Nonobstructive left-sided nephrolithiasis.   3. Mild distention of several small bowel loops favored to be incidental. Enteritis thought less likely. Follow-up if symptoms persist.        This study was performed with techniques to keep radiation doses as low as reasonably achievable (ALARA). Individualized dose reduction techniques using automated exposure control or adjustment of mA and/or kV according to the patient size were employed.  This report was finalized on 6/24/2019 8:43 AM by Higinio العلي MD.    Us Renal Bilateral    Result Date: 7/2/2019  Impression: 1. Findings are concerning for very minimal left-sided hydronephrosis. 2. Incomplete distention of the urinary bladder with limited exam, not identifying the ureteral jet.  This report was finalized on 7/2/2019 3:54 PM by Higinio العلي MD.    I have personally reviewed these labs and images.      Assessment  Mr. Mclaughlin is a 59 y.o. male with 5 mm left renal several smaller lower pole stones.    We had informed discussion about the causes of stones, in the main treatments for nephrolithiasis in 2019.  The 3 main surgical treatments for stones are percutaneous nephrolithotomy, ureteroscopy and laser lithotripsy, and shockwave lithotripsy.  Based on the stone size and location, I have recommended ureteroscopy and laser lithotripsy.  We discussed the risks, benefits, and alternatives to this therapy.  The main risks that we discussed were bleeding, urinary infection, damage to nearby structures, need for further procedures, and cardiopulmonary complications from anesthesia.  The patient voiced understanding and wished to proceed.     Plan  1.  Consented for left URS/HLL stent this afternoon  2.  Keep n.p.o. until then  3.  We will keep him overnight due to his azotemia if it improves he will be good for discharge from my standpoint in the a.m.  4.  He may have an RAMSES from the Toradol  that he received in the ER and may need nephrology involvement.

## 2019-07-03 NOTE — OP NOTE
Preoperative diagnosis  left kidney stone    Postoperative diagnosis  left kidney stone    Procedure performed  1.  Flexible cystoscopy, left retrograde pyelogram with ureteral stent placement 6 Fr x 26 cm  2.  left ureteroscopy with holmium-YAG laser lithotripsy and basket extraction of stone fragments (48046)   3.  Fluoroscopy time < 1 hour with interpretation of images    Surgeon  Lazaro Velazquez MD    Anesthesia  General    Complications  None    Specimen  Stone fragments for biochemical analysis    Findings  Urethroscopy revealed no strictures or other abnormalities.  Cystoscopy revealed no tumors, stones or other mucosal abnormalities.    left retrograde pyelogram revealed a delicate system with identification of the stone seen on pre-op imaging.  No other filling defects and caliber of left ureter was smooth and normal.    Ureteroscopy revealed a large lower pole renal stone and multiple small lower pole renal stones consistent with CT imaging.     Indications  59 y.o. male with history of solitary left kidney after right nephrectomy for RCC in 2017 and ileostomy due to colon perforation in 2007 was found to have a 5 mm left renal stone and intractable flank pain.  It is unclear if it was really obstructing him.  He was admitted for pain control yesterday and agreed to undergo the above named procedure after discussion of the alternatives, risks and benefits.  Informed consent was obtained.      Procedure  The patient was taken to the operating room and placed supine on the operating table.  Pre-operative antibiotics were administered.  Bilateral lower extremity SCDs were placed.  After induction of general anesthesia the patient was positioned in dorsal lithotomy, prepped and draped in a sterile fashion.  A time-out was performed.      A 14-Sami flexible cystoscope was passed carefully via urethra into the bladder.  The left ureteral orifice was identified and a Sensor wire was passed retrograde to the  level of the kidney and confirmed by fluoroscopy.  The flexible scope was off-loaded and the bladder emptied with a straight catheter.  A dual lumen open-ended catheter was passed over the wire. A retrograde pyelogram was performed by slowly injecting 5 mL of 50% Omnipaque contrast via the 5-Prydeinig catheter with findings described above.  An Amplatz super-stiff was placed to the level of the renal pelvis and confirmed by fluoroscopy. The dual lumen was removed. The Sensor wire was clipped to the drape as a safety wire.  An 11/13, 36-cm access sheath was advanced over the super-stiff wire to level of the UPJ under direct fluoroscopic guidance. The inner stylet and super-stiff wire were removed and the sheath was sutured in place with 2-0 silk.  The kidney was entered with the flexible ureteroscope.  The kidney stone was identified and fragmented with a 200-micron holmium YAG laser fiber at laser settings of 0.2 joules and a frequency of 50 Hz. The fragments were basket extracted. At the completion of the procedure, all clinically significant fragments were removed and only dust-like fragments remained.  The access sheath was removed under direct vision.  Ureteral edema but no obvious obstruction was present.  A 5-Prydeinig catheter was passed over the safety wire and the wire withdrawn.   Contrast was injected into the renal pelvis via the 5-Prydeinig open-ended catheter.  A super-stiff wire was placed and confirmed by fluoroscopy.  A 6 Fr x 26 cm stent was positioned with the upper end in the kidney and the lower in the bladder confirmed by fluoroscopy. The bladder was emptied and the procedure was complete. The patient tolerated the procedure well and was stable throughout.    The patient will follow up with me next week for stent removal.   He will be kept as an inpatient at least overnight.  Hopefully his creatinine improves.

## 2019-07-03 NOTE — PROGRESS NOTES
Discharge Planning Assessment  [facility]     Patient Name: Jorge Mclaughlin  MRN: [id number]  Today's Date: 7/3/2019    Admit Date: 7/2/2019    Discharge Needs Assessment     Row Name 07/03/19 0949       Living Environment    Lives With  spouse    Current Living Arrangements  home/apartment/condo    Primary Care Provided by  self    Provides Primary Care For  no one    Family Caregiver if Needed  spouse    Family Caregiver Names  Mellisa Mclaughlin     Quality of Family Relationships  involved    Able to Return to Prior Arrangements  yes       Resource/Environmental Concerns    Resource/Environmental Concerns  none    Transportation Concerns  car, none       Transition Planning    Patient/Family Anticipates Transition to  home    Patient/Family Anticipated Services at Transition  none    Transportation Anticipated  car, drives self       Discharge Needs Assessment    Readmission Within the Last 30 Days  no previous admission in last 30 days    Concerns to be Addressed  no discharge needs identified    Equipment Currently Used at Home  none    Anticipated Changes Related to Illness  none    Equipment Needed After Discharge  none        Discharge Plan     Row Name 07/03/19 0950       Plan    Plan  Home     Patient/Family in Agreement with Plan  yes    Plan Comments  Spoke to pt regarding discharge plans  Confirmed address  and phone number  He is independent with ADLS .Denies any needs         Destination      No service coordination in this encounter.      Durable Medical Equipment      No service coordination in this encounter.      Dialysis/Infusion      No service coordination in this encounter.      Home Medical Care      No service coordination in this encounter.      Therapy      No service coordination in this encounter.      Community Resources      No service coordination in this encounter.        Expected Discharge Date and Time     Expected Discharge Date Expected Discharge Time    Jul 4, 2019          Demographic Summary     Row Name 07/03/19 0948       General Information    Admission Type  observation    Referral Source  admission list    Reason for Consult  discharge planning        Functional Status     Row Name 07/03/19 0948       Functional Status    Usual Activity Tolerance  excellent       Functional Status, IADL    Medications  independent    Meal Preparation  independent    Housekeeping  independent    Laundry  independent    Shopping  independent       Mental Status    General Appearance WDL  WDL       Mental Status Summary    Recent Changes in Mental Status/Cognitive Functioning  no changes        Psychosocial    No documentation.       Abuse/Neglect    No documentation.       Legal    No documentation.       Substance Abuse    No documentation.       Patient Forms    No documentation.           Chiquita Arais

## 2019-07-03 NOTE — ANESTHESIA PROCEDURE NOTES
Airway  Urgency: elective    Airway not difficult    General Information and Staff    Patient location during procedure: OR  CRNA: Piotr Nichols CRNA    Indications and Patient Condition  Indications for airway management: CNS depression    Preoxygenated: yes  Mask difficulty assessment: 1 - vent by mask    Final Airway Details  Final airway type: supraglottic airway      Successful airway: classic  Size 3    Number of attempts at approach: 1

## 2019-07-03 NOTE — H&P
[facility]   HISTORY AND PHYSICAL      Name:  Jorge Mclaughlin   Age:  59 y.o.  Sex:  male  :  1960  MRN:  [id number]   Visit Number:  [id number]  Admission Date:  2019  Date Of Service:  19  Primary Care Physician:  Yazan Corbett MD     Admitting diagnosis:      Kidney stone    Acute renal failure superimposed on stage 3 chronic kidney disease (CMS/HCC)    Inflammatory arthritis    Crohn's disease of large intestine with complication (CMS/HCC)    Acquired hypothyroidism    Hx of unilateral nephrectomy, right    Intractable pain        History Of Presenting Illness:      59-year-old male with past medical history of Crohn's disease with ileostomy, hypothyroidism, right nephrectomy due to renal cell cancer, comes into the ER multiple times because of intractable left flank pain.  Work-up as shown he has kidney stones with nonobstructing stones on the CT with possible early hydronephrosis.  He has multiple ER visits and has seen  and has been treated conservatively but the pain was intractable with the Percocet.  He needed Dilaudid and the pain has been recurrent so plan is for further admission with a further definitive urology treatment.  The ER provider did speak with Dr. Velazquez who will consult the patient and possibly do intervention tomorrow.  Patient has been slightly comfortable at present after 2 doses of Dilaudid.  He has this severe intractable colicky pain which comes and goes and scale is 10 x 10 even with the oral medication.  He has been in the ER for about 7 hours and his renal function has worsened over the course of stay.  Creatinine from 2.1 is gone up to 2.8.  He has been sick and not able to eat much.  Besides that patient denies fever chills.  This has been ongoing since a week and has had 3 ER admissions and has seen the urologist as the outpatient.  He is going to be admitted for observation management for his intractable pain and  "possible intervention.    Review Of Systems:     The following systems were reviewed and negative;  constitution, eyes, ENT, respiratory, cardiovascular, gastrointestinal, genitourinary, musculoskeletal, neurological and behavioral/psych,  Skin except as above.     Past Medical History:    Past Medical History:   Diagnosis Date   • Altered bowel elimination due to intestinal ostomy (CMS/HCC)     PATIENT REPORTS SECONDARY TO A COLON RUPTURE APPROXIMATELY 16-17 YEARS AGO   • Anemia    • Arthritis     WRIST   • Cancer (CMS/HCC)     Kidney   • Crohn disease (CMS/HCC)    • GERD (gastroesophageal reflux disease)    • History of MRSA infection     REPORTS HX OF \"NOSE\" 2-3 YEARS AGO, TREATED.   • History of pneumonia    • Hypothyroid    • Spinal headache    • Wears glasses        Past Surgical history:    Past Surgical History:   Procedure Laterality Date   • COLONOSCOPY     • COLONOSCOPY N/A 7/31/2017    Procedure: Colonoscopy through ostomy site and rectum with biopsies;  Surgeon: Vincenzo Galan MD;  Location: [facility] ENDOSCOPY;  Service:    • ENDOSCOPY     • HEMORRHOIDECTOMY      REPORTS APPROXIMATELY 20 YEARS AGO   • HERNIA REPAIR      UMBILICAL   • KIDNEY SURGERY      Right kidney removed   • NEPHRECTOMY Right 07/2017   • SMALL INTESTINE SURGERY      HX OF COLON RUPTURE WITH PLACEMENT OF OSTOMY   • WISDOM TOOTH EXTRACTION     • WRIST SURGERY Right        Social History:    Social History     Socioeconomic History   • Marital status:      Spouse name: Not on file   • Number of children: Not on file   • Years of education: Not on file   • Highest education level: Not on file   Tobacco Use   • Smoking status: Former Smoker     Types: Cigarettes   • Smokeless tobacco: Never Used   • Tobacco comment: QUIT 30 PLUS YEARS AGO   Substance and Sexual Activity   • Alcohol use: No     Comment: QUIT 30 PLUS YEARS   • Drug use: No   • Sexual activity: Defer       Family History:    Family History   Problem Relation Age of " Onset   • Colon cancer Neg Hx          Allergies:      Latex    Home Medications:    Prior to Admission Medications     Prescriptions Last Dose Informant Patient Reported? Taking?    acetaminophen (TYLENOL) 500 MG tablet   Yes No    Take 500 mg by mouth As Needed for Mild Pain .    ferrous sulfate 325 (65 FE) MG tablet   Yes No    Take 325 mg by mouth Daily With Breakfast.    HYDROcodone-acetaminophen (NORCO) 5-325 MG per tablet   No No    Take 1 tablet by mouth Every 6 (Six) Hours As Needed for Other (pain).    levothyroxine (SYNTHROID, LEVOTHROID) 200 MCG tablet  Self Yes No    take 1 tablet by mouth once daily    Multiple Vitamin (MULTI VITAMIN MENS PO)  Self Yes No    Take 1 tablet by mouth Daily.    ondansetron (ZOFRAN) 4 MG tablet   No No    Take 1 tablet by mouth Every 8 (Eight) Hours As Needed for Nausea or Vomiting.    pantoprazole (PROTONIX) 40 MG EC tablet  Self Yes No    Take 40 mg by mouth Daily.    Testosterone Cypionate (DEPOTESTOTERONE CYPIONATE) 200 MG/ML injection  Self Yes No    inject 1 milliliter every 2 weeks                 Vital Signs:    Temp:  [97.9 °F (36.6 °C)-98.3 °F (36.8 °C)] 98.3 °F (36.8 °C)  Heart Rate:  [] 107  Resp:  [18-19] 18  BP: ()/() 149/109        07/02/19  1111   Weight: 66 kg (145 lb 9.6 oz)       Body mass index is 24.23 kg/m².    Physical Exam:      General Appearance:    Alert and cooperative,  And lying comfortably in bed   Head:    Atraumatic and normocephalic, without obvious abnormality.   Eyes:            PERRLA, conjunctivae and sclerae normal, no Icterus. No pallor. Extraocular movements are within normal limits.   Ears:    Ears appear intact with no abnormalities noted.   Throat:   No oral lesions, no thrush, oral mucosa moist.   Neck:   Supple, trachea midline, no thyromegaly, no carotid bruit, no lymphadenopathy   Lungs:     Chest shape is normal. Breath sounds heard bilaterally equally.  No crackles or wheezing. No Pleural rub or bronchial  breathing.      Heart:    Normal S1 and S2, no murmur, no gallop, no rub. No JVD   Abdomen:     Normal bowel sounds, no masses, no organomegaly. Soft        there is flank tenderness and he has a colostomy in place   Extremities:   Moves all extremities well, no edema, no cyanosis, no             clubbing   Skin:   No  bruising or rash   Neurologic:   Cranial nerves 2 - 12 grossly intact, sensation intact, Motor power is normal and equal bilaterally.       EKG:      EKG has not been done    Labs:    Lab Results (last 24 hours)     Procedure Component Value Units Date/Time    Urine Culture - Urine, Urine, Clean Catch [[id number]] Collected:  07/02/19 1213    Specimen:  Urine, Clean Catch Updated:  07/02/19 1349    Comprehensive Metabolic Panel [[id number]]  (Abnormal) Collected:  07/02/19 1213    Specimen:  Blood Updated:  07/02/19 1241     Glucose 94 mg/dL      BUN 33 mg/dL      Creatinine 2.80 mg/dL      Sodium 136 mmol/L      Potassium 3.8 mmol/L      Chloride 108 mmol/L      CO2 21.0 mmol/L      Calcium 7.9 mg/dL      Total Protein 5.7 g/dL      Albumin 3.10 g/dL      ALT (SGPT) 46 U/L      AST (SGOT) 26 U/L      Alkaline Phosphatase 55 U/L      Total Bilirubin 0.5 mg/dL      eGFR Non African Amer 23 mL/min/1.73      Globulin 2.6 gm/dL      A/G Ratio 1.2 g/dL      BUN/Creatinine Ratio 11.8     Anion Gap 10.8 mmol/L     Narrative:       GFR Normal >60  Chronic Kidney Disease <60  Kidney Failure <15    CK [[id number]]  (Abnormal) Collected:  07/02/19 1213    Specimen:  Blood Updated:  07/02/19 1241     Creatine Kinase 29 U/L     Narrative:       The relative CKMB index is statistically unreliable if the CK is < or equal to 40 U/L.    Urinalysis, Microscopic Only - Urine, Clean Catch [[id number]]  (Abnormal) Collected:  07/02/19 1213    Specimen:  Urine, Clean Catch Updated:  07/02/19 1234     RBC, UA 13-20 /HPF      WBC, UA 6-12 /HPF      Bacteria, UA 1+ /HPF      Squamous Epithelial Cells, UA 0-2 /HPF      Hyaline  Casts, UA None Seen /LPF      Methodology Manual Light Microscopy    Urinalysis With Microscopic If Indicated (No Culture) - Urine, Clean Catch [[id number]]  (Abnormal) Collected:  07/02/19 1213    Specimen:  Urine, Clean Catch Updated:  07/02/19 1227     Color, UA Yellow     Appearance, UA Clear     pH, UA 6.0     Specific Gravity, UA 1.010     Glucose, UA Negative     Ketones, UA Negative     Bilirubin, UA Negative     Blood, UA Large (3+)     Protein, UA Negative     Leuk Esterase, UA Trace     Nitrite, UA Negative     Urobilinogen, UA 0.2 E.U./dL    CBC & Differential [[id number]] Collected:  07/02/19 1213    Specimen:  Blood Updated:  07/02/19 1223    Narrative:       The following orders were created for panel order CBC & Differential.  Procedure                               Abnormality         Status                     ---------                               -----------         ------                     CBC Auto Differential[[id number]]        Abnormal            Final result                 Please view results for these tests on the individual orders.    CBC Auto Differential [[id number]]  (Abnormal) Collected:  07/02/19 1213    Specimen:  Blood Updated:  07/02/19 1223     WBC 14.84 10*3/mm3      RBC 4.06 10*6/mm3      Hemoglobin 12.4 g/dL      Hematocrit 36.4 %      MCV 89.7 fL      MCH 30.5 pg      MCHC 34.1 g/dL      RDW 11.8 %      RDW-SD 38.7 fl      MPV 9.3 fL      Platelets 194 10*3/mm3      Neutrophil % 80.1 %      Lymphocyte % 9.4 %      Monocyte % 8.2 %      Eosinophil % 1.3 %      Basophil % 0.3 %      Immature Grans % 0.7 %      Neutrophils, Absolute 11.89 10*3/mm3      Lymphocytes, Absolute 1.39 10*3/mm3      Monocytes, Absolute 1.22 10*3/mm3      Eosinophils, Absolute 0.20 10*3/mm3      Basophils, Absolute 0.04 10*3/mm3      Immature Grans, Absolute 0.10 10*3/mm3      nRBC 0.0 /100 WBC           Radiology:    Imaging Results (last 72 hours)     Procedure Component Value Units Date/Time    US  Renal Bilateral [[id number]] Collected:  07/02/19 1553     Updated:  07/02/19 1556    Narrative:       RENAL ULTRASOUND     INDICATION: History of right nephrectomy. History of renal stones.  Evaluate for hydronephrosis.     FINDINGS: The left kidney measures 10.6 cm in length. No perinephric  fluid. Probable minimal left-sided hydronephrosis/caliectasis. The  urinary bladder was imaged to a very limited degree, which appears  incompletely distended. No ureteral jet identified on the provided  images. No images of the right renal fossa were obtained.       Impression:       1. Findings are concerning for very minimal left-sided hydronephrosis.  2. Incomplete distention of the urinary bladder with limited exam, not  identifying the ureteral jet.     This report was finalized on 7/2/2019 3:54 PM by Higinio العلي MD.          Assessment:    Assessment/Plan       Kidney stone    Acute renal failure superimposed on stage 3 chronic kidney disease (CMS/HCC)    Inflammatory arthritis    Crohn's disease of large intestine with complication (CMS/HCC)    Acquired hypothyroidism    Hx of unilateral nephrectomy, right    Intractable pain        Plan:     59-year-old patient with history of chronic kidney disease, Crohn's disease, hypothyroidism, right unilateral nephrectomy due to cancer in the past comes in because of intractable renal colicky pain with nonobstructing stone with leukocytosis and worsening renal function.  Possibly has early hydronephrosis and with his recent intractable pain with multiple ER visits will be admitted and consulted by Dr. Velazquez.  Patient may need intervention who will be n.p.o.  Start with IV antibiotic IV pain medications and supportive care meantime.  See rest as per orders    Yazan Corbett MD  07/02/19  8:02 PM    Please note that portions of this note may have been completed with a voice recognition program. Efforts were made to edit the dictations, but occasionally words are mistranscribed.

## 2019-07-03 NOTE — PLAN OF CARE
Problem: Pain, Acute (Adult)  Goal: Acceptable Pain Control/Comfort Level  Outcome: Ongoing (interventions implemented as appropriate)      Problem: Patient Care Overview  Goal: Plan of Care Review  Outcome: Ongoing (interventions implemented as appropriate)   07/03/19 1121   Coping/Psychosocial   Plan of Care Reviewed With patient   Plan of Care Review   Progress improving

## 2019-07-03 NOTE — SIGNIFICANT NOTE
"Pt c/o \" bp cuff hurts \" removed and touriquet noted to be on pt upper r arm- removed and discarded. B\p cuff repositioned. , pt states arm feels better and neurovascular/circulation check completed.   "

## 2019-07-03 NOTE — PROGRESS NOTES
[facility]    Name:  Jorge Mclaughlin   Age:  59 y.o.  Sex:  male  :  1960  MRN:  [id number]   Visit Number:  [id number]  Admission Date:  2019  Date Of Service:  19  Primary Care Physician:  Yazan Corbett MD     LOS: 0 days :  Patient Care Team:  Yazan Corbett MD as PCP - General:      Subjective / Interval History:     59-year-old patient with history of chronic kidney disease, Crohn's disease with ileostomy, hypothyroidism, comes in because of renal colicky pain with left-sided stone who has right nephrectomy.  His renal function has been getting worse and he seems to be having dry mouth also.  Dr. Velazquez has seen him today and planning to take him down to the OR for further intervention.  Patient is n.p.o. at present.  His pain has been severe and he has needed Dilaudid and Percocet which has helped.  There is been no nausea.      Vital Signs:    Temp:  [98 °F (36.7 °C)-98.6 °F (37 °C)] 98 °F (36.7 °C)  Heart Rate:  [] 84  Resp:  [17-18] 17  BP: (123-154)/() 130/63    Intake and output:    I/O last 3 completed shifts:  In: 1000 [IV Piggyback:1000]  Out: -   No intake/output data recorded.    Physical Examination:    General Appearance:    Alert and cooperative, not in any acute distress.   Head:    Atraumatic and normocephalic, without obvious abnormality.   Eyes:            PERRLA,  No pallor. Extraocular movements are within normal limits.   Neck:   Supple,  No lymph glands, no bruit   Lungs:     Chest shape is normal. Breath sounds heard bilaterally equally.  No crackles or wheezing.     Heart:    Normal S1 and S2, no murmur,  No JVD   Abdomen:     Normal bowel sounds, no masses, no organomegaly. Soft        non-tender, no guarding, no rebound tenderness, there is a flank tenderness which is noted   Extremities:   Moves all extremities well, no edema, no cyanosis,    Skin:   No  bruising or rash.   Neurologic:   Grossly non  focal and moves all extrimities.    Laboratory results:  Results from last 7 days   Lab Units 07/03/19  0541 07/02/19  1213 07/02/19  0033   SODIUM mmol/L 136* 136* 132*   POTASSIUM mmol/L 4.2 3.8 4.0   CHLORIDE mmol/L 109* 108* 101   CO2 mmol/L 20.0* 21.0* 19.0*   BUN mg/dL 38* 33* 31*   CREATININE mg/dL 4.40* 2.80* 2.20*   CALCIUM mg/dL 7.5* 7.9* 8.1*   BILIRUBIN mg/dL 0.2 0.5 0.6   ALK PHOS U/L 46 55 55   ALT (SGPT) U/L 33 46 59   AST (SGOT) U/L 15 26 46   GLUCOSE mg/dL 82 94 92     Results from last 7 days   Lab Units 07/03/19  0541 07/02/19  1213 07/02/19  0033   WBC 10*3/mm3 12.09* 14.84* 15.40*   HEMOGLOBIN g/dL 10.7* 12.4* 13.2   HEMATOCRIT % 32.3* 36.4* 37.6   PLATELETS 10*3/mm3 169 194 219         Results from last 7 days   Lab Units 07/02/19  1213   CK TOTAL U/L 29*           Radiology results:    Imaging Results (last 24 hours)     Procedure Component Value Units Date/Time    US Renal Bilateral [[id number]] Collected:  07/02/19 1553     Updated:  07/02/19 1556    Narrative:       RENAL ULTRASOUND     INDICATION: History of right nephrectomy. History of renal stones.  Evaluate for hydronephrosis.     FINDINGS: The left kidney measures 10.6 cm in length. No perinephric  fluid. Probable minimal left-sided hydronephrosis/caliectasis. The  urinary bladder was imaged to a very limited degree, which appears  incompletely distended. No ureteral jet identified on the provided  images. No images of the right renal fossa were obtained.       Impression:       1. Findings are concerning for very minimal left-sided hydronephrosis.  2. Incomplete distention of the urinary bladder with limited exam, not  identifying the ureteral jet.     This report was finalized on 7/2/2019 3:54 PM by Higinio العلي MD.          I have reviewed the patient's radiology reports.    Medication Review:     I have reviewed the patients active and prn medications.     Assessment:      Kidney stone    Acute renal failure superimposed on stage 3  chronic kidney disease (CMS/HCC)    Inflammatory arthritis    Crohn's disease of large intestine with complication (CMS/HCC)    Acquired hypothyroidism    Hx of unilateral nephrectomy, right    Intractable pain          Plan:    1.  Kidney stones with worsening renal function with a possible obstruction-continue with the pain management and Dr. Glynn has been consulted who will do intervention today.  Patient is n.p.o.  2.  Acute on chronic renal failure-his creatinine has worsened from 2.2-4.2 and will give bolus of fluid and increase hydration.  3.  Intractable pain this is improved with the current management and will continue the same-  Patient explained mode of management and once stabilized and kidney stone is removed and renal function improves we may be able to be discharged tomorrow.    Yazan Corbett MD  07/03/19  8:52 AM      Please note that portions of this note may have been completed with a voice recognition program. Efforts were made to edit the dictations, but occasionally words are mistranscribed.

## 2019-07-03 NOTE — ANESTHESIA POSTPROCEDURE EVALUATION
Patient: Jorge Mclaughlin    Procedure Summary     Date:  07/03/19 Room / Location:  [facility] /  [facility]    Anesthesia Start:  1621 Anesthesia Stop:  1730    Procedure:  CYSTOSCOPY, URETEROSCOPY, STONE BASKETING, RETROGRADE PYLEOGRAM,  LASER LITHOTRIPSY WITH STENT INSERTION (Left ) Diagnosis:       Kidney stone      (Kidney stone [N20.0])    Surgeon:  Lazaro Velazquez MD Provider:  Yuan Caraballo CRNA    Anesthesia Type:  general ASA Status:  3          Anesthesia Type: general  Last vitals  BP   135/70 (07/03/19 1829)   Temp   98.6 °F (37 °C) (07/03/19 1829)   Pulse   76 (07/03/19 1829)   Resp   15 (07/03/19 1829)     SpO2   95 % (07/03/19 1829)     Post Anesthesia Care and Evaluation    Patient location during evaluation: PACU  Patient participation: complete - patient participated  Level of consciousness: awake  Pain score: 3  Pain management: adequate  Airway patency: patent  Anesthetic complications: No anesthetic complications  PONV Status: controlled  Cardiovascular status: acceptable and stable  Respiratory status: acceptable and face mask  Hydration status: acceptable

## 2019-07-03 NOTE — ANESTHESIA PREPROCEDURE EVALUATION
Anesthesia Evaluation     Patient summary reviewed and Nursing notes reviewed   history of anesthetic complications:  NPO Solid Status: > 8 hours  NPO Liquid Status: > 8 hours           Airway   Mallampati: II  TM distance: >3 FB  Neck ROM: full  No difficulty expected  Dental - normal exam     Pulmonary - normal exam   (+) pneumonia resolved , a smoker Former,   Cardiovascular - normal exam  Exercise tolerance: good (4-7 METS)    ECG reviewed      ROS comment: 12/2017 ECG sinus rhythm with short QT interval    Neuro/Psych  (+) headaches,     GI/Hepatic/Renal/Endo    (+)  GERD,  renal disease ARF and CRI, hypothyroidism,     Musculoskeletal     (+) chronic pain,   Abdominal  - normal exam   Substance History   (-) alcohol use, drug use     OB/GYN          Other   (+) arthritis   history of cancer    ROS/Med Hx Other: Hx of spinal headache  History of Crohn's disease  Epigastric abdominal pain  Inflammatory arthritis  Crohn's disease of large intestine with complication (CMS/HCC)  Acquired hypothyroidism  Chronic kidney disease, stage III (moderate) (CMS/HCC)  Acute renal failure superimposed on stage 3 chronic kidney disease (CMS/HCC)    Primary osteoarthritis involving multiple joints  Acute renal failure (CMS/HCC)  Intractable pain  Kidney stone  Renal CA  Intestinal ostomy secondary to colon rupture approx 16-17 years ago    Hx of unilateral nephrectomy, right    Allergic to LATEX    LABS:    Cr 4.40/BUN38  K 4.2  WBC 12.09  Hgb 10.7  HCT 32.3                          Anesthesia Plan    ASA 3     general   (Risks and benefits of general anesthesia discussed with patient, including, aspiration, recall, dental damage, cardiac or respiratory compromise, stroke, fluctuations in blood pressure, seizure or death.     Pt advised that a endotracheal tube (ETT), laryngeal mask airway (LMA) or mask would be utilized to maintain the airway. Pt verbalized understanding and agreed to plan.)  intravenous induction    Anesthetic plan, all risks, benefits, and alternatives have been provided, discussed and informed consent has been obtained with: patient.    Plan discussed with CRNA.

## 2019-07-04 VITALS
OXYGEN SATURATION: 100 % | WEIGHT: 147.71 LBS | HEIGHT: 65 IN | SYSTOLIC BLOOD PRESSURE: 146 MMHG | DIASTOLIC BLOOD PRESSURE: 71 MMHG | HEART RATE: 80 BPM | BODY MASS INDEX: 24.61 KG/M2 | TEMPERATURE: 97.8 F | RESPIRATION RATE: 18 BRPM

## 2019-07-04 LAB
ALBUMIN SERPL-MCNC: 2.8 G/DL (ref 3.5–5)
ALBUMIN/GLOB SERPL: 1.1 G/DL (ref 1–2)
ALP SERPL-CCNC: 54 U/L (ref 38–126)
ALT SERPL W P-5'-P-CCNC: 24 U/L (ref 13–69)
ANION GAP SERPL CALCULATED.3IONS-SCNC: 12.2 MMOL/L (ref 10–20)
ANION GAP SERPL CALCULATED.3IONS-SCNC: 14.6 MMOL/L (ref 10–20)
AST SERPL-CCNC: 14 U/L (ref 15–46)
BACTERIA SPEC AEROBE CULT: NO GROWTH
BILIRUB SERPL-MCNC: 0.1 MG/DL (ref 0.2–1.3)
BUN BLD-MCNC: 38 MG/DL (ref 7–20)
BUN BLD-MCNC: 43 MG/DL (ref 7–20)
BUN/CREAT SERPL: 9 (ref 6.3–21.9)
BUN/CREAT SERPL: 9.5 (ref 6.3–21.9)
CALCIUM SPEC-SCNC: 7.6 MG/DL (ref 8.4–10.2)
CALCIUM SPEC-SCNC: 7.8 MG/DL (ref 8.4–10.2)
CHLORIDE SERPL-SCNC: 107 MMOL/L (ref 98–107)
CHLORIDE SERPL-SCNC: 110 MMOL/L (ref 98–107)
CO2 SERPL-SCNC: 18 MMOL/L (ref 26–30)
CO2 SERPL-SCNC: 18 MMOL/L (ref 26–30)
CREAT BLD-MCNC: 4 MG/DL (ref 0.6–1.3)
CREAT BLD-MCNC: 4.8 MG/DL (ref 0.6–1.3)
DEPRECATED RDW RBC AUTO: 39.6 FL (ref 37–54)
ERYTHROCYTE [DISTWIDTH] IN BLOOD BY AUTOMATED COUNT: 11.9 % (ref 12.3–15.4)
GFR SERPL CREATININE-BSD FRML MDRD: 13 ML/MIN/1.73
GFR SERPL CREATININE-BSD FRML MDRD: 16 ML/MIN/1.73
GFR SERPL CREATININE-BSD FRML MDRD: ABNORMAL ML/MIN/1.73
GLOBULIN UR ELPH-MCNC: 2.6 GM/DL
GLUCOSE BLD-MCNC: 148 MG/DL (ref 74–98)
GLUCOSE BLD-MCNC: 165 MG/DL (ref 74–98)
HCT VFR BLD AUTO: 30.9 % (ref 37.5–51)
HGB BLD-MCNC: 10.5 G/DL (ref 13–17.7)
MCH RBC QN AUTO: 30.9 PG (ref 26.6–33)
MCHC RBC AUTO-ENTMCNC: 34 G/DL (ref 31.5–35.7)
MCV RBC AUTO: 90.9 FL (ref 79–97)
PLATELET # BLD AUTO: 181 10*3/MM3 (ref 140–450)
PMV BLD AUTO: 10.3 FL (ref 6–12)
POTASSIUM BLD-SCNC: 4.2 MMOL/L (ref 3.5–5.1)
POTASSIUM BLD-SCNC: 4.6 MMOL/L (ref 3.5–5.1)
PROT SERPL-MCNC: 5.4 G/DL (ref 6.3–8.2)
RBC # BLD AUTO: 3.4 10*6/MM3 (ref 4.14–5.8)
SODIUM BLD-SCNC: 135 MMOL/L (ref 137–145)
SODIUM BLD-SCNC: 136 MMOL/L (ref 137–145)
WBC NRBC COR # BLD: 14.15 10*3/MM3 (ref 3.4–10.8)

## 2019-07-04 PROCEDURE — 80053 COMPREHEN METABOLIC PANEL: CPT | Performed by: INTERNAL MEDICINE

## 2019-07-04 PROCEDURE — 85027 COMPLETE CBC AUTOMATED: CPT | Performed by: INTERNAL MEDICINE

## 2019-07-04 PROCEDURE — G0378 HOSPITAL OBSERVATION PER HR: HCPCS

## 2019-07-04 PROCEDURE — 25010000002 CEFTRIAXONE SODIUM-DEXTROSE 1-3.74 GM-%(50ML) RECONSTITUTED SOLUTION: Performed by: INTERNAL MEDICINE

## 2019-07-04 RX ORDER — ACETAMINOPHEN 325 MG/1
650 TABLET ORAL EVERY 6 HOURS
Qty: 20 TABLET | Refills: 0 | Status: SHIPPED | OUTPATIENT
Start: 2019-07-04 | End: 2022-03-20

## 2019-07-04 RX ORDER — SODIUM CHLORIDE 9 MG/ML
125 INJECTION, SOLUTION INTRAVENOUS CONTINUOUS
Status: DISCONTINUED | OUTPATIENT
Start: 2019-07-04 | End: 2019-07-04 | Stop reason: HOSPADM

## 2019-07-04 RX ORDER — OXYCODONE HYDROCHLORIDE 5 MG/1
5 TABLET ORAL EVERY 6 HOURS PRN
Qty: 20 TABLET | Refills: 0 | Status: SHIPPED | OUTPATIENT
Start: 2019-07-04 | End: 2019-07-13

## 2019-07-04 RX ADMIN — OXYCODONE HYDROCHLORIDE 5 MG: 5 TABLET ORAL at 14:51

## 2019-07-04 RX ADMIN — SODIUM CHLORIDE 125 ML/HR: 9 INJECTION, SOLUTION INTRAVENOUS at 13:55

## 2019-07-04 RX ADMIN — PANTOPRAZOLE SODIUM 40 MG: 40 TABLET, DELAYED RELEASE ORAL at 08:19

## 2019-07-04 RX ADMIN — ACETAMINOPHEN 650 MG: 325 TABLET, FILM COATED ORAL at 02:27

## 2019-07-04 RX ADMIN — OXYCODONE HYDROCHLORIDE 5 MG: 5 TABLET ORAL at 08:19

## 2019-07-04 RX ADMIN — ACETAMINOPHEN 650 MG: 325 TABLET, FILM COATED ORAL at 08:19

## 2019-07-04 RX ADMIN — CEFTRIAXONE 1 G: 1 INJECTION, SOLUTION INTRAVENOUS at 14:52

## 2019-07-04 RX ADMIN — TAMSULOSIN HYDROCHLORIDE 0.4 MG: 0.4 CAPSULE ORAL at 08:19

## 2019-07-04 RX ADMIN — LEVOTHYROXINE SODIUM 200 MCG: 100 TABLET ORAL at 08:18

## 2019-07-04 RX ADMIN — SODIUM CHLORIDE 125 ML/HR: 9 INJECTION, SOLUTION INTRAVENOUS at 12:34

## 2019-07-04 RX ADMIN — SODIUM CHLORIDE 1000 ML: 9 INJECTION, SOLUTION INTRAVENOUS at 11:00

## 2019-07-04 RX ADMIN — OXYCODONE HYDROCHLORIDE 5 MG: 5 TABLET ORAL at 01:07

## 2019-07-04 RX ADMIN — ACETAMINOPHEN 650 MG: 325 TABLET, FILM COATED ORAL at 14:52

## 2019-07-04 NOTE — PLAN OF CARE
Problem: Pain, Acute (Adult)  Goal: Identify Related Risk Factors and Signs and Symptoms  Outcome: Ongoing (interventions implemented as appropriate)    Goal: Acceptable Pain Control/Comfort Level  Outcome: Ongoing (interventions implemented as appropriate)      Problem: Patient Care Overview  Goal: Plan of Care Review  Outcome: Ongoing (interventions implemented as appropriate)    Goal: Individualization and Mutuality  Outcome: Ongoing (interventions implemented as appropriate)    Goal: Discharge Needs Assessment  Outcome: Ongoing (interventions implemented as appropriate)    Goal: Interprofessional Rounds/Family Conf  Outcome: Ongoing (interventions implemented as appropriate)

## 2019-07-04 NOTE — DISCHARGE SUMMARY
[facility]      Name:  Jorge Mclaughlin   Age:  59 y.o.  Sex:  male  :  1960  MRN:  [id number]   Visit Number:  [id number]  Primary Care Physician:  Yazan Corbett MD  Date of Discharge:  2019  Admission Date:  2019      Discharge Diagnosis:         Kidney stone    Acute renal failure superimposed on stage 3 chronic kidney disease (CMS/HCC)    Inflammatory arthritis    Crohn's disease of large intestine with complication (CMS/[facility])    Acquired hypothyroidism    Hx of unilateral nephrectomy, right    Intractable pain    Obstructive uropathy status post stent placement done by Dr. Velazquez      Consults:     Consults     Date and Time Order Name Status Description    2019 Inpatient Urology Consult Completed           Procedures Performed:      Procedure(s):  CYSTOSCOPY, URETEROSCOPY, STONE BASKETING, RETROGRADE PYLEOGRAM,  LASER LITHOTRIPSY WITH STENT INSERTION           Hospital Course:   The patient was admitted on 2019  Please see H&P for details on admission HPI and ROS.  59-year-old male with past medical history of Crohn's disease with ileostomy, hypothyroidism, right nephrectomy due to renal cell cancer, comes into the ER multiple times because of intractable left flank pain.  Work-up as shown he has kidney stones with nonobstructing stones on the CT with possible early hydronephrosis.  He has multiple ER visits and has seen  and has been treated conservatively but the pain was intractable with the Percocet.  He needed Dilaudid and the pain has been recurrent so plan is for further admission with a further definitive urology treatment.  The ER provider did speak with Dr. Velazquez who will consult the patient and possibly do intervention tomorrow.  Patient has been slightly comfortable at present after 2 doses of Dilaudid.  He has this severe intractable colicky pain which comes and goes and scale is 10 x 10 even with the oral  medication.  He has been in the ER for about 7 hours and his renal function has worsened over the course of stay.  Creatinine from 2.1 is gone up to 2.8.  He has been sick and not able to eat much.  Besides that patient denies fever chills.  This has been ongoing since a week and has had 3 ER admissions and has seen the urologist as the outpatient.  He is going to be admitted for observation management for his intractable pain and possible intervention.  After admission his creatinine continued to increase up to 4.8.  Dr. Martin was consulted and yesterday he underwent stone extraction along with her left ureteral stent placement.  His patient's pain has significantly improved.  He is able to tolerate it with use of Percocet as needed.  He has been eating drinking well there is no nausea vomiting fever.  Urine output has been there though his creatinine yesterday prior to procedure was 4.4 and has gone up to 4.8.  We will give more IV fluids today give a bolus of 1 L first followed by 125 mL and repeat a BMP in the afternoon if the creatinine is trending to come down then he will be discharged and followed up in the office next week.  I will see him back in the office on Monday and will repeat the labs.  Follow-up with Dr. Glynn be done in a week as per his instructions and the stent removal will be done accordingly.  Medications have been prescribed and see changes as below          Vital Signs:     Temp:  [97.8 °F (36.6 °C)-99 °F (37.2 °C)] 97.8 °F (36.6 °C)  Heart Rate:  [70-95] 81  Resp:  [1-18] 18  BP: (126-168)/(58-80) 137/71    Physical Exam:     General Appearance:    Alert and cooperative, not in any acute distress.   Head:    Atraumatic and normocephalic, without obvious abnormality.   Eyes:            PERRLA,  No pallor. Extraocular movements are within normal limits.   Neck:   Supple,  No lymph glands, no bruit   Lungs:     Chest shape is normal. Breath sounds heard bilaterally equally.  No crackles or  wheezing.     Heart:    Normal S1 and S2, no murmur,  No JVD   Abdomen:     Normal bowel sounds, no masses, no organomegaly. Soft        non-tender, no guarding, no rebound tenderness.he has a colostomy in place   Extremities:   Moves all extremities well, no edema, no cyanosis,    Skin:   No  bruising or rash.   Neurologic:   Grossly non focal and moves all extrimities equally.        Pertinent Lab Results:     Results from last 7 days   Lab Units 07/04/19  0516 07/03/19  0541 07/02/19  1213   SODIUM mmol/L 135* 136* 136*   POTASSIUM mmol/L 4.6 4.2 3.8   CHLORIDE mmol/L 107 109* 108*   CO2 mmol/L 18.0* 20.0* 21.0*   BUN mg/dL 43* 38* 33*   CREATININE mg/dL 4.80* 4.40* 2.80*   CALCIUM mg/dL 7.8* 7.5* 7.9*   BILIRUBIN mg/dL 0.1* 0.2 0.5   ALK PHOS U/L 54 46 55   ALT (SGPT) U/L 24 33 46   AST (SGOT) U/L 14* 15 26   GLUCOSE mg/dL 165* 82 94     Results from last 7 days   Lab Units 07/04/19  0516 07/03/19  0541 07/02/19  1213   WBC 10*3/mm3 14.15* 12.09* 14.84*   HEMOGLOBIN g/dL 10.5* 10.7* 12.4*   HEMATOCRIT % 30.9* 32.3* 36.4*   PLATELETS 10*3/mm3 181 169 194         Urine Culture   Date Value Ref Range Status   07/02/2019 No growth  Final       Pertinent Radiology Results:    Imaging Results (most recent)     Procedure Component Value Units Date/Time    US Renal Bilateral [[id number]] Collected:  07/02/19 1553     Updated:  07/02/19 1556    Narrative:       RENAL ULTRASOUND     INDICATION: History of right nephrectomy. History of renal stones.  Evaluate for hydronephrosis.     FINDINGS: The left kidney measures 10.6 cm in length. No perinephric  fluid. Probable minimal left-sided hydronephrosis/caliectasis. The  urinary bladder was imaged to a very limited degree, which appears  incompletely distended. No ureteral jet identified on the provided  images. No images of the right renal fossa were obtained.       Impression:       1. Findings are concerning for very minimal left-sided hydronephrosis.  2. Incomplete  distention of the urinary bladder with limited exam, not  identifying the ureteral jet.     This report was finalized on 7/2/2019 3:54 PM by Higinio العلي MD.                  Discharge Disposition:      Home or Self Care    Discharge Medication:         Discharge Medications      New Medications      Instructions Start Date   cephalexin 500 MG capsule  Commonly known as:  KEFLEX   500 mg, Oral, 2 Times Daily, Start the day prior to biopsy      docusate sodium 100 MG capsule  Commonly known as:  COLACE   100 mg, Oral, 2 Times Daily, If taking percocet      oxybutynin XL 10 MG 24 hr tablet  Commonly known as:  DITROPAN XL   10 mg, Oral, Daily PRN      tamsulosin 0.4 MG capsule 24 hr capsule  Commonly known as:  FLOMAX   1 capsule, Oral, Nightly         Changes to Medications      Instructions Start Date   acetaminophen 325 MG tablet  Commonly known as:  TYLENOL  What changed:    · medication strength  · how much to take  · when to take this  · reasons to take this   650 mg, Oral, Every 6 Hours      acetaminophen 325 MG tablet  Commonly known as:  TYLENOL  What changed:  You were already taking a medication with the same name, and this prescription was added. Make sure you understand how and when to take each.   650 mg, Oral, Every 6 Hours         Continue These Medications      Instructions Start Date   ferrous sulfate 325 (65 FE) MG tablet   325 mg, Oral, Daily With Breakfast      levothyroxine 200 MCG tablet  Commonly known as:  SYNTHROID, LEVOTHROID   take 1 tablet by mouth once daily      MULTI VITAMIN MENS PO   1 tablet, Oral, Daily      ondansetron 4 MG tablet  Commonly known as:  ZOFRAN   4 mg, Oral, Every 8 Hours PRN      pantoprazole 40 MG EC tablet  Commonly known as:  PROTONIX   40 mg, Oral, Daily      Testosterone Cypionate 200 MG/ML injection  Commonly known as:  DEPOTESTOTERONE CYPIONATE   inject 1 milliliter every 2 weeks         Stop These Medications    HYDROcodone-acetaminophen 5-325 MG per  tablet  Commonly known as:  NORCO            Discharge Diet:         Renal diet    Follow-up Appointments:      Follow-up with me on Monday in the morning as  Follow-up with Dr. Velazquez as per his instructions next week      Test Results Pending at Discharge:       Order Current Status    Stone Analysis - Calculus, Kidney, Left In process             Yazan Corbett MD  07/04/19  10:12 AM    Time:  Discharge 25 min    Please note that portions of this note may have been completed with a voice recognition program. Efforts were made to edit the dictations, but occasionally words are mistranscribed.

## 2019-07-04 NOTE — PLAN OF CARE
Problem: Pain, Acute (Adult)  Goal: Identify Related Risk Factors and Signs and Symptoms  Outcome: Ongoing (interventions implemented as appropriate)    Goal: Acceptable Pain Control/Comfort Level  Outcome: Ongoing (interventions implemented as appropriate)      Problem: Patient Care Overview  Goal: Plan of Care Review  Outcome: Ongoing (interventions implemented as appropriate)   07/03/19 1121 07/04/19 1600   Coping/Psychosocial   Plan of Care Reviewed With --  patient   Plan of Care Review   Progress improving --      Goal: Individualization and Mutuality  Outcome: Ongoing (interventions implemented as appropriate)    Goal: Discharge Needs Assessment  Outcome: Ongoing (interventions implemented as appropriate)    Goal: Interprofessional Rounds/Family Conf  Outcome: Ongoing (interventions implemented as appropriate)

## 2019-07-04 NOTE — PROGRESS NOTES
Patient: Jorge Mclaughlin  Procedure(s):  CYSTOSCOPY, URETEROSCOPY, STONE BASKETING, RETROGRADE PYLEOGRAM,  LASER LITHOTRIPSY WITH STENT INSERTION  Anesthesia type: [unfilled]    Patient location: [facility] Surgical Floor  Last vitals:   Vitals:    07/04/19 0700   BP: 137/71   Pulse: 81   Resp: 18   Temp: 97.8 °F (36.6 °C)   SpO2: 99%     Level of consciousness: awake, alert and oriented    Post-anesthesia pain: adequate analgesia  Airway patency: patent  Respiratory: unassisted  Cardiovascular: stable and blood pressure at baseline  Hydration: euvolemic    Anesthetic complications: no

## 2019-07-05 ENCOUNTER — READMISSION MANAGEMENT (OUTPATIENT)
Dept: CALL CENTER | Facility: HOSPITAL | Age: 59
End: 2019-07-05

## 2019-07-05 NOTE — OUTREACH NOTE
Prep Survey      Responses   Facility patient discharged from?  [facility]   Is patient eligible?  Yes   Discharge diagnosis  Kidney stone, ARF on CKD III, inflammatory arthritis, Crohn's disease, Acquired hypothyroidism, Hx. of right nephrecomy, intractable pain, Obstructive uropathy s/p cystoscopy, ureteroscopy, stone basket, retrograde pyelogram, laser lithotripsy with stent insertion   Does the patient have one of the following disease processes/diagnoses(primary or secondary)?  General Surgery   Does the patient have Home health ordered?  No   Is there a DME ordered?  No   Comments regarding appointments  Pt to schedule follow up with PCP   Prep survey completed?  Yes          Maria D Cardenas RN

## 2019-07-08 ENCOUNTER — TELEPHONE (OUTPATIENT)
Dept: TELEMETRY | Facility: HOSPITAL | Age: 59
End: 2019-07-08

## 2019-07-08 ENCOUNTER — READMISSION MANAGEMENT (OUTPATIENT)
Dept: CALL CENTER | Facility: HOSPITAL | Age: 59
End: 2019-07-08

## 2019-07-08 NOTE — OUTREACH NOTE
General Surgery Week 1 Survey      Responses   Facility patient discharged from?  [facility]   Does the patient have one of the following disease processes/diagnoses(primary or secondary)?  General Surgery   Is there a successful TCM telephone encounter documented?  No   Week 1 attempt successful?  Yes   Call start time  1600   Call end time  1613   Discharge diagnosis  Kidney stone, ARF on CKD III, inflammatory arthritis, Crohn's disease, Acquired hypothyroidism, Hx. of right nephrecomy, intractable pain, Obstructive uropathy s/p cystoscopy, ureteroscopy, stone basket, retrograde pyelogram, laser lithotripsy with stent insertion   Meds reviewed with patient/caregiver?  Yes   Is the patient having any side effects they believe may be caused by any medication additions or changes?  No   Does the patient have all medications related to this admission filled (includes all antibiotics, pain medications, etc.)  Yes   Is the patient taking all medications as directed (includes completed medication regime)?  Yes   Does the patient have a follow up appointment scheduled with their surgeon?  Yes   Has home health visited the patient within 72 hours of discharge?  N/A   Psychosocial issues?  No   Comments  Patient still with bloody urine,  however, he   Did the patient receive a copy of their discharge instructions?  Yes   Nursing interventions  Reviewed instructions with patient   What is the patient's perception of their health status since discharge?  Improving   Nursing interventions  Nurse provided patient education   Is the patient /caregiver able to teach back basic post-op care?  Practice 'cough and deep breath', Drive as instructed by MD in discharge instructions, Take showers only when approved by MD-sponge bathe until then, Lifting as instructed by MD in discharge instructions   Is the patient/caregiver able to teach back signs and symptoms of incisional infection?  Fever   Is the patient/caregiver able to teach back  steps to recovery at home?  Set small, achievable goals for return to baseline health, Rest and rebuild strength, gradually increase activity, Make a list of questions for surgeon's appointment   Is the patient/caregiver able to teach back the hierarchy of who to call/visit for symptoms/problems? PCP, Specialist, Home health nurse, Urgent Care, ED, 911  Yes   Week 1 call completed?  Yes          Jamie Hendrickson RN

## 2019-07-11 LAB
CA PHOS CRY STONE QL IR: 10 %
COLOR STONE: NORMAL
COM CRY STONE QL IR: 90 %
COMPN STONE: NORMAL
CONV COMMENT: NORMAL
Lab: NORMAL
Lab: NORMAL
NIDUS STONE QL: NORMAL
PATH REPORT.COMMENTS IMP SPEC: NORMAL
SIZE STONE: NORMAL MM
WT STONE: 25.5 MG

## 2019-07-16 ENCOUNTER — READMISSION MANAGEMENT (OUTPATIENT)
Dept: CALL CENTER | Facility: HOSPITAL | Age: 59
End: 2019-07-16

## 2019-07-17 ENCOUNTER — READMISSION MANAGEMENT (OUTPATIENT)
Dept: CALL CENTER | Facility: HOSPITAL | Age: 59
End: 2019-07-17

## 2019-07-17 NOTE — OUTREACH NOTE
General Surgery Week 2 Survey      Responses   Facility patient discharged from?  [facility]   Does the patient have one of the following disease processes/diagnoses(primary or secondary)?  General Surgery   Week 2 attempt successful?  Yes   Call start time  1514   Call end time  1515   Discharge diagnosis  Kidney stone, ARF on CKD III, inflammatory arthritis, Crohn's disease, Acquired hypothyroidism, Hx. of right nephrecomy, intractable pain, Obstructive uropathy s/p cystoscopy, ureteroscopy, stone basket, retrograde pyelogram, laser lithotripsy with stent insertion   Meds reviewed with patient/caregiver?  Yes   Is the patient having any side effects they believe may be caused by any medication additions or changes?  No   Does the patient have all medications related to this admission filled (includes all antibiotics, pain medications, etc.)  Yes   Is the patient taking all medications as directed (includes completed medication regime)?  Yes   Does the patient have a follow up appointment scheduled with their surgeon?  Yes   Has the patient kept scheduled appointments due by today?  Yes   Psychosocial issues?  No   Did the patient receive a copy of their discharge instructions?  Yes   Nursing interventions  Reviewed instructions with patient   What is the patient's perception of their health status since discharge?  Improving   Nursing interventions  Nurse provided patient education   Is the patient /caregiver able to teach back basic post-op care?  Practice 'cough and deep breath', Drive as instructed by MD in discharge instructions, Take showers only when approved by MD-sponge bathe until then, Lifting as instructed by MD in discharge instructions   Is the patient/caregiver able to teach back signs and symptoms of incisional infection?  Fever   Is the patient/caregiver able to teach back steps to recovery at home?  Set small, achievable goals for return to baseline health, Rest and rebuild strength, gradually  increase activity, Make a list of questions for surgeon's appointment   Is the patient/caregiver able to teach back the hierarchy of who to call/visit for symptoms/problems? PCP, Specialist, Home health nurse, Urgent Care, ED, 911  Yes   Week 2 call completed?  Yes          Martha Ordaz, RN

## 2019-07-24 ENCOUNTER — READMISSION MANAGEMENT (OUTPATIENT)
Dept: CALL CENTER | Facility: HOSPITAL | Age: 59
End: 2019-07-24

## 2019-07-24 NOTE — OUTREACH NOTE
General Surgery Week 3 Survey      Responses   Facility patient discharged from?  [facility]   Does the patient have one of the following disease processes/diagnoses(primary or secondary)?  General Surgery   Week 3 attempt successful?  Yes   Call start time  1200   Call end time  1201   Discharge diagnosis  Kidney stone, ARF on CKD III, inflammatory arthritis, Crohn's disease, Acquired hypothyroidism, Hx. of right nephrecomy, intractable pain, Obstructive uropathy s/p cystoscopy, ureteroscopy, stone basket, retrograde pyelogram, laser lithotripsy with stent insertion   Meds reviewed with patient/caregiver?  Yes   Is the patient taking all medications as directed (includes completed medication regime)?  Yes   Has the patient kept scheduled appointments due by today?  Yes   What is the patient's perception of their health status since discharge?  Improving   Is the patient/caregiver able to teach back the hierarchy of who to call/visit for symptoms/problems? PCP, Specialist, Home health nurse, Urgent Care, ED, 911  Yes   Additional teach back comments  Pt says he is doing fine, no questions or concerns at this time.   Week 3 call completed?  Yes   Revoked  No further contact(revokes)-requires comment   Graduated/Revoked comments  Patient asked to be taken off call list.          Nishi Villasenor RN

## 2019-07-29 ENCOUNTER — PROCEDURE VISIT (OUTPATIENT)
Dept: UROLOGY | Facility: CLINIC | Age: 59
End: 2019-07-29

## 2019-07-29 VITALS — WEIGHT: 147 LBS | BODY MASS INDEX: 24.49 KG/M2 | RESPIRATION RATE: 16 BRPM | HEIGHT: 65 IN

## 2019-07-29 DIAGNOSIS — N20.0 NEPHROLITHIASIS: Primary | ICD-10-CM

## 2019-07-29 PROCEDURE — 52310 CYSTOSCOPY AND TREATMENT: CPT | Performed by: UROLOGY

## 2019-07-29 NOTE — PROGRESS NOTES
Preoperative diagnosis  Foreign body in genitourinary tract    Postoperative diagnosis  Foreign body in genitourinary tract    Procedure  Flexible cystourethroscopy with stent removal    Attending surgeon  Lazaro Velazquez MD    Anesthesia  2% lidocaine jelly intraurethrally    Complications  None    Indications  59 y.o. male who is status post left ureteroscopy with holmium laser lithotripsy who presents for stent removal.    Results for RHIANNON RAMOS (MRN [id number]) as of 7/29/2019 09:30   Ref. Range 7/3/2019 17:06   Ca Oxalate - Monohydrate, Stone Latest Units: % 90   Calcium phosphate, Stone Latest Units: % 10       Procedure  Detailed information of all possible complications and side effects were discussed with the patient.  Informed consent was obtained. Patient was given one dose of antibiotics. The patient was placed in supine position and a timeout was performed. The patient was prepped and draped in sterile fashion.  Next, 2% lidocaine jelly was bluntly injected per urethra without difficulty. The 14 Croatian flexible cystoscope was passed through the urethra and into the bladder.  The stent was visualized, grasped and removed in its entirety.  The patient tolerated the procedure well.    Plan  1. Provided education regarding water intake of at least 2 liters per day  2. F/u in 8 weeks with a renal ultrasound  3. PTH, the and Uric Acid today, Litholink prior to next visit

## 2019-07-30 LAB
BUN SERPL-MCNC: 25 MG/DL (ref 7–20)
BUN/CREAT SERPL: 11.9 (ref 6.3–21.9)
CALCIUM SERPL-MCNC: 9.8 MG/DL (ref 8.4–10.2)
CHLORIDE SERPL-SCNC: 106 MMOL/L (ref 98–107)
CO2 SERPL-SCNC: 20 MMOL/L (ref 26–30)
CREAT SERPL-MCNC: 2.1 MG/DL (ref 0.6–1.3)
GLUCOSE SERPL-MCNC: 97 MG/DL (ref 74–98)
INTACT PTH: NORMAL
POTASSIUM SERPL-SCNC: 4 MMOL/L (ref 3.5–5.1)
PTH-INTACT SERPL-MCNC: 45 PG/ML (ref 15–65)
SODIUM SERPL-SCNC: 138 MMOL/L (ref 137–145)
URATE SERPL-MCNC: 7.8 MG/DL (ref 2.5–8.5)

## 2019-09-30 ENCOUNTER — HOSPITAL ENCOUNTER (OUTPATIENT)
Dept: ULTRASOUND IMAGING | Facility: HOSPITAL | Age: 59
Discharge: HOME | End: 2019-09-30
Admitting: UROLOGY

## 2019-09-30 DIAGNOSIS — N20.0 NEPHROLITHIASIS: ICD-10-CM

## 2019-09-30 PROCEDURE — 76775 US EXAM ABDO BACK WALL LIM: CPT

## 2020-10-29 ENCOUNTER — HOSPITAL ENCOUNTER (OUTPATIENT)
Facility: HOSPITAL | Age: 60
Setting detail: OBSERVATION
Discharge: HOME | End: 2020-10-30
Attending: EMERGENCY MEDICINE | Admitting: INTERNAL MEDICINE

## 2020-10-29 ENCOUNTER — APPOINTMENT (OUTPATIENT)
Dept: CT IMAGING | Facility: HOSPITAL | Age: 60
End: 2020-10-29

## 2020-10-29 ENCOUNTER — APPOINTMENT (OUTPATIENT)
Dept: ULTRASOUND IMAGING | Facility: HOSPITAL | Age: 60
End: 2020-10-29

## 2020-10-29 DIAGNOSIS — N17.9 AKI (ACUTE KIDNEY INJURY) (HCC): ICD-10-CM

## 2020-10-29 DIAGNOSIS — N20.1 URETERAL STONE: Primary | ICD-10-CM

## 2020-10-29 LAB
ALBUMIN SERPL-MCNC: 4.3 G/DL (ref 3.5–5.2)
ALBUMIN/GLOB SERPL: 1.7 G/DL
ALP SERPL-CCNC: 69 U/L (ref 39–117)
ALT SERPL W P-5'-P-CCNC: 24 U/L (ref 1–41)
AMORPH URATE CRY URNS QL MICRO: ABNORMAL /HPF
ANION GAP SERPL CALCULATED.3IONS-SCNC: 12.7 MMOL/L (ref 5–15)
AST SERPL-CCNC: 27 U/L (ref 1–40)
BACTERIA UR QL AUTO: ABNORMAL /HPF
BASOPHILS # BLD AUTO: 0.07 10*3/MM3 (ref 0–0.2)
BASOPHILS NFR BLD AUTO: 0.4 % (ref 0–1.5)
BILIRUB SERPL-MCNC: 0.6 MG/DL (ref 0–1.2)
BILIRUB UR QL STRIP: NEGATIVE
BUN SERPL-MCNC: 32 MG/DL (ref 6–20)
BUN/CREAT SERPL: 9.3 (ref 7–25)
CALCIUM SPEC-SCNC: 9.8 MG/DL (ref 8.6–10.5)
CHLORIDE SERPL-SCNC: 93 MMOL/L (ref 98–107)
CLARITY UR: ABNORMAL
CO2 SERPL-SCNC: 24.3 MMOL/L (ref 22–29)
COLOR UR: ABNORMAL
CREAT SERPL-MCNC: 3.45 MG/DL (ref 0.76–1.27)
DEPRECATED RDW RBC AUTO: 38.4 FL (ref 37–54)
EOSINOPHIL # BLD AUTO: 0.25 10*3/MM3 (ref 0–0.4)
EOSINOPHIL NFR BLD AUTO: 1.4 % (ref 0.3–6.2)
ERYTHROCYTE [DISTWIDTH] IN BLOOD BY AUTOMATED COUNT: 11.9 % (ref 12.3–15.4)
GFR SERPL CREATININE-BSD FRML MDRD: 18 ML/MIN/1.73
GLOBULIN UR ELPH-MCNC: 2.6 GM/DL
GLUCOSE SERPL-MCNC: 118 MG/DL (ref 65–99)
GLUCOSE UR STRIP-MCNC: NEGATIVE MG/DL
HCT VFR BLD AUTO: 43.7 % (ref 37.5–51)
HGB BLD-MCNC: 15.5 G/DL (ref 13–17.7)
HGB UR QL STRIP.AUTO: ABNORMAL
HYALINE CASTS UR QL AUTO: ABNORMAL /LPF
IMM GRANULOCYTES # BLD AUTO: 0.08 10*3/MM3 (ref 0–0.05)
IMM GRANULOCYTES NFR BLD AUTO: 0.4 % (ref 0–0.5)
KETONES UR QL STRIP: NEGATIVE
LEUKOCYTE ESTERASE UR QL STRIP.AUTO: NEGATIVE
LIPASE SERPL-CCNC: 58 U/L (ref 13–60)
LYMPHOCYTES # BLD AUTO: 1.69 10*3/MM3 (ref 0.7–3.1)
LYMPHOCYTES NFR BLD AUTO: 9.2 % (ref 19.6–45.3)
MCH RBC QN AUTO: 31.1 PG (ref 26.6–33)
MCHC RBC AUTO-ENTMCNC: 35.5 G/DL (ref 31.5–35.7)
MCV RBC AUTO: 87.6 FL (ref 79–97)
MONOCYTES # BLD AUTO: 1.08 10*3/MM3 (ref 0.1–0.9)
MONOCYTES NFR BLD AUTO: 5.9 % (ref 5–12)
MUCOUS THREADS URNS QL MICRO: ABNORMAL /HPF
NEUTROPHILS NFR BLD AUTO: 15.17 10*3/MM3 (ref 1.7–7)
NEUTROPHILS NFR BLD AUTO: 82.7 % (ref 42.7–76)
NITRITE UR QL STRIP: NEGATIVE
NRBC BLD AUTO-RTO: 0 /100 WBC (ref 0–0.2)
PH UR STRIP.AUTO: <=5 [PH] (ref 5–8)
PLATELET # BLD AUTO: 228 10*3/MM3 (ref 140–450)
PMV BLD AUTO: 10.3 FL (ref 6–12)
POTASSIUM SERPL-SCNC: 3.4 MMOL/L (ref 3.5–5.2)
PROT SERPL-MCNC: 6.9 G/DL (ref 6–8.5)
PROT UR QL STRIP: ABNORMAL
RBC # BLD AUTO: 4.99 10*6/MM3 (ref 4.14–5.8)
RBC # UR: ABNORMAL /HPF
REF LAB TEST METHOD: ABNORMAL
SODIUM SERPL-SCNC: 130 MMOL/L (ref 136–145)
SP GR UR STRIP: 1.02 (ref 1–1.03)
SQUAMOUS #/AREA URNS HPF: ABNORMAL /HPF
UROBILINOGEN UR QL STRIP: ABNORMAL
WBC # BLD AUTO: 18.34 10*3/MM3 (ref 3.4–10.8)
WBC UR QL AUTO: ABNORMAL /HPF

## 2020-10-29 PROCEDURE — 96376 TX/PRO/DX INJ SAME DRUG ADON: CPT

## 2020-10-29 PROCEDURE — 96365 THER/PROPH/DIAG IV INF INIT: CPT

## 2020-10-29 PROCEDURE — 80053 COMPREHEN METABOLIC PANEL: CPT | Performed by: EMERGENCY MEDICINE

## 2020-10-29 PROCEDURE — 85025 COMPLETE CBC W/AUTO DIFF WBC: CPT | Performed by: EMERGENCY MEDICINE

## 2020-10-29 PROCEDURE — 87086 URINE CULTURE/COLONY COUNT: CPT | Performed by: INTERNAL MEDICINE

## 2020-10-29 PROCEDURE — 25010000002 MORPHINE SULFATE (PF) 2 MG/ML SOLUTION: Performed by: INTERNAL MEDICINE

## 2020-10-29 PROCEDURE — G0378 HOSPITAL OBSERVATION PER HR: HCPCS

## 2020-10-29 PROCEDURE — 99244 OFF/OP CNSLTJ NEW/EST MOD 40: CPT | Performed by: UROLOGY

## 2020-10-29 PROCEDURE — 96361 HYDRATE IV INFUSION ADD-ON: CPT

## 2020-10-29 PROCEDURE — 76775 US EXAM ABDO BACK WALL LIM: CPT

## 2020-10-29 PROCEDURE — 74176 CT ABD & PELVIS W/O CONTRAST: CPT

## 2020-10-29 PROCEDURE — 81001 URINALYSIS AUTO W/SCOPE: CPT | Performed by: EMERGENCY MEDICINE

## 2020-10-29 PROCEDURE — 25010000002 ONDANSETRON PER 1 MG: Performed by: EMERGENCY MEDICINE

## 2020-10-29 PROCEDURE — 99284 EMERGENCY DEPT VISIT MOD MDM: CPT

## 2020-10-29 PROCEDURE — 25010000002 CEFTRIAXONE SODIUM-DEXTROSE 1-3.74 GM-%(50ML) RECONSTITUTED SOLUTION: Performed by: EMERGENCY MEDICINE

## 2020-10-29 PROCEDURE — 25010000002 HYDROMORPHONE 1 MG/ML SOLUTION: Performed by: EMERGENCY MEDICINE

## 2020-10-29 PROCEDURE — 83690 ASSAY OF LIPASE: CPT | Performed by: EMERGENCY MEDICINE

## 2020-10-29 PROCEDURE — 25010000002 HYDROMORPHONE 1 MG/ML SOLUTION: Performed by: INTERNAL MEDICINE

## 2020-10-29 PROCEDURE — 96375 TX/PRO/DX INJ NEW DRUG ADDON: CPT

## 2020-10-29 PROCEDURE — 25010000002 MORPHINE PER 10 MG: Performed by: EMERGENCY MEDICINE

## 2020-10-29 RX ORDER — TIZANIDINE 4 MG/1
4 TABLET ORAL EVERY 8 HOURS PRN
Status: DISCONTINUED | OUTPATIENT
Start: 2020-10-29 | End: 2020-10-30 | Stop reason: HOSPADM

## 2020-10-29 RX ORDER — LEVOTHYROXINE SODIUM 0.1 MG/1
200 TABLET ORAL
Status: DISCONTINUED | OUTPATIENT
Start: 2020-10-30 | End: 2020-10-30 | Stop reason: HOSPADM

## 2020-10-29 RX ORDER — MORPHINE SULFATE 2 MG/ML
2 INJECTION, SOLUTION INTRAMUSCULAR; INTRAVENOUS EVERY 4 HOURS PRN
Status: DISCONTINUED | OUTPATIENT
Start: 2020-10-29 | End: 2020-10-29

## 2020-10-29 RX ORDER — SODIUM CHLORIDE 0.9 % (FLUSH) 0.9 %
10 SYRINGE (ML) INJECTION AS NEEDED
Status: DISCONTINUED | OUTPATIENT
Start: 2020-10-29 | End: 2020-10-30 | Stop reason: HOSPADM

## 2020-10-29 RX ORDER — PANTOPRAZOLE SODIUM 40 MG/1
40 TABLET, DELAYED RELEASE ORAL DAILY
Status: DISCONTINUED | OUTPATIENT
Start: 2020-10-29 | End: 2020-10-30 | Stop reason: HOSPADM

## 2020-10-29 RX ORDER — ONDANSETRON 2 MG/ML
4 INJECTION INTRAMUSCULAR; INTRAVENOUS ONCE
Status: COMPLETED | OUTPATIENT
Start: 2020-10-29 | End: 2020-10-29

## 2020-10-29 RX ORDER — MORPHINE SULFATE 4 MG/ML
4 INJECTION, SOLUTION INTRAMUSCULAR; INTRAVENOUS ONCE
Status: COMPLETED | OUTPATIENT
Start: 2020-10-29 | End: 2020-10-29

## 2020-10-29 RX ORDER — CEFTRIAXONE 1 G/50ML
1 INJECTION, SOLUTION INTRAVENOUS ONCE
Status: COMPLETED | OUTPATIENT
Start: 2020-10-29 | End: 2020-10-29

## 2020-10-29 RX ORDER — IBUPROFEN 800 MG/1
800 TABLET ORAL 3 TIMES DAILY PRN
Status: DISCONTINUED | OUTPATIENT
Start: 2020-10-29 | End: 2020-10-30 | Stop reason: HOSPADM

## 2020-10-29 RX ORDER — SODIUM CHLORIDE 9 MG/ML
125 INJECTION, SOLUTION INTRAVENOUS CONTINUOUS
Status: DISCONTINUED | OUTPATIENT
Start: 2020-10-29 | End: 2020-10-30 | Stop reason: HOSPADM

## 2020-10-29 RX ADMIN — SODIUM CHLORIDE 125 ML/HR: 9 INJECTION, SOLUTION INTRAVENOUS at 13:31

## 2020-10-29 RX ADMIN — PANTOPRAZOLE SODIUM 40 MG: 40 TABLET, DELAYED RELEASE ORAL at 21:30

## 2020-10-29 RX ADMIN — CEFTRIAXONE 1 G: 1 INJECTION, SOLUTION INTRAVENOUS at 11:28

## 2020-10-29 RX ADMIN — HYDROMORPHONE HYDROCHLORIDE 1 MG: 1 INJECTION, SOLUTION INTRAMUSCULAR; INTRAVENOUS; SUBCUTANEOUS at 17:43

## 2020-10-29 RX ADMIN — TIZANIDINE 4 MG: 4 TABLET ORAL at 21:30

## 2020-10-29 RX ADMIN — MORPHINE SULFATE 4 MG: 4 INJECTION, SOLUTION INTRAMUSCULAR; INTRAVENOUS at 09:07

## 2020-10-29 RX ADMIN — HYDROMORPHONE HYDROCHLORIDE 1 MG: 1 INJECTION, SOLUTION INTRAMUSCULAR; INTRAVENOUS; SUBCUTANEOUS at 22:03

## 2020-10-29 RX ADMIN — SODIUM CHLORIDE 125 ML/HR: 9 INJECTION, SOLUTION INTRAVENOUS at 21:30

## 2020-10-29 RX ADMIN — HYDROMORPHONE HYDROCHLORIDE 1 MG: 1 INJECTION, SOLUTION INTRAMUSCULAR; INTRAVENOUS; SUBCUTANEOUS at 10:36

## 2020-10-29 RX ADMIN — MORPHINE SULFATE 4 MG: 4 INJECTION, SOLUTION INTRAMUSCULAR; INTRAVENOUS at 10:05

## 2020-10-29 RX ADMIN — ONDANSETRON 4 MG: 2 INJECTION INTRAMUSCULAR; INTRAVENOUS at 09:06

## 2020-10-29 RX ADMIN — MORPHINE SULFATE 2 MG: 2 INJECTION, SOLUTION INTRAMUSCULAR; INTRAVENOUS at 13:55

## 2020-10-30 ENCOUNTER — APPOINTMENT (OUTPATIENT)
Dept: ULTRASOUND IMAGING | Facility: HOSPITAL | Age: 60
End: 2020-10-30

## 2020-10-30 VITALS
HEART RATE: 76 BPM | RESPIRATION RATE: 18 BRPM | TEMPERATURE: 98.2 F | DIASTOLIC BLOOD PRESSURE: 58 MMHG | BODY MASS INDEX: 24.9 KG/M2 | WEIGHT: 149.47 LBS | OXYGEN SATURATION: 97 % | SYSTOLIC BLOOD PRESSURE: 116 MMHG | HEIGHT: 65 IN

## 2020-10-30 DIAGNOSIS — N20.0 NEPHROLITHIASIS: Primary | ICD-10-CM

## 2020-10-30 LAB
ALBUMIN SERPL-MCNC: 3.4 G/DL (ref 3.5–5.2)
ALBUMIN/GLOB SERPL: 1.4 G/DL
ALP SERPL-CCNC: 67 U/L (ref 39–117)
ALT SERPL W P-5'-P-CCNC: 21 U/L (ref 1–41)
ANION GAP SERPL CALCULATED.3IONS-SCNC: 10.5 MMOL/L (ref 5–15)
AST SERPL-CCNC: 20 U/L (ref 1–40)
BACTERIA SPEC AEROBE CULT: NO GROWTH
BASOPHILS # BLD AUTO: 0.05 10*3/MM3 (ref 0–0.2)
BASOPHILS NFR BLD AUTO: 0.6 % (ref 0–1.5)
BILIRUB SERPL-MCNC: 0.4 MG/DL (ref 0–1.2)
BUN SERPL-MCNC: 31 MG/DL (ref 6–20)
BUN/CREAT SERPL: 10.4 (ref 7–25)
CALCIUM SPEC-SCNC: 8.3 MG/DL (ref 8.6–10.5)
CHLORIDE SERPL-SCNC: 106 MMOL/L (ref 98–107)
CO2 SERPL-SCNC: 23.5 MMOL/L (ref 22–29)
CREAT SERPL-MCNC: 2.98 MG/DL (ref 0.76–1.27)
DEPRECATED RDW RBC AUTO: 41.1 FL (ref 37–54)
EOSINOPHIL # BLD AUTO: 0.31 10*3/MM3 (ref 0–0.4)
EOSINOPHIL NFR BLD AUTO: 3.7 % (ref 0.3–6.2)
ERYTHROCYTE [DISTWIDTH] IN BLOOD BY AUTOMATED COUNT: 12.5 % (ref 12.3–15.4)
GFR SERPL CREATININE-BSD FRML MDRD: 22 ML/MIN/1.73
GLOBULIN UR ELPH-MCNC: 2.5 GM/DL
GLUCOSE SERPL-MCNC: 93 MG/DL (ref 65–99)
HCT VFR BLD AUTO: 39.3 % (ref 37.5–51)
HGB BLD-MCNC: 13.6 G/DL (ref 13–17.7)
IMM GRANULOCYTES # BLD AUTO: 0.03 10*3/MM3 (ref 0–0.05)
IMM GRANULOCYTES NFR BLD AUTO: 0.4 % (ref 0–0.5)
LYMPHOCYTES # BLD AUTO: 1.59 10*3/MM3 (ref 0.7–3.1)
LYMPHOCYTES NFR BLD AUTO: 19.2 % (ref 19.6–45.3)
MCH RBC QN AUTO: 31.3 PG (ref 26.6–33)
MCHC RBC AUTO-ENTMCNC: 34.6 G/DL (ref 31.5–35.7)
MCV RBC AUTO: 90.3 FL (ref 79–97)
MONOCYTES # BLD AUTO: 0.63 10*3/MM3 (ref 0.1–0.9)
MONOCYTES NFR BLD AUTO: 7.6 % (ref 5–12)
NEUTROPHILS NFR BLD AUTO: 5.69 10*3/MM3 (ref 1.7–7)
NEUTROPHILS NFR BLD AUTO: 68.5 % (ref 42.7–76)
NRBC BLD AUTO-RTO: 0 /100 WBC (ref 0–0.2)
PLATELET # BLD AUTO: 181 10*3/MM3 (ref 140–450)
PMV BLD AUTO: 10.5 FL (ref 6–12)
POTASSIUM SERPL-SCNC: 4.1 MMOL/L (ref 3.5–5.2)
PROT SERPL-MCNC: 5.9 G/DL (ref 6–8.5)
RBC # BLD AUTO: 4.35 10*6/MM3 (ref 4.14–5.8)
SODIUM SERPL-SCNC: 140 MMOL/L (ref 136–145)
WBC # BLD AUTO: 8.3 10*3/MM3 (ref 3.4–10.8)

## 2020-10-30 PROCEDURE — G0378 HOSPITAL OBSERVATION PER HR: HCPCS

## 2020-10-30 PROCEDURE — 96366 THER/PROPH/DIAG IV INF ADDON: CPT

## 2020-10-30 PROCEDURE — 85025 COMPLETE CBC W/AUTO DIFF WBC: CPT | Performed by: INTERNAL MEDICINE

## 2020-10-30 PROCEDURE — 25010000002 HYDROMORPHONE 1 MG/ML SOLUTION: Performed by: INTERNAL MEDICINE

## 2020-10-30 PROCEDURE — 80053 COMPREHEN METABOLIC PANEL: CPT | Performed by: INTERNAL MEDICINE

## 2020-10-30 PROCEDURE — 25010000002 CEFTRIAXONE SODIUM-DEXTROSE 1-3.74 GM-%(50ML) RECONSTITUTED SOLUTION: Performed by: INTERNAL MEDICINE

## 2020-10-30 PROCEDURE — 96376 TX/PRO/DX INJ SAME DRUG ADON: CPT

## 2020-10-30 PROCEDURE — 96361 HYDRATE IV INFUSION ADD-ON: CPT

## 2020-10-30 PROCEDURE — 99225 PR SBSQ OBSERVATION CARE/DAY 25 MINUTES: CPT | Performed by: UROLOGY

## 2020-10-30 RX ORDER — IBUPROFEN 800 MG/1
800 TABLET ORAL 3 TIMES DAILY PRN
Qty: 30 TABLET | Refills: 0 | Status: SHIPPED | OUTPATIENT
Start: 2020-10-30 | End: 2021-09-22 | Stop reason: HOSPADM

## 2020-10-30 RX ORDER — CEFTRIAXONE 1 G/50ML
1 INJECTION, SOLUTION INTRAVENOUS ONCE
Status: COMPLETED | OUTPATIENT
Start: 2020-10-30 | End: 2020-10-30

## 2020-10-30 RX ORDER — HYDROCODONE BITARTRATE AND ACETAMINOPHEN 5; 325 MG/1; MG/1
1 TABLET ORAL EVERY 6 HOURS PRN
Status: DISCONTINUED | OUTPATIENT
Start: 2020-10-30 | End: 2020-10-30 | Stop reason: HOSPADM

## 2020-10-30 RX ADMIN — SODIUM CHLORIDE 125 ML/HR: 9 INJECTION, SOLUTION INTRAVENOUS at 15:40

## 2020-10-30 RX ADMIN — CEFTRIAXONE 1 G: 1 INJECTION, SOLUTION INTRAVENOUS at 08:05

## 2020-10-30 RX ADMIN — HYDROCODONE BITARTRATE AND ACETAMINOPHEN 1 TABLET: 5; 325 TABLET ORAL at 15:40

## 2020-10-30 RX ADMIN — HYDROMORPHONE HYDROCHLORIDE 1 MG: 1 INJECTION, SOLUTION INTRAMUSCULAR; INTRAVENOUS; SUBCUTANEOUS at 03:24

## 2020-10-30 RX ADMIN — PANTOPRAZOLE SODIUM 40 MG: 40 TABLET, DELAYED RELEASE ORAL at 08:00

## 2020-10-30 RX ADMIN — TIZANIDINE 4 MG: 4 TABLET ORAL at 06:04

## 2020-10-30 RX ADMIN — HYDROCODONE BITARTRATE AND ACETAMINOPHEN 1 TABLET: 5; 325 TABLET ORAL at 10:35

## 2020-10-30 RX ADMIN — HYDROMORPHONE HYDROCHLORIDE 1 MG: 1 INJECTION, SOLUTION INTRAMUSCULAR; INTRAVENOUS; SUBCUTANEOUS at 07:20

## 2020-10-30 RX ADMIN — LEVOTHYROXINE SODIUM 200 MCG: 100 TABLET ORAL at 06:04

## 2020-10-30 RX ADMIN — IBUPROFEN 800 MG: 800 TABLET ORAL at 16:20

## 2021-03-08 ENCOUNTER — TRANSCRIBE ORDERS (OUTPATIENT)
Dept: LAB | Facility: HOSPITAL | Age: 61
End: 2021-03-08

## 2021-03-08 ENCOUNTER — LAB (OUTPATIENT)
Dept: LAB | Facility: HOSPITAL | Age: 61
End: 2021-03-08

## 2021-03-08 DIAGNOSIS — E55.9 AVITAMINOSIS D: ICD-10-CM

## 2021-03-08 DIAGNOSIS — R53.83 FATIGUE AFTER COVID-19 VACCINATION: Primary | ICD-10-CM

## 2021-03-08 DIAGNOSIS — T50.B95A FATIGUE AFTER COVID-19 VACCINATION: ICD-10-CM

## 2021-03-08 DIAGNOSIS — E78.5 HYPERLIPIDEMIA, UNSPECIFIED HYPERLIPIDEMIA TYPE: ICD-10-CM

## 2021-03-08 DIAGNOSIS — R53.83 FATIGUE AFTER COVID-19 VACCINATION: ICD-10-CM

## 2021-03-08 DIAGNOSIS — T50.B95A FATIGUE AFTER COVID-19 VACCINATION: Primary | ICD-10-CM

## 2021-03-08 LAB
25(OH)D3 SERPL-MCNC: 33.3 NG/ML (ref 30–100)
ALBUMIN SERPL-MCNC: 3.5 G/DL (ref 3.5–5.2)
ALBUMIN/GLOB SERPL: 1.2 G/DL
ALP SERPL-CCNC: 73 U/L (ref 39–117)
ALT SERPL W P-5'-P-CCNC: 29 U/L (ref 1–41)
ANION GAP SERPL CALCULATED.3IONS-SCNC: 9.7 MMOL/L (ref 5–15)
AST SERPL-CCNC: 28 U/L (ref 1–40)
BILIRUB SERPL-MCNC: 0.2 MG/DL (ref 0–1.2)
BUN SERPL-MCNC: 25 MG/DL (ref 6–20)
BUN/CREAT SERPL: 10.5 (ref 7–25)
CALCIUM SPEC-SCNC: 9.1 MG/DL (ref 8.6–10.5)
CHLORIDE SERPL-SCNC: 105 MMOL/L (ref 98–107)
CO2 SERPL-SCNC: 23.3 MMOL/L (ref 22–29)
CREAT SERPL-MCNC: 2.37 MG/DL (ref 0.76–1.27)
DEPRECATED RDW RBC AUTO: 41.2 FL (ref 37–54)
ERYTHROCYTE [DISTWIDTH] IN BLOOD BY AUTOMATED COUNT: 12.7 % (ref 12.3–15.4)
GFR SERPL CREATININE-BSD FRML MDRD: 28 ML/MIN/1.73
GLOBULIN UR ELPH-MCNC: 3 GM/DL
GLUCOSE SERPL-MCNC: 93 MG/DL (ref 65–99)
HBA1C MFR BLD: 5.32 % (ref 4.8–5.6)
HCT VFR BLD AUTO: 42.3 % (ref 37.5–51)
HGB BLD-MCNC: 14.1 G/DL (ref 13–17.7)
MCH RBC QN AUTO: 30.3 PG (ref 26.6–33)
MCHC RBC AUTO-ENTMCNC: 33.3 G/DL (ref 31.5–35.7)
MCV RBC AUTO: 90.8 FL (ref 79–97)
PLATELET # BLD AUTO: 218 10*3/MM3 (ref 140–450)
PMV BLD AUTO: 12 FL (ref 6–12)
POTASSIUM SERPL-SCNC: 4.3 MMOL/L (ref 3.5–5.2)
PROT SERPL-MCNC: 6.5 G/DL (ref 6–8.5)
RBC # BLD AUTO: 4.66 10*6/MM3 (ref 4.14–5.8)
SODIUM SERPL-SCNC: 138 MMOL/L (ref 136–145)
T4 FREE SERPL-MCNC: 1.92 NG/DL (ref 0.93–1.7)
TSH SERPL DL<=0.05 MIU/L-ACNC: 0.02 UIU/ML (ref 0.27–4.2)
VIT B12 BLD-MCNC: 617 PG/ML (ref 211–946)
WBC # BLD AUTO: 8.39 10*3/MM3 (ref 3.4–10.8)

## 2021-03-08 PROCEDURE — 82306 VITAMIN D 25 HYDROXY: CPT

## 2021-03-08 PROCEDURE — 83036 HEMOGLOBIN GLYCOSYLATED A1C: CPT

## 2021-03-08 PROCEDURE — 82607 VITAMIN B-12: CPT

## 2021-03-08 PROCEDURE — 84443 ASSAY THYROID STIM HORMONE: CPT

## 2021-03-08 PROCEDURE — 84439 ASSAY OF FREE THYROXINE: CPT

## 2021-03-08 PROCEDURE — 85027 COMPLETE CBC AUTOMATED: CPT

## 2021-03-08 PROCEDURE — 36415 COLL VENOUS BLD VENIPUNCTURE: CPT

## 2021-03-08 PROCEDURE — 80053 COMPREHEN METABOLIC PANEL: CPT

## 2021-09-18 ENCOUNTER — APPOINTMENT (OUTPATIENT)
Dept: CT IMAGING | Facility: HOSPITAL | Age: 61
End: 2021-09-18

## 2021-09-18 ENCOUNTER — HOSPITAL ENCOUNTER (EMERGENCY)
Facility: HOSPITAL | Age: 61
Discharge: HOME | End: 2021-09-18
Attending: EMERGENCY MEDICINE | Admitting: EMERGENCY MEDICINE

## 2021-09-18 VITALS
SYSTOLIC BLOOD PRESSURE: 120 MMHG | WEIGHT: 142 LBS | OXYGEN SATURATION: 93 % | TEMPERATURE: 99.6 F | HEART RATE: 86 BPM | BODY MASS INDEX: 22.82 KG/M2 | RESPIRATION RATE: 16 BRPM | HEIGHT: 66 IN | DIASTOLIC BLOOD PRESSURE: 80 MMHG

## 2021-09-18 DIAGNOSIS — N20.0 KIDNEY STONE ON LEFT SIDE: Primary | ICD-10-CM

## 2021-09-18 LAB
ALBUMIN SERPL-MCNC: 4 G/DL (ref 3.5–5.2)
ALBUMIN/GLOB SERPL: 1.7 G/DL
ALP SERPL-CCNC: 78 U/L (ref 39–117)
ALT SERPL W P-5'-P-CCNC: 18 U/L (ref 1–41)
ANION GAP SERPL CALCULATED.3IONS-SCNC: 14.4 MMOL/L (ref 5–15)
AST SERPL-CCNC: 22 U/L (ref 1–40)
BASOPHILS # BLD AUTO: 0.03 10*3/MM3 (ref 0–0.2)
BASOPHILS NFR BLD AUTO: 0.2 % (ref 0–1.5)
BILIRUB SERPL-MCNC: 0.7 MG/DL (ref 0–1.2)
BUN SERPL-MCNC: 29 MG/DL (ref 6–20)
BUN/CREAT SERPL: 7.6 (ref 7–25)
CALCIUM SPEC-SCNC: 8.9 MG/DL (ref 8.6–10.5)
CHLORIDE SERPL-SCNC: 98 MMOL/L (ref 98–107)
CO2 SERPL-SCNC: 21.6 MMOL/L (ref 22–29)
CREAT SERPL-MCNC: 3.81 MG/DL (ref 0.76–1.27)
DEPRECATED RDW RBC AUTO: 40.5 FL (ref 37–54)
EOSINOPHIL # BLD AUTO: 0.11 10*3/MM3 (ref 0–0.4)
EOSINOPHIL NFR BLD AUTO: 0.8 % (ref 0.3–6.2)
ERYTHROCYTE [DISTWIDTH] IN BLOOD BY AUTOMATED COUNT: 12.4 % (ref 12.3–15.4)
GFR SERPL CREATININE-BSD FRML MDRD: 16 ML/MIN/1.73
GLOBULIN UR ELPH-MCNC: 2.4 GM/DL
GLUCOSE SERPL-MCNC: 104 MG/DL (ref 65–99)
HCT VFR BLD AUTO: 43 % (ref 37.5–51)
HGB BLD-MCNC: 15.2 G/DL (ref 13–17.7)
HOLD SPECIMEN: NORMAL
HOLD SPECIMEN: NORMAL
IMM GRANULOCYTES # BLD AUTO: 0.03 10*3/MM3 (ref 0–0.05)
IMM GRANULOCYTES NFR BLD AUTO: 0.2 % (ref 0–0.5)
LIPASE SERPL-CCNC: 29 U/L (ref 13–60)
LYMPHOCYTES # BLD AUTO: 1.14 10*3/MM3 (ref 0.7–3.1)
LYMPHOCYTES NFR BLD AUTO: 8 % (ref 19.6–45.3)
MCH RBC QN AUTO: 31.2 PG (ref 26.6–33)
MCHC RBC AUTO-ENTMCNC: 35.3 G/DL (ref 31.5–35.7)
MCV RBC AUTO: 88.3 FL (ref 79–97)
MONOCYTES # BLD AUTO: 0.99 10*3/MM3 (ref 0.1–0.9)
MONOCYTES NFR BLD AUTO: 6.9 % (ref 5–12)
NEUTROPHILS NFR BLD AUTO: 11.95 10*3/MM3 (ref 1.7–7)
NEUTROPHILS NFR BLD AUTO: 83.9 % (ref 42.7–76)
NRBC BLD AUTO-RTO: 0 /100 WBC (ref 0–0.2)
PLATELET # BLD AUTO: 162 10*3/MM3 (ref 140–450)
PMV BLD AUTO: 10.3 FL (ref 6–12)
POTASSIUM SERPL-SCNC: 3.9 MMOL/L (ref 3.5–5.2)
PROT SERPL-MCNC: 6.4 G/DL (ref 6–8.5)
RBC # BLD AUTO: 4.87 10*6/MM3 (ref 4.14–5.8)
SODIUM SERPL-SCNC: 134 MMOL/L (ref 136–145)
WBC # BLD AUTO: 14.25 10*3/MM3 (ref 3.4–10.8)
WHOLE BLOOD HOLD SPECIMEN: NORMAL
WHOLE BLOOD HOLD SPECIMEN: NORMAL

## 2021-09-18 PROCEDURE — 96374 THER/PROPH/DIAG INJ IV PUSH: CPT

## 2021-09-18 PROCEDURE — 80053 COMPREHEN METABOLIC PANEL: CPT | Performed by: EMERGENCY MEDICINE

## 2021-09-18 PROCEDURE — 96375 TX/PRO/DX INJ NEW DRUG ADDON: CPT

## 2021-09-18 PROCEDURE — 83690 ASSAY OF LIPASE: CPT | Performed by: EMERGENCY MEDICINE

## 2021-09-18 PROCEDURE — 74176 CT ABD & PELVIS W/O CONTRAST: CPT

## 2021-09-18 PROCEDURE — 96361 HYDRATE IV INFUSION ADD-ON: CPT

## 2021-09-18 PROCEDURE — 25010000002 ONDANSETRON PER 1 MG: Performed by: EMERGENCY MEDICINE

## 2021-09-18 PROCEDURE — 85025 COMPLETE CBC W/AUTO DIFF WBC: CPT | Performed by: EMERGENCY MEDICINE

## 2021-09-18 PROCEDURE — 99283 EMERGENCY DEPT VISIT LOW MDM: CPT

## 2021-09-18 PROCEDURE — 96376 TX/PRO/DX INJ SAME DRUG ADON: CPT

## 2021-09-18 PROCEDURE — 25010000002 HYDROMORPHONE 1 MG/ML SOLUTION: Performed by: EMERGENCY MEDICINE

## 2021-09-18 RX ORDER — OXYCODONE HYDROCHLORIDE AND ACETAMINOPHEN 5; 325 MG/1; MG/1
1 TABLET ORAL ONCE
Status: COMPLETED | OUTPATIENT
Start: 2021-09-18 | End: 2021-09-18

## 2021-09-18 RX ORDER — ONDANSETRON 2 MG/ML
4 INJECTION INTRAMUSCULAR; INTRAVENOUS ONCE
Status: COMPLETED | OUTPATIENT
Start: 2021-09-18 | End: 2021-09-18

## 2021-09-18 RX ORDER — SODIUM CHLORIDE 0.9 % (FLUSH) 0.9 %
10 SYRINGE (ML) INJECTION AS NEEDED
Status: DISCONTINUED | OUTPATIENT
Start: 2021-09-18 | End: 2021-09-18 | Stop reason: HOSPADM

## 2021-09-18 RX ORDER — TAMSULOSIN HYDROCHLORIDE 0.4 MG/1
1 CAPSULE ORAL DAILY
Qty: 10 CAPSULE | Refills: 0 | Status: SHIPPED | OUTPATIENT
Start: 2021-09-18

## 2021-09-18 RX ORDER — OXYCODONE HYDROCHLORIDE AND ACETAMINOPHEN 5; 325 MG/1; MG/1
1 TABLET ORAL EVERY 6 HOURS PRN
Qty: 15 TABLET | Refills: 0 | Status: SHIPPED | OUTPATIENT
Start: 2021-09-18 | End: 2022-01-03

## 2021-09-18 RX ADMIN — OXYCODONE HYDROCHLORIDE AND ACETAMINOPHEN 1 TABLET: 5; 325 TABLET ORAL at 17:12

## 2021-09-18 RX ADMIN — SODIUM CHLORIDE 1000 ML: 9 INJECTION, SOLUTION INTRAVENOUS at 17:12

## 2021-09-18 RX ADMIN — HYDROMORPHONE HYDROCHLORIDE 1 MG: 1 INJECTION, SOLUTION INTRAMUSCULAR; INTRAVENOUS; SUBCUTANEOUS at 17:10

## 2021-09-18 RX ADMIN — HYDROMORPHONE HYDROCHLORIDE 0.5 MG: 1 INJECTION, SOLUTION INTRAMUSCULAR; INTRAVENOUS; SUBCUTANEOUS at 16:31

## 2021-09-18 RX ADMIN — ONDANSETRON 4 MG: 2 INJECTION INTRAMUSCULAR; INTRAVENOUS at 16:31

## 2021-09-18 NOTE — ED PROVIDER NOTES
"Subjective   61-year-old male presents with left flank pain, he has a history of kidney stones.  He states he is had procedures in the past for kidney stones with Dr. Velazquez.  This pain started within the last few days, is in the left flank and radiates down into the front.  He has had some nausea no vomiting.      History provided by:  Patient   used: No        Review of Systems   Gastrointestinal:        Left flank pain   All other systems reviewed and are negative.      Past Medical History:   Diagnosis Date   • Altered bowel elimination due to intestinal ostomy (CMS/HCC)     PATIENT REPORTS SECONDARY TO A COLON RUPTURE APPROXIMATELY 16-17 YEARS AGO   • Anemia    • Arthritis     WRIST   • Cancer (CMS/HCC)     Kidney   • CKD (chronic kidney disease)     stage 3   • Crohn disease (CMS/HCC)    • GERD (gastroesophageal reflux disease)    • History of MRSA infection     REPORTS HX OF \"NOSE\" 2-3 YEARS AGO, TREATED.   • History of pneumonia    • Hypothyroid    • Spinal headache    • Wears glasses        Allergies   Allergen Reactions   • Latex Rash       Past Surgical History:   Procedure Laterality Date   • COLONOSCOPY     • COLONOSCOPY N/A 7/31/2017    Procedure: Colonoscopy through ostomy site and rectum with biopsies;  Surgeon: Vincenzo Galan MD;  Location: [facility] ENDOSCOPY;  Service:    • ENDOSCOPY     • HEMORRHOIDECTOMY      REPORTS APPROXIMATELY 20 YEARS AGO   • HERNIA REPAIR      UMBILICAL   • KIDNEY SURGERY      Right kidney removed   • NEPHRECTOMY Right 07/2017   • SMALL INTESTINE SURGERY      HX OF COLON RUPTURE WITH PLACEMENT OF OSTOMY 16-17 years ago   • URETEROSCOPY LASER LITHOTRIPSY WITH STENT INSERTION Left 7/3/2019    Procedure: CYSTOSCOPY, URETEROSCOPY, STONE BASKETING, RETROGRADE PYLEOGRAM,  LASER LITHOTRIPSY WITH STENT INSERTION;  Surgeon: Lazaro Velazquez MD;  Location: [facility] OR;  Service: Urology   • URETEROSCOPY LASER LITHOTRIPSY WITH STENT INSERTION Left 9/20/2021    " Procedure: URETEROSCOPY diagnostic WITH STENT INSERTION  with left retrograde;  Surgeon: Lazaro Velazquez MD;  Location: [facility];  Service: Urology;  Laterality: Left;   • WISDOM TOOTH EXTRACTION     • WRIST SURGERY Right        Family History   Problem Relation Age of Onset   • Heart disease Mother    • Cancer Father    • No Known Problems Sister    • COPD Brother    • COPD Sister    • Colon cancer Neg Hx        Social History     Socioeconomic History   • Marital status:      Spouse name: Not on file   • Number of children: Not on file   • Years of education: Not on file   • Highest education level: Not on file   Tobacco Use   • Smoking status: Former Smoker     Types: Cigarettes   • Smokeless tobacco: Never Used   • Tobacco comment: QUIT 30 PLUS YEARS AGO   Substance and Sexual Activity   • Alcohol use: No     Comment: QUIT 30 PLUS YEARS   • Drug use: No   • Sexual activity: Yes     Partners: Female           Objective   Physical Exam  Vitals and nursing note reviewed.   Constitutional:       Appearance: He is well-developed.   HENT:      Head: Normocephalic and atraumatic.   Cardiovascular:      Rate and Rhythm: Normal rate and regular rhythm.   Pulmonary:      Effort: Pulmonary effort is normal.      Breath sounds: Normal breath sounds.   Abdominal:      Comments: Left flank pain   Musculoskeletal:         General: Normal range of motion.      Cervical back: Normal range of motion.   Skin:     General: Skin is warm and dry.   Neurological:      Mental Status: He is alert and oriented to person, place, and time.   Psychiatric:         Behavior: Behavior normal.         Thought Content: Thought content normal.         Judgment: Judgment normal.         Procedures           ED Course                                           MDM  Number of Diagnoses or Management Options  Kidney stone on left side: new and requires workup     Amount and/or Complexity of Data Reviewed  Clinical lab tests:  reviewed  Tests in the radiology section of CPT®: reviewed  Decide to obtain previous medical records or to obtain history from someone other than the patient: yes    Risk of Complications, Morbidity, and/or Mortality  Presenting problems: minimal  Diagnostic procedures: minimal  Management options: minimal    Patient Progress  Patient progress: stable      Final diagnoses:   Kidney stone on left side       ED Disposition  ED Disposition     ED Disposition Condition Comment    Discharge Stable           Lazaro Velazquez MD  [address] [address] [phone]    Schedule an appointment as soon as possible for a visit       [facility] Emergency Department  [address] [place] [id number] [phone]    If symptoms worsen         Medication List      New Prescriptions    oxyCODONE-acetaminophen 5-325 MG per tablet  Commonly known as: PERCOCET  Take 1 tablet by mouth Every 6 (Six) Hours As Needed for Moderate Pain .     tamsulosin 0.4 MG capsule 24 hr capsule  Commonly known as: FLOMAX  Take 1 capsule by mouth Daily.           Where to Get Your Medications      These medications were sent to [id number] - [place], [place] - [address] [place] [address] [employer] AT [facility] [employer] [place] & - [phone]  - [phone] [address] [address]    Phone: [phone]   · oxyCODONE-acetaminophen 5-325 MG per tablet  · tamsulosin 0.4 MG capsule 24 hr capsule          Vick Maurer Jr., PAOmarC  09/24/21 6586

## 2021-09-20 ENCOUNTER — ANESTHESIA EVENT (OUTPATIENT)
Dept: PERIOP | Facility: HOSPITAL | Age: 61
End: 2021-09-20

## 2021-09-20 ENCOUNTER — ANESTHESIA (OUTPATIENT)
Dept: PERIOP | Facility: HOSPITAL | Age: 61
End: 2021-09-20

## 2021-09-20 ENCOUNTER — HOSPITAL ENCOUNTER (OUTPATIENT)
Facility: HOSPITAL | Age: 61
Setting detail: HOSPITAL OUTPATIENT SURGERY
End: 2021-09-20
Attending: UROLOGY | Admitting: UROLOGY

## 2021-09-20 ENCOUNTER — HOSPITAL ENCOUNTER (INPATIENT)
Facility: HOSPITAL | Age: 61
LOS: 2 days | Discharge: HOME | End: 2021-09-22
Attending: EMERGENCY MEDICINE | Admitting: INTERNAL MEDICINE

## 2021-09-20 DIAGNOSIS — N13.2 HYDRONEPHROSIS WITH OBSTRUCTING CALCULUS: ICD-10-CM

## 2021-09-20 DIAGNOSIS — N20.9 CALCULUS (=STONE): ICD-10-CM

## 2021-09-20 DIAGNOSIS — N17.9 ACUTE RENAL FAILURE, UNSPECIFIED ACUTE RENAL FAILURE TYPE (HCC): Primary | ICD-10-CM

## 2021-09-20 LAB
ALBUMIN SERPL-MCNC: 3.3 G/DL (ref 3.5–5.2)
ALBUMIN/GLOB SERPL: 1.3 G/DL
ALP SERPL-CCNC: 74 U/L (ref 39–117)
ALT SERPL W P-5'-P-CCNC: 11 U/L (ref 1–41)
ANION GAP SERPL CALCULATED.3IONS-SCNC: 21.4 MMOL/L (ref 5–15)
AST SERPL-CCNC: 7 U/L (ref 1–40)
BACTERIA UR QL AUTO: ABNORMAL /HPF
BASOPHILS # BLD AUTO: 0.03 10*3/MM3 (ref 0–0.2)
BASOPHILS NFR BLD AUTO: 0.2 % (ref 0–1.5)
BILIRUB SERPL-MCNC: 0.4 MG/DL (ref 0–1.2)
BILIRUB UR QL STRIP: NEGATIVE
BUN SERPL-MCNC: 47 MG/DL (ref 6–20)
BUN/CREAT SERPL: 5.7 (ref 7–25)
CALCIUM SPEC-SCNC: 7.6 MG/DL (ref 8.6–10.5)
CHLORIDE SERPL-SCNC: 88 MMOL/L (ref 98–107)
CLARITY UR: ABNORMAL
CO2 SERPL-SCNC: 15.6 MMOL/L (ref 22–29)
COLOR UR: ABNORMAL
CREAT SERPL-MCNC: 8.2 MG/DL (ref 0.76–1.27)
DEPRECATED RDW RBC AUTO: 39.3 FL (ref 37–54)
EOSINOPHIL # BLD AUTO: 0.22 10*3/MM3 (ref 0–0.4)
EOSINOPHIL NFR BLD AUTO: 1.6 % (ref 0.3–6.2)
ERYTHROCYTE [DISTWIDTH] IN BLOOD BY AUTOMATED COUNT: 12.3 % (ref 12.3–15.4)
FLUAV RNA RESP QL NAA+PROBE: NOT DETECTED
FLUBV RNA RESP QL NAA+PROBE: NOT DETECTED
GFR SERPL CREATININE-BSD FRML MDRD: 7 ML/MIN/1.73
GFR SERPL CREATININE-BSD FRML MDRD: ABNORMAL ML/MIN/{1.73_M2}
GLOBULIN UR ELPH-MCNC: 2.6 GM/DL
GLUCOSE SERPL-MCNC: 91 MG/DL (ref 65–99)
GLUCOSE UR STRIP-MCNC: NEGATIVE MG/DL
HCT VFR BLD AUTO: 38.7 % (ref 37.5–51)
HGB BLD-MCNC: 14 G/DL (ref 13–17.7)
HGB UR QL STRIP.AUTO: ABNORMAL
HOLD SPECIMEN: NORMAL
HOLD SPECIMEN: NORMAL
HYALINE CASTS UR QL AUTO: ABNORMAL /LPF
IMM GRANULOCYTES # BLD AUTO: 0.06 10*3/MM3 (ref 0–0.05)
IMM GRANULOCYTES NFR BLD AUTO: 0.4 % (ref 0–0.5)
KETONES UR QL STRIP: NEGATIVE
LEUKOCYTE ESTERASE UR QL STRIP.AUTO: ABNORMAL
LIPASE SERPL-CCNC: 19 U/L (ref 13–60)
LYMPHOCYTES # BLD AUTO: 1.04 10*3/MM3 (ref 0.7–3.1)
LYMPHOCYTES NFR BLD AUTO: 7.5 % (ref 19.6–45.3)
MCH RBC QN AUTO: 31.4 PG (ref 26.6–33)
MCHC RBC AUTO-ENTMCNC: 36.2 G/DL (ref 31.5–35.7)
MCV RBC AUTO: 86.8 FL (ref 79–97)
MONOCYTES # BLD AUTO: 1.19 10*3/MM3 (ref 0.1–0.9)
MONOCYTES NFR BLD AUTO: 8.6 % (ref 5–12)
NEUTROPHILS NFR BLD AUTO: 11.27 10*3/MM3 (ref 1.7–7)
NEUTROPHILS NFR BLD AUTO: 81.7 % (ref 42.7–76)
NITRITE UR QL STRIP: NEGATIVE
NRBC BLD AUTO-RTO: 0 /100 WBC (ref 0–0.2)
PH UR STRIP.AUTO: <=5 [PH] (ref 5–8)
PLATELET # BLD AUTO: 120 10*3/MM3 (ref 140–450)
PMV BLD AUTO: 10.9 FL (ref 6–12)
POTASSIUM SERPL-SCNC: 3.9 MMOL/L (ref 3.5–5.2)
PROT SERPL-MCNC: 5.9 G/DL (ref 6–8.5)
PROT UR QL STRIP: ABNORMAL
RBC # BLD AUTO: 4.46 10*6/MM3 (ref 4.14–5.8)
RBC # UR: ABNORMAL /HPF
REF LAB TEST METHOD: ABNORMAL
SARS-COV-2 RNA RESP QL NAA+PROBE: NOT DETECTED
SODIUM SERPL-SCNC: 125 MMOL/L (ref 136–145)
SP GR UR STRIP: 1.01 (ref 1–1.03)
SQUAMOUS #/AREA URNS HPF: ABNORMAL /HPF
UROBILINOGEN UR QL STRIP: ABNORMAL
WBC # BLD AUTO: 13.81 10*3/MM3 (ref 3.4–10.8)
WBC UR QL AUTO: ABNORMAL /HPF
WHOLE BLOOD HOLD SPECIMEN: NORMAL
WHOLE BLOOD HOLD SPECIMEN: NORMAL

## 2021-09-20 PROCEDURE — 25010000002 MORPHINE PER 10 MG: Performed by: EMERGENCY MEDICINE

## 2021-09-20 PROCEDURE — 83690 ASSAY OF LIPASE: CPT | Performed by: EMERGENCY MEDICINE

## 2021-09-20 PROCEDURE — 99222 1ST HOSP IP/OBS MODERATE 55: CPT | Performed by: EMERGENCY MEDICINE

## 2021-09-20 PROCEDURE — 0T778DZ DILATION OF LEFT URETER WITH INTRALUMINAL DEVICE, VIA NATURAL OR ARTIFICIAL OPENING ENDOSCOPIC: ICD-10-PCS | Performed by: UROLOGY

## 2021-09-20 PROCEDURE — 99284 EMERGENCY DEPT VISIT MOD MDM: CPT

## 2021-09-20 PROCEDURE — 52351 CYSTOURETERO & OR PYELOSCOPE: CPT | Performed by: UROLOGY

## 2021-09-20 PROCEDURE — 25010000002 DEXAMETHASONE PER 1 MG: Performed by: NURSE ANESTHETIST, CERTIFIED REGISTERED

## 2021-09-20 PROCEDURE — 25010000003 CEFAZOLIN SODIUM-DEXTROSE 2-3 GM-%(50ML) RECONSTITUTED SOLUTION: Performed by: UROLOGY

## 2021-09-20 PROCEDURE — 25010000002 HYDROMORPHONE 1 MG/ML SOLUTION: Performed by: EMERGENCY MEDICINE

## 2021-09-20 PROCEDURE — 25010000002 CEFTRIAXONE SODIUM-DEXTROSE 1-3.74 GM-%(50ML) RECONSTITUTED SOLUTION: Performed by: EMERGENCY MEDICINE

## 2021-09-20 PROCEDURE — C1769 GUIDE WIRE: HCPCS | Performed by: UROLOGY

## 2021-09-20 PROCEDURE — 74420 UROGRAPHY RTRGR +-KUB: CPT | Performed by: UROLOGY

## 2021-09-20 PROCEDURE — 25010000002 IOPAMIDOL 61 % SOLUTION: Performed by: UROLOGY

## 2021-09-20 PROCEDURE — C2617 STENT, NON-COR, TEM W/O DEL: HCPCS | Performed by: UROLOGY

## 2021-09-20 PROCEDURE — 25010000002 ONDANSETRON PER 1 MG: Performed by: NURSE ANESTHETIST, CERTIFIED REGISTERED

## 2021-09-20 PROCEDURE — 81001 URINALYSIS AUTO W/SCOPE: CPT | Performed by: EMERGENCY MEDICINE

## 2021-09-20 PROCEDURE — 25010000002 ONDANSETRON PER 1 MG: Performed by: EMERGENCY MEDICINE

## 2021-09-20 PROCEDURE — C1758 CATHETER, URETERAL: HCPCS | Performed by: UROLOGY

## 2021-09-20 PROCEDURE — 80053 COMPREHEN METABOLIC PANEL: CPT | Performed by: EMERGENCY MEDICINE

## 2021-09-20 PROCEDURE — 83970 ASSAY OF PARATHORMONE: CPT | Performed by: INTERNAL MEDICINE

## 2021-09-20 PROCEDURE — 0TC78ZZ EXTIRPATION OF MATTER FROM LEFT URETER, VIA NATURAL OR ARTIFICIAL OPENING ENDOSCOPIC: ICD-10-PCS | Performed by: UROLOGY

## 2021-09-20 PROCEDURE — 82365 CALCULUS SPECTROSCOPY: CPT | Performed by: UROLOGY

## 2021-09-20 PROCEDURE — 25010000002 MIDAZOLAM PER 1MG: Performed by: NURSE ANESTHETIST, CERTIFIED REGISTERED

## 2021-09-20 PROCEDURE — 87636 SARSCOV2 & INF A&B AMP PRB: CPT | Performed by: EMERGENCY MEDICINE

## 2021-09-20 PROCEDURE — 99221 1ST HOSP IP/OBS SF/LOW 40: CPT | Performed by: UROLOGY

## 2021-09-20 PROCEDURE — C1894 INTRO/SHEATH, NON-LASER: HCPCS | Performed by: UROLOGY

## 2021-09-20 PROCEDURE — 25010000002 PROPOFOL 200 MG/20ML EMULSION: Performed by: NURSE ANESTHETIST, CERTIFIED REGISTERED

## 2021-09-20 PROCEDURE — 85025 COMPLETE CBC W/AUTO DIFF WBC: CPT

## 2021-09-20 PROCEDURE — 25010000002 HEPARIN (PORCINE) PER 1000 UNITS: Performed by: EMERGENCY MEDICINE

## 2021-09-20 PROCEDURE — 52332 CYSTOSCOPY AND TREATMENT: CPT | Performed by: UROLOGY

## 2021-09-20 PROCEDURE — 25010000002 MORPHINE SULFATE (PF) 2 MG/ML SOLUTION: Performed by: EMERGENCY MEDICINE

## 2021-09-20 PROCEDURE — 25010000002 HYDROMORPHONE 1 MG/ML SOLUTION: Performed by: NURSE ANESTHETIST, CERTIFIED REGISTERED

## 2021-09-20 PROCEDURE — BT1F1ZZ FLUOROSCOPY OF LEFT KIDNEY, URETER AND BLADDER USING LOW OSMOLAR CONTRAST: ICD-10-PCS | Performed by: UROLOGY

## 2021-09-20 PROCEDURE — 25010000002 FENTANYL CITRATE (PF) 100 MCG/2ML SOLUTION: Performed by: NURSE ANESTHETIST, CERTIFIED REGISTERED

## 2021-09-20 DEVICE — URETERAL STENT
Type: IMPLANTABLE DEVICE | Site: KIDNEY | Status: FUNCTIONAL
Brand: CONTOUR™

## 2021-09-20 RX ORDER — CEFTRIAXONE 1 G/50ML
1 INJECTION, SOLUTION INTRAVENOUS EVERY 24 HOURS
Status: COMPLETED | OUTPATIENT
Start: 2021-09-20 | End: 2021-09-21

## 2021-09-20 RX ORDER — IPRATROPIUM BROMIDE AND ALBUTEROL SULFATE 2.5; .5 MG/3ML; MG/3ML
3 SOLUTION RESPIRATORY (INHALATION) ONCE AS NEEDED
Status: DISCONTINUED | OUTPATIENT
Start: 2021-09-20 | End: 2021-09-20 | Stop reason: HOSPADM

## 2021-09-20 RX ORDER — PANTOPRAZOLE SODIUM 40 MG/1
40 TABLET, DELAYED RELEASE ORAL DAILY
Status: DISCONTINUED | OUTPATIENT
Start: 2021-09-20 | End: 2021-09-22 | Stop reason: HOSPADM

## 2021-09-20 RX ORDER — ATROPA BELLADONNA AND OPIUM 16.2; 3 MG/1; MG/1
SUPPOSITORY RECTAL AS NEEDED
Status: DISCONTINUED | OUTPATIENT
Start: 2021-09-20 | End: 2021-09-20 | Stop reason: HOSPADM

## 2021-09-20 RX ORDER — ONDANSETRON 2 MG/ML
INJECTION INTRAMUSCULAR; INTRAVENOUS AS NEEDED
Status: DISCONTINUED | OUTPATIENT
Start: 2021-09-20 | End: 2021-09-20 | Stop reason: SURG

## 2021-09-20 RX ORDER — ONDANSETRON 2 MG/ML
4 INJECTION INTRAMUSCULAR; INTRAVENOUS ONCE AS NEEDED
Status: DISCONTINUED | OUTPATIENT
Start: 2021-09-20 | End: 2021-09-20 | Stop reason: HOSPADM

## 2021-09-20 RX ORDER — CEFAZOLIN SODIUM 2 G/50ML
2 SOLUTION INTRAVENOUS
Status: COMPLETED | OUTPATIENT
Start: 2021-09-20 | End: 2021-09-20

## 2021-09-20 RX ORDER — MORPHINE SULFATE 2 MG/ML
6 INJECTION, SOLUTION INTRAMUSCULAR; INTRAVENOUS ONCE
Status: COMPLETED | OUTPATIENT
Start: 2021-09-20 | End: 2021-09-20

## 2021-09-20 RX ORDER — ONDANSETRON 2 MG/ML
4 INJECTION INTRAMUSCULAR; INTRAVENOUS EVERY 6 HOURS PRN
Status: DISCONTINUED | OUTPATIENT
Start: 2021-09-20 | End: 2021-09-22 | Stop reason: HOSPADM

## 2021-09-20 RX ORDER — MIDAZOLAM HYDROCHLORIDE 2 MG/2ML
INJECTION, SOLUTION INTRAMUSCULAR; INTRAVENOUS AS NEEDED
Status: DISCONTINUED | OUTPATIENT
Start: 2021-09-20 | End: 2021-09-20 | Stop reason: SURG

## 2021-09-20 RX ORDER — MORPHINE SULFATE 4 MG/ML
4 INJECTION, SOLUTION INTRAMUSCULAR; INTRAVENOUS ONCE
Status: COMPLETED | OUTPATIENT
Start: 2021-09-20 | End: 2021-09-20

## 2021-09-20 RX ORDER — SODIUM CHLORIDE 9 MG/ML
75 INJECTION, SOLUTION INTRAVENOUS CONTINUOUS
Status: ACTIVE | OUTPATIENT
Start: 2021-09-20 | End: 2021-09-21

## 2021-09-20 RX ORDER — FENTANYL CITRATE 50 UG/ML
INJECTION, SOLUTION INTRAMUSCULAR; INTRAVENOUS AS NEEDED
Status: DISCONTINUED | OUTPATIENT
Start: 2021-09-20 | End: 2021-09-20 | Stop reason: SURG

## 2021-09-20 RX ORDER — HEPARIN SODIUM 5000 [USP'U]/ML
5000 INJECTION, SOLUTION INTRAVENOUS; SUBCUTANEOUS EVERY 8 HOURS SCHEDULED
Status: DISCONTINUED | OUTPATIENT
Start: 2021-09-20 | End: 2021-09-22 | Stop reason: HOSPADM

## 2021-09-20 RX ORDER — PROMETHAZINE HYDROCHLORIDE 25 MG/1
25 SUPPOSITORY RECTAL ONCE AS NEEDED
Status: DISCONTINUED | OUTPATIENT
Start: 2021-09-20 | End: 2021-09-20 | Stop reason: HOSPADM

## 2021-09-20 RX ORDER — PROMETHAZINE HYDROCHLORIDE 25 MG/1
25 TABLET ORAL ONCE AS NEEDED
Status: DISCONTINUED | OUTPATIENT
Start: 2021-09-20 | End: 2021-09-20 | Stop reason: HOSPADM

## 2021-09-20 RX ORDER — SODIUM BICARBONATE 650 MG/1
1300 TABLET ORAL 4 TIMES DAILY
Status: DISCONTINUED | OUTPATIENT
Start: 2021-09-20 | End: 2021-09-22 | Stop reason: HOSPADM

## 2021-09-20 RX ORDER — ACETAMINOPHEN 650 MG/1
650 SUPPOSITORY RECTAL EVERY 4 HOURS PRN
Status: DISCONTINUED | OUTPATIENT
Start: 2021-09-20 | End: 2021-09-22 | Stop reason: HOSPADM

## 2021-09-20 RX ORDER — DEXAMETHASONE SODIUM PHOSPHATE 4 MG/ML
INJECTION, SOLUTION INTRA-ARTICULAR; INTRALESIONAL; INTRAMUSCULAR; INTRAVENOUS; SOFT TISSUE AS NEEDED
Status: DISCONTINUED | OUTPATIENT
Start: 2021-09-20 | End: 2021-09-20 | Stop reason: SURG

## 2021-09-20 RX ORDER — LIDOCAINE HYDROCHLORIDE 20 MG/ML
INJECTION, SOLUTION INTRAVENOUS AS NEEDED
Status: DISCONTINUED | OUTPATIENT
Start: 2021-09-20 | End: 2021-09-20 | Stop reason: SURG

## 2021-09-20 RX ORDER — PROPOFOL 10 MG/ML
INJECTION, EMULSION INTRAVENOUS AS NEEDED
Status: DISCONTINUED | OUTPATIENT
Start: 2021-09-20 | End: 2021-09-20 | Stop reason: SURG

## 2021-09-20 RX ORDER — MEPERIDINE HYDROCHLORIDE 25 MG/ML
12.5 INJECTION INTRAMUSCULAR; INTRAVENOUS; SUBCUTANEOUS
Status: DISCONTINUED | OUTPATIENT
Start: 2021-09-20 | End: 2021-09-20 | Stop reason: HOSPADM

## 2021-09-20 RX ORDER — TAMSULOSIN HYDROCHLORIDE 0.4 MG/1
0.4 CAPSULE ORAL DAILY
Status: DISCONTINUED | OUTPATIENT
Start: 2021-09-20 | End: 2021-09-22 | Stop reason: HOSPADM

## 2021-09-20 RX ORDER — SODIUM CHLORIDE 0.9 % (FLUSH) 0.9 %
10 SYRINGE (ML) INJECTION AS NEEDED
Status: DISCONTINUED | OUTPATIENT
Start: 2021-09-20 | End: 2021-09-22 | Stop reason: HOSPADM

## 2021-09-20 RX ORDER — MAGNESIUM HYDROXIDE 1200 MG/15ML
LIQUID ORAL AS NEEDED
Status: DISCONTINUED | OUTPATIENT
Start: 2021-09-20 | End: 2021-09-20 | Stop reason: HOSPADM

## 2021-09-20 RX ORDER — SODIUM CHLORIDE 0.9 % (FLUSH) 0.9 %
10 SYRINGE (ML) INJECTION EVERY 12 HOURS SCHEDULED
Status: DISCONTINUED | OUTPATIENT
Start: 2021-09-20 | End: 2021-09-22 | Stop reason: HOSPADM

## 2021-09-20 RX ORDER — SODIUM CHLORIDE 9 MG/ML
500 INJECTION, SOLUTION INTRAVENOUS CONTINUOUS
Status: ACTIVE | OUTPATIENT
Start: 2021-09-20 | End: 2021-09-21

## 2021-09-20 RX ORDER — OXYCODONE HYDROCHLORIDE AND ACETAMINOPHEN 5; 325 MG/1; MG/1
1 TABLET ORAL EVERY 6 HOURS PRN
Status: DISCONTINUED | OUTPATIENT
Start: 2021-09-20 | End: 2021-09-22 | Stop reason: HOSPADM

## 2021-09-20 RX ORDER — ACETAMINOPHEN 325 MG/1
650 TABLET ORAL EVERY 4 HOURS PRN
Status: DISCONTINUED | OUTPATIENT
Start: 2021-09-20 | End: 2021-09-22 | Stop reason: HOSPADM

## 2021-09-20 RX ORDER — ONDANSETRON 2 MG/ML
4 INJECTION INTRAMUSCULAR; INTRAVENOUS ONCE
Status: COMPLETED | OUTPATIENT
Start: 2021-09-20 | End: 2021-09-20

## 2021-09-20 RX ORDER — ACETAMINOPHEN 160 MG/5ML
650 SOLUTION ORAL EVERY 4 HOURS PRN
Status: DISCONTINUED | OUTPATIENT
Start: 2021-09-20 | End: 2021-09-22 | Stop reason: HOSPADM

## 2021-09-20 RX ADMIN — PANTOPRAZOLE SODIUM 40 MG: 40 TABLET, DELAYED RELEASE ORAL at 18:17

## 2021-09-20 RX ADMIN — HYDROMORPHONE HYDROCHLORIDE 0.5 MG: 1 INJECTION, SOLUTION INTRAMUSCULAR; INTRAVENOUS; SUBCUTANEOUS at 10:50

## 2021-09-20 RX ADMIN — MIDAZOLAM HYDROCHLORIDE 2 MG: 1 INJECTION, SOLUTION INTRAMUSCULAR; INTRAVENOUS at 13:14

## 2021-09-20 RX ADMIN — MORPHINE SULFATE 6 MG: 2 INJECTION, SOLUTION INTRAMUSCULAR; INTRAVENOUS at 05:38

## 2021-09-20 RX ADMIN — ONDANSETRON 4 MG: 2 INJECTION INTRAMUSCULAR; INTRAVENOUS at 05:38

## 2021-09-20 RX ADMIN — SODIUM BICARBONATE 650 MG TABLET 1300 MG: at 17:42

## 2021-09-20 RX ADMIN — LEVOTHYROXINE SODIUM 200 MCG: 125 TABLET ORAL at 17:42

## 2021-09-20 RX ADMIN — SODIUM CHLORIDE 100 MG: 900 INJECTION, SOLUTION INTRAVENOUS at 05:48

## 2021-09-20 RX ADMIN — TAMSULOSIN HYDROCHLORIDE 0.4 MG: 0.4 CAPSULE ORAL at 17:42

## 2021-09-20 RX ADMIN — SODIUM BICARBONATE 650 MG TABLET 1300 MG: at 10:06

## 2021-09-20 RX ADMIN — CEFAZOLIN SODIUM 2 G: 2 SOLUTION INTRAVENOUS at 13:22

## 2021-09-20 RX ADMIN — SODIUM CHLORIDE 75 ML/HR: 9 INJECTION, SOLUTION INTRAVENOUS at 17:55

## 2021-09-20 RX ADMIN — MORPHINE SULFATE 4 MG: 4 INJECTION, SOLUTION INTRAMUSCULAR; INTRAVENOUS at 06:30

## 2021-09-20 RX ADMIN — SODIUM CHLORIDE 500 ML: 9 INJECTION, SOLUTION INTRAVENOUS at 10:48

## 2021-09-20 RX ADMIN — FENTANYL CITRATE 50 MCG: 50 INJECTION INTRAMUSCULAR; INTRAVENOUS at 13:14

## 2021-09-20 RX ADMIN — SODIUM CHLORIDE, PRESERVATIVE FREE 10 ML: 5 INJECTION INTRAVENOUS at 21:07

## 2021-09-20 RX ADMIN — ONDANSETRON 4 MG: 2 INJECTION INTRAMUSCULAR; INTRAVENOUS at 13:22

## 2021-09-20 RX ADMIN — SODIUM CHLORIDE 1000 ML: 9 INJECTION, SOLUTION INTRAVENOUS at 05:37

## 2021-09-20 RX ADMIN — HEPARIN SODIUM 5000 UNITS: 5000 INJECTION INTRAVENOUS; SUBCUTANEOUS at 21:07

## 2021-09-20 RX ADMIN — SODIUM BICARBONATE 650 MG TABLET 1300 MG: at 20:21

## 2021-09-20 RX ADMIN — LIDOCAINE HYDROCHLORIDE 100 MG: 20 INJECTION, SOLUTION INTRAVENOUS at 13:17

## 2021-09-20 RX ADMIN — CEFTRIAXONE 1 G: 1 INJECTION, SOLUTION INTRAVENOUS at 17:43

## 2021-09-20 RX ADMIN — HYDROMORPHONE HYDROCHLORIDE 1 MG: 1 INJECTION, SOLUTION INTRAMUSCULAR; INTRAVENOUS; SUBCUTANEOUS at 08:16

## 2021-09-20 RX ADMIN — PROPOFOL 200 MG: 10 INJECTION, EMULSION INTRAVENOUS at 13:17

## 2021-09-20 RX ADMIN — OXYCODONE HYDROCHLORIDE AND ACETAMINOPHEN 1 TABLET: 5; 325 TABLET ORAL at 17:54

## 2021-09-20 RX ADMIN — DEXAMETHASONE SODIUM PHOSPHATE 4 MG: 4 INJECTION, SOLUTION INTRAMUSCULAR; INTRAVENOUS at 13:22

## 2021-09-20 NOTE — CASE MANAGEMENT/SOCIAL WORK
Discharge Planning Assessment  [facility]     Patient Name: Jorge Mclaughlin  MRN: [id number]  Today's Date: 9/20/2021    Admit Date: 9/20/2021    Discharge Needs Assessment     Row Name 09/20/21 1514       Living Environment    Lives With  spouse    Name(s) of Who Lives With Patient  wife    Unique Family Situation  works fulltime    Primary Care Provided by  self;spouse/significant other    Provides Primary Care For  no one    Able to Return to Prior Arrangements  yes    Living Arrangement Comments  home with spouse       Resource/Environmental Concerns    Transportation Concerns  car, none       Transition Planning    Patient/Family Anticipates Transition to  home       Discharge Needs Assessment    Readmission Within the Last 30 Days  no previous admission in last 30 days    Concerns to be Addressed  discharge planning    Discharge Coordination/Progress  home        Discharge Plan     Row Name 09/20/21 1516       Plan    Plan  Discharge plan, verified address and PCP. Lives with wife, has living will requested copy. Works, drives, no home health or dme. No transport or medication access issues.        Continued Care and Services - Admitted Since 9/20/2021    Coordination has not been started for this encounter.       Expected Discharge Date and Time     Expected Discharge Date Expected Discharge Time    Sep 23, 2021         Demographic Summary     Row Name 09/20/21 1447       General Information    Admission Type  inpatient    Arrived From  home    Expected Length of Stay (LOS)  4    Referral Source  admission list    Reason for Consult  discharge planning    Preferred Language  English       Contact Information    Contact Information Comments  wife        Functional Status     Row Name 09/20/21 1513       Functional Status, IADL    Medications  independent    Meal Preparation  independent    Housekeeping  independent    Laundry  independent    Shopping  independent    IADL Comments  no dme or o2, walks        Employment/    Employment Status  employed full-time        Psychosocial    No documentation.       Abuse/Neglect    No documentation.       Legal    No documentation.       Substance Abuse    No documentation.       Patient Forms    No documentation.           JEANNE HawkinsW

## 2021-09-20 NOTE — ANESTHESIA PROCEDURE NOTES
Airway  Urgency: elective    Date/Time: 9/20/2021 1:18 PM    General Information and Staff    Patient location during procedure: OR  CRNA: Megha Pickett CRNA    Indications and Patient Condition    Preoxygenated: yes  Mask difficulty assessment: 1 - vent by mask    Final Airway Details  Final airway type: supraglottic airway      Successful airway: unique  Size 4  Airway Seal Pressure (cm H2O): 20    Number of attempts at approach: 1  Assessment: lips, teeth, and gum same as pre-op    Additional Comments  LMA seats well

## 2021-09-20 NOTE — CONSULTS
"HPI  Jorge Mclaughlin is a 61 y.o. with history of   1. Acute renal failure, unspecified acute renal failure type (CMS/HCC)    2. Hydronephrosis with obstructing calculus       He complains of left colicky continuous severe excruciating flank pain that is associated with acute renal failure, but not with fevers or dysuria.  It radiates around his left side.    Past Medical History  Past Medical History:   Diagnosis Date   • Altered bowel elimination due to intestinal ostomy (CMS/HCC)     PATIENT REPORTS SECONDARY TO A COLON RUPTURE APPROXIMATELY 16-17 YEARS AGO   • Anemia    • Arthritis     WRIST   • Cancer (CMS/HCC)     Kidney   • CKD (chronic kidney disease)     stage 3   • Crohn disease (CMS/HCC)    • GERD (gastroesophageal reflux disease)    • History of MRSA infection     REPORTS HX OF \"NOSE\" 2-3 YEARS AGO, TREATED.   • History of pneumonia    • Hypothyroid    • Spinal headache    • Wears glasses        Past Surgical History  Past Surgical History:   Procedure Laterality Date   • COLONOSCOPY     • COLONOSCOPY N/A 7/31/2017    Procedure: Colonoscopy through ostomy site and rectum with biopsies;  Surgeon: Vincenzo Galan MD;  Location: [facility] ENDOSCOPY;  Service:    • ENDOSCOPY     • HEMORRHOIDECTOMY      REPORTS APPROXIMATELY 20 YEARS AGO   • HERNIA REPAIR      UMBILICAL   • KIDNEY SURGERY      Right kidney removed   • NEPHRECTOMY Right 07/2017   • SMALL INTESTINE SURGERY      HX OF COLON RUPTURE WITH PLACEMENT OF OSTOMY 16-17 years ago   • URETEROSCOPY LASER LITHOTRIPSY WITH STENT INSERTION Left 7/3/2019    Procedure: CYSTOSCOPY, URETEROSCOPY, STONE BASKETING, RETROGRADE PYLEOGRAM,  LASER LITHOTRIPSY WITH STENT INSERTION;  Surgeon: Lazaro Velazquez MD;  Location: [facility] OR;  Service: Urology   • WISDOM TOOTH EXTRACTION     • WRIST SURGERY Right        Medications    Current Facility-Administered Medications:   •  HYDROmorphone (DILAUDID) injection 0.5 mg, 0.5 mg, Intravenous, Q10 Min CECYN, Piotr Nichols " V, CRNA, 0.5 mg at 09/20/21 1050  •  [MAR Hold] sodium bicarbonate tablet 1,300 mg, 1,300 mg, Oral, 4x Daily, Sunday Landin, DO, 1,300 mg at 09/20/21 1006  •  [MAR Hold] sodium chloride 0.9 % flush 10 mL, 10 mL, Intravenous, PRN, Rasta Barnard MD  •  sodium chloride 0.9 % infusion 500 mL, 500 mL, Intravenous, Continuous, Lazaro Velazquez MD, Last Rate: 25 mL/hr at 09/20/21 1048, 500 mL at 09/20/21 1048    Allergies  Allergies   Allergen Reactions   • Latex Rash       Social History  Social History     Socioeconomic History   • Marital status:      Spouse name: Not on file   • Number of children: Not on file   • Years of education: Not on file   • Highest education level: Not on file   Tobacco Use   • Smoking status: Former Smoker     Types: Cigarettes   • Smokeless tobacco: Never Used   • Tobacco comment: QUIT 30 PLUS YEARS AGO   Substance and Sexual Activity   • Alcohol use: No     Comment: QUIT 30 PLUS YEARS   • Drug use: No   • Sexual activity: Yes     Partners: Female       Review of Systems  Constitutional: No fevers or chills  Skin: Negative for rash  Endocrine: No heat/cold intolerance   Cardiovascular: Negative for chest pain or dyspnea on exertion  Respiratory: Negative for shortness of breath or wheezing  Gastrointestinal: No constipation, nausea or vomiting  Genitourinary: Negative for new lower urinary tract symptoms, current gross hematuria or dysuria.  Musculoskeletal: No flank pain  Neurological:  Negative for frequent headaches or dizziness  Lymph/Heme: Negative for leg swelling or calf pain.    Physical Exam  Visit Vitals  /84 (BP Location: Left arm, Patient Position: Lying)   Pulse 88   Temp 99 °F (37.2 °C) (Temporal)   Resp 18   Wt 64.4 kg (142 lb)   SpO2 98%   BMI 23.27 kg/m²     Constitutional: NAD, WDWN.   HEENT: NCAT. Conjunctivae normal.  MMM.    Cardiovascular: Regular rate.  Pulmonary/Chest: Respirations are even and non-labored bilaterally.  Abdominal: Soft. No  distension, tenderness, masses or guarding. No CVA tenderness.  Neurological: A + O x 3.  Cranial Nerves II-XII grossly intact. Normal gait.  Extremities: RAMSEY x 4, Warm. No clubbing.  No cyanosis.    Skin: Pink, warm and dry.  No rashes noted.  Psychiatric:  Normal mood and affect    Labs  Brief Urine Lab Results  (Last result in the past 365 days)      Color   Clarity   Blood   Leuk Est   Nitrite   Protein   CREAT   Urine HCG        10/29/20 1005 Dark Yellow Cloudy Moderate (2+) Negative Negative 30 mg/dL (1+)             Lab Results   Component Value Date    GLUCOSE 91 09/20/2021    CALCIUM 7.6 (L) 09/20/2021     (L) 09/20/2021    K 3.9 09/20/2021    CO2 15.6 (L) 09/20/2021    CL 88 (L) 09/20/2021    BUN 47 (H) 09/20/2021    CREATININE 8.20 (H) 09/20/2021    EGFRIFAFRI  09/20/2021      Comment:      <15 Indicative of kidney failure.    EGFRIFNONA 7 (L) 09/20/2021    BCR 5.7 (L) 09/20/2021    ANIONGAP 21.4 (H) 09/20/2021     Lab Results   Component Value Date    WBC 13.81 (H) 09/20/2021    HGB 14.0 09/20/2021    HCT 38.7 09/20/2021    MCV 86.8 09/20/2021     (L) 09/20/2021       No results found for: PSA  No components found for: CREATSERUM  CT Abdomen Pelvis Stone Protocol    Result Date: 9/18/2021  CT SCAN OF THE ABDOMEN AND PELVIS WITHOUT CONTRAST     9/18/2021 4:39 PM   HISTORY: Left flank abdominal pain  COMPARISON: 10/29/2020  PROCEDURE: Axial images were obtained from the lung bases to the pubic symphysis by computed tomography. This study was performed with techniques to keep radiation doses as low as reasonably achievable, (ALARA). Individualized dose reduction techniques using automated exposure control or adjustment of mA and/or kV according to the patient size were employed.  FINDINGS:  ABDOMEN: Clear lung bases.  Unenhanced liver and spleen are unremarkable. Multiple gallstones. Pancreas and adrenals are unremarkable. Mild left hydronephrosis secondary to a 3 mm left ureteropelvic junction  stone. There is diffuse left perinephric edema without fluid. Right nephrectomy. Right lower quadrant ileostomy. An 8 cm segment of distal ileum just before the ostomy is decompressed. Approximately there is focal dilatation of the distal ileum up to 4 cm. No clear obstructing mass.  Mild plaque of the abdominal aorta without aneurysm.  No lymphadenopathy.  PELVIS: Decompressed bladder. Enlarged prostate, 4.8 cm. No fracture or osseous lesion identified.       1. Obstructing 3 mm stone in the left ureteropelvic junction. Mild left hydronephrosis with prominent reactive left perinephric edema. 2. Dilated distal ileal segment with abrupt transition point to decompressed ileum just before the the ileostomy in the right abdomen. No obstructing mass or hernia. This could be transient. However consider follow-up KUBs to ensure no progressing bowel dilatation. 3. Right nephrectomy. 4. Cholelithiasis.  This report was finalized on 9/18/2021 4:53 PM by Dr Ton Olivo DO.      Assessment  Jorge Mclaughlin is a 61 y.o. male who presents with the following diagnosis:  1. Acute renal failure, unspecified acute renal failure type (CMS/HCC)    2. Hydronephrosis with obstructing calculus         Plan  1. To OR for left URETEROSCOPY LASER LITHOTRIPSY WITH STENT INSERTION     Lazaro Velazquez MD

## 2021-09-20 NOTE — CONSULTS
"        [facility]      Nephrology Consultation      Referring Provider:   No ref. provider found    Reason for Consultation:  Acute Kidney Injury and associated problems.  CKD stage 4      Subjective:  Chief complaint   Chief Complaint   Patient presents with   • Flank Pain     History of present illness:    Patient is 71-year-old  male with history of right nephrectomy and since then has stage IV chronic kidney disease, he presented to the emergency department with left flank pain he was seen earlier in the week with a similar complaint, but this time pain was worse and on further evaluation noted to have significant worsening of renal function along with hydronephrosis.  Urology was consulted and patient was taken to the OR for stent placement. This morning he appears fairly comfortable, alert and interactive.  I have reviewed labs/imaging/records from this hospitalization, including ER staff and admitting/attending physicians H/P's and progress notes to establish a comprehensive understanding of this patient's clinical hospital course, as well as to establish plan of care appropriately.   Past Medical History:   Diagnosis Date   • Altered bowel elimination due to intestinal ostomy (CMS/HCC)     PATIENT REPORTS SECONDARY TO A COLON RUPTURE APPROXIMATELY 16-17 YEARS AGO   • Anemia    • Arthritis     WRIST   • Cancer (CMS/HCC)     Kidney   • CKD (chronic kidney disease)     stage 3   • Crohn disease (CMS/HCC)    • GERD (gastroesophageal reflux disease)    • History of MRSA infection     REPORTS HX OF \"NOSE\" 2-3 YEARS AGO, TREATED.   • History of pneumonia    • Hypothyroid    • Spinal headache    • Wears glasses        Past Surgical History:   Procedure Laterality Date   • COLONOSCOPY     • COLONOSCOPY N/A 7/31/2017    Procedure: Colonoscopy through ostomy site and rectum with biopsies;  Surgeon: Vincenzo Galan MD;  Location: [facility] ENDOSCOPY;  Service:    • ENDOSCOPY     • HEMORRHOIDECTOMY   "    REPORTS APPROXIMATELY 20 YEARS AGO   • HERNIA REPAIR      UMBILICAL   • KIDNEY SURGERY      Right kidney removed   • NEPHRECTOMY Right 07/2017   • SMALL INTESTINE SURGERY      HX OF COLON RUPTURE WITH PLACEMENT OF OSTOMY 16-17 years ago   • URETEROSCOPY LASER LITHOTRIPSY WITH STENT INSERTION Left 7/3/2019    Procedure: CYSTOSCOPY, URETEROSCOPY, STONE BASKETING, RETROGRADE PYLEOGRAM,  LASER LITHOTRIPSY WITH STENT INSERTION;  Surgeon: Lazaro Velazquez MD;  Location: [facility];  Service: Urology   • URETEROSCOPY LASER LITHOTRIPSY WITH STENT INSERTION Left 9/20/2021    Procedure: URETEROSCOPY diagnostic WITH STENT INSERTION  with left retrograde;  Surgeon: Lazaro Velazquez MD;  Location: [facility] OR;  Service: Urology;  Laterality: Left;   • WISDOM TOOTH EXTRACTION     • WRIST SURGERY Right      Family History   Problem Relation Age of Onset   • Heart disease Mother    • Cancer Father    • No Known Problems Sister    • COPD Brother    • COPD Sister    • Colon cancer Neg Hx      negative h/o ESRD     Social History     Tobacco Use   • Smoking status: Former Smoker     Types: Cigarettes   • Smokeless tobacco: Never Used   • Tobacco comment: QUIT 30 PLUS YEARS AGO   Substance Use Topics   • Alcohol use: No     Comment: QUIT 30 PLUS YEARS   • Drug use: No     Home medications:   Prior to Admission Medications     Prescriptions Last Dose Informant Patient Reported? Taking?    acetaminophen (TYLENOL) 325 MG tablet 9/20/2021  No Yes    Take 2 tablets by mouth Every 6 (Six) Hours.    ferrous sulfate 325 (65 FE) MG tablet Past Week  Yes Yes    Take 325 mg by mouth Daily With Breakfast.    levothyroxine (SYNTHROID, LEVOTHROID) 200 MCG tablet Past Week Self Yes Yes    take 1 tablet by mouth once daily    Multiple Vitamin (MULTI VITAMIN MENS PO) Past Week Self Yes Yes    Take 1 tablet by mouth Daily.    pantoprazole (PROTONIX) 40 MG EC tablet Past Week Self Yes Yes    Take 40 mg by mouth Daily.    tamsulosin (FLOMAX) 0.4  MG capsule 24 hr capsule Past Week  No Yes    Take 1 capsule by mouth Daily.    ibuprofen (ADVIL,MOTRIN) 800 MG tablet 9/20/2021  No No    Take 1 tablet by mouth 3 (Three) Times a Day As Needed for Mild Pain  or Moderate Pain  (back pain).    oxyCODONE-acetaminophen (PERCOCET) 5-325 MG per tablet Unknown  No No    Take 1 tablet by mouth Every 6 (Six) Hours As Needed for Moderate Pain .    Testosterone Cypionate (DEPOTESTOTERONE CYPIONATE) 200 MG/ML injection 9/13/2021 Self Yes No    inject 1 milliliter every 2 weeks        Emergency department medications:   Medications   sodium chloride 0.9 % flush 10 mL ( Intravenous MAR Unhold 9/20/21 1646)   sodium bicarbonate tablet 1,300 mg (1,300 mg Oral Given 9/20/21 2021)   levothyroxine (SYNTHROID, LEVOTHROID) tablet 200 mcg (200 mcg Oral Given 9/21/21 0607)   oxyCODONE-acetaminophen (PERCOCET) 5-325 MG per tablet 1 tablet (1 tablet Oral Given 9/21/21 0342)   pantoprazole (PROTONIX) EC tablet 40 mg (40 mg Oral Given 9/21/21 0610)   tamsulosin (FLOMAX) 24 hr capsule 0.4 mg (0.4 mg Oral Given 9/20/21 1742)   sodium chloride 0.9 % flush 10 mL (10 mL Intravenous Given 9/20/21 2107)   sodium chloride 0.9 % flush 10 mL (has no administration in time range)   acetaminophen (TYLENOL) tablet 650 mg (has no administration in time range)     Or   acetaminophen (TYLENOL) 160 MG/5ML solution 650 mg (has no administration in time range)     Or   acetaminophen (TYLENOL) suppository 650 mg (has no administration in time range)   ondansetron (ZOFRAN) injection 4 mg (has no administration in time range)   heparin (porcine) 5000 UNIT/ML injection 5,000 Units (5,000 Units Subcutaneous Given 9/21/21 0606)   cefTRIAXone (ROCEPHIN) IVPB 1 g/50ml dextrose (premix) (1 g Intravenous New Bag 9/20/21 1743)   sodium chloride 0.9 % infusion (75 mL/hr Intravenous New Bag 9/20/21 1755)   HYDROmorphone (DILAUDID) injection 0.5 mg (0.5 mg Intravenous Given 9/21/21 0605)   sodium chloride 0.9 % infusion 500  "mL ( Intravenous Rate/Dose Change 9/20/21 1358)   Morphine sulfate (PF) injection 6 mg (6 mg Intravenous Given 9/20/21 0538)   ondansetron (ZOFRAN) injection 4 mg (4 mg Intravenous Given 9/20/21 0538)   lidocaine (XYLOCAINE) 1 % 100 mg in sodium chloride 0.9 % 250 mL IVPB (100 mg Intravenous Given 9/20/21 0548)   sodium chloride 0.9 % bolus 1,000 mL (0 mL Intravenous Stopped 9/20/21 0810)   Morphine sulfate (PF) injection 4 mg (4 mg Intravenous Given 9/20/21 0630)   HYDROmorphone (DILAUDID) injection 1 mg (1 mg Intravenous Given 9/20/21 0816)   ceFAZolin Sodium-Dextrose (ANCEF) IVPB (duplex) 2 g (2 g Intravenous Given 9/20/21 1322)       Allergies:  Latex    Review of Systems    1. Constitutional: Negative for fever and chills.  Denies any diaphoresis. Denies fatigue or malaise. Denies any unexpected weight change.   2. HENT: Negative for congestion and hearing loss.   3. Eyes: Negative for redness and visual disturbance.   4. Respiratory: Negative for shortness of breath or cough. Negative for chest pain  5. Cardiovascular: Negative for chest pain and chest tightness or palpitations.   6. Gastrointestinal: Positive for left-sided abdominal pain no distention, and blood in stool. Denies any vomiting, diarrhea or constipation.  He does complain of nausea.  7. Endocrine: Negative for heat or cold intolerance.   8. Genitourinary: Positive for difficulty urinating.   9. Musculoskeletal: Negative for arthralgias, back pain.  Denies any myalgias.   10. Skin: Negative for color change, rash and wound.   11. Neurological: Negative for syncope, weakness and headaches.   12. Hematological: Negative for adenopathy. Does not bruise/bleed easily.   13. Psychiatric/Behavioral: Negative for confusion. The patient is not nervous/anxious.     Objective:  Vital Signs  /57 (BP Location: Left arm, Patient Position: Lying)   Pulse 77   Temp 97.7 °F (36.5 °C) (Temporal)   Resp 18   Ht 167.6 cm (66\")   Wt 64.4 kg (142 lb)   " SpO2 95%   BMI 22.92 kg/m²          No intake/output data recorded.    Intake/Output Summary (Last 24 hours) at 9/21/2021 0800  Last data filed at 9/21/2021 0500  Gross per 24 hour   Intake 2220.74 ml   Output 2450 ml   Net -229.26 ml       Physical Exam:  General Appearance:   Alert, cooperative, in no acute distress.     Head:   Normocephalic, without obvious abnormality, atraumatic.     Eyes:      Normal, conjunctivae and sclerae, no icterus, no pallor, corneas clear, PERRLA        Throat:   Oral mucosa dry      Neck:  No adenopathy, supple, trachea midline, no thyromegaly, no carotid bruit, no JVD        Lungs:    Clear to auscultation and fair air movement noted.      Heart::   Regular rhythm and normal rate, normal S1 and S2.       Abdomen:   Obese. Normal bowel sounds, no masses, no organomegaly, soft non-tender, non-distended, no guarding, no rebound tenderness, ileostomy present.      Genital urinary:   No urinary bladder palpable      Extremities:  Moves all extremities, no edema, no cyanosis, no redness.     Pulses:  Pulses palpable and equal bilaterally but weak.     Skin:  No bleeding, bruising or rash        Neurologic:  Cranial nerves grossly intact, move all extremities         Results Review:   Results from last 7 days   Lab Units 09/21/21  0555 09/20/21  0507 09/18/21  1631   SODIUM mmol/L 130* 125* 134*   POTASSIUM mmol/L 4.4 3.9 3.9   CHLORIDE mmol/L 95* 88* 98   CO2 mmol/L 16.5* 15.6* 21.6*   BUN mg/dL 68* 47* 29*   CREATININE mg/dL 7.78* 8.20* 3.81*   CALCIUM mg/dL 7.0* 7.6* 8.9   ALBUMIN g/dL  --  3.30* 4.00   BILIRUBIN mg/dL  --  0.4 0.7   ALK PHOS U/L  --  74 78   ALT (SGPT) U/L  --  11 18   AST (SGOT) U/L  --  7 22   GLUCOSE mg/dL 125* 91 104*     Estimated Creatinine Clearance: 9.3 mL/min (A) (by C-G formula based on SCr of 7.78 mg/dL (H)).          Results from last 7 days   Lab Units 09/21/21  0555 09/20/21  0507 09/18/21  1631   WBC 10*3/mm3 9.93 13.81* 14.25*   HEMOGLOBIN g/dL 12.8*  14.0 15.2   PLATELETS 10*3/mm3 126* 120* 162         Brief Urine Lab Results  (Last result in the past 365 days)      Color   Clarity   Blood   Leuk Est   Nitrite   Protein   CREAT   Urine HCG        09/20/21 2113 Red Cloudy Large (3+) Moderate (2+) Negative 100 mg/dL (2+)             No results found for: UTPCR  Imaging Results (Last 24 Hours)     ** No results found for the last 24 hours. **        cefTRIAXone, 1 g, Intravenous, Q24H  heparin (porcine), 5,000 Units, Subcutaneous, Q8H  levothyroxine, 200 mcg, Oral, Daily  pantoprazole, 40 mg, Oral, Daily  sodium bicarbonate, 1,300 mg, Oral, 4x Daily  sodium chloride, 10 mL, Intravenous, Q12H  tamsulosin, 0.4 mg, Oral, Daily      sodium chloride, 75 mL/hr, Last Rate: 75 mL/hr (09/20/21 1755)        Assessment/Plan:    1. Acute renal failure (CMS/HCC): Patient has solitary kidney with obstruction leading to acute kidney injury.  Potassium is fine but he does have low bicarb, likely combination of ostomy as well as chronic kidney disease.  2. Chronic kidney disease stage IV: From records it does not appear that he has seen or follow-up with any nephrologist.  3. Nephrolithiasis with hydronephrosis: Urology has been consulted and patient was taken to the OR for stent placement.  I will check a PTH.  4. Status post right nephrectomy secondary to cancer:  5. Crohn's disease:  6. S/p partial colectomy and now have ostomy:    Risk and complexity: High    Plan:  · Continue with IV fluids and start him on oral sodium bicarb. I will check PTH as well as phosphorus.  · Patient has never followed up with the nephrology, he also said that he had kidney stones but has never been worked up. He is interested in having a work-up for stone and for sure will need closer follow-up with nephrology for his stage IV chronic kidney disease.  · Continue with rest of the current treatment plan and surveillance labs.  · Details were discussed with the patient no family in the room.     · Details were also discussed with Dr. Corbett.   · Further recommendations will depend on clinical course of the patient during the current hospitalization.    · I also discussed the details with the nursing staff.  · Rest as ordered.    In closing, I sincerely appreciate opportunity to participate in care of this patient. If I can be of any further assistance with the management of this patient, please don’t hesitate to contact me.    Luis Jorgensen MD, SRI    09/21/21  08:00 EDT    Dictated using Dragon.

## 2021-09-20 NOTE — OP NOTE
Preoperative diagnosis  left kidney stone    Postoperative diagnosis  left kidney stone    Procedure performed  1.  Flexible cystoscopy, left retrograde pyelogram with ureteral stent placement 6 Fr x 26 cm  2.  Left ureteroscopy diagnostic   3.  Fluoroscopy time < 1 hour with interpretation of images    Surgeon  Lazaro Velazquez MD    Anesthesia  General    Complications  None    Specimen  Stone fragments for biochemical analysis    Findings  Urethroscopy revealed no strictures or other abnormalities.  Cystoscopy revealed no tumors, stones or other mucosal abnormalities.    left retrograde pyelogram revealed a delicate system with identification of the stone seen on pre-op imaging.  No other filling defects and caliber of left ureter was smooth and normal.    Ureteroscopy revealed a small 3 mm left ureteral stone compatible with preoperative imaging, that we were able to flush out through the sheath alongside the scope and was sent for analysis.     Indications  61 y.o. male agreed to undergo the above named procedure after discussion of the alternatives, risks and benefits.  Informed consent was obtained.      Procedure  The patient was taken to the operating room and placed supine on the operating table.  Pre-operative antibiotics were administered.  Bilateral lower extremity SCDs were placed.  After induction of general anesthesia the patient was positioned in dorsal lithotomy, prepped and draped in a sterile fashion.  A time-out was performed.      A 14-Djiboutian flexible cystoscope was passed carefully via urethra into the bladder.  The left ureteral orifice was identified and a Sensor wire was passed retrograde to the level of the kidney and confirmed by fluoroscopy.  The flexible scope was off-loaded and the bladder emptied with a straight catheter.  A dual lumen open-ended catheter was passed over the wire. A retrograde pyelogram was performed by slowly injecting 5 mL of 50% Omnipaque contrast via the 5-Djiboutian  catheter with findings described above.  An Amplatz super-stiff was placed to the level of the renal pelvis and confirmed by fluoroscopy. The dual lumen was removed. The Sensor wire was clipped to the drape as a safety wire.  An 11/13, 28-cm access sheath was advanced over the super-stiff wire to level of the mid ureter under direct fluoroscopic guidance. The inner stylet and super-stiff wire were removed.  The kidney was entered with the flexible ureteroscope.  The kidney stone was identified and actually flushed out before it could be basket extracted.  Ureteral edema but no obvious obstruction was present.  The rest of the entire collecting system was it inspected meticulously and no other stones were found.  A 5-Sudanese catheter was passed over the safety wire and the wire withdrawn.   Contrast was injected into the renal pelvis via the 5-Sudanese open-ended catheter.  A super-stiff wire was placed and confirmed by fluoroscopy.  A 6 Fr x 26 cm stent was positioned with the upper end in the kidney and the lower in the bladder confirmed by fluoroscopy. The bladder was emptied and the procedure was complete. The patient tolerated the procedure well and was stable throughout.    The patient will follow up with me next week for stent removal.  He will be admitted in the hospital and receive IV fluids and monitoring for his acute renal failure.

## 2021-09-20 NOTE — H&P
[facility]   HISTORY AND PHYSICAL      Name:  Jorge Mclaughlin   Age:  61 y.o.  Sex:  male  :  1960  MRN:  [id number]   Visit Number:  [id number]  Admission Date:  2021  Date Of Service:  21  Primary Care Physician:  Yazan Corbett MD    Chief Complaint: Left flank pain    History Of Presenting Illness:  61 M h/o renal cell carcinoma s/p nephrectomy (R), Crohn disease, colonic rupture s/p ileostomy remote who presented to the ED with complaints of left leg pain.  It has been worsening over the past 3 to 4 days now and he has had no urine output.  He was seen in the ED 2 days ago and diagnosed with left-sided kidney stone measuring 3 mm.  The pain has gotten worse.  CT 2021 revealed a 3 mm stone left UP junction with mild hydronephrosis and reactive left perinephric edema.  Urology consulted from the ED, plan OR later.    Review Of Systems:All systems were reviewed and negative except as mentioned in history of presenting illness, assessment and plan.    Past Medical History: Patient  has a past medical history of Altered bowel elimination due to intestinal ostomy (CMS/HCC), Anemia, Arthritis, Cancer (CMS/HCC), CKD (chronic kidney disease), Crohn disease (CMS/HCC), GERD (gastroesophageal reflux disease), History of MRSA infection, History of pneumonia, Hypothyroid, Spinal headache, and Wears glasses.    Past Surgical History: Patient  has a past surgical history that includes Small intestine surgery; Wrist surgery (Right); Hernia repair; Colonoscopy; Esophagogastroduodenoscopy; [place] tooth extraction; Hemorrhoid surgery; Colonoscopy (N/A, 2017); Kidney surgery; Nephrectomy (Right, 2017); and ureteroscopy laser lithotripsy with stent insertion (Left, 7/3/2019).    Social History: Patient  reports that he has quit smoking. His smoking use included cigarettes. He has never used smokeless tobacco. He reports that he does not drink alcohol and does not use  drugs.    Family History: Patient's family history includes COPD in his brother and sister; Cancer in his father; Heart disease in his mother; No Known Problems in his sister.    Allergies:  Latex    Home Medications:  Prior to Admission Medications     Prescriptions Last Dose Informant Patient Reported? Taking?    acetaminophen (TYLENOL) 325 MG tablet   No No    Take 2 tablets by mouth Every 6 (Six) Hours.    Patient not taking:  Reported on 10/30/2020    ferrous sulfate 325 (65 FE) MG tablet   Yes No    Take 325 mg by mouth Daily With Breakfast.    ibuprofen (ADVIL,MOTRIN) 800 MG tablet   No No    Take 1 tablet by mouth 3 (Three) Times a Day As Needed for Mild Pain  or Moderate Pain  (back pain).    levothyroxine (SYNTHROID, LEVOTHROID) 200 MCG tablet  Self Yes No    take 1 tablet by mouth once daily    Multiple Vitamin (MULTI VITAMIN MENS PO)  Self Yes No    Take 1 tablet by mouth Daily.    oxyCODONE-acetaminophen (PERCOCET) 5-325 MG per tablet   No No    Take 1 tablet by mouth Every 6 (Six) Hours As Needed for Moderate Pain .    pantoprazole (PROTONIX) 40 MG EC tablet  Self Yes No    Take 40 mg by mouth Daily.    tamsulosin (FLOMAX) 0.4 MG capsule 24 hr capsule   No No    Take 1 capsule by mouth Daily.    Testosterone Cypionate (DEPOTESTOTERONE CYPIONATE) 200 MG/ML injection  Self Yes No    inject 1 milliliter every 2 weeks        ED Medications:  Medications   sodium chloride 0.9 % flush 10 mL (has no administration in time range)   sodium bicarbonate tablet 1,300 mg (has no administration in time range)   Morphine sulfate (PF) injection 6 mg (6 mg Intravenous Given 9/20/21 0538)   ondansetron (ZOFRAN) injection 4 mg (4 mg Intravenous Given 9/20/21 0538)   lidocaine (XYLOCAINE) 1 % 100 mg in sodium chloride 0.9 % 250 mL IVPB (100 mg Intravenous Given 9/20/21 0548)   sodium chloride 0.9 % bolus 1,000 mL (0 mL Intravenous Stopped 9/20/21 0810)   Morphine sulfate (PF) injection 4 mg (4 mg Intravenous Given 9/20/21  0630)   HYDROmorphone (DILAUDID) injection 1 mg (1 mg Intravenous Given 9/20/21 0816)     Vital Signs:  Temp:  [98.1 °F (36.7 °C)] 98.1 °F (36.7 °C)  Heart Rate:  [90] 90  Resp:  [16-18] 18  BP: (163)/(98) 163/98        09/20/21  0508   Weight: 64.4 kg (142 lb)     Body mass index is 23.27 kg/m².    Physical Exam:   General: NAD, resting in bed  HEENT: EOMI, NC/AT  Heart: regular  Lungs: nonlabored  Abdomen: Soft, nondistended, nontender, ileostomy  MSK: TTP left flank  Extremities: No edema  Neurological: A&O x3, moves all extremities  Psychological: Mood and affect appropriate, speech is coherent  Skin: warm, dry    Laboratory data:I have reviewed the labs done in the emergency room.    Results from last 7 days   Lab Units 09/20/21  0507 09/18/21  1631   SODIUM mmol/L 125* 134*   POTASSIUM mmol/L 3.9 3.9   CHLORIDE mmol/L 88* 98   CO2 mmol/L 15.6* 21.6*   BUN mg/dL 47* 29*   CREATININE mg/dL 8.20* 3.81*   CALCIUM mg/dL 7.6* 8.9   BILIRUBIN mg/dL 0.4 0.7   ALK PHOS U/L 74 78   ALT (SGPT) U/L 11 18   AST (SGOT) U/L 7 22   GLUCOSE mg/dL 91 104*     Results from last 7 days   Lab Units 09/20/21  0507 09/18/21  1631   WBC 10*3/mm3 13.81* 14.25*   HEMOGLOBIN g/dL 14.0 15.2   HEMATOCRIT % 38.7 43.0   PLATELETS 10*3/mm3 120* 162     Results from last 7 days   Lab Units 09/20/21  0507   LIPASE U/L 19     Radiology:CT Abdomen Pelvis Stone Protocol    Result Date: 9/18/2021  CT SCAN OF THE ABDOMEN AND PELVIS WITHOUT CONTRAST     9/18/2021 4:39 PM   HISTORY: Left flank abdominal pain  COMPARISON: 10/29/2020  PROCEDURE: Axial images were obtained from the lung bases to the pubic symphysis by computed tomography. This study was performed with techniques to keep radiation doses as low as reasonably achievable, (ALARA). Individualized dose reduction techniques using automated exposure control or adjustment of mA and/or kV according to the patient size were employed.  FINDINGS:  ABDOMEN: Clear lung bases.  Unenhanced liver and  spleen are unremarkable. Multiple gallstones. Pancreas and adrenals are unremarkable. Mild left hydronephrosis secondary to a 3 mm left ureteropelvic junction stone. There is diffuse left perinephric edema without fluid. Right nephrectomy. Right lower quadrant ileostomy. An 8 cm segment of distal ileum just before the ostomy is decompressed. Approximately there is focal dilatation of the distal ileum up to 4 cm. No clear obstructing mass.  Mild plaque of the abdominal aorta without aneurysm.  No lymphadenopathy.  PELVIS: Decompressed bladder. Enlarged prostate, 4.8 cm. No fracture or osseous lesion identified.       1. Obstructing 3 mm stone in the left ureteropelvic junction. Mild left hydronephrosis with prominent reactive left perinephric edema. 2. Dilated distal ileal segment with abrupt transition point to decompressed ileum just before the the ileostomy in the right abdomen. No obstructing mass or hernia. This could be transient. However consider follow-up KUBs to ensure no progressing bowel dilatation. 3. Right nephrectomy. 4. Cholelithiasis.  This report was finalized on 9/18/2021 4:53 PM by Dr Ton Olivo DO.      Assessment:  Obstructive left uropathy with hydronephrosis  Acute renal failure on CKD    Plan:  -Pain control, Flomax, supportive care  -Judicious IV fluids to reduce risk of volume overload, perioperative antibiotics  -Sodium bicarb tabs  -Nephrology and urology consultation  -Full code, inpatient, high risk secondary to multiple comorbidities    Sunday Lanidn DO  09/20/21  09:28 EDT    Dictated utilizing Dragon dictation.

## 2021-09-20 NOTE — ED PROVIDER NOTES
"Subjective   61-year-old male presenting with flank pain.  Patient was seen here 2 days ago and diagnosed with a left-sided kidney stone.  He states he was unable to get his pain medication because the pharmacy was closed.  He states that he has had increasing severe left-sided flank abdominal pain.  There are no alleviating or aggravating factors.  It is associated with nausea.  He denies any fevers, chills.  He does note that he has not urinated since that ER visit.  Of note he does have a history of a right nephrectomy secondary to renal cell carcinoma.          Review of Systems   Constitutional: Negative.    HENT: Negative.    Eyes: Negative.    Respiratory: Negative.    Cardiovascular: Negative.    Gastrointestinal: Positive for abdominal pain and nausea.   Genitourinary: Positive for difficulty urinating and flank pain.   Skin: Negative.    Neurological: Negative.    Psychiatric/Behavioral: Negative.        Past Medical History:   Diagnosis Date   • Altered bowel elimination due to intestinal ostomy (CMS/HCC)     PATIENT REPORTS SECONDARY TO A COLON RUPTURE APPROXIMATELY 16-17 YEARS AGO   • Anemia    • Arthritis     WRIST   • Cancer (CMS/HCC)     Kidney   • CKD (chronic kidney disease)     stage 3   • Crohn disease (CMS/HCC)    • GERD (gastroesophageal reflux disease)    • History of MRSA infection     REPORTS HX OF \"NOSE\" 2-3 YEARS AGO, TREATED.   • History of pneumonia    • Hypothyroid    • Spinal headache    • Wears glasses        Allergies   Allergen Reactions   • Latex Rash       Past Surgical History:   Procedure Laterality Date   • COLONOSCOPY     • COLONOSCOPY N/A 7/31/2017    Procedure: Colonoscopy through ostomy site and rectum with biopsies;  Surgeon: Vincenzo Galan MD;  Location: [facility] ENDOSCOPY;  Service:    • ENDOSCOPY     • HEMORRHOIDECTOMY      REPORTS APPROXIMATELY 20 YEARS AGO   • HERNIA REPAIR      UMBILICAL   • KIDNEY SURGERY      Right kidney removed   • NEPHRECTOMY Right 07/2017   • " SMALL INTESTINE SURGERY      HX OF COLON RUPTURE WITH PLACEMENT OF OSTOMY 16-17 years ago   • URETEROSCOPY LASER LITHOTRIPSY WITH STENT INSERTION Left 7/3/2019    Procedure: CYSTOSCOPY, URETEROSCOPY, STONE BASKETING, RETROGRADE PYLEOGRAM,  LASER LITHOTRIPSY WITH STENT INSERTION;  Surgeon: Lazaro Velazquez MD;  Location: [facility];  Service: Urology   • URETEROSCOPY LASER LITHOTRIPSY WITH STENT INSERTION Left 9/20/2021    Procedure: URETEROSCOPY diagnostic WITH STENT INSERTION  with left retrograde;  Surgeon: Lazaro Velazquez MD;  Location: [facility] OR;  Service: Urology;  Laterality: Left;   • WISDOM TOOTH EXTRACTION     • WRIST SURGERY Right        Family History   Problem Relation Age of Onset   • Heart disease Mother    • Cancer Father    • No Known Problems Sister    • COPD Brother    • COPD Sister    • Colon cancer Neg Hx        Social History     Socioeconomic History   • Marital status:      Spouse name: Not on file   • Number of children: Not on file   • Years of education: Not on file   • Highest education level: Not on file   Tobacco Use   • Smoking status: Former Smoker     Types: Cigarettes   • Smokeless tobacco: Never Used   • Tobacco comment: QUIT 30 PLUS YEARS AGO   Substance and Sexual Activity   • Alcohol use: No     Comment: QUIT 30 PLUS YEARS   • Drug use: No   • Sexual activity: Yes     Partners: Female           Objective   Physical Exam  Vitals reviewed.   Constitutional:       General: He is not in acute distress.     Appearance: Normal appearance. He is not ill-appearing, toxic-appearing or diaphoretic.   HENT:      Head: Normocephalic and atraumatic.      Right Ear: External ear normal.      Left Ear: External ear normal.      Nose: Nose normal.      Mouth/Throat:      Mouth: Mucous membranes are moist.      Pharynx: Oropharynx is clear.   Eyes:      Extraocular Movements: Extraocular movements intact.      Conjunctiva/sclera: Conjunctivae normal.      Pupils: Pupils are  equal, round, and reactive to light.   Cardiovascular:      Rate and Rhythm: Normal rate and regular rhythm.      Pulses: Normal pulses.      Heart sounds: Normal heart sounds.   Pulmonary:      Effort: Pulmonary effort is normal. No respiratory distress.      Breath sounds: Normal breath sounds.   Abdominal:      General: Bowel sounds are normal. There is no distension.      Comments: Left-sided abdominal tenderness, colostomy present   Musculoskeletal:         General: No swelling, tenderness or deformity. Normal range of motion.      Cervical back: Normal range of motion and neck supple.   Skin:     General: Skin is warm and dry.      Capillary Refill: Capillary refill takes less than 2 seconds.      Findings: No rash.   Neurological:      General: No focal deficit present.      Mental Status: He is alert and oriented to person, place, and time.   Psychiatric:         Mood and Affect: Mood normal.         Behavior: Behavior normal.         Procedures           ED Course                                           MDM  Number of Diagnoses or Management Options  Diagnosis management comments: 61-year-old male with flank and abdominal pain.  Well-developed, well-nourished man in no distress with exam as above.  He has normal vital signs.  His exam is notable for some left-sided abdominal tenderness.  Will check labs and urinalysis.  Will give symptomatic treatment.  Disposition pending.    DDx: Kidney stone, UTI, renal failure       Amount and/or Complexity of Data Reviewed  Decide to obtain previous medical records or to obtain history from someone other than the patient: yes        Final diagnoses:   Acute renal failure, unspecified acute renal failure type (CMS/HCC)   Hydronephrosis with obstructing calculus          Rasta Barnard MD  09/22/21 8725

## 2021-09-20 NOTE — ANESTHESIA POSTPROCEDURE EVALUATION
Patient: Jorge Mclaughlin    Procedure Summary     Date: 09/20/21 Room / Location: [facility] /  [facility] OR    Anesthesia Start: 1312 Anesthesia Stop: 1403    Procedure: URETEROSCOPY diagnostic WITH STENT INSERTION  with left retrograde (Left ) Diagnosis:     Surgeons: Lazaro Velazquez MD Provider: Megha Pickett CRNA    Anesthesia Type: general ASA Status: 3          Anesthesia Type: general    Vitals  Vitals Value Taken Time   /70 09/20/21 1459   Temp 98.9 °F (37.2 °C) 09/20/21 1404   Pulse 84 09/20/21 1518   Resp 11 09/20/21 1459   SpO2 93 % 09/20/21 1518   Vitals shown include unvalidated device data.        Post Anesthesia Care and Evaluation    Patient location during evaluation: PACU  Patient participation: complete - patient participated  Level of consciousness: awake and alert  Pain score: 2  Pain management: satisfactory to patient  Airway patency: patent  Anesthetic complications: No anesthetic complications  PONV Status: none  Cardiovascular status: acceptable and stable  Respiratory status: acceptable  Hydration status: acceptable

## 2021-09-20 NOTE — ANESTHESIA PREPROCEDURE EVALUATION
Anesthesia Evaluation     Patient summary reviewed and Nursing notes reviewed   history of anesthetic complications:  NPO Solid Status: > 8 hours  NPO Liquid Status: > 8 hours           Airway   Mallampati: II  TM distance: >3 FB  Neck ROM: full  No difficulty expected  Dental - normal exam     Pulmonary - normal exam   (+) pneumonia resolved , a smoker Former,   Cardiovascular - normal exam  Exercise tolerance: good (4-7 METS)    ECG reviewed      ROS comment: 12/2017 ECG sinus rhythm with short QT interval    Neuro/Psych  (+) headaches (spinal headache),     GI/Hepatic/Renal/Endo    (+)  GERD,  renal disease (s/o left nephrectomy for renal cell carcinoma ) ARF and CRI,     Musculoskeletal     (+) chronic pain,   Abdominal  - normal exam   Substance History   (-) alcohol use, drug use     OB/GYN          Other   arthritis,    history of cancer    ROS/Med Hx Other: Hx of spinal headache  History of Crohn's disease  Epigastric abdominal pain  Inflammatory arthritis  Crohn's disease of large intestine with complication (CMS/HCC)  Acquired hypothyroidism  Chronic kidney disease, stage III (moderate) (CMS/HCC)  Acute renal failure superimposed on stage 3 chronic kidney disease (CMS/HCC)    Primary osteoarthritis involving multiple joints  Acute renal failure (CMS/HCC)  Intractable pain  Kidney stone  Renal CA  Intestinal ostomy secondary to colon rupture approx 16-17 years ago    Hx of unilateral nephrectomy, right    Allergic to LATEX    LABS:    Cr 8.2  BUN 47  K 3.9  WBC 13.81  Hgb 14  HCT 38.7                            Anesthesia Plan    ASA 3     general   (Risks and benefits of general anesthesia discussed with patient, including, aspiration, recall, dental damage, cardiac or respiratory compromise, stroke, fluctuations in blood pressure, seizure or death.     Pt advised that a endotracheal tube (ETT), laryngeal mask airway (LMA) or mask would be utilized to maintain the airway. Pt verbalized understanding and  agreed to plan.)  intravenous induction     Anesthetic plan, all risks, benefits, and alternatives have been provided, discussed and informed consent has been obtained with: patient.    Plan discussed with CRNA.

## 2021-09-21 LAB
ANION GAP SERPL CALCULATED.3IONS-SCNC: 18.5 MMOL/L (ref 5–15)
BASOPHILS # BLD AUTO: 0.01 10*3/MM3 (ref 0–0.2)
BASOPHILS NFR BLD AUTO: 0.1 % (ref 0–1.5)
BUN SERPL-MCNC: 68 MG/DL (ref 6–20)
BUN/CREAT SERPL: 8.7 (ref 7–25)
CALCIUM SPEC-SCNC: 7 MG/DL (ref 8.6–10.5)
CHLORIDE SERPL-SCNC: 95 MMOL/L (ref 98–107)
CO2 SERPL-SCNC: 16.5 MMOL/L (ref 22–29)
CREAT SERPL-MCNC: 7.78 MG/DL (ref 0.76–1.27)
DEPRECATED RDW RBC AUTO: 39.8 FL (ref 37–54)
EOSINOPHIL # BLD AUTO: 0 10*3/MM3 (ref 0–0.4)
EOSINOPHIL NFR BLD AUTO: 0 % (ref 0.3–6.2)
ERYTHROCYTE [DISTWIDTH] IN BLOOD BY AUTOMATED COUNT: 12.3 % (ref 12.3–15.4)
GFR SERPL CREATININE-BSD FRML MDRD: 7 ML/MIN/1.73
GFR SERPL CREATININE-BSD FRML MDRD: ABNORMAL ML/MIN/{1.73_M2}
GLUCOSE SERPL-MCNC: 125 MG/DL (ref 65–99)
HCT VFR BLD AUTO: 36.6 % (ref 37.5–51)
HGB BLD-MCNC: 12.8 G/DL (ref 13–17.7)
IMM GRANULOCYTES # BLD AUTO: 0.04 10*3/MM3 (ref 0–0.05)
IMM GRANULOCYTES NFR BLD AUTO: 0.4 % (ref 0–0.5)
LYMPHOCYTES # BLD AUTO: 0.94 10*3/MM3 (ref 0.7–3.1)
LYMPHOCYTES NFR BLD AUTO: 9.5 % (ref 19.6–45.3)
MCH RBC QN AUTO: 30.8 PG (ref 26.6–33)
MCHC RBC AUTO-ENTMCNC: 35 G/DL (ref 31.5–35.7)
MCV RBC AUTO: 88 FL (ref 79–97)
MONOCYTES # BLD AUTO: 0.53 10*3/MM3 (ref 0.1–0.9)
MONOCYTES NFR BLD AUTO: 5.3 % (ref 5–12)
NEUTROPHILS NFR BLD AUTO: 8.41 10*3/MM3 (ref 1.7–7)
NEUTROPHILS NFR BLD AUTO: 84.7 % (ref 42.7–76)
NRBC BLD AUTO-RTO: 0 /100 WBC (ref 0–0.2)
PHOSPHATE SERPL-MCNC: 6.9 MG/DL (ref 2.5–4.5)
PLATELET # BLD AUTO: 126 10*3/MM3 (ref 140–450)
PMV BLD AUTO: 11.5 FL (ref 6–12)
POTASSIUM SERPL-SCNC: 4.4 MMOL/L (ref 3.5–5.2)
PTH-INTACT SERPL-MCNC: 70.4 PG/ML (ref 15–65)
RBC # BLD AUTO: 4.16 10*6/MM3 (ref 4.14–5.8)
SODIUM SERPL-SCNC: 130 MMOL/L (ref 136–145)
WBC # BLD AUTO: 9.93 10*3/MM3 (ref 3.4–10.8)

## 2021-09-21 PROCEDURE — 25010000002 HYDROMORPHONE 1 MG/ML SOLUTION: Performed by: EMERGENCY MEDICINE

## 2021-09-21 PROCEDURE — 25010000002 HEPARIN (PORCINE) PER 1000 UNITS: Performed by: EMERGENCY MEDICINE

## 2021-09-21 PROCEDURE — 25010000002 ONDANSETRON PER 1 MG: Performed by: EMERGENCY MEDICINE

## 2021-09-21 PROCEDURE — 25010000002 CEFTRIAXONE SODIUM-DEXTROSE 1-3.74 GM-%(50ML) RECONSTITUTED SOLUTION: Performed by: EMERGENCY MEDICINE

## 2021-09-21 PROCEDURE — 85025 COMPLETE CBC W/AUTO DIFF WBC: CPT | Performed by: EMERGENCY MEDICINE

## 2021-09-21 PROCEDURE — 80048 BASIC METABOLIC PNL TOTAL CA: CPT | Performed by: EMERGENCY MEDICINE

## 2021-09-21 PROCEDURE — 84100 ASSAY OF PHOSPHORUS: CPT | Performed by: INTERNAL MEDICINE

## 2021-09-21 RX ORDER — SODIUM CHLORIDE 9 MG/ML
75 INJECTION, SOLUTION INTRAVENOUS CONTINUOUS
Status: DISCONTINUED | OUTPATIENT
Start: 2021-09-21 | End: 2021-09-22 | Stop reason: HOSPADM

## 2021-09-21 RX ADMIN — SODIUM BICARBONATE 650 MG TABLET 1300 MG: at 20:41

## 2021-09-21 RX ADMIN — ACETAMINOPHEN 650 MG: 325 TABLET ORAL at 13:46

## 2021-09-21 RX ADMIN — SODIUM BICARBONATE 650 MG TABLET 1300 MG: at 08:15

## 2021-09-21 RX ADMIN — PANTOPRAZOLE SODIUM 40 MG: 40 TABLET, DELAYED RELEASE ORAL at 06:10

## 2021-09-21 RX ADMIN — OXYCODONE HYDROCHLORIDE AND ACETAMINOPHEN 1 TABLET: 5; 325 TABLET ORAL at 03:42

## 2021-09-21 RX ADMIN — SODIUM CHLORIDE, PRESERVATIVE FREE 10 ML: 5 INJECTION INTRAVENOUS at 08:38

## 2021-09-21 RX ADMIN — SODIUM CHLORIDE 75 ML/HR: 9 INJECTION, SOLUTION INTRAVENOUS at 14:00

## 2021-09-21 RX ADMIN — HEPARIN SODIUM 5000 UNITS: 5000 INJECTION INTRAVENOUS; SUBCUTANEOUS at 20:48

## 2021-09-21 RX ADMIN — HEPARIN SODIUM 5000 UNITS: 5000 INJECTION INTRAVENOUS; SUBCUTANEOUS at 06:06

## 2021-09-21 RX ADMIN — LEVOTHYROXINE SODIUM 200 MCG: 125 TABLET ORAL at 06:07

## 2021-09-21 RX ADMIN — CEFTRIAXONE 1 G: 1 INJECTION, SOLUTION INTRAVENOUS at 17:56

## 2021-09-21 RX ADMIN — SODIUM BICARBONATE 650 MG TABLET 1300 MG: at 17:56

## 2021-09-21 RX ADMIN — HYDROMORPHONE HYDROCHLORIDE 0.5 MG: 1 INJECTION, SOLUTION INTRAMUSCULAR; INTRAVENOUS; SUBCUTANEOUS at 06:05

## 2021-09-21 RX ADMIN — ACETAMINOPHEN 650 MG: 325 TABLET ORAL at 20:41

## 2021-09-21 RX ADMIN — TAMSULOSIN HYDROCHLORIDE 0.4 MG: 0.4 CAPSULE ORAL at 08:38

## 2021-09-21 RX ADMIN — ONDANSETRON 4 MG: 2 INJECTION INTRAMUSCULAR; INTRAVENOUS at 17:57

## 2021-09-21 RX ADMIN — HEPARIN SODIUM 5000 UNITS: 5000 INJECTION INTRAVENOUS; SUBCUTANEOUS at 14:00

## 2021-09-21 RX ADMIN — SODIUM BICARBONATE 650 MG TABLET 1300 MG: at 11:25

## 2021-09-21 NOTE — CONSULTS
Patient: Jorge Mclaughlin  Procedure(s):  URETEROSCOPY diagnostic WITH STENT INSERTION  with left retrograde  Anesthesia type: general    Patient location: [facility] Surgical Floor  Last vitals:   Vitals:    09/21/21 1200   BP: 107/60   Pulse: 81   Resp: 21   Temp: 97.3 °F (36.3 °C)   SpO2: 96%     Level of consciousness: awake, alert and oriented    Post-anesthesia pain: adequate analgesia  Airway patency: patent  Respiratory: unassisted  Cardiovascular: stable  Hydration: euvolemic    Anesthetic complications: no

## 2021-09-21 NOTE — PROGRESS NOTES
[facility]    Name:  Jorge Mclaughlin   Age:  61 y.o.  Sex:  male  :  1960  MRN:  [id number]   Visit Number:  [id number]  Admission Date:  2021  Date Of Service:  21  Primary Care Physician:  Yazan Corbett MD     LOS: 1 day :  Patient Care Team:  Yazan Corbett MD as PCP - General:      Subjective / Interval History:     61-year-old Jorge Mclaughlin has been admitted because of acute on chronic renal failure along with hydronephrosis and obstructing left calculus.   stenting yesterday and post procedure has been admitted.  He has been started on IV fluids hydration and his pain has improved.  There is no fever.  No nausea vomiting though he was nauseous last evening.      Vital Signs:    Temp:  [95.7 °F (35.4 °C)-99 °F (37.2 °C)] 97.8 °F (36.6 °C)  Heart Rate:  [74-95] 80  Resp:  [8-18] 16  BP: (102-155)/(55-89) 107/57    Intake and output:    I/O last 3 completed shifts:  In: 2220.7 [I.V.:1170.7; IV Piggyback:1050]  Out: 2450 [Urine:2450]  No intake/output data recorded.    Physical Examination:    General Appearance:    Alert and cooperative, not in any acute distress.   Head:    Atraumatic and normocephalic, without obvious abnormality.   Eyes:            PERRLA,  No pallor. Extraocular movements are within normal limits.   Neck:   Supple,  No lymph glands, no bruit   Lungs:     Chest shape is normal. Breath sounds heard bilaterally equally.  No crackles or wheezing.     Heart:    Normal S1 and S2, no murmur,  No JVD   Abdomen:     Normal bowel sounds, no masses, no organomegaly. Soft     nontender, no guarding, no rebound tenderness, left colostomy in place vascular Baseline creatinine is around 2.5 to 3-year yes yes medical stage IV you name Wali around a follow-up name ever scope but on the way here and understands how you are [place] I am fine you know I am cured your cancer.  We need to establish you with then we are to do it  medically for her after that is like you my neurologist says JUANPABLO December [facility] will keep a watch and about a year one of the is probably due realized with him concurrent you could be but that has old diabetic to worsen physical catheter and medical   Extremities:   Moves all extremities well, no edema, no cyanosis,    Skin:   No  bruising or rash.   Neurologic:   Grossly nonfocal and moves all extremities.      Laboratory results:  Results from last 7 days   Lab Units 21  0555 21  0507 21  1631   SODIUM mmol/L 130* 125* 134*   POTASSIUM mmol/L 4.4 3.9 3.9   CHLORIDE mmol/L 95* 88* 98   CO2 mmol/L 16.5* 15.6* 21.6*   BUN mg/dL 68* 47* 29*   CREATININE mg/dL 7.78* 8.20* 3.81*   CALCIUM mg/dL 7.0* 7.6* 8.9   BILIRUBIN mg/dL  --  0.4 0.7   ALK PHOS U/L  --  74 78   ALT (SGPT) U/L  --  11 18   AST (SGOT) U/L  --  7 22   GLUCOSE mg/dL 125* 91 104*     Results from last 7 days   Lab Units 21  0555 21  0507 21  1631   WBC 10*3/mm3 9.93 13.81* 14.25*   HEMOGLOBIN g/dL 12.8* 14.0 15.2   HEMATOCRIT % 36.6* 38.7 43.0   PLATELETS 10*3/mm3 126* 120* 162                   Radiology results:    Imaging Results (Last 24 Hours)     ** No results found for the last 24 hours. **          I have reviewed the patient's radiology reports.    Medication Review:     I have reviewed the patients active and prn medications.     Assessment:      Kidney stone    Acute renal failure superimposed on stage 3 chronic kidney disease (CMS/HCC)    Crohn's disease of large intestine with complication (CMS/HCC)    Acquired hypothyroidism    Hx of unilateral nephrectomy, right    Primary osteoarthritis involving multiple joints    Acute renal failure (CMS/HCC)          Plan:    Acute on chronic renal failure-at present to nephrology consult with Dr. Jorgensen has been done.  IV fluids and hydration will be continued.  Potassium has been in the normal range.    Kidney stone with obstructive uropathy-s/p  removal with basket and stenting which has been done.    Patient has been explained the mode of management plan of care and see rest as per orders    Yazan Corbett MD  09/21/21  09:41 EDT      Please note that portions of this note were completed with a voice recognition program.

## 2021-09-22 VITALS
OXYGEN SATURATION: 95 % | SYSTOLIC BLOOD PRESSURE: 135 MMHG | BODY MASS INDEX: 22.82 KG/M2 | TEMPERATURE: 98.6 F | HEART RATE: 84 BPM | DIASTOLIC BLOOD PRESSURE: 79 MMHG | RESPIRATION RATE: 16 BRPM | WEIGHT: 142 LBS | HEIGHT: 66 IN

## 2021-09-22 LAB
ALBUMIN SERPL-MCNC: 3.2 G/DL (ref 3.5–5.2)
ALBUMIN/GLOB SERPL: 1.2 G/DL
ALP SERPL-CCNC: 67 U/L (ref 39–117)
ALT SERPL W P-5'-P-CCNC: 7 U/L (ref 1–41)
ANION GAP SERPL CALCULATED.3IONS-SCNC: 15.9 MMOL/L (ref 5–15)
AST SERPL-CCNC: 9 U/L (ref 1–40)
BILIRUB SERPL-MCNC: <0.2 MG/DL (ref 0–1.2)
BUN SERPL-MCNC: 62 MG/DL (ref 6–20)
BUN/CREAT SERPL: 10.1 (ref 7–25)
CALCIUM SPEC-SCNC: 7.2 MG/DL (ref 8.6–10.5)
CHLORIDE SERPL-SCNC: 105 MMOL/L (ref 98–107)
CO2 SERPL-SCNC: 19.1 MMOL/L (ref 22–29)
CREAT SERPL-MCNC: 6.14 MG/DL (ref 0.76–1.27)
DEPRECATED RDW RBC AUTO: 41 FL (ref 37–54)
ERYTHROCYTE [DISTWIDTH] IN BLOOD BY AUTOMATED COUNT: 12.9 % (ref 12.3–15.4)
GFR SERPL CREATININE-BSD FRML MDRD: 9 ML/MIN/1.73
GFR SERPL CREATININE-BSD FRML MDRD: ABNORMAL ML/MIN/{1.73_M2}
GLOBULIN UR ELPH-MCNC: 2.6 GM/DL
GLUCOSE SERPL-MCNC: 103 MG/DL (ref 65–99)
HCT VFR BLD AUTO: 33.7 % (ref 37.5–51)
HGB BLD-MCNC: 11.9 G/DL (ref 13–17.7)
MCH RBC QN AUTO: 31 PG (ref 26.6–33)
MCHC RBC AUTO-ENTMCNC: 35.3 G/DL (ref 31.5–35.7)
MCV RBC AUTO: 87.8 FL (ref 79–97)
PLATELET # BLD AUTO: 156 10*3/MM3 (ref 140–450)
PMV BLD AUTO: 10.9 FL (ref 6–12)
POTASSIUM SERPL-SCNC: 3.5 MMOL/L (ref 3.5–5.2)
PROT SERPL-MCNC: 5.8 G/DL (ref 6–8.5)
RBC # BLD AUTO: 3.84 10*6/MM3 (ref 4.14–5.8)
SODIUM SERPL-SCNC: 140 MMOL/L (ref 136–145)
WBC # BLD AUTO: 8.08 10*3/MM3 (ref 3.4–10.8)

## 2021-09-22 PROCEDURE — 25010000002 HEPARIN (PORCINE) PER 1000 UNITS: Performed by: EMERGENCY MEDICINE

## 2021-09-22 PROCEDURE — 80053 COMPREHEN METABOLIC PANEL: CPT | Performed by: INTERNAL MEDICINE

## 2021-09-22 PROCEDURE — 85027 COMPLETE CBC AUTOMATED: CPT | Performed by: INTERNAL MEDICINE

## 2021-09-22 PROCEDURE — 99232 SBSQ HOSP IP/OBS MODERATE 35: CPT | Performed by: UROLOGY

## 2021-09-22 RX ORDER — CHOLECALCIFEROL (VITAMIN D3) 125 MCG
5 CAPSULE ORAL NIGHTLY PRN
Status: DISCONTINUED | OUTPATIENT
Start: 2021-09-22 | End: 2021-09-22 | Stop reason: HOSPADM

## 2021-09-22 RX ORDER — CIPROFLOXACIN 250 MG/1
250 TABLET, FILM COATED ORAL 2 TIMES DAILY
Qty: 20 TABLET | Refills: 0 | Status: SHIPPED | OUTPATIENT
Start: 2021-09-22 | End: 2022-01-03

## 2021-09-22 RX ADMIN — SODIUM BICARBONATE 650 MG TABLET 1300 MG: at 09:24

## 2021-09-22 RX ADMIN — SODIUM BICARBONATE 650 MG TABLET 1300 MG: at 11:31

## 2021-09-22 RX ADMIN — PANTOPRAZOLE SODIUM 40 MG: 40 TABLET, DELAYED RELEASE ORAL at 06:24

## 2021-09-22 RX ADMIN — Medication 5 MG: at 02:56

## 2021-09-22 RX ADMIN — SODIUM BICARBONATE 650 MG TABLET 1300 MG: at 08:21

## 2021-09-22 RX ADMIN — HEPARIN SODIUM 5000 UNITS: 5000 INJECTION INTRAVENOUS; SUBCUTANEOUS at 06:24

## 2021-09-22 RX ADMIN — TAMSULOSIN HYDROCHLORIDE 0.4 MG: 0.4 CAPSULE ORAL at 08:21

## 2021-09-22 RX ADMIN — ACETAMINOPHEN 650 MG: 325 TABLET ORAL at 09:24

## 2021-09-22 RX ADMIN — TAMSULOSIN HYDROCHLORIDE 0.4 MG: 0.4 CAPSULE ORAL at 09:24

## 2021-09-22 RX ADMIN — SODIUM CHLORIDE, PRESERVATIVE FREE 10 ML: 5 INJECTION INTRAVENOUS at 08:21

## 2021-09-22 RX ADMIN — LEVOTHYROXINE SODIUM 200 MCG: 125 TABLET ORAL at 06:24

## 2021-09-22 NOTE — PROGRESS NOTES
"Urology Inpatient Progress Note    Subjective  Patient is doing well.  He passed fill and void trial with no post void residual after 175 was instilled.  Renal function is improving.  He has had good urine output.    Physical Exam  Visit Vitals  /70 (BP Location: Left arm, Patient Position: Lying)   Pulse 86   Temp 99 °F (37.2 °C) (Temporal)   Resp 16   Ht 167.6 cm (66\")   Wt 64.4 kg (142 lb)   SpO2 94%   BMI 22.92 kg/m²     Constitutional: NAD, WDWN.  Cardiovascular: Regular rate.  Pulmonary/Chest: Respirations are even and non-labored bilaterally.  Abdominal: Soft. No distension, tenderness, masses or guarding. No CVA tenderness.  Extremities: RAMSEY x 4, Warm. No clubbing.  No cyanosis.    Skin: Pink, warm and dry.  No rashes noted.      I/O last 3 completed shifts:  In: 2336 [P.O.:1520; I.V.:816]  Out: 5200 [Urine:5200]      Current Facility-Administered Medications:   •  acetaminophen (TYLENOL) tablet 650 mg, 650 mg, Oral, Q4H PRN, 650 mg at 09/22/21 0924 **OR** acetaminophen (TYLENOL) 160 MG/5ML solution 650 mg, 650 mg, Oral, Q4H PRN **OR** acetaminophen (TYLENOL) suppository 650 mg, 650 mg, Rectal, Q4H PRN, Sunday Landin, DO  •  heparin (porcine) 5000 UNIT/ML injection 5,000 Units, 5,000 Units, Subcutaneous, Q8H, Sunday Landin, DO, 5,000 Units at 09/22/21 0624  •  HYDROmorphone (DILAUDID) injection 0.5 mg, 0.5 mg, Intravenous, Q2H PRN, Sunday Landin, DO, 0.5 mg at 09/21/21 0605  •  levothyroxine (SYNTHROID, LEVOTHROID) tablet 200 mcg, 200 mcg, Oral, Daily, Sunday Landin, , 200 mcg at 09/22/21 0624  •  melatonin tablet 5 mg, 5 mg, Oral, Nightly PRN, Bushra Doshi MD, 5 mg at 09/22/21 0256  •  ondansetron (ZOFRAN) injection 4 mg, 4 mg, Intravenous, Q6H PRN, Sunday Landin DO, 4 mg at 09/21/21 1757  •  oxyCODONE-acetaminophen (PERCOCET) 5-325 MG per tablet 1 tablet, 1 tablet, Oral, Q6H PRN, Sunday Landin DO, 1 tablet at 09/21/21 0342  •  pantoprazole (PROTONIX) EC tablet 40 mg, 40 mg, Oral, Daily, Mushtaq, " Umar, DO, 40 mg at 09/22/21 0624  •  sodium bicarbonate tablet 1,300 mg, 1,300 mg, Oral, 4x Daily, Mushtaq, Umar, DO, 1,300 mg at 09/22/21 0924  •  sodium chloride 0.9 % flush 10 mL, 10 mL, Intravenous, PRN, Mushtaq, Umar, DO  •  sodium chloride 0.9 % flush 10 mL, 10 mL, Intravenous, Q12H, Mushtaq, Umar, DO, 10 mL at 09/22/21 0821  •  sodium chloride 0.9 % flush 10 mL, 10 mL, Intravenous, PRN, Mushtaq, Umar, DO  •  sodium chloride 0.9 % infusion, 75 mL/hr, Intravenous, Continuous, Yazan Corbett MD, Last Rate: 75 mL/hr at 09/21/21 1400, 75 mL/hr at 09/21/21 1400  •  tamsulosin (FLOMAX) 24 hr capsule 0.4 mg, 0.4 mg, Oral, Daily, Mushtaq, Umar, DO, 0.4 mg at 09/22/21 0924    Labs  Lab Results   Component Value Date    GLUCOSE 103 (H) 09/22/2021    CALCIUM 7.2 (L) 09/22/2021     09/22/2021    K 3.5 09/22/2021    CO2 19.1 (L) 09/22/2021     09/22/2021    BUN 62 (H) 09/22/2021    CREATININE 6.14 (H) 09/22/2021    EGFRIFAFRI  09/22/2021      Comment:      <15 Indicative of kidney failure.    EGFRIFNONA 9 (L) 09/22/2021    BCR 10.1 09/22/2021    ANIONGAP 15.9 (H) 09/22/2021       Lab Results   Component Value Date    WBC 8.08 09/22/2021    HGB 11.9 (L) 09/22/2021    HCT 33.7 (L) 09/22/2021    MCV 87.8 09/22/2021     09/22/2021       No results found for: URINECX    Brief Urine Lab Results  (Last result in the past 365 days)      Color   Clarity   Blood   Leuk Est   Nitrite   Protein   CREAT   Urine HCG        09/20/21 2113 Red Cloudy Large (3+) Moderate (2+) Negative 100 mg/dL (2+)                 Radiographic Studies  CT Abdomen Pelvis Stone Protocol    Result Date: 9/18/2021  1. Obstructing 3 mm stone in the left ureteropelvic junction. Mild left hydronephrosis with prominent reactive left perinephric edema. 2. Dilated distal ileal segment with abrupt transition point to decompressed ileum just before the the ileostomy in the right abdomen. No obstructing mass or hernia. This could be transient. However  consider follow-up KUBs to ensure no progressing bowel dilatation. 3. Right nephrectomy. 4. Cholelithiasis.  This report was finalized on 9/18/2021 4:53 PM by Dr Ton Olivo DO.      Patient Active Problem List   Diagnosis   • History of Crohn's disease   • Epigastric abdominal pain   • Inflammatory arthritis   • Crohn's disease of large intestine with complication (CMS/HCC)   • Acquired hypothyroidism   • Chronic kidney disease, stage III (moderate) (CMS/HCC)   • Acute renal failure superimposed on stage 3 chronic kidney disease (CMS/HCC)   • Hx of unilateral nephrectomy, right   • Primary osteoarthritis involving multiple joints   • Acute renal failure (CMS/HCC)   • Intractable pain   • Kidney stone   • Ureteral stone       Assessment  61 y.o. male with a PMHx of  has a past medical history of Altered bowel elimination due to intestinal ostomy (CMS/HCC), Anemia, Arthritis, Cancer (CMS/HCC), CKD (chronic kidney disease), Crohn disease (CMS/HCC), GERD (gastroesophageal reflux disease), History of MRSA infection, History of pneumonia, Hypothyroid, Spinal headache, and Wears glasses., who presented with left obstructing 3 mm ureteral stone at the [facility] solitary kidney, acute renal failure.  He is status post left ureteroscopy with removal of the stone and stent placement.    Plan  1.  He will follow-up with me on 9/27/2021 for cystoscopy with stent removal.  2.  We will get follow-up renal labs to make sure that kidney function continues to improve.

## 2021-09-22 NOTE — CASE MANAGEMENT/SOCIAL WORK
Case Management Discharge Note                Selected Continued Care - Admitted Since 9/20/2021      Transportation Services  Private: Car    Final Discharge Disposition Code: 01 - home or self-care

## 2021-09-22 NOTE — DISCHARGE SUMMARY
[facility]   DISCHARGE SUMMARY      Name:  Jorge Mclaughlin   Age:  61 y.o.  Sex:  male  :  1960  MRN:  [id number]   Visit Number:  [id number]  Primary Care Physician:  Yazan Corbett MD  Date of Discharge:  2021  Admission Date:  2021      Discharge Diagnosis:         Kidney stone    Acute renal failure superimposed on stage 3 chronic kidney disease (CMS/HCC)    Crohn's disease of large intestine with complication (CMS/HCC)    Acquired hypothyroidism    Hx of unilateral nephrectomy, right    Primary osteoarthritis involving multiple joints    Acute renal failure (CMS/HCC)          Consults:     Consults     Date and Time Order Name Status Description    2021  4:46 PM Inpatient Nephrology Consult Completed           Procedures Performed:      Procedure(s):  URETEROSCOPY diagnostic WITH STENT INSERTION  with left retrograde           Hospital Course:   The patient was admitted on 2021  Please see H&P for details on admission HPI and ROS.   61 M h/o renal cell carcinoma s/p nephrectomy (R), Crohn disease, colonic rupture s/p ileostomy remote who presented to the ED with complaints of left leg pain.  It has been worsening over the past 3 to 4 days now and he has had no urine output.  He was seen in the ED 2 days ago and diagnosed with left-sided kidney stone measuring 3 mm.  The pain has gotten worse.  CT 2021 revealed a 3 mm stone left UP junction with mild hydronephrosis and reactive left perinephric edema.  Urology consulted from the ED, plan OR later  After admission he was started on IV fluids IV hydration and a dose of IV antibiotic started.  He has improved his renal function from creatinine around 8 has come down to 6.  He is asymptomatic his pain is improved.  He still has the mild hematuria and urinary catheter.  Voiding trial will be done and he will be discharged today if Dr. Velazquez agrees.  He will also need follow-up with me as well as   Marcus and Dr. Jorgensen as nephrology consult was during during the hospitalization.  Work-up for hyperparathyroidism has been started and will closely follow-up as outpatient from your onwards.  I will see the patient next Monday repeat his labs and follow-up with Dr. Jorgensen and Dr. Velazquez as per their recommendations    Vital Signs:     Temp:  [97 °F (36.1 °C)-99 °F (37.2 °C)] 99 °F (37.2 °C)  Heart Rate:  [78-86] 86  Resp:  [16-21] 16  BP: (104-144)/(60-79) 139/70    Physical Exam:     General Appearance:    Alert and cooperative, not in any acute distress.   Head:    Atraumatic and normocephalic, without obvious abnormality.   Eyes:            PERRLA,  No pallor. Extraocular movements are within normal limits.   Neck:   Supple,  No lymph glands, no bruit   Lungs:     Chest shape is normal. Breath sounds heard bilaterally equally.  No crackles or wheezing.     Heart:    Normal S1 and S2, no murmur,  No JVD   Abdomen:     Normal bowel sounds, no masses, no organomegaly. Soft     nontender, no guarding, no rebound tenderness.  Left-sided colostomy   Extremities:   Moves all extremities well, no edema, no cyanosis,    Skin:   No  bruising or rash.   Neurologic:   Grossly nonfocal and moves all extremities.        Pertinent Lab Results:     Results from last 7 days   Lab Units 09/22/21  0529 09/21/21  0555 09/20/21  0507 09/18/21  1631 09/18/21  1631   SODIUM mmol/L 140 130* 125*   < > 134*   POTASSIUM mmol/L 3.5 4.4 3.9   < > 3.9   CHLORIDE mmol/L 105 95* 88*   < > 98   CO2 mmol/L 19.1* 16.5* 15.6*   < > 21.6*   BUN mg/dL 62* 68* 47*   < > 29*   CREATININE mg/dL 6.14* 7.78* 8.20*   < > 3.81*   CALCIUM mg/dL 7.2* 7.0* 7.6*   < > 8.9   BILIRUBIN mg/dL <0.2  --  0.4  --  0.7   ALK PHOS U/L 67  --  74  --  78   ALT (SGPT) U/L 7  --  11  --  18   AST (SGOT) U/L 9  --  7  --  22   GLUCOSE mg/dL 103* 125* 91   < > 104*    < > = values in this interval not displayed.     Results from last 7 days   Lab Units 09/22/21  0546  09/21/21  0555 09/20/21  0507   WBC 10*3/mm3 8.08 9.93 13.81*   HEMOGLOBIN g/dL 11.9* 12.8* 14.0   HEMATOCRIT % 33.7* 36.6* 38.7   PLATELETS 10*3/mm3 156 126* 120*         No results found for: BLOODCX, URINECX, WOUNDCX, MRSACX    Pertinent Radiology Results:    Imaging Results (Most Recent)     None                  Discharge Disposition:      Home or Self Care    Discharge Medication:         Discharge Medications      New Medications      Instructions Start Date   ciprofloxacin 500 MG tablet  Commonly known as: Cipro   250 mg, Oral, 2 Times Daily         Continue These Medications      Instructions Start Date   acetaminophen 325 MG tablet  Commonly known as: TYLENOL   650 mg, Oral, Every 6 Hours      ferrous sulfate 325 (65 FE) MG tablet   325 mg, Oral, Daily With Breakfast      levothyroxine 200 MCG tablet  Commonly known as: SYNTHROID, LEVOTHROID   take 1 tablet by mouth once daily      multivitamin tablet tablet  Commonly known as: THERAGRAN   1 tablet, Oral, Daily      oxyCODONE-acetaminophen 5-325 MG per tablet  Commonly known as: PERCOCET   1 tablet, Oral, Every 6 Hours PRN      pantoprazole 40 MG EC tablet  Commonly known as: PROTONIX   40 mg, Oral, Daily      tamsulosin 0.4 MG capsule 24 hr capsule  Commonly known as: FLOMAX   0.4 mg, Oral, Daily      Testosterone Cypionate 200 MG/ML injection  Commonly known as: DEPOTESTOTERONE CYPIONATE   inject 1 milliliter every 2 weeks         Stop These Medications    ibuprofen 800 MG tablet  Commonly known as: ADVIL,MOTRIN            Discharge Diet:       Renal diet      Follow-up Appointments:      Future Appointments   Date Time Provider Department Center   9/27/2021  8:30 AM Lazaro Velazquez MD [facility] [place] [facility]   Follow-up with me on Monday this coming week    Follow-up with Dr. Vázquez as per his instructions      Test Results Pending at Discharge:      Pending Labs     Order Current Status    STONE ANALYSIS - Calculus, Kidney, Left In process              Yazan Corbett MD  09/22/21  09:21 EDT    Time:  Discharge 29 min    Please note that portions of this note were completed with a voice recognition program.

## 2021-09-22 NOTE — PROGRESS NOTES
[facility] Progress Note  [facility]. [place]        Patient Name: Jorge Mclaughlin  : 1960  MRN: [id number]   LOS: 2 days    Patient Care Team:  Yazan Corbett MD as PCP - General    Chief Complaint:    Chief Complaint   Patient presents with   • Flank Pain     Primary Care Physician:  Yazan Corbett MD  Date of admission: 2021    Subjective     Interval History:   He feels fairly well has been eating and drinking but renal function has not improved as much as I thought it will improve with IV hydration.  Review of Systems:   As noted above    Objective     Vitals:   Temp:  [97 °F (36.1 °C)-98.6 °F (37 °C)] 97.6 °F (36.4 °C)  Heart Rate:  [78-84] 79  Resp:  [16-21] 18  BP: (104-144)/(57-79) 131/71    Intake/Output Summary (Last 24 hours) at 2021 0641  Last data filed at 2021 0600  Gross per 24 hour   Intake 1520 ml   Output 3700 ml   Net -2180 ml       Physical Exam:    General Appearance: alert, oriented x 3, no acute distress   Skin: warm and dry  HEENT: oral mucosa normal, nonicteric sclera  Neck: supple, no JVD  Lungs: CTA  Heart: RRR, normal S1 and S2  Abdomen: soft, nontender, nondistended, he does have ostomy in the right lower quadrant.  : no palpable bladder  Extremities: no edema, cyanosis or clubbing  Neuro: normal speech and mental status     Scheduled Meds:     Current Facility-Administered Medications   Medication Dose Route Frequency Provider Last Rate Last Admin   • acetaminophen (TYLENOL) tablet 650 mg  650 mg Oral Q4H PRN Sunday Landin, DO   650 mg at 21    Or   • acetaminophen (TYLENOL) 160 MG/5ML solution 650 mg  650 mg Oral Q4H PRN Mushtaq, Beatricear, DO        Or   • acetaminophen (TYLENOL) suppository 650 mg  650 mg Rectal Q4H PRN Mushtaq, Sunday, DO       • heparin (porcine) 5000 UNIT/ML injection 5,000 Units  5,000 Units Subcutaneous Q8H Sunday Landin, DO   5,000 Units at 21 0624   • HYDROmorphone (DILAUDID)  injection 0.5 mg  0.5 mg Intravenous Q2H PRN Mushtaq, Beatricear, DO   0.5 mg at 09/21/21 0605   • levothyroxine (SYNTHROID, LEVOTHROID) tablet 200 mcg  200 mcg Oral Daily Mushtaq, Beatricear, DO   200 mcg at 09/22/21 0624   • melatonin tablet 5 mg  5 mg Oral Nightly PRN Bushra Doshi MD   5 mg at 09/22/21 0256   • ondansetron (ZOFRAN) injection 4 mg  4 mg Intravenous Q6H PRN Mushtaq, Beatricear, DO   4 mg at 09/21/21 1757   • oxyCODONE-acetaminophen (PERCOCET) 5-325 MG per tablet 1 tablet  1 tablet Oral Q6H PRN Mushtaq, Sunday, DO   1 tablet at 09/21/21 0342   • pantoprazole (PROTONIX) EC tablet 40 mg  40 mg Oral Daily Mushtaq, Beatricear, DO   40 mg at 09/22/21 0624   • sodium bicarbonate tablet 1,300 mg  1,300 mg Oral 4x Daily MushtaqBeatrice mijaresar, DO   1,300 mg at 09/21/21 2041   • sodium chloride 0.9 % flush 10 mL  10 mL Intravenous PRN Mushtaq, Beatricear, DO       • sodium chloride 0.9 % flush 10 mL  10 mL Intravenous Q12H Mushtaq, Sunday, DO   10 mL at 09/21/21 0838   • sodium chloride 0.9 % flush 10 mL  10 mL Intravenous PRN Mushtaq, Beatricear, DO       • sodium chloride 0.9 % infusion  75 mL/hr Intravenous Continuous Yazan Corbett MD 75 mL/hr at 09/21/21 1400 75 mL/hr at 09/21/21 1400   • tamsulosin (FLOMAX) 24 hr capsule 0.4 mg  0.4 mg Oral Daily Mushtaq, Beatricear, DO   0.4 mg at 09/21/21 0838       heparin (porcine), 5,000 Units, Subcutaneous, Q8H  levothyroxine, 200 mcg, Oral, Daily  pantoprazole, 40 mg, Oral, Daily  sodium bicarbonate, 1,300 mg, Oral, 4x Daily  sodium chloride, 10 mL, Intravenous, Q12H  tamsulosin, 0.4 mg, Oral, Daily        IV Meds:   sodium chloride, 75 mL/hr, Last Rate: 75 mL/hr (09/21/21 1400)        Results Reviewed:   I have personally reviewed the results from the time of this admission to 9/22/2021 06:41 EDT     Results from last 7 days   Lab Units 09/22/21  0529 09/21/21  0555 09/20/21  0507 09/18/21  1631 09/18/21  1631   SODIUM mmol/L 140 130* 125*   < > 134*   POTASSIUM mmol/L 3.5 4.4 3.9   < > 3.9   CHLORIDE mmol/L 105 95*  88*   < > 98   CO2 mmol/L 19.1* 16.5* 15.6*   < > 21.6*   BUN mg/dL 62* 68* 47*   < > 29*   CREATININE mg/dL 6.14* 7.78* 8.20*   < > 3.81*   CALCIUM mg/dL 7.2* 7.0* 7.6*   < > 8.9   BILIRUBIN mg/dL <0.2  --  0.4  --  0.7   ALK PHOS U/L 67  --  74  --  78   ALT (SGPT) U/L 7  --  11  --  18   AST (SGOT) U/L 9  --  7  --  22   GLUCOSE mg/dL 103* 125* 91   < > 104*    < > = values in this interval not displayed.       Estimated Creatinine Clearance: 11.8 mL/min (A) (by C-G formula based on SCr of 6.14 mg/dL (H)).    Results from last 7 days   Lab Units 09/21/21  0555   PHOSPHORUS mg/dL 6.9*             Results from last 7 days   Lab Units 09/22/21  0529 09/21/21  0555 09/20/21  0507 09/18/21  1631   WBC 10*3/mm3 8.08 9.93 13.81* 14.25*   HEMOGLOBIN g/dL 11.9* 12.8* 14.0 15.2   PLATELETS 10*3/mm3 156 126* 120* 162             Brief Urine Lab Results  (Last result in the past 365 days)      Color   Clarity   Blood   Leuk Est   Nitrite   Protein   CREAT   Urine HCG        09/20/21 2113 Red Cloudy Large (3+) Moderate (2+) Negative 100 mg/dL (2+)               No results found for: UTPCR    Imaging Results (Last 24 Hours)     ** No results found for the last 24 hours. **              Assessment / Plan     ASSESSMENT:    1. Acute renal failure (CMS/HCC): Patient has solitary kidney with obstruction leading to acute kidney injury.  Potassium is fine but he does have low bicarb, likely combination of ostomy as well as chronic kidney disease.  2. Chronic kidney disease stage IV: From records it does not appear that he has seen or follow-up with any nephrologist.  3. Nephrolithiasis with hydronephrosis: Urology has been consulted and patient was taken to the OR for stent placement.  I will check a PTH.  4. Status post right nephrectomy secondary to cancer:  5. Crohn's disease:  S/p partial colectomy and now have ostomy:      PLAN:    · Continue with IV fluids, he likely will settle down to his baseline.  He said he will schedule  follow-up with me in the office once he gets discharged from the hospital.  · Details were discussed with the patient no family in the room.    · Details were also discussed with Dr. Corbett.  Plans to discharge him later today.  · Continue with rest of the current treatment plan and surveillance labs.  · Further recommendations will depend on clinical course of the patient during the current hospitalization.     Thank you for involving us in the care of Jorge Mclaughlin.  Please feel free to call with any questions.    Luis Jorgensen MD, FASN  09/22/21  06:41 EDT    [facility]  [phone]      Much of this encounter note is an electronic transcription/translation of spoken language to printed text. The electronic translation of spoken language may permit erroneous, or at times, nonsensical words or phrases to be inadvertently transcribed; Although I have reviewed the note for such errors, some may still exist.

## 2021-09-23 ENCOUNTER — READMISSION MANAGEMENT (OUTPATIENT)
Dept: CALL CENTER | Facility: HOSPITAL | Age: 61
End: 2021-09-23

## 2021-09-23 NOTE — OUTREACH NOTE
Prep Survey      Responses   Jew facility patient discharged from?  [facility]   Is LACE score < 7 ?  No   Emergency Room discharge w/ pulse ox?  No   Eligibility  Readm Mgmt   Discharge diagnosis  Acute renal failure   Does the patient have one of the following disease processes/diagnoses(primary or secondary)?  Other   Does the patient have Home health ordered?  No   Is there a DME ordered?  No   Prep survey completed?  Yes          JAJA Doll RN

## 2021-09-24 ENCOUNTER — READMISSION MANAGEMENT (OUTPATIENT)
Dept: CALL CENTER | Facility: HOSPITAL | Age: 61
End: 2021-09-24

## 2021-09-24 LAB
CALCIUM OXALATE DIHYDRATE MFR STONE IR: 10 %
COLOR STONE: NORMAL
COM MFR STONE: 90 %
COMPN STONE: NORMAL
LABORATORY COMMENT REPORT: NORMAL
Lab: NORMAL
Lab: NORMAL
PHOTO: NORMAL
SIZE STONE: NORMAL MM
SPEC SOURCE SUBJ: NORMAL
WT STONE: 9 MG

## 2021-09-24 NOTE — OUTREACH NOTE
Medical Week 1 Survey      Responses   [facility] patient discharged from?  [facility]   Does the patient have one of the following disease processes/diagnoses(primary or secondary)?  Other   Week 1 attempt successful?  Yes   Call start time  1636   Call end time  1639   Discharge diagnosis  Acute renal failure   Is patient permission given to speak with other caregiver?  Yes   List who call center can speak with  wife   Meds reviewed with patient/caregiver?  Yes   Is the patient having any side effects they believe may be caused by any medication additions or changes?  No   Does the patient have all medications ordered at discharge?  Yes   Is the patient taking all medications as directed (includes completed medication regime)?  Yes   Does the patient have a primary care provider?   Yes   Does the patient have an appointment with their PCP within 7 days of discharge?  Yes   Comments regarding PCP  f/u 9-27   Has the patient kept scheduled appointments due by today?  N/A   Psychosocial issues?  No   Comments  Pt reports still having hematuria, painful urination with bending, urine frequency is improving. He is currently working. He is staying hydrated, drinking approx 1gal water daily, per his report.   Did the patient receive a copy of their discharge instructions?  Yes   Nursing interventions  Reviewed instructions with patient   What is the patient's perception of their health status since discharge?  Improving   Is the patient/caregiver able to teach back signs and symptoms related to disease process for when to call PCP?  Yes   Is the patient/caregiver able to teach back signs and symptoms related to disease process for when to call 911?  Yes   Is the patient/caregiver able to teach back the hierarchy of who to call/visit for symptoms/problems? PCP, Specialist, Home health nurse, Urgent Care, ED, 911  Yes   If the patient is a current smoker, are they able to teach back resources for cessation?  Not a  smoker   Week 1 call completed?  Yes   Wrap up additional comments  He feels like he has another stone          Mendy Hernandez RN

## 2021-09-27 ENCOUNTER — OFFICE VISIT (OUTPATIENT)
Dept: UROLOGY | Facility: CLINIC | Age: 61
End: 2021-09-27

## 2021-09-27 DIAGNOSIS — N20.0 NEPHROLITHIASIS: Primary | ICD-10-CM

## 2021-09-27 PROCEDURE — 52310 CYSTOSCOPY AND TREATMENT: CPT | Performed by: UROLOGY

## 2021-09-27 NOTE — PROGRESS NOTES
Preoperative diagnosis  Foreign body in genitourinary tract    Postoperative diagnosis  Foreign body in genitourinary tract    Procedure  Flexible cystourethroscopy with stent removal    Attending surgeon  Lazaro Velazquez MD    Anesthesia  2% lidocaine jelly intraurethrally    Complications  None    Indications  61 y.o. male who is status post  ureteroscopy with holmium laser lithotripsy who presents for stent removal.    Results for RHIANNON RAMOS (MRN [id number]) as of 9/27/2021 08:49   Ref. Range 9/20/2021 13:47   Ca Oxalate-Dihydrate, Stone Latest Units: % 10   Ca Oxalate - Monohydrate, Stone Latest Units: % 90       Procedure  Detailed information of all possible complications and side effects were discussed with the patient.  Informed consent was obtained. Patient was given one dose of antibiotics. The patient was placed in supine position and a timeout was performed. The patient was prepped and draped in sterile fashion.  Next, 2% lidocaine jelly was bluntly injected per urethra without difficulty. The 14 Sami flexible cystoscope was passed through the urethra and into the bladder.  The stent was visualized, grasped and removed in its entirety.  The patient tolerated the procedure well.    Plan  1. Provided education regarding water intake of at least 2 liters per day  2. F/u in 8 weeks with a renal ultrasound  3.  [employer]

## 2021-09-28 LAB
BUN SERPL-MCNC: 37 MG/DL (ref 6–20)
BUN/CREAT SERPL: 14.1 (ref 7–25)
CALCIUM SERPL-MCNC: 8.9 MG/DL (ref 8.6–10.5)
CHLORIDE SERPL-SCNC: 99 MMOL/L (ref 98–107)
CO2 SERPL-SCNC: 14.7 MMOL/L (ref 22–29)
CREAT SERPL-MCNC: 2.63 MG/DL (ref 0.76–1.27)
GLUCOSE SERPL-MCNC: 99 MG/DL (ref 65–99)
POTASSIUM SERPL-SCNC: 4.2 MMOL/L (ref 3.5–5.2)
SODIUM SERPL-SCNC: 134 MMOL/L (ref 136–145)

## 2021-09-30 ENCOUNTER — READMISSION MANAGEMENT (OUTPATIENT)
Dept: CALL CENTER | Facility: HOSPITAL | Age: 61
End: 2021-09-30

## 2021-09-30 NOTE — OUTREACH NOTE
Medical Week 2 Survey      Responses   [facility] patient discharged from?  [facility]   Does the patient have one of the following disease processes/diagnoses(primary or secondary)?  Other   Week 2 attempt successful?  Yes   Call start time  1414   Discharge diagnosis  Acute renal failure   Call end time  1418   Has the patient kept scheduled appointments due by today?  Yes   What is the patient's perception of their health status since discharge?  New symptoms unrelated to diagnosis   Week 2 Call Completed?  Yes   Wrap up additional comments  patient reports ever since he had the procedure he has felt like his bottom is falling out - he reports he informed the doctor of this and the doctor told him to go to the ER- pt refused - I encouraged him if he was still feeling that way to adhere to doctors advice-call dropped from patients end shortly after           Ammy Bustillo RN

## 2021-10-05 ENCOUNTER — TELEPHONE (OUTPATIENT)
Dept: UROLOGY | Facility: CLINIC | Age: 61
End: 2021-10-05

## 2021-10-06 ENCOUNTER — READMISSION MANAGEMENT (OUTPATIENT)
Dept: CALL CENTER | Facility: HOSPITAL | Age: 61
End: 2021-10-06

## 2021-10-06 NOTE — OUTREACH NOTE
Medical Week 3 Survey      Responses   [facility] patient discharged from?  [facility]   Does the patient have one of the following disease processes/diagnoses(primary or secondary)?  Other   Week 3 attempt successful?  Yes   Call start time  1545   Call end time  1550   Discharge diagnosis  Acute renal failure   Meds reviewed with patient/caregiver?  Yes   Is the patient taking all medications as directed (includes completed medication regime)?  Yes   Has the patient kept scheduled appointments due by today?  Yes   Psychosocial issues?  No   What is the patient's perception of their health status since discharge?  Improving   Additional teach back comments  Patient says his tailbone is better, he says maybe it was bruised. He is able to sit. He has returned to work.   Week 3 Call Completed?  Yes   Is the patient interested in additional calls from an ambulatory ?  NOTE:  applies to high risk patients requiring additional follow-up.  No   Revoked  No further contact(revokes)-requires comment   Graduated/Revoked comments  Has returned to work no further calls.          Rosa Maria RN

## 2021-11-24 ENCOUNTER — APPOINTMENT (OUTPATIENT)
Dept: ULTRASOUND IMAGING | Facility: HOSPITAL | Age: 61
End: 2021-11-24

## 2022-01-03 ENCOUNTER — LAB (OUTPATIENT)
Dept: LAB | Facility: HOSPITAL | Age: 62
End: 2022-01-03

## 2022-01-03 ENCOUNTER — OFFICE VISIT (OUTPATIENT)
Dept: GASTROENTEROLOGY | Facility: CLINIC | Age: 62
End: 2022-01-03

## 2022-01-03 VITALS
DIASTOLIC BLOOD PRESSURE: 88 MMHG | BODY MASS INDEX: 24.23 KG/M2 | SYSTOLIC BLOOD PRESSURE: 140 MMHG | TEMPERATURE: 98.2 F | WEIGHT: 150.8 LBS | HEIGHT: 66 IN

## 2022-01-03 DIAGNOSIS — K50.90 CROHN'S DISEASE WITHOUT COMPLICATION, UNSPECIFIED GASTROINTESTINAL TRACT LOCATION: Chronic | ICD-10-CM

## 2022-01-03 DIAGNOSIS — Z98.890 HISTORY OF ILEOSTOMY: Chronic | ICD-10-CM

## 2022-01-03 DIAGNOSIS — R10.13 EPIGASTRIC PAIN: Chronic | ICD-10-CM

## 2022-01-03 DIAGNOSIS — D64.9 ANEMIA, UNSPECIFIED TYPE: Chronic | ICD-10-CM

## 2022-01-03 DIAGNOSIS — R93.3 ABNORMAL CT SCAN, COLON: ICD-10-CM

## 2022-01-03 DIAGNOSIS — K21.9 GASTROESOPHAGEAL REFLUX DISEASE, UNSPECIFIED WHETHER ESOPHAGITIS PRESENT: Chronic | ICD-10-CM

## 2022-01-03 DIAGNOSIS — R11.0 NAUSEA: Chronic | ICD-10-CM

## 2022-01-03 DIAGNOSIS — K50.90 CROHN'S DISEASE WITHOUT COMPLICATION, UNSPECIFIED GASTROINTESTINAL TRACT LOCATION: Primary | Chronic | ICD-10-CM

## 2022-01-03 DIAGNOSIS — Z01.818 PREOP TESTING: Primary | ICD-10-CM

## 2022-01-03 DIAGNOSIS — R19.7 DIARRHEA, UNSPECIFIED TYPE: Chronic | ICD-10-CM

## 2022-01-03 DIAGNOSIS — Z12.11 ENCOUNTER FOR SCREENING FOR MALIGNANT NEOPLASM OF COLON: ICD-10-CM

## 2022-01-03 DIAGNOSIS — K26.9 DUODENAL ULCER: Chronic | ICD-10-CM

## 2022-01-03 LAB
ADV 40+41 DNA STL QL NAA+NON-PROBE: NOT DETECTED
ALBUMIN SERPL-MCNC: 4.2 G/DL (ref 3.5–5.2)
ALBUMIN/GLOB SERPL: 1.4 G/DL
ALP SERPL-CCNC: 136 U/L (ref 39–117)
ALT SERPL W P-5'-P-CCNC: 64 U/L (ref 1–41)
ANION GAP SERPL CALCULATED.3IONS-SCNC: 12.5 MMOL/L (ref 5–15)
AST SERPL-CCNC: 67 U/L (ref 1–40)
ASTRO TYP 1-8 RNA STL QL NAA+NON-PROBE: NOT DETECTED
BILIRUB SERPL-MCNC: 0.5 MG/DL (ref 0–1.2)
BUN SERPL-MCNC: 35 MG/DL (ref 6–20)
BUN/CREAT SERPL: 13.3 (ref 7–25)
C CAYETANENSIS DNA STL QL NAA+NON-PROBE: NOT DETECTED
C COLI+JEJ+UPSA DNA STL QL NAA+NON-PROBE: NOT DETECTED
C DIFF TOX GENS STL QL NAA+PROBE: NOT DETECTED
CALCIUM SPEC-SCNC: 8.8 MG/DL (ref 8.6–10.5)
CHLORIDE SERPL-SCNC: 100 MMOL/L (ref 98–107)
CO2 SERPL-SCNC: 22.5 MMOL/L (ref 22–29)
CREAT SERPL-MCNC: 2.64 MG/DL (ref 0.76–1.27)
CRYPTOSP DNA STL QL NAA+NON-PROBE: NOT DETECTED
DEPRECATED RDW RBC AUTO: 44 FL (ref 37–54)
E HISTOLYT DNA STL QL NAA+NON-PROBE: NOT DETECTED
EAEC PAA PLAS AGGR+AATA ST NAA+NON-PRB: NOT DETECTED
EC STX1+STX2 GENES STL QL NAA+NON-PROBE: NOT DETECTED
EPEC EAE GENE STL QL NAA+NON-PROBE: NOT DETECTED
ERYTHROCYTE [DISTWIDTH] IN BLOOD BY AUTOMATED COUNT: 13.3 % (ref 12.3–15.4)
ETEC LTA+ST1A+ST1B TOX ST NAA+NON-PROBE: NOT DETECTED
FERRITIN SERPL-MCNC: 214.2 NG/ML (ref 30–400)
G LAMBLIA DNA STL QL NAA+NON-PROBE: NOT DETECTED
GFR SERPL CREATININE-BSD FRML MDRD: 25 ML/MIN/1.73
GLOBULIN UR ELPH-MCNC: 3.1 GM/DL
GLUCOSE SERPL-MCNC: 102 MG/DL (ref 65–99)
HCT VFR BLD AUTO: 42.8 % (ref 37.5–51)
HGB BLD-MCNC: 14.9 G/DL (ref 13–17.7)
IGA1 MFR SER: 243 MG/DL (ref 70–400)
IRON 24H UR-MRATE: 92 MCG/DL (ref 59–158)
IRON SATN MFR SERPL: 21 % (ref 20–50)
LIPASE SERPL-CCNC: 84 U/L (ref 13–60)
MCH RBC QN AUTO: 32.1 PG (ref 26.6–33)
MCHC RBC AUTO-ENTMCNC: 34.8 G/DL (ref 31.5–35.7)
MCV RBC AUTO: 92.2 FL (ref 79–97)
NOROVIRUS GI+II RNA STL QL NAA+NON-PROBE: NOT DETECTED
P SHIGELLOIDES DNA STL QL NAA+NON-PROBE: NOT DETECTED
PLATELET # BLD AUTO: 205 10*3/MM3 (ref 140–450)
PMV BLD AUTO: 10.7 FL (ref 6–12)
POTASSIUM SERPL-SCNC: 4.5 MMOL/L (ref 3.5–5.2)
PROT SERPL-MCNC: 7.3 G/DL (ref 6–8.5)
RBC # BLD AUTO: 4.64 10*6/MM3 (ref 4.14–5.8)
RVA RNA STL QL NAA+NON-PROBE: NOT DETECTED
S ENT+BONG DNA STL QL NAA+NON-PROBE: NOT DETECTED
SAPO I+II+IV+V RNA STL QL NAA+NON-PROBE: NOT DETECTED
SHIGELLA SP+EIEC IPAH ST NAA+NON-PROBE: NOT DETECTED
SODIUM SERPL-SCNC: 135 MMOL/L (ref 136–145)
TIBC SERPL-MCNC: 448 MCG/DL (ref 298–536)
TRANSFERRIN SERPL-MCNC: 301 MG/DL (ref 200–360)
TSH SERPL DL<=0.05 MIU/L-ACNC: 85.05 UIU/ML (ref 0.27–4.2)
V CHOL+PARA+VUL DNA STL QL NAA+NON-PROBE: NOT DETECTED
V CHOLERAE DNA STL QL NAA+NON-PROBE: NOT DETECTED
VIT B12 BLD-MCNC: 1846 PG/ML (ref 211–946)
WBC NRBC COR # BLD: 9.43 10*3/MM3 (ref 3.4–10.8)
Y ENTEROCOL DNA STL QL NAA+NON-PROBE: NOT DETECTED

## 2022-01-03 PROCEDURE — 86364 TISS TRNSGLTMNASE EA IG CLAS: CPT

## 2022-01-03 PROCEDURE — 84443 ASSAY THYROID STIM HORMONE: CPT

## 2022-01-03 PROCEDURE — 87493 C DIFF AMPLIFIED PROBE: CPT

## 2022-01-03 PROCEDURE — 87338 HPYLORI STOOL AG IA: CPT

## 2022-01-03 PROCEDURE — 80053 COMPREHEN METABOLIC PANEL: CPT

## 2022-01-03 PROCEDURE — 84466 ASSAY OF TRANSFERRIN: CPT

## 2022-01-03 PROCEDURE — 83540 ASSAY OF IRON: CPT

## 2022-01-03 PROCEDURE — 99214 OFFICE O/P EST MOD 30 MIN: CPT | Performed by: NURSE PRACTITIONER

## 2022-01-03 PROCEDURE — 36415 COLL VENOUS BLD VENIPUNCTURE: CPT

## 2022-01-03 PROCEDURE — 82728 ASSAY OF FERRITIN: CPT

## 2022-01-03 PROCEDURE — 82784 ASSAY IGA/IGD/IGG/IGM EACH: CPT

## 2022-01-03 PROCEDURE — 83690 ASSAY OF LIPASE: CPT

## 2022-01-03 PROCEDURE — 85027 COMPLETE CBC AUTOMATED: CPT

## 2022-01-03 PROCEDURE — 82652 VIT D 1 25-DIHYDROXY: CPT

## 2022-01-03 PROCEDURE — 0097U HC BIOFIRE FILMARRAY GI PANEL: CPT

## 2022-01-03 PROCEDURE — 82607 VITAMIN B-12: CPT

## 2022-01-03 RX ORDER — TRIAMCINOLONE ACETONIDE 1 MG/G
1 CREAM TOPICAL 2 TIMES DAILY
COMMUNITY

## 2022-01-03 RX ORDER — METOCLOPRAMIDE 10 MG/1
10 TABLET ORAL 2 TIMES DAILY
COMMUNITY

## 2022-01-03 RX ORDER — POLYETHYLENE GLYCOL 3350 17 G/17G
POWDER, FOR SOLUTION ORAL
Qty: 238 G | Refills: 0 | Status: SHIPPED | OUTPATIENT
Start: 2022-01-03

## 2022-01-03 RX ORDER — BALSALAZIDE DISODIUM 750 MG/1
2250 CAPSULE ORAL 2 TIMES DAILY
COMMUNITY

## 2022-01-03 RX ORDER — DUPILUMAB 200 MG/1.14ML
INJECTION, SOLUTION SUBCUTANEOUS
COMMUNITY

## 2022-01-03 RX ORDER — BISACODYL 5 MG/1
TABLET, DELAYED RELEASE ORAL
Qty: 4 TABLET | Refills: 0 | Status: SHIPPED | OUTPATIENT
Start: 2022-01-03

## 2022-01-03 RX ORDER — SODIUM CHLORIDE 9 MG/ML
70 INJECTION, SOLUTION INTRAVENOUS CONTINUOUS PRN
Status: CANCELLED | OUTPATIENT
Start: 2022-01-03

## 2022-01-03 NOTE — PROGRESS NOTES
New Patient Consult      Date: 2022   Patient Name: Jorge Mclaughlin  MRN: [id number]  : 1960     Primary Care Provider: Yazan Corbett MD    Chief Complaint   Patient presents with   • Colon Cancer Screening     History of Present Illness: Jorge Mclaughlin is a 61 y.o. male who is here today to establish care with Gastroenterology for colon cancer screening.    He has a history of epigastric pain with nausea for the past year that occurs every 2 hours and has not improved. He constantly feels hungry. He will wake at night several times due to the pain and nausea. If he eats, the pain and nausea will improve for an hour or so. He feels he is constantly eating and is eating much larger portions than prior.  He is taking Pantoprazole for several years, but it has not helped.     He has an ileostomy for over 20 years, diagnosed with Crohn's since his late 's. He typically empties his bag every 1-2 hours as it gets full. He does drink lots of fluids throughout the day.  He denies seeing bright red blood or black stool in his bag. He occasionally has mucus with bright red stranding coming from his rectum.  He has never been on Imuran, Budesonide, Humira or any treatments for Crohn's. He had anemia a few months ago when he was hospitalized for kidney stone, but has not had follow up labs. He had a kidney removed several years ago due to renal carcinoma.    He has a history of reflux for many years that is reasonably controlled with Pantoprazole daily. No difficulty swallowing.     He has joint pain and takes Tylenol on occasion, but occasionally takes Ibuprofen. 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     His last colonoscopy was in 2017 by Dr. Galan. He has had his colon perforated three times during colonoscopy, he thinks it was ruptured in 2017. His last EGD was in .  No family history of GI malignancy.  His son has Crohn's. He had an aunt with colitis, unsure of specifics.    Prior History  Dr. Vincenzo Galan 8/7/2017  This is a 57-year-old white male who was recently discharged from [facility] on 8/4/2017 with history of free intra-peritoneal and retroperitoneal air.  The patient did not have peritoneal signs and was treated conservatively after Limited colonoscopy from anus and ileoscopy through ileostomy.  The patient was found to have a stricture at the ileum and what appeared to be splenic flexure.  The mucosa was noted to be friable with easy bleeding.  The patient continued to do fine in terms of abdomen however he had some indigestion.  The patient had significant pain in his left ankle, and left shoulder as well as right wrist.  The patient could not do weightbearing which prompted him to seek medical attention in the emergency room.  With significant pain in the left ankle the patient felt somewhat nauseated.  His left shoulder pain is mostly at the tip of the shoulder at a probably acromioclavicular joint which is significantly tender to touch.  The patient also had low-grade temperature of 99.4.  He denied vomiting.  The patient has been passing liquidy stools through the ileostomy bag.  He has also noticed small amount of rectal discharge. There is no history of overt GI bleed (Hematemesis, melena or hematochezia).  The patient also felt somewhat hot and uneasy before his presentation.     The patient has a history of long-standing Crohn's.  The patient has right-sided ileostomy done several years ago.  The patient had perforation at that time.  He had a colonoscopy done by Dr. Lew in 2014 which was complicated by free intra-abdominal air, and retroperitoneal air.  The patient has history of some mucoid secretions and bright red blood per rectum.  There is history of colon polyps.  The patient was told about a residual polyps.  On 7/31/2017 the patient underwent a elective colonoscopy and  ileoscopy.  This was again complicated by free intra-abdominal air, and retroperitoneal and small mediastinal air.  The patient was hospitalized.  He did not have significant abdominal pain or peritoneal signs.  The patient was advised regarding NG tube suctioning however the patient despite long discussion refused it.  He was given IV fluids, acid suppressive therapy, and IV antibiotics.  The patient did well conservatively.  The patient had undergone a CT of abdomen and pelvis without contrast which did not reveal intra-abdominal or pelvic fluid.  The patient was followed with abdominal and chest films.  Another CT with oral contrast on 8/3/2017 revealed interval improvement of the area.  The patient again did not have free fluid.  His white count remained normal.  The patient had increased creatinine.  He was given IV fluids with improvement.  Of interest the patient was not taking supplemental thyroids with significant elevation of TSH.  He has started taking thyroid.  Currently his TSH is normal.  The patient has been complaining of some pain in his wrists which is old.  Of interest he takes Mobic and other NSAIDs off and on.  The patient also had some nausea and migraine headaches.  These have improved.  During his stay the patient was noted to have PVCs.  He has history of such irregularities in the past.  The patient remained asymptomatic.  He was monitored on telemetry.  The CT also revealed renal mass unfortunately without contrast this could not be well evaluated.  Previously in 1990s the patient also had free intra-abdominal area.     There is no history of trauma to the ankle or shoulder.  There is no back pain.  The patient denies significant rash.  He denies eye symptoms.      Dr. Vincenzo Galan 6/28/2017  The patient came back for follow visit today. He feels okay.   He empties his ileostomy bag perhaps 10-15 times a day (just for precautions). Is no bright red blood in the ileostomy bag. There is no  "history suggestive of melena. The patient denies dysphagia or odynophagia. There is history of some mucoid discharge per rectum. There is no further bright red blood per rectum. There is no rectal pain. There is no recent weight loss. Rather the patient has gained about 5 pounds since his last visit in January 2015.     The patient has history of hypothyroidism.  Previously the patient was noted to have significant elevation of TSH.  Unfortunately, the patient ran out of Synthyroid about a month or so ago and currently not taking one.     The patient has a history of reflux off and on for the last several years perhaps 10-12 years.  The reflux is moderately severe.  Symptoms are described as retrosternal burning sensation, and indigestion.  There is history of occasional regurgitative symptoms.  Frequency being several times per week.  The symptoms are worse at night.  The patient takes acid suppressive therapy with reasonable control of his symptoms.     As you recall , the patient has history of long-standing \"Crohn's disease\" perhaps from early 90s. The patient has been treated with sulfasalazine in the past. He also has received steroids. The patient was followed up by Dr. Sow. The patient was followed up in [place]. In 1995 the patient claims that he got very sick he was followed up by Dr. Kris talavera. In 2000 the patient claims that he ruptured his colon. He was treated by Dr. Lew. The patient had small bowel resection, and ileostomy. The patient has also undergone hemorrhoidectomy by Dr. Jewell a few years ago. The patient has history of some bright red blood per rectum, and mucoid discharge from the rectum. The patient underwent a colonoscopy by Dr. Lew in August 2014. According to the notes colonoscope could not be used. An EGD scope was used. The patient was noted to have \"significant inflammatory changes\" in the colon all the way to \"transverse colon\". It appears that the patient had left-sided " "diverticulosis. According to the report no polyps were seen. The patient had undergone \"random biopsies from the colon\". Biopsies were negative for inflammatory changes. The patient was noted to have hyperplastic polyp without dysplastic change. The patient was noted to have significant retroperitoneal air. He was treated conservatively with resolution. The patient was told to have \"residual colon polyps\". The patient has not received B12 supplements as such other than what he consumes orally as over-the-counter vitamins. According to the patient is B12 is good.    Subjective      Past Medical History:   Diagnosis Date   • Altered bowel elimination due to intestinal ostomy (HCC)     PATIENT REPORTS SECONDARY TO A COLON RUPTURE APPROXIMATELY 16-17 YEARS AGO   • Anemia    • Arthritis     WRIST   • Cancer (HCC)     Kidney   • CKD (chronic kidney disease)     stage 3   • Crohn disease (HCC)    • GERD (gastroesophageal reflux disease)    • History of MRSA infection     REPORTS HX OF \"NOSE\" 2-3 YEARS AGO, TREATED.   • History of pneumonia    • Hypothyroid    • Spinal headache    • Wears glasses      Past Surgical History:   Procedure Laterality Date   • COLONOSCOPY     • COLONOSCOPY N/A 7/31/2017    Procedure: Colonoscopy through ostomy site and rectum with biopsies;  Surgeon: Vincenzo Galan MD;  Location: [facility] ENDOSCOPY;  Service:    • ENDOSCOPY     • HEMORRHOIDECTOMY      REPORTS APPROXIMATELY 20 YEARS AGO   • HERNIA REPAIR      UMBILICAL   • KIDNEY SURGERY      Right kidney removed   • NEPHRECTOMY Right 07/2017   • SMALL INTESTINE SURGERY      HX OF COLON RUPTURE WITH PLACEMENT OF OSTOMY 16-17 years ago   • URETEROSCOPY LASER LITHOTRIPSY WITH STENT INSERTION Left 7/3/2019    Procedure: CYSTOSCOPY, URETEROSCOPY, STONE BASKETING, RETROGRADE PYLEOGRAM,  LASER LITHOTRIPSY WITH STENT INSERTION;  Surgeon: Lazaro Velazquez MD;  Location: [facility] OR;  Service: Urology   • URETEROSCOPY LASER LITHOTRIPSY WITH STENT " INSERTION Left 9/20/2021    Procedure: URETEROSCOPY diagnostic WITH STENT INSERTION  with left retrograde;  Surgeon: Lazaro Velazquez MD;  Location: [facility];  Service: Urology;  Laterality: Left;   • WISDOM TOOTH EXTRACTION     • WRIST SURGERY Right      Family History   Problem Relation Age of Onset   • Heart disease Mother    • Cancer Father    • No Known Problems Sister    • COPD Brother    • COPD Sister    • Crohn's disease Son    • Colon cancer Neg Hx      Social History     Socioeconomic History   • Marital status:    Tobacco Use   • Smoking status: Former Smoker     Types: Cigarettes   • Smokeless tobacco: Never Used   • Tobacco comment: QUIT 30 PLUS YEARS AGO   Substance and Sexual Activity   • Alcohol use: No     Comment: QUIT 30 PLUS YEARS   • Drug use: No   • Sexual activity: Yes     Partners: Female       Current Outpatient Medications:   •  acetaminophen (TYLENOL) 325 MG tablet, Take 2 tablets by mouth Every 6 (Six) Hours., Disp: 20 tablet, Rfl: 0  •  balsalazide (COLAZAL) 750 MG capsule, Take 2,250 mg by mouth 2 (Two) Times a Day., Disp: , Rfl:   •  Diclofenac Sodium (VOLTAREN) 1 % gel gel, Apply 4 g topically to the appropriate area as directed 4 (Four) Times a Day As Needed., Disp: , Rfl:   •  Dupilumab (Dupixent) 200 MG/1.14ML solution pen-injector, Inject  under the skin into the appropriate area as directed., Disp: , Rfl:   •  ferrous sulfate 325 (65 FE) MG tablet, Take 325 mg by mouth Daily With Breakfast., Disp: , Rfl:   •  fluocinonide (LIDEX) 0.05 % cream, Apply 1 application topically to the appropriate area as directed 2 (Two) Times a Day., Disp: , Rfl:   •  levothyroxine (SYNTHROID, LEVOTHROID) 200 MCG tablet, take 1 tablet by mouth once daily, Disp: , Rfl: 0  •  metoclopramide (REGLAN) 10 MG tablet, Take 10 mg by mouth 2 (Two) Times a Day., Disp: , Rfl:   •  Multiple Vitamin (MULTI VITAMIN MENS PO), Take 1 tablet by mouth Daily., Disp: , Rfl:   •  pantoprazole (PROTONIX) 40  MG EC tablet, Take 40 mg by mouth Daily., Disp: , Rfl:   •  tamsulosin (FLOMAX) 0.4 MG capsule 24 hr capsule, Take 1 capsule by mouth Daily., Disp: 10 capsule, Rfl: 0  •  Testosterone Cypionate (DEPOTESTOTERONE CYPIONATE) 200 MG/ML injection, inject 1 milliliter every 2 weeks, Disp: , Rfl: 0  •  triamcinolone (KENALOG) 0.1 % cream, Apply 1 application topically to the appropriate area as directed 2 (Two) Times a Day., Disp: , Rfl:   •  bisacodyl (DULCOLAX) 5 MG EC tablet, Take as directed for colon prep, Disp: 4 tablet, Rfl: 0  •  polyethylene glycol (MiraLax) 17 GM/SCOOP powder, Take as directed for colonoscopy prep, Disp: 238 g, Rfl: 0    Allergies   Allergen Reactions   • Latex Rash     The following portions of the patient's history were reviewed and updated as appropriate: allergies, current medications, past family history, past medical history, past social history, past surgical history and problem list.    Objective     Physical Exam  Vitals and nursing note reviewed.   Constitutional:       General: He is not in acute distress.     Appearance: Normal appearance. He is well-developed.   HENT:      Head: Normocephalic and atraumatic.      Right Ear: Hearing normal.      Left Ear: Hearing normal.      Mouth/Throat:      Mouth: Mucous membranes are not pale, not dry and not cyanotic.   Eyes:      General: Lids are normal.      Conjunctiva/sclera: Conjunctivae normal.   Neck:      Trachea: Trachea normal.   Cardiovascular:      Rate and Rhythm: Normal rate and regular rhythm.      Heart sounds: Normal heart sounds.   Pulmonary:      Effort: Pulmonary effort is normal. No respiratory distress.      Breath sounds: Normal breath sounds.   Abdominal:      General: Bowel sounds are normal.      Palpations: Abdomen is soft. There is no mass.      Tenderness: There is no abdominal tenderness.      Hernia: No hernia is present.       Skin:     General: Skin is warm and dry.   Neurological:      Mental Status: He is  "alert and oriented to person, place, and time.   Psychiatric:         Mood and Affect: Mood normal.         Speech: Speech normal.         Behavior: Behavior normal. Behavior is cooperative.       Vitals:    01/03/22 1034   BP: 140/88   Temp: 98.2 °F (36.8 °C)   TempSrc: Infrared   Weight: 68.4 kg (150 lb 12.8 oz)   Height: 167.6 cm (66\")     Body mass index is 24.34 kg/m².     Results Review:   I have reviewed the patient's new clinical and imaging results.    No visits with results within 90 Day(s) from this visit.   Latest known visit with results is:   Office Visit on 09/27/2021   Component Date Value Ref Range Status   • Glucose 09/27/2021 99  65 - 99 mg/dL Final   • BUN 09/27/2021 37* 6 - 20 mg/dL Final   • Creatinine 09/27/2021 2.63* 0.76 - 1.27 mg/dL Final   • eGFR Non  Am 09/27/2021 25* >60 mL/min/1.73 Final    Comment: GFR Normal >60  Chronic Kidney Disease <60  Kidney Failure <15     • eGFR  Am 09/27/2021 30* >60 mL/min/1.73 Final   • BUN/Creatinine Ratio 09/27/2021 14.1  7.0 - 25.0 Final   • Sodium 09/27/2021 134* 136 - 145 mmol/L Final   • Potassium 09/27/2021 4.2  3.5 - 5.2 mmol/L Final    Comment: Slight hemolysis detected by analyzer. Results may be  affected.     • Chloride 09/27/2021 99  98 - 107 mmol/L Final   • Total CO2 09/27/2021 14.7* 22.0 - 29.0 mmol/L Final   • Calcium 09/27/2021 8.9  8.6 - 10.5 mg/dL Final      Comprehensive Metabolic Panel (09/22/2021 05:29)  CBC (No Diff) (09/22/2021 05:29)    CT Abdomen Pelvis Stone Protocol (09/18/2021 16:41)    EGD dated 6/1/2015 per Dr. Vincenzo Galan  - Grade 1 esophagitis (erythema). No definite Ferrari's.   - Small sliding hiatal hernia less than 3 cm.   - Erythematous gastritis.   - Erythematous/erosive duodenitis with boggy folds and small areas of eschar.   - A 0.75 cm clean base duodenal bulb ulcer.  - Duodenum second portion biopsy with nonspecific chronic duodenitis, negative for celiac.  Duodenum biopsy bulb with acute peptic " duodenitis with ulceration and marked inflammation.  Reactive glandular atypia, believed to be secondary to the ulcer and inflammation, however follow-up recommended.  Antrum and body biopsy unremarkable, no H. pylori.  Stomach biopsy distal greater curve with minimal chronic gastritis, no H. pylori.    Colonoscopy dated 7/31/2017 per Dr. Vincenzo Galan  - Left-sided diverticulosis.  - Stricture at 50 cm from anal verge at what appears to be splenic flexure area.    - Internal hemorrhoids.  - Small external hemorrhoids.  Somewhat adenomatous perineal area.  - Mucosal friability and easy bleeding within the left colon.  - Stricture in the distal ileum.   - Mucosal friability and easy bleeding within the distal ileum.    - Terminal ileum biopsies with single fragment of inflamed ulcerated granulation tissue intermixed with fragments of benign small bowel mucosa showing changes consistent with treated Crohn's disease.  Descending colon biopsy stricture with changes consistent with treated Crohn's disease.  Sigmoid colon polyp biopsies with changes consistent with treated Crohn's disease.  Rectal biopsies with changes consistent with treated Crohn's disease.    Assessment / Plan      1. Crohn's disease without complication, unspecified gastrointestinal tract location (HCC)  2. Diarrhea, unspecified type  3. History of ileostomy  4. Abnormal CT scan, colon  5. Anemia, unspecified type  6. Encounter for screening for malignant neoplasm of colon  The patient was diagnosed with Crohn's in his late 20s and has a history of an ileostomy bag for over 20 years.  He typically empties the bag every 1-2 hours as he gets full.  He denies seeing bright red blood or black stool in the bag.  He occasionally has mucus with bright red stranding coming from his rectum.  He has never taken Imuran, Humira, budesonide or any other treatment for Crohn's. He had anemia a few months ago when he was hospitalized for kidney stone, he had kidney  removed several years ago due to renal carcinoma. He takes occasional Tylenol and NSAIDs for joint pain. He is on Dupixent for the past year for rash of his hands after using latex gloves. CTAP in September 2021 with an 8 cm segment of distal ileum just before the ostomy decompressed, approximately there is focal dilatation of the distal ileum up to 4 cm, no clear obstructing mass.  It was recommended to consider follow-up to ensure no progressing bowel dilatation. He had 3 perforations of colon after colonoscopies in the past.   His last colonoscopy was in 2017 per Dr. Galan with biopsies consistent with Crohn's disease, strictures at theTI and 50 cm from anal verge were noted.  Avoid all NSAIDs.  Labs  Stool studies  CTAP to ensure no progressing bowel dilatation.  He is due for colonoscopy for screening for colon cancer.  Joint pain can be associated with Crohn's.     - CBC (No Diff); Future  - Comprehensive Metabolic Panel; Future  - Gastrointestinal Panel, PCR - Stool, Per Rectum; Future  - Clostridium Difficile Toxin, PCR - Stool, Per Rectum; Future  - Calprotectin, Fecal - Stool, Per Rectum; Future  - Pancreatic Elastase, Fecal - Stool, Per Rectum; Future  - Iron Profile; Future  - Ferritin; Future  - Vitamin B12; Future  - Vitamin D 1,25 Dihydroxy; Future  - Case Request; Standing  - Case Request  - TSH; Future  - IgA; Future  - Tissue Transglutaminase, IgA; Future  - Case Request; Standing  - Case Request  - CT Abdomen Pelvis Without Contrast; Future  - polyethylene glycol (MiraLax) 17 GM/SCOOP powder; Take as directed for colonoscopy prep  Dispense: 238 g; Refill: 0  - bisacodyl (DULCOLAX) 5 MG EC tablet; Take as directed for colon prep  Dispense: 4 tablet; Refill: 0    7. Epigastric pain  8. Nausea  9. Duodenal ulcer  10. Gastroesophageal reflux disease, unspecified whether esophagitis present  For the past year he has been having epigastric pain and nausea that occurs every 2 hours if he has not eaten. He  feels he is constantly eating to keep pain and nausea under control. EGD in 2015 with duodenal ulcer and areas of eschar, biopsies with reactive glandular tissue, otherwise unremarkable. He has a history of Crohn's. He is on Pantoprazole 40 mg daily for several years for reflux with reasonable control. Continue same. No history of difficulty swallowing.  Anti-reflux measures.  Labs  Stool for H. Pylori.  EGD to rule out peptic ulcer disease.    - TSH; Future  - Lipase; Future  - IgA; Future  - Tissue Transglutaminase, IgA; Future  - H. Pylori Antigen, Stool - Stool, Per Rectum; Future  - Case Request; Standing  - Case Request    Patient Instructions   Antireflux measures: Avoid fried, fatty foods, alcohol, chocolate, coffee, tea,  soft drinks, peppermint and spearmint, spicy foods, tomatoes and tomato based foods, onion based foods, and smoking.   Other antireflux measures include weight reduction if overweight, avoiding tight clothing around the abdomen, elevating the head of the bed 6 inches with blocks under the head board, and don't drink or eat before going to bed and avoid lying down immediately after meals.   Pantoprazole 40 mg 1 po daily in the am 30 minutes before breakfast.   Recommended to take Levothyroxine first in the am upon waking, wait 30 minutes, then take Pantoprazole 40 mg, wait 30 minutes, then eat breakfast and take other medications.  Avoid all NSAIDs.  Labs  Stool studies  CT Abdomen and pelvis  Upper endoscopy-EGD: The indications, technique, alternatives and potential risk and complications were discussed with the patient including but not limited to bleeding, perforations, missing lesions and anesthetic complications. The patient understands and wishes to proceed with the procedure and has given their verbal consent. Written patient education information was given to the patient.   COVID-19 testing prior to procedure. The patient will need to self-quarantine after testing until the  procedure. Instructions given to patient.   Colonoscopy: The indications, technique, alternatives and potential risk and complications were discussed with the patient including but not limited to bleeding, perforations, missing lesions and anesthetic complications. The patient understands and wishes to proceed with the procedure and has given their verbal consent. Written patient education information was given to the patient.   The patient will call if they have further questions before procedure.      Lia Oakes, APRN  1/3/2022    Please note that portions of this note may have been completed with a voice recognition program. Efforts were made to edit the dictations, but occasionally words are mistranscribed.

## 2022-01-03 NOTE — PATIENT INSTRUCTIONS
Antireflux measures: Avoid fried, fatty foods, alcohol, chocolate, coffee, tea,  soft drinks, peppermint and spearmint, spicy foods, tomatoes and tomato based foods, onion based foods, and smoking.   Other antireflux measures include weight reduction if overweight, avoiding tight clothing around the abdomen, elevating the head of the bed 6 inches with blocks under the head board, and don't drink or eat before going to bed and avoid lying down immediately after meals.   Pantoprazole 40 mg 1 po daily in the am 30 minutes before breakfast.   Recommended to take Levothyroxine first in the am upon waking, wait 30 minutes, then take Pantoprazole 40 mg, wait 30 minutes, then eat breakfast and take other medications.  Avoid all NSAIDs.  Labs  Stool studies  CT Abdomen and pelvis  Upper endoscopy-EGD: The indications, technique, alternatives and potential risk and complications were discussed with the patient including but not limited to bleeding, perforations, missing lesions and anesthetic complications. The patient understands and wishes to proceed with the procedure and has given their verbal consent. Written patient education information was given to the patient.   COVID-19 testing prior to procedure. The patient will need to self-quarantine after testing until the procedure. Instructions given to patient.   Colonoscopy: The indications, technique, alternatives and potential risk and complications were discussed with the patient including but not limited to bleeding, perforations, missing lesions and anesthetic complications. The patient understands and wishes to proceed with the procedure and has given their verbal consent. Written patient education information was given to the patient.   The patient will call if they have further questions before procedure.

## 2022-01-04 LAB — TTG IGA SER-ACNC: <2 U/ML (ref 0–3)

## 2022-01-05 LAB
1,25(OH)2D SERPL-MCNC: 37.4 PG/ML (ref 19.9–79.3)
H PYLORI AG STL QL IA: NEGATIVE

## 2022-02-22 ENCOUNTER — TELEPHONE (OUTPATIENT)
Dept: GASTROENTEROLOGY | Facility: CLINIC | Age: 62
End: 2022-02-22

## 2022-02-22 NOTE — TELEPHONE ENCOUNTER
PATIENT IS SCHEDULED FOR COLONOSCOPY AND UPPER ENDOSCOPY ON 03/14/22. DR. WOOD WILL BE OUT THAT DAY. SPOKE TO PT. HE HAS BEEN R/S TO 03/28/22 AT 10:30 AM

## 2022-03-19 PROCEDURE — 96374 THER/PROPH/DIAG INJ IV PUSH: CPT

## 2022-03-19 PROCEDURE — 99283 EMERGENCY DEPT VISIT LOW MDM: CPT

## 2022-03-19 PROCEDURE — 96375 TX/PRO/DX INJ NEW DRUG ADDON: CPT

## 2022-03-20 ENCOUNTER — HOSPITAL ENCOUNTER (EMERGENCY)
Facility: HOSPITAL | Age: 62
Discharge: HOME | End: 2022-03-20
Attending: EMERGENCY MEDICINE | Admitting: EMERGENCY MEDICINE

## 2022-03-20 ENCOUNTER — APPOINTMENT (OUTPATIENT)
Dept: CT IMAGING | Facility: HOSPITAL | Age: 62
End: 2022-03-20

## 2022-03-20 ENCOUNTER — APPOINTMENT (OUTPATIENT)
Dept: GENERAL RADIOLOGY | Facility: HOSPITAL | Age: 62
End: 2022-03-20

## 2022-03-20 VITALS
SYSTOLIC BLOOD PRESSURE: 145 MMHG | HEIGHT: 66 IN | OXYGEN SATURATION: 100 % | WEIGHT: 147 LBS | DIASTOLIC BLOOD PRESSURE: 78 MMHG | HEART RATE: 89 BPM | TEMPERATURE: 97.7 F | BODY MASS INDEX: 23.63 KG/M2 | RESPIRATION RATE: 16 BRPM

## 2022-03-20 DIAGNOSIS — K80.50 BILIARY COLIC: Primary | ICD-10-CM

## 2022-03-20 DIAGNOSIS — Z93.2 ILEOSTOMY IN PLACE: ICD-10-CM

## 2022-03-20 DIAGNOSIS — K80.20 CALCULUS OF GALLBLADDER WITHOUT CHOLECYSTITIS WITHOUT OBSTRUCTION: ICD-10-CM

## 2022-03-20 DIAGNOSIS — Z90.5 HISTORY OF NEPHRECTOMY, RIGHT: ICD-10-CM

## 2022-03-20 LAB
ALBUMIN SERPL-MCNC: 4.1 G/DL (ref 3.5–5.2)
ALBUMIN/GLOB SERPL: 1.5 G/DL
ALP SERPL-CCNC: 76 U/L (ref 39–117)
ALT SERPL W P-5'-P-CCNC: 16 U/L (ref 1–41)
ANION GAP SERPL CALCULATED.3IONS-SCNC: 11.7 MMOL/L (ref 5–15)
AST SERPL-CCNC: 16 U/L (ref 1–40)
BASOPHILS # BLD AUTO: 0.05 10*3/MM3 (ref 0–0.2)
BASOPHILS NFR BLD AUTO: 0.5 % (ref 0–1.5)
BILIRUB SERPL-MCNC: 0.3 MG/DL (ref 0–1.2)
BILIRUB UR QL STRIP: NEGATIVE
BUN SERPL-MCNC: 26 MG/DL (ref 8–23)
BUN/CREAT SERPL: 9.1 (ref 7–25)
CALCIUM SPEC-SCNC: 8.7 MG/DL (ref 8.6–10.5)
CHLORIDE SERPL-SCNC: 101 MMOL/L (ref 98–107)
CLARITY UR: CLEAR
CO2 SERPL-SCNC: 21.3 MMOL/L (ref 22–29)
COLOR UR: YELLOW
CREAT SERPL-MCNC: 2.87 MG/DL (ref 0.76–1.27)
DEPRECATED RDW RBC AUTO: 43.2 FL (ref 37–54)
EGFRCR SERPLBLD CKD-EPI 2021: 24.3 ML/MIN/1.73
EOSINOPHIL # BLD AUTO: 0.42 10*3/MM3 (ref 0–0.4)
EOSINOPHIL NFR BLD AUTO: 4.3 % (ref 0.3–6.2)
ERYTHROCYTE [DISTWIDTH] IN BLOOD BY AUTOMATED COUNT: 12.8 % (ref 12.3–15.4)
GLOBULIN UR ELPH-MCNC: 2.8 GM/DL
GLUCOSE SERPL-MCNC: 115 MG/DL (ref 65–99)
GLUCOSE UR STRIP-MCNC: NEGATIVE MG/DL
HCT VFR BLD AUTO: 40.2 % (ref 37.5–51)
HGB BLD-MCNC: 14.1 G/DL (ref 13–17.7)
HGB UR QL STRIP.AUTO: NEGATIVE
HOLD SPECIMEN: NORMAL
HOLD SPECIMEN: NORMAL
IMM GRANULOCYTES # BLD AUTO: 0.02 10*3/MM3 (ref 0–0.05)
IMM GRANULOCYTES NFR BLD AUTO: 0.2 % (ref 0–0.5)
KETONES UR QL STRIP: NEGATIVE
LEUKOCYTE ESTERASE UR QL STRIP.AUTO: NEGATIVE
LIPASE SERPL-CCNC: 64 U/L (ref 13–60)
LYMPHOCYTES # BLD AUTO: 2 10*3/MM3 (ref 0.7–3.1)
LYMPHOCYTES NFR BLD AUTO: 20.6 % (ref 19.6–45.3)
MCH RBC QN AUTO: 32.3 PG (ref 26.6–33)
MCHC RBC AUTO-ENTMCNC: 35.1 G/DL (ref 31.5–35.7)
MCV RBC AUTO: 92.2 FL (ref 79–97)
MONOCYTES # BLD AUTO: 0.87 10*3/MM3 (ref 0.1–0.9)
MONOCYTES NFR BLD AUTO: 9 % (ref 5–12)
NEUTROPHILS NFR BLD AUTO: 6.35 10*3/MM3 (ref 1.7–7)
NEUTROPHILS NFR BLD AUTO: 65.4 % (ref 42.7–76)
NITRITE UR QL STRIP: NEGATIVE
NRBC BLD AUTO-RTO: 0 /100 WBC (ref 0–0.2)
PH UR STRIP.AUTO: 5.5 [PH] (ref 5–8)
PLATELET # BLD AUTO: 205 10*3/MM3 (ref 140–450)
PMV BLD AUTO: 10.6 FL (ref 6–12)
POTASSIUM SERPL-SCNC: 4.3 MMOL/L (ref 3.5–5.2)
PROT SERPL-MCNC: 6.9 G/DL (ref 6–8.5)
PROT UR QL STRIP: ABNORMAL
RBC # BLD AUTO: 4.36 10*6/MM3 (ref 4.14–5.8)
SODIUM SERPL-SCNC: 134 MMOL/L (ref 136–145)
SP GR UR STRIP: 1.01 (ref 1–1.03)
UROBILINOGEN UR QL STRIP: ABNORMAL
WBC NRBC COR # BLD: 9.71 10*3/MM3 (ref 3.4–10.8)
WHOLE BLOOD HOLD SPECIMEN: NORMAL
WHOLE BLOOD HOLD SPECIMEN: NORMAL

## 2022-03-20 PROCEDURE — 80053 COMPREHEN METABOLIC PANEL: CPT | Performed by: EMERGENCY MEDICINE

## 2022-03-20 PROCEDURE — 96374 THER/PROPH/DIAG INJ IV PUSH: CPT

## 2022-03-20 PROCEDURE — 74176 CT ABD & PELVIS W/O CONTRAST: CPT

## 2022-03-20 PROCEDURE — 25010000002 MORPHINE PER 10 MG: Performed by: EMERGENCY MEDICINE

## 2022-03-20 PROCEDURE — 81003 URINALYSIS AUTO W/O SCOPE: CPT | Performed by: EMERGENCY MEDICINE

## 2022-03-20 PROCEDURE — 83690 ASSAY OF LIPASE: CPT | Performed by: EMERGENCY MEDICINE

## 2022-03-20 PROCEDURE — 96375 TX/PRO/DX INJ NEW DRUG ADDON: CPT

## 2022-03-20 PROCEDURE — 71045 X-RAY EXAM CHEST 1 VIEW: CPT

## 2022-03-20 PROCEDURE — 25010000002 ONDANSETRON PER 1 MG: Performed by: EMERGENCY MEDICINE

## 2022-03-20 PROCEDURE — 85025 COMPLETE CBC W/AUTO DIFF WBC: CPT | Performed by: EMERGENCY MEDICINE

## 2022-03-20 RX ORDER — HYDROCODONE BITARTRATE AND ACETAMINOPHEN 5; 325 MG/1; MG/1
1 TABLET ORAL EVERY 6 HOURS PRN
Qty: 12 TABLET | Refills: 0 | Status: SHIPPED | OUTPATIENT
Start: 2022-03-20 | End: 2022-03-20 | Stop reason: SDUPTHER

## 2022-03-20 RX ORDER — HYDROCODONE BITARTRATE AND ACETAMINOPHEN 5; 325 MG/1; MG/1
1 TABLET ORAL EVERY 6 HOURS PRN
Qty: 12 TABLET | Refills: 0 | OUTPATIENT
Start: 2022-03-20 | End: 2022-10-20

## 2022-03-20 RX ORDER — MORPHINE SULFATE 4 MG/ML
4 INJECTION, SOLUTION INTRAMUSCULAR; INTRAVENOUS ONCE
Status: COMPLETED | OUTPATIENT
Start: 2022-03-20 | End: 2022-03-20

## 2022-03-20 RX ORDER — ONDANSETRON 4 MG/1
4 TABLET, ORALLY DISINTEGRATING ORAL EVERY 8 HOURS PRN
Qty: 12 TABLET | Refills: 0 | Status: SHIPPED | OUTPATIENT
Start: 2022-03-20 | End: 2022-03-20 | Stop reason: SDUPTHER

## 2022-03-20 RX ORDER — ONDANSETRON 2 MG/ML
4 INJECTION INTRAMUSCULAR; INTRAVENOUS ONCE
Status: COMPLETED | OUTPATIENT
Start: 2022-03-20 | End: 2022-03-20

## 2022-03-20 RX ORDER — SODIUM CHLORIDE 0.9 % (FLUSH) 0.9 %
10 SYRINGE (ML) INJECTION AS NEEDED
Status: DISCONTINUED | OUTPATIENT
Start: 2022-03-20 | End: 2022-03-20 | Stop reason: HOSPADM

## 2022-03-20 RX ORDER — ONDANSETRON 4 MG/1
4 TABLET, ORALLY DISINTEGRATING ORAL EVERY 8 HOURS PRN
Qty: 12 TABLET | Refills: 0 | Status: SHIPPED | OUTPATIENT
Start: 2022-03-20

## 2022-03-20 RX ORDER — HYDROCODONE BITARTRATE AND ACETAMINOPHEN 5; 325 MG/1; MG/1
1 TABLET ORAL ONCE
Status: COMPLETED | OUTPATIENT
Start: 2022-03-20 | End: 2022-03-20

## 2022-03-20 RX ADMIN — MORPHINE SULFATE 4 MG: 4 INJECTION, SOLUTION INTRAMUSCULAR; INTRAVENOUS at 01:00

## 2022-03-20 RX ADMIN — ONDANSETRON 4 MG: 2 INJECTION INTRAMUSCULAR; INTRAVENOUS at 01:00

## 2022-03-20 RX ADMIN — HYDROCODONE BITARTRATE AND ACETAMINOPHEN 1 TABLET: 5; 325 TABLET ORAL at 04:33

## 2022-04-06 NOTE — ED PROVIDER NOTES
"Subjective   History of Present Illness    Chief Complaint: Right lateral abdomen and right upper quadrant right flank area pain  History of Present Illness: 62-year-old male presents above complaint intermittent over the last few weeks worse over the last day or so.  Associated nausea.  History of renal cancer, nephrectomy  Onset: See above timeline  Duration: Persistent  Exacerbating / Alleviating factors: None  Associated symptoms: None      Nurses Notes reviewed and agree, including vitals, allergies, social history and prior medical history.     REVIEW OF SYSTEMS: All systems reviewed and not pertinent unless noted.    Positive for: Right-sided abdomen flank pain, nausea    Negative for: Fever GI bleeding chills shortness of breath cough hemoptysis syncope jaundiceReview of Systems    Past Medical History:   Diagnosis Date   • Altered bowel elimination due to intestinal ostomy (HCC)     PATIENT REPORTS SECONDARY TO A COLON RUPTURE APPROXIMATELY 16-17 YEARS AGO   • Anemia    • Arthritis     WRIST   • Cancer (HCC)     Kidney   • CKD (chronic kidney disease)     stage 3   • Crohn disease (HCC)    • GERD (gastroesophageal reflux disease)    • History of MRSA infection     REPORTS HX OF \"NOSE\" 2-3 YEARS AGO, TREATED.   • History of pneumonia    • Hypothyroid    • Spinal headache    • Wears glasses        Allergies   Allergen Reactions   • Latex Rash       Past Surgical History:   Procedure Laterality Date   • COLONOSCOPY     • COLONOSCOPY N/A 7/31/2017    Procedure: Colonoscopy through ostomy site and rectum with biopsies;  Surgeon: Vincenzo Galan MD;  Location: [facility] ENDOSCOPY;  Service:    • ENDOSCOPY     • HEMORRHOIDECTOMY      REPORTS APPROXIMATELY 20 YEARS AGO   • HERNIA REPAIR      UMBILICAL   • KIDNEY SURGERY      Right kidney removed   • NEPHRECTOMY Right 07/2017   • SMALL INTESTINE SURGERY      HX OF COLON RUPTURE WITH PLACEMENT OF OSTOMY 16-17 years ago   • URETEROSCOPY LASER LITHOTRIPSY WITH STENT " INSERTION Left 7/3/2019    Procedure: CYSTOSCOPY, URETEROSCOPY, STONE BASKETING, RETROGRADE PYLEOGRAM,  LASER LITHOTRIPSY WITH STENT INSERTION;  Surgeon: Lazaro Velazquez MD;  Location: [facility] OR;  Service: Urology   • URETEROSCOPY LASER LITHOTRIPSY WITH STENT INSERTION Left 9/20/2021    Procedure: URETEROSCOPY diagnostic WITH STENT INSERTION  with left retrograde;  Surgeon: Lazaro Velazquez MD;  Location: [facility] OR;  Service: Urology;  Laterality: Left;   • WISDOM TOOTH EXTRACTION     • WRIST SURGERY Right        Family History   Problem Relation Age of Onset   • Heart disease Mother    • Cancer Father    • No Known Problems Sister    • COPD Brother    • COPD Sister    • Crohn's disease Son    • Colon cancer Neg Hx        Social History     Socioeconomic History   • Marital status:    Tobacco Use   • Smoking status: Former Smoker     Types: Cigarettes   • Smokeless tobacco: Never Used   • Tobacco comment: QUIT 30 PLUS YEARS AGO   Substance and Sexual Activity   • Alcohol use: No     Comment: QUIT 30 PLUS YEARS   • Drug use: No   • Sexual activity: Yes     Partners: Female           Objective   Physical Exam    CONSTITUTIONAL: Well developed, nontoxic 62-year-old male,  in mild to moderate discomfort.  VITAL SIGNS: per nursing, reviewed and noted  SKIN: exposed skin with no rashes, ulcerations or petechiae.  EYES: perrla. EOMI.  ENT: Normal voice.  Patient maintained wearing a mask throughout patient encounter due to coronavirus pandemic  RESPIRATORY:  No increased work of breathing. No retractions.   CARDIOVASCULAR:  regular rate and rhythm, no murmurs.  Good Peripheral pulses. Good cap refill to extremities.   GI: Abdomen soft, reproducible right upper quadrant tenderness palpation without rebound tenderness or guarding, normal bowel sounds. No hernia. No ascites.  MUSCULOSKELETAL:  No tenderness. Full ROM. Strength and tone grossly normal.  no spasms. no neck or back tenderness or spasm.    NEUROLOGIC: Alert, oriented x 3. No gross deficits. GCS 15.   PSYCH: appropriate affect.  : no bladder tenderness or distention, no CVA tenderness      Procedures     No attending physician procedures were performed on this patient.      ED Course  ED Course as of 04/06/22 0350 Wed Apr 06, 2022   0348 Lipase(!): 64 [PF]   0348 Glucose(!): 115 [PF]   0348 BUN(!): 26 [PF]   0348 Creatinine(!): 2.87 [PF]   0348 Sodium(!): 134 [PF]   0348 Potassium: 4.3 [PF]   0348 Chloride: 101 [PF]   0348 CO2(!): 21.3 [PF]   0348 Calcium: 8.7 [PF]   0348 Total Protein: 6.9 [PF]   0348 Albumin: 4.10 [PF]   0348 ALT (SGPT): 16 [PF]   0348 AST (SGOT): 16 [PF]   0348 Alkaline Phosphatase: 76 [PF]   0348 Total Bilirubin: 0.3 [PF]   0348 Color, UA: Yellow [PF]   0348 Appearance, UA: Clear [PF]   0348 pH, UA: 5.5 [PF]   0348 Specific Gravity, UA: 1.015 [PF]   0348 Glucose: Negative [PF]   0348 Ketones, UA: Negative [PF]   0348 Bilirubin, UA: Negative [PF]   0348 Blood, UA: Negative [PF]   0348 Protein, UA(!): Trace [PF]   0348 Leukocytes, UA: Negative [PF]   0348 Nitrite, UA: Negative [PF]   0348 WBC: 9.71 [PF]   0348 Hemoglobin: 14.1 [PF]   0348 Hematocrit: 40.2 [PF]   0348 Platelets: 205 [PF]   0349 FINDINGS:  CT abdomen and pelvis without contrast     Comparison: None     Findings:     Calcified granuloma noted left lung base.     Lung bases otherwise clear. No acute bony abnormalities.     Right lower quadrant ostomy noted without bowel distention.     Liver and spleen are unremarkable. Cholelithiasis.     Pancreas and left adrenal gland are within normal limits.     Right adrenal gland not definitely visualized.     Status post right nephrectomy with no abnormality in surgical bed.     No left renal or ureteral stone or hydronephrosis.     No evidence for aortic aneurysm.     No free fluid or adenopathy is noted in the pelvis.     No evidence for diverticulitis. Appendix not  identified.     IMPRESSION:  Impression:     Cholelithiasis without other gallbladder findings.     No acute processes.     Authenticated by Piotr Jones MD on 03/20/2022 03:39:25 AM               [PF]      ED Course User Index  [PF] Oracio Whitten,                                                  MDM  62-year-old male present above complaint.  Work-up consistent with cholelithiasis with biliary colic without choledocholithiasis or cholecystitis.  Patient discharged home stable condition with supportive care recommendations outpatient follow-up with general surgery return precautions discussed.  Prescription for Norco and Zofran.  Final diagnoses:   Biliary colic   Calculus of gallbladder without cholecystitis without obstruction   History of nephrectomy, right   Ileostomy in place (HCC)       ED Disposition  ED Disposition     ED Disposition   Discharge    Condition   Stable    Comment   --             Ina Macias MD  [address] [address] [phone]               Medication List      New Prescriptions    HYDROcodone-acetaminophen 5-325 MG per tablet  Commonly known as: NORCO  Take 1 tablet by mouth Every 6 (Six) Hours As Needed for Severe Pain .     ondansetron ODT 4 MG disintegrating tablet  Commonly known as: ZOFRAN-ODT  Place 1 tablet on the tongue Every 8 (Eight) Hours As Needed for Nausea.        Stop    acetaminophen 325 MG tablet  Commonly known as: TYLENOL           Where to Get Your Medications      These medications were sent to [facility]/pharmacy #[id number] - [place], [place] - [address] - [phone]  - [phone] FX  [address] [facility] [address] [facility] [address]    Hours: 24-hours Phone: [phone]   · HYDROcodone-acetaminophen 5-325 MG per tablet  · ondansetron ODT 4 MG disintegrating tablet          Oracio Whitten DO  04/06/22 1846

## 2022-04-12 ENCOUNTER — TRANSCRIBE ORDERS (OUTPATIENT)
Dept: ADMINISTRATIVE | Facility: HOSPITAL | Age: 62
End: 2022-04-12

## 2022-04-12 DIAGNOSIS — M51.16 NEURITIS OR RADICULITIS DUE TO RUPTURE OF LUMBAR INTERVERTEBRAL DISC: Primary | ICD-10-CM

## 2022-05-16 ENCOUNTER — HOSPITAL ENCOUNTER (OUTPATIENT)
Dept: MRI IMAGING | Facility: HOSPITAL | Age: 62
Discharge: HOME | End: 2022-05-16
Admitting: INTERNAL MEDICINE

## 2022-05-16 DIAGNOSIS — M51.16 NEURITIS OR RADICULITIS DUE TO RUPTURE OF LUMBAR INTERVERTEBRAL DISC: ICD-10-CM

## 2022-05-16 PROCEDURE — 72148 MRI LUMBAR SPINE W/O DYE: CPT

## 2022-05-17 ENCOUNTER — TELEPHONE (OUTPATIENT)
Dept: GASTROENTEROLOGY | Facility: CLINIC | Age: 62
End: 2022-05-17

## 2022-05-20 ENCOUNTER — DOCUMENTATION (OUTPATIENT)
Dept: GASTROENTEROLOGY | Facility: CLINIC | Age: 62
End: 2022-05-20

## 2022-05-20 NOTE — PROGRESS NOTES
Patient called switchboard and asked for GI on call around 6:30 pm. I called him back at 6:45 pm. He stated that he arrived at the hospital for his procedures prior to 6pm. He was under the impression this was his scheduled time. He states that he was called multiple times today confirming that he should arrive at 6pm. I saw documentation in his chart that he was scheduled to have procedures at 6:30am. I contacted Dr Martin who informed me that patient had no showed for his scheduled procedure and had been contacted several times by outpatient surgery center regarding his procedures this morning. I relayed that message to patient who was very upset and argumentative. I spoke to him for approx 5 mins and he continued to argue that his procedure should be done this evening. I told him that wasn’t possible, he missed his scheduled procedure and he would need to call the office on Monday to either reschedule his procedures or schedule an appt with Dr Martin in the clinic. He continued to argue with me. I informed him that I was now ending the call and hung up.

## 2022-10-15 ENCOUNTER — APPOINTMENT (OUTPATIENT)
Dept: CT IMAGING | Facility: HOSPITAL | Age: 62
End: 2022-10-15

## 2022-10-15 ENCOUNTER — HOSPITAL ENCOUNTER (EMERGENCY)
Facility: HOSPITAL | Age: 62
Discharge: HOME | End: 2022-10-15
Attending: FAMILY MEDICINE | Admitting: FAMILY MEDICINE

## 2022-10-15 VITALS
BODY MASS INDEX: 23.63 KG/M2 | WEIGHT: 147 LBS | TEMPERATURE: 97.8 F | OXYGEN SATURATION: 96 % | SYSTOLIC BLOOD PRESSURE: 128 MMHG | RESPIRATION RATE: 22 BRPM | HEART RATE: 75 BPM | DIASTOLIC BLOOD PRESSURE: 75 MMHG | HEIGHT: 66 IN

## 2022-10-15 DIAGNOSIS — R10.9 RIGHT FLANK PAIN: ICD-10-CM

## 2022-10-15 DIAGNOSIS — K50.00 CROHN'S DISEASE OF SMALL INTESTINE WITHOUT COMPLICATION: Primary | ICD-10-CM

## 2022-10-15 LAB
ALBUMIN SERPL-MCNC: 4 G/DL (ref 3.5–5.2)
ALBUMIN/GLOB SERPL: 1.7 G/DL
ALP SERPL-CCNC: 63 U/L (ref 39–117)
ALT SERPL W P-5'-P-CCNC: 14 U/L (ref 1–41)
ANION GAP SERPL CALCULATED.3IONS-SCNC: 12.8 MMOL/L (ref 5–15)
AST SERPL-CCNC: 21 U/L (ref 1–40)
BACTERIA UR QL AUTO: ABNORMAL /HPF
BASOPHILS # BLD AUTO: 0.09 10*3/MM3 (ref 0–0.2)
BASOPHILS NFR BLD AUTO: 0.9 % (ref 0–1.5)
BILIRUB SERPL-MCNC: 0.3 MG/DL (ref 0–1.2)
BILIRUB UR QL STRIP: NEGATIVE
BUN SERPL-MCNC: 25 MG/DL (ref 8–23)
BUN/CREAT SERPL: 10 (ref 7–25)
CALCIUM SPEC-SCNC: 8.5 MG/DL (ref 8.6–10.5)
CHLORIDE SERPL-SCNC: 105 MMOL/L (ref 98–107)
CLARITY UR: CLEAR
CO2 SERPL-SCNC: 21.2 MMOL/L (ref 22–29)
COLOR UR: YELLOW
CREAT SERPL-MCNC: 2.49 MG/DL (ref 0.76–1.27)
DEPRECATED RDW RBC AUTO: 42.7 FL (ref 37–54)
EGFRCR SERPLBLD CKD-EPI 2021: 28.8 ML/MIN/1.73
EOSINOPHIL # BLD AUTO: 0.37 10*3/MM3 (ref 0–0.4)
EOSINOPHIL NFR BLD AUTO: 3.6 % (ref 0.3–6.2)
ERYTHROCYTE [DISTWIDTH] IN BLOOD BY AUTOMATED COUNT: 13.2 % (ref 12.3–15.4)
GLOBULIN UR ELPH-MCNC: 2.3 GM/DL
GLUCOSE SERPL-MCNC: 111 MG/DL (ref 65–99)
GLUCOSE UR STRIP-MCNC: NEGATIVE MG/DL
HCT VFR BLD AUTO: 40.7 % (ref 37.5–51)
HGB BLD-MCNC: 14.5 G/DL (ref 13–17.7)
HGB UR QL STRIP.AUTO: ABNORMAL
HOLD SPECIMEN: NORMAL
HOLD SPECIMEN: NORMAL
HYALINE CASTS UR QL AUTO: ABNORMAL /LPF
IMM GRANULOCYTES # BLD AUTO: 0.04 10*3/MM3 (ref 0–0.05)
IMM GRANULOCYTES NFR BLD AUTO: 0.4 % (ref 0–0.5)
KETONES UR QL STRIP: NEGATIVE
LEUKOCYTE ESTERASE UR QL STRIP.AUTO: NEGATIVE
LIPASE SERPL-CCNC: 53 U/L (ref 13–60)
LYMPHOCYTES # BLD AUTO: 2.42 10*3/MM3 (ref 0.7–3.1)
LYMPHOCYTES NFR BLD AUTO: 23.4 % (ref 19.6–45.3)
MCH RBC QN AUTO: 31.3 PG (ref 26.6–33)
MCHC RBC AUTO-ENTMCNC: 35.6 G/DL (ref 31.5–35.7)
MCV RBC AUTO: 87.9 FL (ref 79–97)
MONOCYTES # BLD AUTO: 0.78 10*3/MM3 (ref 0.1–0.9)
MONOCYTES NFR BLD AUTO: 7.6 % (ref 5–12)
NEUTROPHILS NFR BLD AUTO: 6.63 10*3/MM3 (ref 1.7–7)
NEUTROPHILS NFR BLD AUTO: 64.1 % (ref 42.7–76)
NITRITE UR QL STRIP: NEGATIVE
NRBC BLD AUTO-RTO: 0 /100 WBC (ref 0–0.2)
PH UR STRIP.AUTO: 5.5 [PH] (ref 5–8)
PLATELET # BLD AUTO: 199 10*3/MM3 (ref 140–450)
PMV BLD AUTO: 10.3 FL (ref 6–12)
POTASSIUM SERPL-SCNC: 4.2 MMOL/L (ref 3.5–5.2)
PROT SERPL-MCNC: 6.3 G/DL (ref 6–8.5)
PROT UR QL STRIP: ABNORMAL
RBC # BLD AUTO: 4.63 10*6/MM3 (ref 4.14–5.8)
RBC # UR STRIP: ABNORMAL /HPF
REF LAB TEST METHOD: ABNORMAL
SODIUM SERPL-SCNC: 139 MMOL/L (ref 136–145)
SP GR UR STRIP: 1.02 (ref 1–1.03)
SQUAMOUS #/AREA URNS HPF: ABNORMAL /HPF
UROBILINOGEN UR QL STRIP: ABNORMAL
WBC # UR STRIP: ABNORMAL /HPF
WBC NRBC COR # BLD: 10.33 10*3/MM3 (ref 3.4–10.8)
WHOLE BLOOD HOLD COAG: NORMAL
WHOLE BLOOD HOLD SPECIMEN: NORMAL

## 2022-10-15 PROCEDURE — 81001 URINALYSIS AUTO W/SCOPE: CPT | Performed by: FAMILY MEDICINE

## 2022-10-15 PROCEDURE — 96375 TX/PRO/DX INJ NEW DRUG ADDON: CPT

## 2022-10-15 PROCEDURE — 85025 COMPLETE CBC W/AUTO DIFF WBC: CPT | Performed by: FAMILY MEDICINE

## 2022-10-15 PROCEDURE — 25010000002 METHYLPREDNISOLONE PER 125 MG: Performed by: FAMILY MEDICINE

## 2022-10-15 PROCEDURE — 96374 THER/PROPH/DIAG INJ IV PUSH: CPT

## 2022-10-15 PROCEDURE — 80053 COMPREHEN METABOLIC PANEL: CPT | Performed by: FAMILY MEDICINE

## 2022-10-15 PROCEDURE — 25010000002 ONDANSETRON PER 1 MG: Performed by: FAMILY MEDICINE

## 2022-10-15 PROCEDURE — 74176 CT ABD & PELVIS W/O CONTRAST: CPT

## 2022-10-15 PROCEDURE — 83690 ASSAY OF LIPASE: CPT | Performed by: FAMILY MEDICINE

## 2022-10-15 PROCEDURE — 99284 EMERGENCY DEPT VISIT MOD MDM: CPT

## 2022-10-15 PROCEDURE — 25010000002 MORPHINE PER 10 MG: Performed by: FAMILY MEDICINE

## 2022-10-15 RX ORDER — METHYLPREDNISOLONE SODIUM SUCCINATE 125 MG/2ML
125 INJECTION, POWDER, LYOPHILIZED, FOR SOLUTION INTRAMUSCULAR; INTRAVENOUS ONCE
Status: COMPLETED | OUTPATIENT
Start: 2022-10-15 | End: 2022-10-15

## 2022-10-15 RX ORDER — METRONIDAZOLE 500 MG/1
500 TABLET ORAL 2 TIMES DAILY
Qty: 14 TABLET | Refills: 0 | Status: SHIPPED | OUTPATIENT
Start: 2022-10-15 | End: 2022-10-22

## 2022-10-15 RX ORDER — MORPHINE SULFATE 2 MG/ML
2 INJECTION, SOLUTION INTRAMUSCULAR; INTRAVENOUS ONCE
Status: COMPLETED | OUTPATIENT
Start: 2022-10-15 | End: 2022-10-15

## 2022-10-15 RX ORDER — ONDANSETRON 2 MG/ML
4 INJECTION INTRAMUSCULAR; INTRAVENOUS ONCE
Status: COMPLETED | OUTPATIENT
Start: 2022-10-15 | End: 2022-10-15

## 2022-10-15 RX ORDER — SODIUM CHLORIDE 0.9 % (FLUSH) 0.9 %
10 SYRINGE (ML) INJECTION AS NEEDED
Status: DISCONTINUED | OUTPATIENT
Start: 2022-10-15 | End: 2022-10-16 | Stop reason: HOSPADM

## 2022-10-15 RX ORDER — CIPROFLOXACIN 500 MG/1
500 TABLET, FILM COATED ORAL DAILY
Qty: 7 TABLET | Refills: 0 | Status: SHIPPED | OUTPATIENT
Start: 2022-10-15 | End: 2022-10-20

## 2022-10-15 RX ADMIN — SODIUM CHLORIDE 1000 ML: 9 INJECTION, SOLUTION INTRAVENOUS at 20:14

## 2022-10-15 RX ADMIN — METHYLPREDNISOLONE SODIUM SUCCINATE 125 MG: 125 INJECTION, POWDER, FOR SOLUTION INTRAMUSCULAR; INTRAVENOUS at 23:28

## 2022-10-15 RX ADMIN — MORPHINE SULFATE 2 MG: 2 INJECTION, SOLUTION INTRAMUSCULAR; INTRAVENOUS at 20:15

## 2022-10-15 RX ADMIN — ONDANSETRON 4 MG: 2 INJECTION INTRAMUSCULAR; INTRAVENOUS at 20:15

## 2022-10-15 NOTE — ED PROVIDER NOTES
Subjective   History of Present Illness  62-year-old male with past medical history of Crohn's disease, kidney disease, anemia, gastroesophageal reflux disease presents the emergency department with right flank pain.  Patient reports that he has a history of both gallstones and kidney stones.  Patient reports that this feels similar to his previous kidney stones.  Patient reports that he also was told back in March that he had gallstones however he was scheduled for surgery but had to cancel due to his wife having a brain malignancy.  He says that she is just very sick and he does not have time to really take care of his problem as long well as hers.  He reports that this belly pain and flank pain started earlier today and its been constant and he just cannot get comfortable.  He does report that if he lies still it does not hurt but if he tries to move any he gets this sick stabbing feeling in his side.  Patient denies any chest pain, shortness of breath, diarrhea, or fevers.  Patient reports that really if he hunches over so he does not have the pain but when he stands straight he feels a stabbing in his back on the right side that goes all the way through.  Patient reports that this feels just like when he has his kidney stones.    History provided by:  Patient  Flank Pain  Pain location:  R flank  Pain quality: aching and gnawing    Pain radiates to:  RUQ and RLQ  Pain severity:  Moderate  Onset quality:  Gradual  Timing:  Constant  Progression:  Unchanged  Chronicity:  New  Relieved by:  Position changes  Worsened by:  Nothing  Ineffective treatments:  None tried  Associated symptoms: nausea    Associated symptoms: no chest pain, no dysuria and no fever        Review of Systems   Constitutional: Negative.  Negative for fever.   HENT: Negative.    Respiratory: Negative.    Cardiovascular: Negative.  Negative for chest pain.   Gastrointestinal: Positive for nausea. Negative for abdominal pain.   Endocrine:  "Negative.    Genitourinary: Positive for flank pain. Negative for dysuria.   Skin: Negative.    Neurological: Negative.    Psychiatric/Behavioral: Negative.    All other systems reviewed and are negative.      Past Medical History:   Diagnosis Date   • Altered bowel elimination due to intestinal ostomy (HCC)     PATIENT REPORTS SECONDARY TO A COLON RUPTURE APPROXIMATELY 16-17 YEARS AGO   • Anemia    • Arthritis     WRIST   • Bowel perforation (HCC)     states that he had his bowel perforated twice from colonoscopies, and was told that a small scope should always be used in the future.   • Cancer (HCC)     Kidney   • CKD (chronic kidney disease)     stage 3   • COVID-19 vaccine series completed     Covid vaccine x4 doses   • Crohn disease (HCC)    • Gallstones    • GERD (gastroesophageal reflux disease)    • History of MRSA infection     nose- \"years ago\"   • History of pneumonia    • Hypothyroid    • Kidney stones    • Seasonal allergies    • Spinal headache    • Wears glasses        Allergies   Allergen Reactions   • Latex Rash       Past Surgical History:   Procedure Laterality Date   • COLONOSCOPY     • COLONOSCOPY N/A 07/31/2017    Procedure: Colonoscopy through ostomy site and rectum with biopsies;  Surgeon: Vincenzo Galan MD;  Location: [facility] ENDOSCOPY;  Service:    • ENDOSCOPY     • HEMORRHOIDECTOMY      REPORTS APPROXIMATELY 20 YEARS AGO   • HERNIA REPAIR      UMBILICAL   • NEPHRECTOMY Right 07/2017   • SKIN BIOPSY      benign   • SMALL INTESTINE SURGERY      HX OF COLON RUPTURE WITH PLACEMENT OF OSTOMY 16-17 years ago   • URETEROSCOPY LASER LITHOTRIPSY WITH STENT INSERTION Left 07/03/2019    Procedure: CYSTOSCOPY, URETEROSCOPY, STONE BASKETING, RETROGRADE PYLEOGRAM,  LASER LITHOTRIPSY WITH STENT INSERTION;  Surgeon: Lazaro Velazquez MD;  Location: [facility] OR;  Service: Urology   • URETEROSCOPY LASER LITHOTRIPSY WITH STENT INSERTION Left 09/20/2021    Procedure: URETEROSCOPY diagnostic WITH STENT " INSERTION  with left retrograde;  Surgeon: Lazaro Velazquez MD;  Location: [facility];  Service: Urology;  Laterality: Left;   • WISDOM TOOTH EXTRACTION     • WRIST SURGERY Right        Family History   Problem Relation Age of Onset   • Heart disease Mother    • Cancer Father    • No Known Problems Sister    • COPD Brother    • COPD Sister    • Crohn's disease Son    • Colon cancer Neg Hx        Social History     Socioeconomic History   • Marital status:    Tobacco Use   • Smoking status: Former     Packs/day: 2.00     Years: 3.00     Pack years: 6.00     Types: Cigarettes     Quit date:      Years since quittin.8   • Smokeless tobacco: Former     Types: Snuff   Vaping Use   • Vaping Use: Never used   Substance and Sexual Activity   • Alcohol use: No     Comment: QUIT 30 PLUS YEARS   • Drug use: No   • Sexual activity: Defer           Objective   Physical Exam  Vitals and nursing note reviewed.   Constitutional:       Appearance: Normal appearance.   HENT:      Head: Normocephalic and atraumatic.      Right Ear: Tympanic membrane normal.      Left Ear: Tympanic membrane normal.      Nose: Nose normal.      Mouth/Throat:      Mouth: Mucous membranes are moist.   Eyes:      Extraocular Movements: Extraocular movements intact.      Pupils: Pupils are equal, round, and reactive to light.   Cardiovascular:      Rate and Rhythm: Normal rate and regular rhythm.      Pulses: Normal pulses.   Pulmonary:      Effort: Pulmonary effort is normal.   Abdominal:      General: Abdomen is flat.      Palpations: Abdomen is soft.   Musculoskeletal:         General: Normal range of motion.      Cervical back: Normal range of motion.   Skin:     General: Skin is warm.      Capillary Refill: Capillary refill takes less than 2 seconds.   Neurological:      General: No focal deficit present.      Mental Status: He is alert and oriented to person, place, and time.   Psychiatric:         Mood and Affect: Mood normal.          Behavior: Behavior normal.         Thought Content: Thought content normal.         Judgment: Judgment normal.         Procedures           ED Course                                           MDM  Number of Diagnoses or Management Options  Crohn's disease of small intestine without complication (HCC): new and requires workup  Right flank pain: new and requires workup  Diagnosis management comments: 62-year-old male presents with right flank pain concerns for nephrolithiasis or cholelithiasis.  Patient is hemodynamically stable and afebrile.  Patient's work-up includes laboratory data that have revealed a CBC that is nondiagnostic and all within normal limits, CMP that shows a kidney function of 2.49 which is about patient's baseline kidney function.  His electrolytes are within normal limits.  His LFTs are within normal limits as well as his total bilirubin.  CT scan of his abdomen pelvis revealed no hydronephrosis or nephrolithiasis.  It does reveal inflammatory changes of the small bowel consistent with Crohn's disease.  He also says that there is a possible inflammatory stricture.  There is no signs of obstruction.  Patient's physical exam is benign.  Patient was given pain medicine and IV fluids.  Results were given to patient patient reports he has not had a Crohn's flare in years.  Patient reports that he is extremely tired and stressed however he states he still feels the pain if he sits up straight.  Discussed with him that it could be musculoskeletal since it is positional and gets worse when he sits up straight and its in the center of his back on the right side.  I have offered patient pain medicine and muscle relaxers patient has declined said he would follow-up with his PCP and his GI specialist on Monday.  Advised patient that if symptoms worsen or anything changes please to return to the ER immediately patient verbalized understanding is in agreement with treatment plan       Amount and/or  Complexity of Data Reviewed  Clinical lab tests: ordered and reviewed  Tests in the radiology section of CPT®: ordered and reviewed  Tests in the medicine section of CPT®: reviewed and ordered  Independent visualization of images, tracings, or specimens: yes        Final diagnoses:   Crohn's disease of small intestine without complication (HCC)   Right flank pain       ED Disposition  ED Disposition     ED Disposition   Discharge    Condition   Stable    Comment   --             Yazan Corbett MD  [address]  [facility] [address]  [phone]    Schedule an appointment as soon as possible for a visit       Yazmin Martin MD  [address] [facility] [address]  [facility] [address]  [phone]    Schedule an appointment as soon as possible for a visit            Medication List      New Prescriptions    ciprofloxacin 500 MG tablet  Commonly known as: CIPRO  Take 1 tablet by mouth Daily for 7 days.     metroNIDAZOLE 500 MG tablet  Commonly known as: FLAGYL  Take 1 tablet by mouth 2 (Two) Times a Day for 7 days.        ASK your doctor about these medications    Diclofenac Sodium 1 % gel gel  Commonly known as: VOLTAREN     HYDROcodone-acetaminophen 5-325 MG per tablet  Commonly known as: NORCO  Take 1 tablet by mouth Every 6 (Six) Hours As Needed for Severe Pain .     metoclopramide 10 MG tablet  Commonly known as: REGLAN     triamcinolone 0.1 % cream  Commonly known as: KENALOG           Where to Get Your Medications      These medications were sent to [id number] - [place] - [address] AT [facility] [address] - [phone] PH - [phone] FX  [address] [place] [address]    Phone: [phone]   · ciprofloxacin 500 MG tablet  · metroNIDAZOLE 500 MG tablet          Faby James DO  10/16/22 9868

## 2022-10-20 ENCOUNTER — HOSPITAL ENCOUNTER (EMERGENCY)
Facility: HOSPITAL | Age: 62
Discharge: HOME | End: 2022-10-20
Attending: EMERGENCY MEDICINE | Admitting: EMERGENCY MEDICINE

## 2022-10-20 VITALS
DIASTOLIC BLOOD PRESSURE: 101 MMHG | HEART RATE: 54 BPM | BODY MASS INDEX: 23.61 KG/M2 | WEIGHT: 150.4 LBS | RESPIRATION RATE: 20 BRPM | SYSTOLIC BLOOD PRESSURE: 155 MMHG | OXYGEN SATURATION: 97 % | TEMPERATURE: 97.6 F | HEIGHT: 67 IN

## 2022-10-20 DIAGNOSIS — M77.8 TENDINITIS OF RIGHT WRIST: Primary | ICD-10-CM

## 2022-10-20 DIAGNOSIS — M77.11 LATERAL EPICONDYLITIS OF RIGHT ELBOW: ICD-10-CM

## 2022-10-20 PROCEDURE — 99283 EMERGENCY DEPT VISIT LOW MDM: CPT

## 2022-10-20 PROCEDURE — 63710000001 PREDNISONE PER 5 MG: Performed by: EMERGENCY MEDICINE

## 2022-10-20 PROCEDURE — 63710000001 ONDANSETRON ODT 4 MG TABLET DISPERSIBLE: Performed by: EMERGENCY MEDICINE

## 2022-10-20 RX ORDER — PREDNISONE 20 MG/1
20 TABLET ORAL 2 TIMES DAILY
Qty: 10 TABLET | Refills: 0 | Status: SHIPPED | OUTPATIENT
Start: 2022-10-20 | End: 2022-10-25

## 2022-10-20 RX ORDER — ACETAMINOPHEN AND CODEINE PHOSPHATE 300; 30 MG/1; MG/1
1 TABLET ORAL ONCE
Status: COMPLETED | OUTPATIENT
Start: 2022-10-20 | End: 2022-10-20

## 2022-10-20 RX ORDER — ONDANSETRON 4 MG/1
4 TABLET, ORALLY DISINTEGRATING ORAL ONCE
Status: COMPLETED | OUTPATIENT
Start: 2022-10-20 | End: 2022-10-20

## 2022-10-20 RX ORDER — HYDROCODONE BITARTRATE AND ACETAMINOPHEN 5; 325 MG/1; MG/1
1 TABLET ORAL EVERY 6 HOURS PRN
Qty: 10 TABLET | Refills: 0 | Status: SHIPPED | OUTPATIENT
Start: 2022-10-20

## 2022-10-20 RX ADMIN — ONDANSETRON 4 MG: 4 TABLET, ORALLY DISINTEGRATING ORAL at 08:07

## 2022-10-20 RX ADMIN — ACETAMINOPHEN AND CODEINE PHOSPHATE 1 TABLET: 300; 30 TABLET ORAL at 08:05

## 2022-10-20 RX ADMIN — PREDNISONE 60 MG: 50 TABLET ORAL at 08:05

## 2022-10-20 NOTE — ED PROVIDER NOTES
"Subjective   History of Present Illness  62-year-old male presents to the ED with a chief complaint of right arm pain.  Patient complaining of pain in his right thumb and right wrist and right elbow.  He states that the pain is worse in his right thumb with opposition and flexion of his thumb.  He states that the pain radiates all the way up into his right elbow.  Pain is worse with movement of his right thumb.  He denies specific trauma or injury.  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  He normally gets injections in his right wrist by orthopedics.  He states that is been a while.  He states that this pain is flared up over the last few days.  No fever or chills.  No swelling.  No prior treatments or limiting factors.  No prior valuations.  No other complaints at this time.        Review of Systems   Musculoskeletal: Positive for arthralgias and joint swelling.        Right wrist pain, right elbow pain right thumb pain   All other systems reviewed and are negative.      Past Medical History:   Diagnosis Date   • Altered bowel elimination due to intestinal ostomy (HCC)     PATIENT REPORTS SECONDARY TO A COLON RUPTURE APPROXIMATELY 16-17 YEARS AGO   • Anemia    • Arthritis     WRIST   • Bowel perforation (HCC)     states that he had his bowel perforated twice from colonoscopies, and was told that a small scope should always be used in the future.   • Cancer (HCC)     Kidney   • CKD (chronic kidney disease)     stage 3   • COVID-19 vaccine series completed     Covid vaccine x4 doses   • Crohn disease (HCC)    • Gallstones    • GERD (gastroesophageal reflux disease)    • History of MRSA infection     nose- \"years ago\"   • History of pneumonia    • Hypothyroid    • Kidney stones    • Seasonal allergies    • Spinal headache    • Wears glasses        Allergies   Allergen Reactions   • Latex Rash       Past Surgical History:   Procedure Laterality Date   • " COLONOSCOPY     • COLONOSCOPY N/A 2017    Procedure: Colonoscopy through ostomy site and rectum with biopsies;  Surgeon: Vincenzo Galan MD;  Location: [facility] ENDOSCOPY;  Service:    • ENDOSCOPY     • HEMORRHOIDECTOMY      REPORTS APPROXIMATELY 20 YEARS AGO   • HERNIA REPAIR      UMBILICAL   • NEPHRECTOMY Right 2017   • SKIN BIOPSY      benign   • SMALL INTESTINE SURGERY      HX OF COLON RUPTURE WITH PLACEMENT OF OSTOMY 16-17 years ago   • URETEROSCOPY LASER LITHOTRIPSY WITH STENT INSERTION Left 2019    Procedure: CYSTOSCOPY, URETEROSCOPY, STONE BASKETING, RETROGRADE PYLEOGRAM,  LASER LITHOTRIPSY WITH STENT INSERTION;  Surgeon: Lazaro Velazquez MD;  Location: [facility] OR;  Service: Urology   • URETEROSCOPY LASER LITHOTRIPSY WITH STENT INSERTION Left 2021    Procedure: URETEROSCOPY diagnostic WITH STENT INSERTION  with left retrograde;  Surgeon: Lazaro Velazquez MD;  Location: [facility] OR;  Service: Urology;  Laterality: Left;   • WISDOM TOOTH EXTRACTION     • WRIST SURGERY Right        Family History   Problem Relation Age of Onset   • Heart disease Mother    • Cancer Father    • No Known Problems Sister    • COPD Brother    • COPD Sister    • Crohn's disease Son    • Colon cancer Neg Hx        Social History     Socioeconomic History   • Marital status:    Tobacco Use   • Smoking status: Former     Packs/day: 2.00     Years: 3.00     Pack years: 6.00     Types: Cigarettes     Quit date:      Years since quittin.8   • Smokeless tobacco: Former     Types: Snuff   Vaping Use   • Vaping Use: Never used   Substance and Sexual Activity   • Alcohol use: No     Comment: QUIT 30 PLUS YEARS   • Drug use: No   • Sexual activity: Defer           Objective   Physical Exam  Vitals and nursing note reviewed.   Constitutional:       General: He is not in acute distress.     Appearance: He is well-developed. He is not diaphoretic.   HENT:      Head: Normocephalic and atraumatic.      Nose:  Nose normal.   Eyes:      Conjunctiva/sclera: Conjunctivae normal.      Pupils: Pupils are equal, round, and reactive to light.   Cardiovascular:      Rate and Rhythm: Normal rate and regular rhythm.      Pulses: Normal pulses.   Pulmonary:      Effort: Pulmonary effort is normal. No respiratory distress.      Breath sounds: Normal breath sounds.   Abdominal:      General: There is no distension.      Palpations: Abdomen is soft.      Tenderness: There is no abdominal tenderness.   Musculoskeletal:         General: No deformity.      Comments: Tenderness to palpation to the right MCP joint and right wrist.  Tenderness to palpation to the right lateral epicondyle   Neurological:      Mental Status: He is alert and oriented to person, place, and time.      Cranial Nerves: No cranial nerve deficit.      Coordination: Coordination normal.         Procedures           ED Course                                           MDM  62-year-old male presents to the ED with right wrist thumb and elbow pain.  Pain is worse with opposition of his right thumb.  Suspect arthritis and/or component of tendinitis given the pain radiates to the right lateral epicondyle.  Will discharge with symptomatic management and follow-up with orthopedics.  The patient is agreeable to this plan.  No other trauma or injury.  No indication for repeat x-rays at this time.        Final diagnoses:   Tendinitis of right wrist   Lateral epicondylitis of right elbow       ED Disposition  ED Disposition     ED Disposition   Discharge    Condition   Stable    Comment   --             No follow-up provider specified.       Medication List      New Prescriptions    predniSONE 20 MG tablet  Commonly known as: DELTASONE  Take 1 tablet by mouth 2 (Two) Times a Day for 5 days.           Where to Get Your Medications      These medications were sent to [id number] - [place], [place] - [address] AT [facility] [address] - [phone] PH  - [phone] FX  [address], [place] [address]    Phone: [phone]   · HYDROcodone-acetaminophen 5-325 MG per tablet  · predniSONE 20 MG tablet          Leeroy Smyth,   10/20/22 0817

## 2022-11-14 ENCOUNTER — TRANSCRIBE ORDERS (OUTPATIENT)
Dept: ADMINISTRATIVE | Facility: HOSPITAL | Age: 62
End: 2022-11-14

## 2022-11-14 ENCOUNTER — HOSPITAL ENCOUNTER (OUTPATIENT)
Dept: MRI IMAGING | Facility: HOSPITAL | Age: 62
Discharge: HOME | End: 2022-11-14
Admitting: PODIATRIST

## 2022-11-14 DIAGNOSIS — S93.492A SPRAIN OF ANTERIOR TALOFIBULAR LIGAMENT OF LEFT ANKLE, INITIAL ENCOUNTER: ICD-10-CM

## 2022-11-14 DIAGNOSIS — S93.492A SPRAIN OF ANTERIOR TALOFIBULAR LIGAMENT OF LEFT ANKLE, INITIAL ENCOUNTER: Primary | ICD-10-CM

## 2022-11-14 PROCEDURE — 73721 MRI JNT OF LWR EXTRE W/O DYE: CPT

## 2022-12-05 DIAGNOSIS — K50.90 CROHN'S DISEASE WITHOUT COMPLICATION, UNSPECIFIED GASTROINTESTINAL TRACT LOCATION: ICD-10-CM

## 2022-12-05 DIAGNOSIS — D64.9 ANEMIA, UNSPECIFIED TYPE: ICD-10-CM

## 2022-12-05 DIAGNOSIS — Z12.11 ENCOUNTER FOR SCREENING FOR MALIGNANT NEOPLASM OF COLON: Primary | ICD-10-CM

## 2022-12-05 RX ORDER — PEG-3350, SODIUM SULFATE, SODIUM CHLORIDE, POTASSIUM CHLORIDE, SODIUM ASCORBATE AND ASCORBIC ACID 7.5-2.691G
1000 KIT ORAL TAKE AS DIRECTED
Qty: 1 EACH | Refills: 0 | Status: SHIPPED | OUTPATIENT
Start: 2022-12-05

## 2023-01-10 ENCOUNTER — PREP FOR SURGERY (OUTPATIENT)
Dept: OTHER | Facility: HOSPITAL | Age: 63
End: 2023-01-10
Payer: MEDICAID

## 2023-01-10 DIAGNOSIS — K50.90 CROHN'S DISEASE WITHOUT COMPLICATION, UNSPECIFIED GASTROINTESTINAL TRACT LOCATION: ICD-10-CM

## 2023-01-10 DIAGNOSIS — D64.9 ANEMIA, UNSPECIFIED TYPE: ICD-10-CM

## 2023-01-10 DIAGNOSIS — Z12.11 ENCOUNTER FOR SCREENING FOR MALIGNANT NEOPLASM OF COLON: Primary | ICD-10-CM

## 2023-01-10 RX ORDER — SODIUM CHLORIDE 9 MG/ML
70 INJECTION, SOLUTION INTRAVENOUS CONTINUOUS PRN
Status: CANCELLED | OUTPATIENT
Start: 2023-01-10

## 2023-01-11 PROBLEM — K50.90 CROHN'S DISEASE WITHOUT COMPLICATION: Status: ACTIVE | Noted: 2023-01-11

## 2023-01-11 NOTE — PRE-PROCEDURE INSTRUCTIONS
PAT phone history completed with pt for upcoming procedure on 1/16/23 with Dr. Martin.      PAT PASS GIVEN/REVIEWED WITH PT.  VERBALIZED UNDERSTANDING OF THE FOLLOWING:  DO NOT EAT, DRINK, SMOKE, USE SMOKELESS TOBACCO OR CHEW GUM AFTER MIDNIGHT THE NIGHT BEFORE SURGERY.  THIS ALSO INCLUDES HARD CANDIES AND MINTS.    DO NOT SHAVE THE AREA TO BE OPERATED ON AT LEAST 48 HOURS PRIOR TO THE PROCEDURE.  DO NOT WEAR MAKE UP OR NAIL POLISH.  DO NOT LEAVE IN ANY PIERCING OR WEAR JEWELRY THE DAY OF SURGERY.      DO NOT USE ADHESIVES IF YOU WEAR DENTURES.    DO NOT WEAR EYE CONTACTS; BRING IN YOUR GLASSES.    ONLY TAKE MEDICATION THE MORNING OF YOUR PROCEDURE IF INSTRUCTED BY YOUR SURGEON WITH ENOUGH WATER TO SWALLOW THE MEDICATION.  IF YOUR SURGEON DID NOT SPECIFY WHICH MEDICATIONS TO TAKE, YOU WILL NEED TO CALL THEIR OFFICE FOR FURTHER INSTRUCTIONS AND DO AS THEY INSTRUCT.    LEAVE ANYTHING YOU CONSIDER VALUABLE AT HOME.    YOU WILL NEED TO ARRANGE FOR SOMEONE TO DRIVE YOU HOME AFTER SURGERY.  IT IS RECOMMENDED THAT YOU DO NOT DRIVE, WORK, DRINK ALCOHOL OR MAKE MAJOR DECISIONS FOR AT LEAST 24 HOURS AFTER YOUR PROCEDURE IS COMPLETE.      THE DAY OF YOUR PROCEDURE, BRING IN THE FOLLOWING IF APPLICABLE:   PICTURE ID AND INSURANCE/MEDICARE OR MEDICAID CARDS/ANY CO-PAY THAT MAY BE DUE   COPY OF ADVANCED DIRECTIVE/LIVING WILL/POWER OR    CPAP/BIPAP/INHALERS   SKIN PREP SHEET   YOUR PREADMISSION TESTING PASS (IF NOT A PHONE HISTORY)

## 2023-05-09 DIAGNOSIS — Z12.11 ENCOUNTER FOR SCREENING FOR MALIGNANT NEOPLASM OF COLON: ICD-10-CM

## 2023-05-09 RX ORDER — PEG-3350, SODIUM SULFATE, SODIUM CHLORIDE, POTASSIUM CHLORIDE, SODIUM ASCORBATE AND ASCORBIC ACID 7.5-2.691G
1000 KIT ORAL TAKE AS DIRECTED
Qty: 1 EACH | Refills: 0 | Status: SHIPPED | OUTPATIENT
Start: 2023-05-09

## 2023-05-23 ENCOUNTER — APPOINTMENT (OUTPATIENT)
Dept: GENERAL RADIOLOGY | Facility: HOSPITAL | Age: 63
End: 2023-05-23
Payer: MEDICAID

## 2023-05-23 ENCOUNTER — HOSPITAL ENCOUNTER (EMERGENCY)
Facility: HOSPITAL | Age: 63
Discharge: HOME | End: 2023-05-23
Attending: STUDENT IN AN ORGANIZED HEALTH CARE EDUCATION/TRAINING PROGRAM | Admitting: STUDENT IN AN ORGANIZED HEALTH CARE EDUCATION/TRAINING PROGRAM
Payer: MEDICAID

## 2023-05-23 VITALS
DIASTOLIC BLOOD PRESSURE: 82 MMHG | SYSTOLIC BLOOD PRESSURE: 127 MMHG | RESPIRATION RATE: 16 BRPM | BODY MASS INDEX: 23.72 KG/M2 | OXYGEN SATURATION: 96 % | HEIGHT: 66 IN | HEART RATE: 81 BPM | WEIGHT: 147.6 LBS | TEMPERATURE: 98.3 F

## 2023-05-23 DIAGNOSIS — J06.9 VIRAL URI WITH COUGH: Primary | ICD-10-CM

## 2023-05-23 LAB
ALBUMIN SERPL-MCNC: 3.7 G/DL (ref 3.5–5.2)
ALBUMIN/GLOB SERPL: 1.5 G/DL
ALP SERPL-CCNC: 56 U/L (ref 39–117)
ALT SERPL W P-5'-P-CCNC: 17 U/L (ref 1–41)
ANION GAP SERPL CALCULATED.3IONS-SCNC: 12.4 MMOL/L (ref 5–15)
AST SERPL-CCNC: 29 U/L (ref 1–40)
BASOPHILS # BLD AUTO: 0.04 10*3/MM3 (ref 0–0.2)
BASOPHILS NFR BLD AUTO: 0.5 % (ref 0–1.5)
BILIRUB SERPL-MCNC: 0.2 MG/DL (ref 0–1.2)
BUN SERPL-MCNC: 16 MG/DL (ref 8–23)
BUN/CREAT SERPL: 6.9 (ref 7–25)
CALCIUM SPEC-SCNC: 9.3 MG/DL (ref 8.6–10.5)
CHLORIDE SERPL-SCNC: 105 MMOL/L (ref 98–107)
CO2 SERPL-SCNC: 21.6 MMOL/L (ref 22–29)
CREAT SERPL-MCNC: 2.31 MG/DL (ref 0.76–1.27)
DEPRECATED RDW RBC AUTO: 45.1 FL (ref 37–54)
EGFRCR SERPLBLD CKD-EPI 2021: 31.4 ML/MIN/1.73
EOSINOPHIL # BLD AUTO: 0.09 10*3/MM3 (ref 0–0.4)
EOSINOPHIL NFR BLD AUTO: 1 % (ref 0.3–6.2)
ERYTHROCYTE [DISTWIDTH] IN BLOOD BY AUTOMATED COUNT: 13.3 % (ref 12.3–15.4)
FLUAV RNA RESP QL NAA+PROBE: NOT DETECTED
FLUBV RNA RESP QL NAA+PROBE: NOT DETECTED
GLOBULIN UR ELPH-MCNC: 2.5 GM/DL
GLUCOSE SERPL-MCNC: 123 MG/DL (ref 65–99)
HCT VFR BLD AUTO: 41.5 % (ref 37.5–51)
HGB BLD-MCNC: 14.3 G/DL (ref 13–17.7)
IMM GRANULOCYTES # BLD AUTO: 0.04 10*3/MM3 (ref 0–0.05)
IMM GRANULOCYTES NFR BLD AUTO: 0.5 % (ref 0–0.5)
LYMPHOCYTES # BLD AUTO: 1.21 10*3/MM3 (ref 0.7–3.1)
LYMPHOCYTES NFR BLD AUTO: 13.8 % (ref 19.6–45.3)
MCH RBC QN AUTO: 32 PG (ref 26.6–33)
MCHC RBC AUTO-ENTMCNC: 34.5 G/DL (ref 31.5–35.7)
MCV RBC AUTO: 92.8 FL (ref 79–97)
MONOCYTES # BLD AUTO: 0.76 10*3/MM3 (ref 0.1–0.9)
MONOCYTES NFR BLD AUTO: 8.7 % (ref 5–12)
NEUTROPHILS NFR BLD AUTO: 6.64 10*3/MM3 (ref 1.7–7)
NEUTROPHILS NFR BLD AUTO: 75.5 % (ref 42.7–76)
NRBC BLD AUTO-RTO: 0 /100 WBC (ref 0–0.2)
PLATELET # BLD AUTO: 179 10*3/MM3 (ref 140–450)
PMV BLD AUTO: 10.4 FL (ref 6–12)
POTASSIUM SERPL-SCNC: 4.3 MMOL/L (ref 3.5–5.2)
PROT SERPL-MCNC: 6.2 G/DL (ref 6–8.5)
RBC # BLD AUTO: 4.47 10*6/MM3 (ref 4.14–5.8)
SARS-COV-2 RNA RESP QL NAA+PROBE: NOT DETECTED
SODIUM SERPL-SCNC: 139 MMOL/L (ref 136–145)
TROPONIN T SERPL HS-MCNC: 28 NG/L
WBC NRBC COR # BLD: 8.78 10*3/MM3 (ref 3.4–10.8)

## 2023-05-23 PROCEDURE — 71045 X-RAY EXAM CHEST 1 VIEW: CPT

## 2023-05-23 PROCEDURE — 87636 SARSCOV2 & INF A&B AMP PRB: CPT | Performed by: STUDENT IN AN ORGANIZED HEALTH CARE EDUCATION/TRAINING PROGRAM

## 2023-05-23 PROCEDURE — 80053 COMPREHEN METABOLIC PANEL: CPT | Performed by: STUDENT IN AN ORGANIZED HEALTH CARE EDUCATION/TRAINING PROGRAM

## 2023-05-23 PROCEDURE — 85025 COMPLETE CBC W/AUTO DIFF WBC: CPT | Performed by: STUDENT IN AN ORGANIZED HEALTH CARE EDUCATION/TRAINING PROGRAM

## 2023-05-23 PROCEDURE — 93005 ELECTROCARDIOGRAM TRACING: CPT | Performed by: STUDENT IN AN ORGANIZED HEALTH CARE EDUCATION/TRAINING PROGRAM

## 2023-05-23 PROCEDURE — 84484 ASSAY OF TROPONIN QUANT: CPT | Performed by: STUDENT IN AN ORGANIZED HEALTH CARE EDUCATION/TRAINING PROGRAM

## 2023-05-23 PROCEDURE — 99283 EMERGENCY DEPT VISIT LOW MDM: CPT

## 2023-05-23 RX ORDER — BENZONATATE 100 MG/1
100 CAPSULE ORAL 3 TIMES DAILY PRN
Qty: 30 CAPSULE | Refills: 0 | Status: SHIPPED | OUTPATIENT
Start: 2023-05-23

## 2023-05-23 RX ORDER — ONDANSETRON 4 MG/1
4 TABLET, ORALLY DISINTEGRATING ORAL EVERY 8 HOURS PRN
Qty: 20 TABLET | Refills: 0 | Status: SHIPPED | OUTPATIENT
Start: 2023-05-23

## 2023-05-23 RX ORDER — BENZONATATE 100 MG/1
100 CAPSULE ORAL ONCE
Status: COMPLETED | OUTPATIENT
Start: 2023-05-23 | End: 2023-05-23

## 2023-05-23 RX ADMIN — BENZONATATE 100 MG: 100 CAPSULE ORAL at 21:46

## 2023-05-23 NOTE — Clinical Note
[facility] EMERGENCY DEPARTMENT  [address] [facility] [address] [facility] [address]  Phone: [phone]    Jorge Mclaughlin was seen and treated in our emergency department on 5/23/2023.  He may return to work on 05/25/2023.         Thank you for choosing [facility].    Eduardo Brown MD

## 2023-05-24 NOTE — DISCHARGE INSTRUCTIONS
You were evaluated for cough.  We got labs and a chest x-ray which did not show any concerns for a pneumonia.  We gave you Tessalon Perles to help with cough and you are now stable for discharge.  Would recommend continue take Tessalon Perles as needed for your cough.  We also recommend following with your primary care doctor to ensure that this cough resolves appropriately.  If you have significant increase in your shortness of breath or chest pain, please connect emergency department for further evaluation.  You are now stable for discharge.

## 2023-05-24 NOTE — ED PROVIDER NOTES
Subjective  History of Present Illness:    Patient is a 63-year-old male with history of CKD, kidney cancer, Crohn's disease status post colostomy, hypothyroidism, GERD who presents today with cough.  States that he has had increasing cough and shortness of breath since Saturday.  Had several friends that come down with pneumonia and was concerned for the possibility of this and thus presents to our emergency department for evaluation.  Denies any chest pain.  No abdominal pain, nausea, vomiting, more loose than baseline to his ostomy.  Patient denies any unilateral leg swelling or leg pain.  No personal history of PE/DVT.      Nurses Notes reviewed and agree, including vitals, allergies, social history and prior medical history.     REVIEW OF SYSTEMS: All systems reviewed and not pertinent unless noted.  Review of Systems   Constitutional: Positive for activity change. Negative for appetite change, chills, fatigue and fever.   HENT: Negative for congestion, sinus pressure, sneezing and trouble swallowing.    Eyes: Negative for discharge and itching.   Respiratory: Positive for cough and shortness of breath. Negative for stridor.    Cardiovascular: Negative for chest pain and palpitations.   Gastrointestinal: Negative for abdominal distention and abdominal pain.   Endocrine: Negative for cold intolerance and heat intolerance.   Genitourinary: Negative for decreased urine volume, dysuria and urgency.   Musculoskeletal: Negative for gait problem, neck pain and neck stiffness.   Skin: Negative for color change and rash.   Allergic/Immunologic: Negative for immunocompromised state.   Neurological: Negative for facial asymmetry and headaches.   Hematological: Negative for adenopathy.   Psychiatric/Behavioral: Negative for self-injury and suicidal ideas.       Past Medical History:   Diagnosis Date   • Altered bowel elimination due to intestinal ostomy     PATIENT REPORTS SECONDARY TO A COLON RUPTURE APPROXIMATELY 16-17  YEARS AGO   • Arthritis     WRIST   • Asthma     as a child   • Bowel perforation     states that he had his bowel perforated twice from colonoscopies, and was told that a small scope should always be used in the future.   • Cancer     Kidney   • CKD (chronic kidney disease)     stage 3   • COVID-19 vaccine series completed     Covid vaccine x4 doses   • Crohn disease    • Gallstones    • GERD (gastroesophageal reflux disease)    • History of MRSA infection 2017    nose   • History of pneumonia    • [place] (hard of hearing)     no hearing aids   • Hypothyroid    • Kidney stones    • Seasonal allergies    • Spinal headache    • Wears glasses        Allergies:    Latex      Past Surgical History:   Procedure Laterality Date   • COLONOSCOPY     • COLONOSCOPY N/A 07/31/2017    Procedure: Colonoscopy through ostomy site and rectum with biopsies;  Surgeon: Vincenzo Galan MD;  Location: [facility] ENDOSCOPY;  Service:    • ENDOSCOPY     • HEMORRHOIDECTOMY      REPORTS APPROXIMATELY 20 YEARS AGO   • HERNIA REPAIR      UMBILICAL   • NEPHRECTOMY Right 07/2017   • SKIN BIOPSY      benign   • SMALL INTESTINE SURGERY      HX OF COLON RUPTURE WITH PLACEMENT OF OSTOMY 16-17 years ago   • URETEROSCOPY LASER LITHOTRIPSY WITH STENT INSERTION Left 07/03/2019    Procedure: CYSTOSCOPY, URETEROSCOPY, STONE BASKETING, RETROGRADE PYLEOGRAM,  LASER LITHOTRIPSY WITH STENT INSERTION;  Surgeon: Lazaro Velazquez MD;  Location: [facility] OR;  Service: Urology   • URETEROSCOPY LASER LITHOTRIPSY WITH STENT INSERTION Left 09/20/2021    Procedure: URETEROSCOPY diagnostic WITH STENT INSERTION  with left retrograde;  Surgeon: Lazaro Velazquez MD;  Location: [facility] OR;  Service: Urology;  Laterality: Left;   • WISDOM TOOTH EXTRACTION     • WRIST SURGERY Right          Social History     Socioeconomic History   • Marital status:    Tobacco Use   • Smoking status: Former     Packs/day: 2.00     Years: 3.00     Pack years: 6.00     Types:  "Cigarettes     Quit date:      Years since quittin.4   • Smokeless tobacco: Former     Types: Snuff   Vaping Use   • Vaping Use: Never used   Substance and Sexual Activity   • Alcohol use: No     Comment: QUIT 30 PLUS YEARS   • Drug use: No   • Sexual activity: Defer         Family History   Problem Relation Age of Onset   • Heart disease Mother    • Cancer Father    • No Known Problems Sister    • COPD Brother    • COPD Sister    • Crohn's disease Son    • Colon cancer Neg Hx        Objective  Physical Exam:  /82   Pulse 81   Temp 98.3 °F (36.8 °C) (Oral)   Resp 16   Ht 167.6 cm (66\")   Wt 67 kg (147 lb 9.6 oz)   SpO2 96%   BMI 23.82 kg/m²      Physical Exam  Constitutional:       General: He is not in acute distress.     Appearance: Normal appearance. He is normal weight. He is not ill-appearing.   HENT:      Head: Normocephalic and atraumatic.      Nose: Nose normal. No congestion or rhinorrhea.      Mouth/Throat:      Mouth: Mucous membranes are moist.      Pharynx: Oropharynx is clear.   Eyes:      Extraocular Movements: Extraocular movements intact.      Conjunctiva/sclera: Conjunctivae normal.      Pupils: Pupils are equal, round, and reactive to light.   Cardiovascular:      Rate and Rhythm: Normal rate and regular rhythm.      Pulses: Normal pulses.   Pulmonary:      Effort: Pulmonary effort is normal. No respiratory distress.      Breath sounds: Normal breath sounds. No decreased breath sounds or wheezing.   Abdominal:      General: Abdomen is flat. Bowel sounds are normal. There is no distension.      Palpations: Abdomen is soft.      Tenderness: There is no abdominal tenderness.   Musculoskeletal:         General: No swelling or tenderness. Normal range of motion.      Cervical back: Normal range of motion and neck supple. No rigidity or tenderness.   Skin:     General: Skin is warm and dry.      Capillary Refill: Capillary refill takes less than 2 seconds.   Neurological:      " General: No focal deficit present.      Mental Status: He is alert and oriented to person, place, and time. Mental status is at baseline.      Cranial Nerves: No cranial nerve deficit.      Sensory: No sensory deficit.      Motor: No weakness.   Psychiatric:         Mood and Affect: Mood normal.         Behavior: Behavior normal.         Thought Content: Thought content normal.         Judgment: Judgment normal.         Procedures    ED Course:        EKG interpreted by me, sinus tachycardia with no concerning ST changes noted, rate of 102    Lab Results (last 24 hours)     Procedure Component Value Units Date/Time    COVID-19 and FLU A/B PCR - Swab, Nasopharynx [[id number]]  (Normal) Collected: 05/23/23 2133    Specimen: Swab from Nasopharynx Updated: 05/23/23 2156     COVID19 Not Detected     Influenza A PCR Not Detected     Influenza B PCR Not Detected    Narrative:      Fact sheet for providers: https://www.fda.gov/media/823895/download    Fact sheet for patients: https://www.fda.gov/media/769521/download    Test performed by PCR.    CBC & Differential [[id number]]  (Abnormal) Collected: 05/23/23 2147    Specimen: Blood Updated: 05/23/23 2154    Narrative:      The following orders were created for panel order CBC & Differential.  Procedure                               Abnormality         Status                     ---------                               -----------         ------                     CBC Auto Differential[[id number]]        Abnormal            Final result                 Please view results for these tests on the individual orders.    Comprehensive Metabolic Panel [[id number]]  (Abnormal) Collected: 05/23/23 2147    Specimen: Blood Updated: 05/23/23 2211     Glucose 123 mg/dL      BUN 16 mg/dL      Creatinine 2.31 mg/dL      Sodium 139 mmol/L      Potassium 4.3 mmol/L      Comment: Specimen hemolyzed.  Results may be affected.        Chloride 105 mmol/L      CO2 21.6 mmol/L      Calcium 9.3 mg/dL       Total Protein 6.2 g/dL      Albumin 3.7 g/dL      ALT (SGPT) 17 U/L      Comment: Specimen hemolyzed.  Results may be affected.        AST (SGOT) 29 U/L      Comment: Specimen hemolyzed.  Results may be affected.        Alkaline Phosphatase 56 U/L      Total Bilirubin 0.2 mg/dL      Globulin 2.5 gm/dL      A/G Ratio 1.5 g/dL      BUN/Creatinine Ratio 6.9     Anion Gap 12.4 mmol/L      eGFR 31.4 mL/min/1.73     Narrative:      GFR Normal >60  Chronic Kidney Disease <60  Kidney Failure <15      Single High Sensitivity Troponin T [[id number]]  (Abnormal) Collected: 05/23/23 2147    Specimen: Blood Updated: 05/23/23 2214     HS Troponin T 28 ng/L      Comment: Specimen hemolyzed.  Results may be affected.       Narrative:      High Sensitive Troponin T Reference Range:  <10.0 ng/L- Negative Female for AMI  <15.0 ng/L- Negative Male for AMI  >=10 - Abnormal Female indicating possible myocardial injury.  >=15 - Abnormal Male indicating possible myocardial injury.   Clinicians would have to utilize clinical acumen, EKG, Troponin, and serial changes to determine if it is an Acute Myocardial Infarction or myocardial injury due to an underlying chronic condition.         CBC Auto Differential [[id number]]  (Abnormal) Collected: 05/23/23 2147    Specimen: Blood Updated: 05/23/23 2154     WBC 8.78 10*3/mm3      RBC 4.47 10*6/mm3      Hemoglobin 14.3 g/dL      Hematocrit 41.5 %      MCV 92.8 fL      MCH 32.0 pg      MCHC 34.5 g/dL      RDW 13.3 %      RDW-SD 45.1 fl      MPV 10.4 fL      Platelets 179 10*3/mm3      Neutrophil % 75.5 %      Lymphocyte % 13.8 %      Monocyte % 8.7 %      Eosinophil % 1.0 %      Basophil % 0.5 %      Immature Grans % 0.5 %      Neutrophils, Absolute 6.64 10*3/mm3      Lymphocytes, Absolute 1.21 10*3/mm3      Monocytes, Absolute 0.76 10*3/mm3      Eosinophils, Absolute 0.09 10*3/mm3      Basophils, Absolute 0.04 10*3/mm3      Immature Grans, Absolute 0.04 10*3/mm3      nRBC 0.0 /100 WBC             No radiology results from the last 24 hrs       MDM    Initial impression of presenting illness: Shortness of breath    DDX: includes but is not limited to: Viral URI, bacterial pneumonia, PE    Patient arrives stable with vitals interpreted by myself.     Pertinent features from physical exam: Patient clear to auscultation, no pain with deep inspiration, satting well on room air, not tachycardic at my time at bedside.    Initial diagnostic plan: CBC, CMP, troponin, ECG, chest x-ray    Results from initial plan were reviewed and interpreted by me revealing no concern for pneumonia    Diagnostic information from other sources: Reviewed past medical records    Interventions / Re-evaluation: Given Tessalon Perles with significant improvement of symptoms    Results/clinical rationale were discussed with patient at bedside    Consultations/Discussion of results with other physicians: Discussed likely diagnosis of viral URI.  Encourage PCP follow-up to ensure adequate resolution.  Prescribed course of Tessalon Perles for symptom control as an outpatient and recommended continued decongestant with expectorant.  Patient voiced understanding and agreement the plan.  Strict turn precaution for severe increase in chest pain or shortness of breath.  Discharged in stable condition.    Disposition plan: Discharge  -----    Final diagnoses:   Viral URI with cough        Eduardo Brwon MD  05/24/23 3377

## 2023-07-10 ENCOUNTER — APPOINTMENT (OUTPATIENT)
Dept: GENERAL RADIOLOGY | Facility: HOSPITAL | Age: 63
DRG: 393 | End: 2023-07-10
Payer: MEDICAID

## 2023-07-10 ENCOUNTER — APPOINTMENT (OUTPATIENT)
Dept: CT IMAGING | Facility: HOSPITAL | Age: 63
DRG: 393 | End: 2023-07-10
Payer: MEDICAID

## 2023-07-10 ENCOUNTER — HOSPITAL ENCOUNTER (INPATIENT)
Facility: HOSPITAL | Age: 63
LOS: 4 days | Discharge: HOME | DRG: 393 | End: 2023-07-14
Attending: INTERNAL MEDICINE | Admitting: INTERNAL MEDICINE
Payer: MEDICAID

## 2023-07-10 DIAGNOSIS — M77.11 LATERAL EPICONDYLITIS OF RIGHT ELBOW: ICD-10-CM

## 2023-07-10 DIAGNOSIS — Z12.11 ENCOUNTER FOR SCREENING FOR MALIGNANT NEOPLASM OF COLON: ICD-10-CM

## 2023-07-10 DIAGNOSIS — M77.8 TENDINITIS OF RIGHT WRIST: ICD-10-CM

## 2023-07-10 DIAGNOSIS — K50.90 CROHN'S DISEASE WITHOUT COMPLICATION, UNSPECIFIED GASTROINTESTINAL TRACT LOCATION: ICD-10-CM

## 2023-07-10 DIAGNOSIS — D64.9 ANEMIA, UNSPECIFIED TYPE: ICD-10-CM

## 2023-07-10 PROBLEM — K63.1 BOWEL PERFORATION: Status: ACTIVE | Noted: 2023-07-10

## 2023-07-10 PROBLEM — K63.1 COLON PERFORATION: Status: ACTIVE | Noted: 2023-07-10

## 2023-07-10 LAB
ALBUMIN SERPL-MCNC: 3.4 G/DL (ref 3.5–5.2)
ALBUMIN/GLOB SERPL: 1.5 G/DL
ALP SERPL-CCNC: 57 U/L (ref 39–117)
ALT SERPL W P-5'-P-CCNC: 18 U/L (ref 1–41)
ANION GAP SERPL CALCULATED.3IONS-SCNC: 12.5 MMOL/L (ref 5–15)
AST SERPL-CCNC: 15 U/L (ref 1–40)
BILIRUB SERPL-MCNC: 0.9 MG/DL (ref 0–1.2)
BUN SERPL-MCNC: 18 MG/DL (ref 8–23)
BUN/CREAT SERPL: 8.3 (ref 7–25)
CALCIUM SPEC-SCNC: 8.8 MG/DL (ref 8.6–10.5)
CHLORIDE SERPL-SCNC: 107 MMOL/L (ref 98–107)
CO2 SERPL-SCNC: 21.5 MMOL/L (ref 22–29)
CREAT SERPL-MCNC: 2.18 MG/DL (ref 0.76–1.27)
DEPRECATED RDW RBC AUTO: 50.7 FL (ref 37–54)
EGFRCR SERPLBLD CKD-EPI 2021: 33.6 ML/MIN/1.73
ERYTHROCYTE [DISTWIDTH] IN BLOOD BY AUTOMATED COUNT: 14 % (ref 12.3–15.4)
GLOBULIN UR ELPH-MCNC: 2.2 GM/DL
GLUCOSE SERPL-MCNC: 92 MG/DL (ref 65–99)
HCT VFR BLD AUTO: 43.8 % (ref 37.5–51)
HGB BLD-MCNC: 14.6 G/DL (ref 13–17.7)
MCH RBC QN AUTO: 32.7 PG (ref 26.6–33)
MCHC RBC AUTO-ENTMCNC: 33.3 G/DL (ref 31.5–35.7)
MCV RBC AUTO: 98 FL (ref 79–97)
PLATELET # BLD AUTO: 127 10*3/MM3 (ref 140–450)
PMV BLD AUTO: 10.5 FL (ref 6–12)
POTASSIUM SERPL-SCNC: 3.7 MMOL/L (ref 3.5–5.2)
PROT SERPL-MCNC: 5.6 G/DL (ref 6–8.5)
RBC # BLD AUTO: 4.47 10*6/MM3 (ref 4.14–5.8)
SODIUM SERPL-SCNC: 141 MMOL/L (ref 136–145)
T4 FREE SERPL-MCNC: 1.07 NG/DL (ref 0.93–1.7)
WBC NRBC COR # BLD: 10.05 10*3/MM3 (ref 3.4–10.8)

## 2023-07-10 PROCEDURE — 25010000002 PIPERACILLIN SOD-TAZOBACTAM PER 1 G: Performed by: INTERNAL MEDICINE

## 2023-07-10 PROCEDURE — 74022 RADEX COMPL AQT ABD SERIES: CPT

## 2023-07-10 PROCEDURE — 45380 COLONOSCOPY AND BIOPSY: CPT | Performed by: INTERNAL MEDICINE

## 2023-07-10 PROCEDURE — 80053 COMPREHEN METABOLIC PANEL: CPT | Performed by: INTERNAL MEDICINE

## 2023-07-10 PROCEDURE — 0DBP8ZX EXCISION OF RECTUM, VIA NATURAL OR ARTIFICIAL OPENING ENDOSCOPIC, DIAGNOSTIC: ICD-10-PCS | Performed by: INTERNAL MEDICINE

## 2023-07-10 PROCEDURE — 0DB68ZX EXCISION OF STOMACH, VIA NATURAL OR ARTIFICIAL OPENING ENDOSCOPIC, DIAGNOSTIC: ICD-10-PCS | Performed by: INTERNAL MEDICINE

## 2023-07-10 PROCEDURE — 0DBN8ZX EXCISION OF SIGMOID COLON, VIA NATURAL OR ARTIFICIAL OPENING ENDOSCOPIC, DIAGNOSTIC: ICD-10-PCS | Performed by: INTERNAL MEDICINE

## 2023-07-10 PROCEDURE — 0DBB8ZX EXCISION OF ILEUM, VIA NATURAL OR ARTIFICIAL OPENING ENDOSCOPIC, DIAGNOSTIC: ICD-10-PCS | Performed by: INTERNAL MEDICINE

## 2023-07-10 PROCEDURE — 88305 TISSUE EXAM BY PATHOLOGIST: CPT

## 2023-07-10 PROCEDURE — 25010000002 HYDROMORPHONE 1 MG/ML SOLUTION: Performed by: INTERNAL MEDICINE

## 2023-07-10 PROCEDURE — 85027 COMPLETE CBC AUTOMATED: CPT | Performed by: INTERNAL MEDICINE

## 2023-07-10 PROCEDURE — 0DB58ZX EXCISION OF ESOPHAGUS, VIA NATURAL OR ARTIFICIAL OPENING ENDOSCOPIC, DIAGNOSTIC: ICD-10-PCS | Performed by: INTERNAL MEDICINE

## 2023-07-10 PROCEDURE — 74176 CT ABD & PELVIS W/O CONTRAST: CPT

## 2023-07-10 PROCEDURE — 43239 EGD BIOPSY SINGLE/MULTIPLE: CPT | Performed by: INTERNAL MEDICINE

## 2023-07-10 PROCEDURE — 99223 1ST HOSP IP/OBS HIGH 75: CPT | Performed by: SURGERY

## 2023-07-10 PROCEDURE — 84439 ASSAY OF FREE THYROXINE: CPT | Performed by: INTERNAL MEDICINE

## 2023-07-10 RX ORDER — NALOXONE HCL 0.4 MG/ML
0.4 VIAL (ML) INJECTION
Status: DISCONTINUED | OUTPATIENT
Start: 2023-07-10 | End: 2023-07-13

## 2023-07-10 RX ORDER — DEXTROSE AND SODIUM CHLORIDE 5; .9 G/100ML; G/100ML
50 INJECTION, SOLUTION INTRAVENOUS CONTINUOUS
Status: DISCONTINUED | OUTPATIENT
Start: 2023-07-10 | End: 2023-07-14 | Stop reason: HOSPADM

## 2023-07-10 RX ORDER — ONDANSETRON 2 MG/ML
4 INJECTION INTRAMUSCULAR; INTRAVENOUS EVERY 6 HOURS PRN
Status: DISCONTINUED | OUTPATIENT
Start: 2023-07-10 | End: 2023-07-14 | Stop reason: HOSPADM

## 2023-07-10 RX ORDER — SODIUM CHLORIDE 0.9 % (FLUSH) 0.9 %
10 SYRINGE (ML) INJECTION AS NEEDED
Status: DISCONTINUED | OUTPATIENT
Start: 2023-07-10 | End: 2023-07-14 | Stop reason: HOSPADM

## 2023-07-10 RX ORDER — SODIUM CHLORIDE 0.9 % (FLUSH) 0.9 %
10 SYRINGE (ML) INJECTION EVERY 12 HOURS SCHEDULED
Status: DISCONTINUED | OUTPATIENT
Start: 2023-07-10 | End: 2023-07-14 | Stop reason: HOSPADM

## 2023-07-10 RX ORDER — SODIUM CHLORIDE 9 MG/ML
50 INJECTION, SOLUTION INTRAVENOUS CONTINUOUS
Status: DISCONTINUED | OUTPATIENT
Start: 2023-07-10 | End: 2023-07-10

## 2023-07-10 RX ORDER — SIMETHICONE 20 MG/.3ML
EMULSION ORAL AS NEEDED
Status: DISCONTINUED | OUTPATIENT
Start: 2023-07-10 | End: 2023-07-10 | Stop reason: HOSPADM

## 2023-07-10 RX ORDER — SODIUM CHLORIDE 9 MG/ML
40 INJECTION, SOLUTION INTRAVENOUS AS NEEDED
Status: DISCONTINUED | OUTPATIENT
Start: 2023-07-10 | End: 2023-07-14 | Stop reason: HOSPADM

## 2023-07-10 RX ADMIN — HYDROMORPHONE HYDROCHLORIDE 0.5 MG: 1 INJECTION, SOLUTION INTRAMUSCULAR; INTRAVENOUS; SUBCUTANEOUS at 19:46

## 2023-07-10 RX ADMIN — HYDROMORPHONE HYDROCHLORIDE 0.5 MG: 1 INJECTION, SOLUTION INTRAMUSCULAR; INTRAVENOUS; SUBCUTANEOUS at 14:53

## 2023-07-10 RX ADMIN — Medication 10 ML: at 20:32

## 2023-07-10 RX ADMIN — TAZOBACTAM SODIUM AND PIPERACILLIN SODIUM 3.38 G: 375; 3 INJECTION, SOLUTION INTRAVENOUS at 20:31

## 2023-07-10 RX ADMIN — DEXTROSE AND SODIUM CHLORIDE 100 ML/HR: 5; 900 INJECTION, SOLUTION INTRAVENOUS at 15:27

## 2023-07-10 RX ADMIN — HYDROMORPHONE HYDROCHLORIDE 0.5 MG: 1 INJECTION, SOLUTION INTRAMUSCULAR; INTRAVENOUS; SUBCUTANEOUS at 23:36

## 2023-07-10 RX ADMIN — DEXTROSE AND SODIUM CHLORIDE 100 ML/HR: 5; 900 INJECTION, SOLUTION INTRAVENOUS at 23:36

## 2023-07-10 RX ADMIN — HYDROMORPHONE HYDROCHLORIDE 0.5 MG: 1 INJECTION, SOLUTION INTRAMUSCULAR; INTRAVENOUS; SUBCUTANEOUS at 17:30

## 2023-07-10 RX ADMIN — TAZOBACTAM SODIUM AND PIPERACILLIN SODIUM 3.38 G: 375; 3 INJECTION, SOLUTION INTRAVENOUS at 14:53

## 2023-07-10 NOTE — PROGRESS NOTES
Pharmacokinetic Consult - Piperacillin/Tazobactam Dosing  Jorge Mclaughlin is a 63 y.o. male who has been consulted to dose Piperacillin/Tazobactam for  intra-abdominal infection .    Current Antimicrobial Therapy    Anti-Infectives (From admission, onward)      Ordered     Dose/Rate Route Frequency Start Stop    07/10/23 1502  piperacillin-tazobactam (ZOSYN) 3.375 g in iso-osmotic dextrose 50 ml (premix)        Ordering Provider: Yifan Johnson MD    3.375 g  over 4 Hours Intravenous Every 8 Hours 07/10/23 2100 07/15/23 2059    07/10/23 1412  piperacillin-tazobactam (ZOSYN) 3.375 g in iso-osmotic dextrose 50 ml (premix)        Ordering Provider: Yifan Johnson MD    3.375 g  over 30 Minutes Intravenous Once 07/10/23 1500      07/10/23 1410  Pharmacy to Dose Zosyn        Ordering Provider: Yifan Johnson MD     Does not apply Continuous PRN 07/10/23 1410 07/15/23 1409            Microbiology Results (last 10 days)       ** No results found for the last 240 hours. **             Allergies  Latex    Relevant clinical data and objective history reviewed:  Creatinine   Date Value Ref Range Status   07/10/2023 2.18 (H) 0.76 - 1.27 mg/dL Final     Estimated Creatinine Clearance: 34.2 mL/min (A) (by C-G formula based on SCr of 2.18 mg/dL (H)).  No intake/output data recorded.  Patient weight: (P) 65 kg (143 lb 4.8 oz)    Asessment/Plan  Initiate Piperacillin/Tazobactam 3.375 gm IV every 8 hours  Pharmacy will monitor Mr. Mclaughlin's renal function and clinical status and adjust the Piperacillin/Tazobactam dose and/or frequency as needed.    Thank you for the consult,     Oniel JohnsonD, BCPS  7/10/2023   15:20 EDT

## 2023-07-10 NOTE — H&P
[facility]  HISTORY AND PHYSICAL    Patient Name: Jorge Mclaughlin  : 1960  MRN: [id number]    Chief Complaint:   For EGD/surveillance colonoscopy    History Of Presenting Illness:    Epigastric pain  H/o PUD  H/o Crohnes    Past Medical History:   Diagnosis Date    Altered bowel elimination due to intestinal ostomy     PATIENT REPORTS SECONDARY TO A COLON RUPTURE APPROXIMATELY 16-17 YEARS AGO    Arthritis     WRIST    Asthma     as a child    Bowel perforation     states that he had his bowel perforated twice from colonoscopies, and was told that a small scope should always be used in the future.    Cancer     Kidney    CKD (chronic kidney disease)     stage 3    COVID-19 vaccine series completed     Covid vaccine x4 doses    Crohn disease     Gallstones     GERD (gastroesophageal reflux disease)     History of MRSA infection     nose    History of pneumonia     [place] (hard of hearing)     no hearing aids    Hypothyroid     Kidney stones     Seasonal allergies     Spinal headache     Wears glasses        Past Surgical History:   Procedure Laterality Date    COLONOSCOPY      COLONOSCOPY N/A 2017    Procedure: Colonoscopy through ostomy site and rectum with biopsies;  Surgeon: Vincenzo Galan MD;  Location: [facility] ENDOSCOPY;  Service:     ENDOSCOPY      HEMORRHOIDECTOMY      REPORTS APPROXIMATELY 20 YEARS AGO    HERNIA REPAIR      UMBILICAL    NEPHRECTOMY Right 2017    SKIN BIOPSY      benign    SMALL INTESTINE SURGERY      HX OF COLON RUPTURE WITH PLACEMENT OF OSTOMY 16-17 years ago    URETEROSCOPY LASER LITHOTRIPSY WITH STENT INSERTION Left 2019    Procedure: CYSTOSCOPY, URETEROSCOPY, STONE BASKETING, RETROGRADE PYLEOGRAM,  LASER LITHOTRIPSY WITH STENT INSERTION;  Surgeon: Lazaro Velazquez MD;  Location: [facility] OR;  Service: Urology    URETEROSCOPY LASER LITHOTRIPSY WITH STENT INSERTION Left 2021    Procedure: URETEROSCOPY diagnostic WITH STENT INSERTION  with  left retrograde;  Surgeon: Lazaro Velazquez MD;  Location: [facility];  Service: Urology;  Laterality: Left;    WISDOM TOOTH EXTRACTION      WRIST SURGERY Right        Social History     Socioeconomic History    Marital status:    Tobacco Use    Smoking status: Former     Packs/day: 2.00     Years: 3.00     Pack years: 6.00     Types: Cigarettes     Quit date:      Years since quittin.5    Smokeless tobacco: Former     Types: Snuff   Vaping Use    Vaping Use: Never used   Substance and Sexual Activity    Alcohol use: No     Comment: QUIT 30 PLUS YEARS    Drug use: No    Sexual activity: Defer       Family History   Problem Relation Age of Onset    Heart disease Mother     Cancer Father     No Known Problems Sister     COPD Brother     COPD Sister     Crohn's disease Son     Colon cancer Neg Hx        Prior to Admission Medications:  Medications Prior to Admission   Medication Sig Dispense Refill Last Dose    balsalazide (COLAZAL) 750 MG capsule Take 3 capsules by mouth 2 (Two) Times a Day.   2023 at 2100    benzonatate (TESSALON) 100 MG capsule Take 1 capsule by mouth 3 (Three) Times a Day As Needed for Cough. 30 capsule 0 Past Week    bisacodyl (DULCOLAX) 5 MG EC tablet Take as directed for colon prep 4 tablet 0 2023 at 1800    Diclofenac Sodium (VOLTAREN) 1 % gel gel Apply 4 g topically to the appropriate area as directed 4 (Four) Times a Day As Needed.   2023 at 2100    Dupilumab (Dupixent) 200 MG/1.14ML solution pen-injector Inject  under the skin into the appropriate area as directed Every 14 (Fourteen) Days.   2023 at 2100    ferrous sulfate 325 (65 FE) MG tablet Take 1 tablet by mouth Daily With Breakfast.   2023 at 0700    fluocinonide (LIDEX) 0.05 % cream Apply 1 application  topically to the appropriate area as directed 2 (Two) Times a Day.   2023 at 0700    HYDROcodone-acetaminophen (NORCO) 5-325 MG per tablet Take 1 tablet by mouth Every 6 (Six) Hours As Needed  for Severe Pain. 10 tablet 0 7/9/2023 at 0700    levothyroxine (SYNTHROID, LEVOTHROID) 125 MCG tablet take 1 tablet by mouth once daily  0 7/9/2023 at 0700    metoclopramide (REGLAN) 10 MG tablet Take 1 tablet by mouth 2 (Two) Times a Day.   7/9/2023 at 0700    Multiple Vitamin (MULTI VITAMIN MENS PO) Take 1 tablet by mouth Daily.   7/9/2023 at 0700    ondansetron ODT (ZOFRAN-ODT) 4 MG disintegrating tablet Place 1 tablet on the tongue Every 8 (Eight) Hours As Needed for Nausea. 12 tablet 0 Past Week    ondansetron ODT (ZOFRAN-ODT) 4 MG disintegrating tablet Place 1 tablet on the tongue Every 8 (Eight) Hours As Needed for Nausea or Vomiting. 20 tablet 0 Past Week    pantoprazole (PROTONIX) 40 MG EC tablet Take 1 tablet by mouth Daily.   7/9/2023 at 0700    PEG-KCl-NaCl-NaSulf-Na Asc-C (MOVIPREP) 100 g reconstituted solution powder Take 1,000 mL by mouth Take As Directed. Take as directed for colonoscopy prep. 1 each 0 7/9/2023 at 1800    polyethylene glycol (MiraLax) 17 GM/SCOOP powder Take as directed for colonoscopy prep 238 g 0 7/9/2023 at 1700    tamsulosin (FLOMAX) 0.4 MG capsule 24 hr capsule Take 1 capsule by mouth Daily. 10 capsule 0 7/9/2023 at 0700    Testosterone Cypionate (DEPOTESTOTERONE CYPIONATE) 200 MG/ML injection inject 1 milliliter every 2 weeks  0 7/9/2023 at 0700    triamcinolone (KENALOG) 0.1 % cream Apply 1 application  topically to the appropriate area as directed 2 (Two) Times a Day.   7/9/2023 at 2100       Allergies:  Allergies   Allergen Reactions    Latex Rash        Vitals: Temp:  [97.4 °F (36.3 °C)] 97.4 °F (36.3 °C)  Heart Rate:  [63] 63  Resp:  [17] 17  BP: (139)/(96) 139/96    Review Of Systems:  Constitutional:  Negative for chills, fever, and unexpected weight change.  Respiratory:  Negative for cough, chest tightness, shortness of breath, and wheezing.  Cardiovascular:  Negative for chest pain, palpitations, and leg swelling.  Gastrointestinal:  Negative for abdominal distention,  abdominal pain, nausea, vomiting.  Neurological:  Negative for weakness, numbness, and headaches.     Physical Exam:    General Appearance:  Alert, cooperative, in no acute distress.   Lungs:   Clear to auscultation, respirations regular, even and                 unlabored.   Heart:  Regular rhythm and normal rate.   Abdomen:   Normal bowel sounds, no masses, no organomegaly. Soft, nontender, nondistended   Neurologic: Alert and oriented x 3. Moves all four limbs equally       Assessment & Plan     Assessment:  Active Problems:    Encounter for screening for malignant neoplasm of colon    Anemia    Crohn's disease without complication      Plan: ESOPHAGOGASTRODUODENOSCOPY (N/A), COLONOSCOPY (N/A)     Yazmin Martin MD  7/10/2023

## 2023-07-10 NOTE — PROGRESS NOTES
Patient had an elective EGD and colonoscopy this morning.  He has an extensive history of complicated Crohn's disease with active small bowel inflammation and large bowel strictures not on any treatment.     He had a moderate stenosis at the mid rectum, traverses with the gastroscope, however noted to have a severe stricture at mid sigmoid no attempt was made to pass the scope.  He also had a extensive sigmoid diverticulosis.  Patient had a prior right colectomy for perforation with end ileostomy with what appears to be a stapled left-sided colon with the blind end.     Postprocedure patient developed abdominal pain and abdominal distention.  Abdominal x-rays and chest x-ray done revealed free air.  Colonoscopy and x-ray findings discussed with the patient and his family.     Zosyn IV ordered  Patient has a prior lab work showing increased creatinine, will get urgent noncontrast CT to rule out any intra-abdominal collection (patient does not have any colon communicating with the small bowel).  Case discussed with hospitalist for admission and case discussed with on-call surgery Dr. To.      Stricture area examined on withdrawal which did not reveal any obvious mucosal break.  Unclear whether patient has any proximal extensive bowel involvement with Crohn's. Given his extensive diverticular disease, diverticular perf also suspected

## 2023-07-10 NOTE — CONSULTS
"General Surgery Consult     Name:Jorge Mclaughlin  Age: 63 y.o.  Gender: male  : 1960  MRN: [id number]  Visit Number: [id number]  Admit Date: 7/10/2023  Date of Service: 07/10/23    Patient Care Team:  Yazan Corbett MD as PCP - General    Reason for Consultation: Extensive pneumoperitoneum following colonoscopy    Chief complaint: Post colonoscopy abdominal pain      History of Present Illness:     Jorge Mclaughlin is a 63 y.o. male patient with a longstanding history of complicated Crohn's disease of nearly 40 years duration, who presented today for scheduled EGD and colonoscopy for surveillance purposes.  The patient was diagnosed with Crohn's in his late 20s (he is currently 63 years old), and has an end ileostomy after having undergone what sounds like an extended right hemicolectomy with a long Cain's type pouch 20 years ago by Dr Macias for management of a perforation secondary to his Crohn's disease.  Review of the patient's medical records suggest that he was treated with steroids and sulfasalazine early in his disease course, and is currently on Balsalazide.   Additionally, it appears that the patient has had multiple prior perforations secondary to colonoscopy, 3 in total.  It appears that he developed pneumomediastinum, and extensive retroperitoneal and mesenteric air as well as free air within the abdomen and pelvis following colonoscopy in .  In 2017, he encountered similar difficulties with post colonoscopy films demonstrating pneumomediastinum with free intraperitoneal air and colonic pneumatosis.  Additional imaging found in care everywhere performed in 2017 demonstrates a CT abdomen pelvis on 2017 ordered for \"colon perforation\" by Dr. Luis Alberto Candelaria colorectal surgery.  This CT scan demonstrated postsurgical changes without evidence of obstruction or perforation.  Unfortunately, no notes from colorectal surgery are available using the care everywhere link.  It " appears that the patient was seen in routine follow-up by the gastroenterology service in January 2022, at which time it was noted that the patient was due for surveillance exam.  EGD was also recommended to rule out peptic ulcer disease due to some complaints the patient reported including epigastric pain, and nausea.  He was noted to have a prior history of duodenal ulcer found on EGD in 2015.  A number of laboratory studies were ordered and it appears that case requests were placed for both EGD and colonoscopy however, the patient was not seen again in follow-up by the gastroenterology service, and presented today, about 18 months later,  for the previously discussed EGD and colonoscopy procedures.  Additional review of the record demonstrates that the patient has had at least 1 visit to our emergency department in October 2022 where he was found to have an apparent Crohn's flare with CT proven inflammatory changes of the small bowel consistent with Crohn's disease as well as a possible inflammatory stricture.    Today, the patient underwent an EGD demonstrating LA grade a esophagitis without bleeding.  Biopsies were taken.  Patchy, mildly erythematous gastric mucosa was found in the posterior gastric wall and was biopsied as well.  The duodenum was unremarkable.  Colonoscopy demonstrated numerous areas of stenosis the first being about 10 cm from the anal verge, which required transitioning from a colonoscope to a gastroscope in order to be traversed.  Extensive diverticulosis was found in the rectosigmoid and sigmoid colon.  A benign-appearing intrinsic severe stenosis was found in the distal sigmoid colon and was unable to be traversed even with a gastroscope.  The vascular pattern was noted to be diffusely decreased and featureless in terms of the colon mucosa.  The gastroscope was then used to perform ileoscopy, and the terminal ileum was noted to contain a few ulcers which were biopsied.  The terminal ileum  contained a benign-appearing intrinsic severe stenosis that was not able to be traversed with the gastroscope.  The procedure was then concluded, and per report and discussion with the performing endoscopist, there was no obvious current inflammation in the rectum or colon, multiple areas of stricture as described above, but no clinical concern for perforation at the conclusion of the exam.  However, following the procedure, the patient developed abdominal pain and distention.  Plain films demonstrated pneumoperitoneum.  Based on this an urgent noncontrasted CT of the abdomen pelvis was ordered and I was asked to evaluate the patient in consultation.  Subsequent CT demonstrated a large amount of pneumoperitoneum measuring up to 8 cm anterior to posterior with mass effect on the liver and stomach.  Pneumatosis was seen in the hepatic flexure of the colon and to a lesser extent in the ascending colon.  There was large amount of pneumoperitoneum also seen in the pelvis with a moderate to large amount of extraperitoneal air.      The patient was admitted to the facility, kept n.p.o., and started on broad-spectrum IV antibiotic therapy with Zosyn.  At the time of my evaluation, he was found to be well-appearing but complained of severe abdominal pain.    Patient Active Problem List   Diagnosis    History of Crohn's disease    Epigastric pain    Inflammatory arthritis    Crohn's disease of large intestine with complication    Acquired hypothyroidism    Chronic kidney disease, stage III (moderate)    Acute renal failure superimposed on stage 3 chronic kidney disease    Hx of unilateral nephrectomy, right    Primary osteoarthritis involving multiple joints    Acute renal failure    Intractable pain    Kidney stone    Ureteral stone    History of ileostomy    Encounter for screening for malignant neoplasm of colon    Nausea    Duodenal ulcer    Gastroesophageal reflux disease    Anemia    Diarrhea    Crohn's disease without  complication    Bowel perforation    Colon perforation         Past Medical History:   Diagnosis Date    Altered bowel elimination due to intestinal ostomy     PATIENT REPORTS SECONDARY TO A COLON RUPTURE APPROXIMATELY 16-17 YEARS AGO    Arthritis     WRIST    Asthma     as a child    Bowel perforation     states that he had his bowel perforated twice from colonoscopies, and was told that a small scope should always be used in the future.    Cancer     Kidney    CKD (chronic kidney disease)     stage 3    COVID-19 vaccine series completed     Covid vaccine x4 doses    Crohn disease     Gallstones     GERD (gastroesophageal reflux disease)     History of MRSA infection 2017    nose    History of pneumonia     [place] (hard of hearing)     no hearing aids    Hypothyroid     Kidney stones     Seasonal allergies     Spinal headache     Wears glasses        Past Surgical History:   Procedure Laterality Date    COLONOSCOPY      COLONOSCOPY N/A 07/31/2017    Procedure: Colonoscopy through ostomy site and rectum with biopsies;  Surgeon: Vincenzo Galan MD;  Location: [facility] ENDOSCOPY;  Service:     ENDOSCOPY      HEMORRHOIDECTOMY      REPORTS APPROXIMATELY 20 YEARS AGO    HERNIA REPAIR      UMBILICAL    NEPHRECTOMY Right 07/2017    SKIN BIOPSY      benign    SMALL INTESTINE SURGERY      HX OF COLON RUPTURE WITH PLACEMENT OF OSTOMY 16-17 years ago    URETEROSCOPY LASER LITHOTRIPSY WITH STENT INSERTION Left 07/03/2019    Procedure: CYSTOSCOPY, URETEROSCOPY, STONE BASKETING, RETROGRADE PYLEOGRAM,  LASER LITHOTRIPSY WITH STENT INSERTION;  Surgeon: Lazaro Velazquez MD;  Location: [facility] OR;  Service: Urology    URETEROSCOPY LASER LITHOTRIPSY WITH STENT INSERTION Left 09/20/2021    Procedure: URETEROSCOPY diagnostic WITH STENT INSERTION  with left retrograde;  Surgeon: Lazaro Velazquez MD;  Location: [facility] OR;  Service: Urology;  Laterality: Left;    WISDOM TOOTH EXTRACTION      WRIST SURGERY Right        Family History    Problem Relation Age of Onset    Heart disease Mother     Cancer Father     No Known Problems Sister     COPD Brother     COPD Sister     Crohn's disease Son     Colon cancer Neg Hx        Social History     Socioeconomic History    Marital status:    Tobacco Use    Smoking status: Former     Packs/day: 2.00     Years: 3.00     Pack years: 6.00     Types: Cigarettes     Quit date:      Years since quittin.5    Smokeless tobacco: Former     Types: Snuff   Vaping Use    Vaping Use: Never used   Substance and Sexual Activity    Alcohol use: No     Comment: QUIT 30 PLUS YEARS    Drug use: No    Sexual activity: Defer         Current Facility-Administered Medications:     dextrose 5 % and sodium chloride 0.9 % infusion, 100 mL/hr, Intravenous, Continuous, Yifan Johnson MD, Last Rate: 100 mL/hr at 07/10/23 1527, 100 mL/hr at 07/10/23 1527    HYDROmorphone (DILAUDID) injection 0.5 mg, 0.5 mg, Intravenous, Q2H PRN, 0.5 mg at 07/10/23 1730 **AND** naloxone (NARCAN) injection 0.4 mg, 0.4 mg, Intravenous, Q5 Min PRN, Yifan Johnson MD    ondansetron (ZOFRAN) injection 4 mg, 4 mg, Intravenous, Q6H PRN, Yifan Johnson MD    Pharmacy to Dose Zosyn, , Does not apply, Continuous PRN, Yifan Johnson MD    piperacillin-tazobactam (ZOSYN) 3.375 g in iso-osmotic dextrose 50 ml (premix), 3.375 g, Intravenous, Q8H, Yifan Johnson MD    sodium chloride 0.9 % flush 10 mL, 10 mL, Intravenous, Q12H, Yifan Johnson MD    sodium chloride 0.9 % flush 10 mL, 10 mL, Intravenous, PRNElizabeth Roshan, MD    sodium chloride 0.9 % infusion 40 mL, 40 mL, Intravenous, PRNElizabeth Roshan, MD    Medications Prior to Admission   Medication Sig Dispense Refill Last Dose    balsalazide (COLAZAL) 750 MG capsule Take 3 capsules by mouth 2 (Two) Times a Day.   2023 at 2100    benzonatate (TESSALON) 100 MG capsule Take 1 capsule by mouth 3 (Three) Times a Day As Needed for Cough. 30 capsule 0 Past Week    bisacodyl (DULCOLAX) 5 MG EC tablet Take  as directed for colon prep 4 tablet 0 7/9/2023 at 1800    Diclofenac Sodium (VOLTAREN) 1 % gel gel Apply 4 g topically to the appropriate area as directed 4 (Four) Times a Day As Needed.   7/9/2023 at 2100    Dupilumab (Dupixent) 200 MG/1.14ML solution pen-injector Inject  under the skin into the appropriate area as directed Every 14 (Fourteen) Days.   7/9/2023 at 2100    ferrous sulfate 325 (65 FE) MG tablet Take 1 tablet by mouth Daily With Breakfast.   7/9/2023 at 0700    fluocinonide (LIDEX) 0.05 % cream Apply 1 application  topically to the appropriate area as directed 2 (Two) Times a Day.   7/9/2023 at 0700    HYDROcodone-acetaminophen (NORCO) 5-325 MG per tablet Take 1 tablet by mouth Every 6 (Six) Hours As Needed for Severe Pain. 10 tablet 0 7/9/2023 at 0700    levothyroxine (SYNTHROID, LEVOTHROID) 125 MCG tablet take 1 tablet by mouth once daily  0 7/9/2023 at 0700    metoclopramide (REGLAN) 10 MG tablet Take 1 tablet by mouth 2 (Two) Times a Day.   7/9/2023 at 0700    Multiple Vitamin (MULTI VITAMIN MENS PO) Take 1 tablet by mouth Daily.   7/9/2023 at 0700    ondansetron ODT (ZOFRAN-ODT) 4 MG disintegrating tablet Place 1 tablet on the tongue Every 8 (Eight) Hours As Needed for Nausea. 12 tablet 0 Past Week    ondansetron ODT (ZOFRAN-ODT) 4 MG disintegrating tablet Place 1 tablet on the tongue Every 8 (Eight) Hours As Needed for Nausea or Vomiting. 20 tablet 0 Past Week    pantoprazole (PROTONIX) 40 MG EC tablet Take 1 tablet by mouth Daily.   7/9/2023 at 0700    PEG-KCl-NaCl-NaSulf-Na Asc-C (MOVIPREP) 100 g reconstituted solution powder Take 1,000 mL by mouth Take As Directed. Take as directed for colonoscopy prep. 1 each 0 7/9/2023 at 1800    polyethylene glycol (MiraLax) 17 GM/SCOOP powder Take as directed for colonoscopy prep 238 g 0 7/9/2023 at 1700    tamsulosin (FLOMAX) 0.4 MG capsule 24 hr capsule Take 1 capsule by mouth Daily. 10 capsule 0 7/9/2023 at 0700    Testosterone Cypionate  (DEPOTESTOTERONE CYPIONATE) 200 MG/ML injection inject 1 milliliter every 2 weeks  0 7/9/2023 at 0700    triamcinolone (KENALOG) 0.1 % cream Apply 1 application  topically to the appropriate area as directed 2 (Two) Times a Day.   7/9/2023 at 2100       Allergies   Allergen Reactions    Latex Rash       Review of Systems   Constitutional: Negative.    HENT: Negative.     Eyes: Negative.    Respiratory: Negative.     Cardiovascular: Negative.    Gastrointestinal:  Positive for abdominal distention and abdominal pain.   Endocrine: Negative.    Genitourinary: Negative.    Musculoskeletal: Negative.    Skin: Negative.    Allergic/Immunologic: Negative.    Neurological: Negative.    Hematological: Negative.    Psychiatric/Behavioral: Negative.       OBJECTIVE:     Vital Signs  Temp:  [97 °F (36.1 °C)-98.5 °F (36.9 °C)] 97.8 °F (36.6 °C)  Heart Rate:  [52-97] 74  Resp:  [16-18] 18  BP: (109-143)/(79-97) 109/79    I/O this shift:  In: 575 [P.O.:75; I.V.:500]  Out: -   No intake/output data recorded.      Physical Exam:      General Appearance:    Alert, cooperative, in mild distress secondary to pain, but otherwise does not appear ill or toxic.   Head:    Normocephalic, without obvious abnormality, atraumatic   Eyes:            Lids and lashes normal, conjunctivae and sclerae normal, no icterus   Ears:    Ears appear intact with no abnormalities noted   Lungs:     Respirations regular, even and unlabored    Heart:    Regular rhythm and normal rate   Abdomen:   Mildly distended, diffuse tenderness to palpation which is worse in the bilateral lower quadrants consistent with peritonitis.  Ileostomy in the right lower quadrant is functioning with noted gas in the appliance.   Genitalia:    Deferred   Extremities:   Moves all extremities well, no edema, no cyanosis, no  redness   Pulses:   Pulses palpable and equal bilaterally   Skin:   No bleeding, bruising or rash   Neurologic:   AAOx3, no gross deficits         Results  Review:  I have reviewed the entirety of the patient's clinical lab results.  I have also personally reviewed the patient's imaging    Narrative & Impression   PROCEDURE: CT ABDOMEN PELVIS WO CONTRAST-     HISTORY: Bowel perforation; Z12.11-Encounter for screening for malignant  neoplasm of colon; K50.90-Crohn's disease, unspecified, without  complications; D64.9-Anemia, unspecified        COMPARISON: February 15, 2022     TECHNIQUE: Axial CT images of the abdomen and pelvis were obtained  without contrast. Coronal reformatted images were also obtained.This  study was performed with techniques to keep radiation doses as low as  reasonably achievable, (ALARA). Individualized dose reduction techniques  using automated exposure control or adjustment of mA and/or kV according  to the patient size were employed.        FINDINGS:      ABDOMEN: The lung bases are clear.  Postoperative changes are seen from  right nephrectomy. There is no evidence of left renal stone or  hydronephrosis.  Multiple gallstones are seen in the gallbladder.  The  liver, spleen and pancreas have an unremarkable unenhanced appearance.   No mass or adenopathy is seen.  Postoperative changes are seen in the  right abdomen. A very large amount of pneumoperitoneum is seen measuring  up to 8 cm AP. This causes mass effect on the liver and stomach.  Pneumatosis is noted at the hepatic flexure of the colon and to a lesser  extent in the ascending colon.     PELVIS:  Images of the pelvis reveal a large amount of pneumoperitoneum.  There is also a moderate to large amount of extraperitoneal air. A right  pelvis ostomy is present. Scattered diverticula are seen in the colon.   A small amount of pelvic free fluid is seen which is likely reactive.     IMPRESSION:   Large amount of pneumoperitoneum and moderate to large amount of  extraperitoneal air. Since there is pneumatosis of the hepatic flexure  of the colon and to a lesser extent the ascending colon,  one of these  may represent the site of the perforation.                 This report was signed and finalized on 7/10/2023 1:24 PM by Vick Simpson MD.     Lab Results (last 72 hours)       Procedure Component Value Units Date/Time    Comprehensive Metabolic Panel [[id number]]  (Abnormal) Collected: 07/10/23 1436    Specimen: Blood Updated: 07/10/23 1500     Glucose 92 mg/dL      BUN 18 mg/dL      Creatinine 2.18 mg/dL      Sodium 141 mmol/L      Potassium 3.7 mmol/L      Chloride 107 mmol/L      CO2 21.5 mmol/L      Calcium 8.8 mg/dL      Total Protein 5.6 g/dL      Albumin 3.4 g/dL      ALT (SGPT) 18 U/L      AST (SGOT) 15 U/L      Alkaline Phosphatase 57 U/L      Total Bilirubin 0.9 mg/dL      Globulin 2.2 gm/dL      A/G Ratio 1.5 g/dL      BUN/Creatinine Ratio 8.3     Anion Gap 12.5 mmol/L      eGFR 33.6 mL/min/1.73     Narrative:      GFR Normal >60  Chronic Kidney Disease <60  Kidney Failure <15      CBC (No Diff) [[id number]]  (Abnormal) Collected: 07/10/23 1436    Specimen: Blood Updated: 07/10/23 1442     WBC 10.05 10*3/mm3      RBC 4.47 10*6/mm3      Hemoglobin 14.6 g/dL      Hematocrit 43.8 %      MCV 98.0 fL      MCH 32.7 pg      MCHC 33.3 g/dL      RDW 14.0 %      RDW-SD 50.7 fl      MPV 10.5 fL      Platelets 127 10*3/mm3     TISSUE EXAM, [facility] ([facility]) [[id number]] Collected: 07/10/23 0812    Specimen: Tissue from Gastric, Antrum; Tissue from Esophagus, Distal; Tissue from Large Intestine, Sigmoid Colon; Tissue from Large Intestine, Rectum; Tissue from Small Intestine, Ileum Updated: 07/10/23 1336                            ASSESSMENT/PLAN:      Bowel perforation    Encounter for screening for malignant neoplasm of colon    Anemia    Crohn's disease without complication    Colon perforation    Mr. Mclaughlin is a 63-year-old gentleman with a longstanding history of complicated Crohn's disease dating back at least 40 years, currently admitted following colon perforation secondary to colonoscopy  performed earlier today.  Fortunately, the patient does not appear toxic and is overall well-appearing despite his extensive pneumoperitoneum.  He does have peritonitis on exam however.  I had a long discussion with the patient and his wife at the bedside regarding the management options.  Fortunately, it appears based on my extensive review of his prior history that was seems to be the source of his perforation is his defunctionalized remnant colon.  For this reason, I do think he warrants a trial of nonoperative management as he has successfully had similar issues resolve with nonsurgical treatment in the past.  I explained to him that his longstanding end ileostomy is protective, and given that his perforated colon is no longer in continuity with his small bowel, there should be minimal contamination despite the perforation.  The large amount of air is representative of insufflation, and should reabsorb over the next several days.  I agree with broad-spectrum antibiotic coverage, and I would continue with n.p.o. status for now.  Should his condition deteriorate any whatsoever, I would recommend that he be transferred to the [facility] for definitive care by the colorectal surgery service given his extensive and longstanding complicated Crohn's disease.  As discussed above he has seen them in the past.  I told the patient that my recommendation for long-term management of this issue would be referral to colorectal surgery at  to be evaluated for completion colectomy.  This would eliminate the disease residual colon and eliminate the need for colonoscopy surveillance.  Certainly, completion colectomy could be accomplished with or without leaving a portion of the rectum and anus intact.  When discussing this with the patient and his wife, he indicated to me that the same recommendation was given to him at  several years ago, and it seems that APR was actually recommended and he was not  comfortable with the loss of his anus and rectum.  For this reason, he declined the procedure, and has not had any additional follow-up with their service.  I discussed with him that any residual rectum left in place would need ongoing surveillance, but this would certainly be lower risk than having to proceed with surveillance on a long segment of defunctionalized colon.  Certainly, the usual recommendation would be ongoing surveillance of the residual colon as long as any remains given his increased risk for the development of colon cancer.  However, I explained to the patient that he is at a bit of a crossroads in terms of risks and benefits as it seems that this is now his fourth perforation in the last 9 years during colonoscopy.  It would be hard to make the argument that colonoscopy is providing significant benefit to him in terms of cancer screening/surveillance, especially in light of his numerous strictures, and the inability to evaluate the entire residual colon.  At the conclusion of our discussion, the patient understood, and was agreeable to both a trial of nonoperative management, and was open to the option of outpatient follow-up with colorectal surgery at  to revisit the idea of completion colectomy.  He also verbalizes understanding that should his condition deteriorate, that he would require transfer for a higher level of care.  At the conclusion of the consultation, the patient and his wife had no additional questions for me, and were satisfied with the plan of care.    Yaima To MD  07/10/23  17:43 EDT

## 2023-07-10 NOTE — DISCHARGE INSTRUCTIONS
- Discharge patient to home (ambulatory).   - Resume previous diet.   - Follow an antireflux regimen.   - PPI daily  - Await pathology results.   - Return to my office in 8 weeks.     No pushing, pulling, tugging,  heavy lifting, or strenuous activity.  No major decision making, driving, or drinking alcoholic beverages for 24 hours. ( due to the medications you have  received)  Always use good hand hygiene/washing techniques.  NO driving while taking pain medications.    * if you have an incision:  Check your incision area every day for signs of infection.   Check for:  * more redness, swelling, or pain  *more fluid or blood  *warmth  *pus or bad smellTo assist you in voiding:  Drink plenty of fluids  Listen to running water while attempting to void.    If you are unable to urinate and you have an uncomfortable urge to void or it has been   6 hours since you were discharged, return to the Emergency Room

## 2023-07-10 NOTE — H&P
[facility]   HISTORY AND PHYSICAL      Name:  Jorge Mclaughlin   Age:  63 y.o.  Sex:  male  :  1960  MRN:  [id number]   Visit Number:  [id number]  Admission Date:  7/10/2023  Date Of Service:  07/10/23  Primary Care Physician:  Yazan Corbett MD     Admitting diagnosis:      Bowel perforation    Acquired hypothyroidism    Chronic kidney disease, stage III (moderate)    Hx of unilateral nephrectomy, right    Intractable pain    History of ileostomy    Encounter for screening for malignant neoplasm of colon    Anemia    Crohn's disease without complication    Colon perforation        History Of Presenting Illness:    63-year-old patient with past medical history of chronic Crohn's disease s/p ileostomy, history of right nephrectomy due to cancer in remission, hypothyroidism, anemia, chronic kidney disease stage III, who has been directly admitted after the the procedure for colonoscopy which was done leading to perforation.  He will in the morning underwent surveillance colonoscopy due to his history of Crohn's disease and who has had perforation of his bowel due to the colonoscopy in the past 2 times today after the procedure was done noted significant pain.  There was abdominal distention and the x-ray showed that there was pneumoperitoneum and there was signs of perforation which was confirmed on the CAT scan  Patient was given Dilaudid and further admitted as inpatient for further management of his complication  Surgical consult has been done and appreciate Dr. To's input  He is complaining of severe pain though Dilaudid has helped at present and his pain scale is 7 x 10  He has been n.p.o. and IV fluids have been started.  Denies any nausea vomiting  He does feel short of breath    Review Of Systems:     The following systems were reviewed and negative;  constitution, eyes, ENT, respiratory, cardiovascular, gastrointestinal, genitourinary, musculoskeletal,  neurological and behavioral/psych,  Skin except as above.     Past Medical History:    Past Medical History:   Diagnosis Date    Altered bowel elimination due to intestinal ostomy     PATIENT REPORTS SECONDARY TO A COLON RUPTURE APPROXIMATELY 16-17 YEARS AGO    Arthritis     WRIST    Asthma     as a child    Bowel perforation     states that he had his bowel perforated twice from colonoscopies, and was told that a small scope should always be used in the future.    Cancer     Kidney    CKD (chronic kidney disease)     stage 3    COVID-19 vaccine series completed     Covid vaccine x4 doses    Crohn disease     Gallstones     GERD (gastroesophageal reflux disease)     History of MRSA infection 2017    nose    History of pneumonia     [place] (hard of hearing)     no hearing aids    Hypothyroid     Kidney stones     Seasonal allergies     Spinal headache     Wears glasses        Past Surgical history:    Past Surgical History:   Procedure Laterality Date    COLONOSCOPY      COLONOSCOPY N/A 07/31/2017    Procedure: Colonoscopy through ostomy site and rectum with biopsies;  Surgeon: Vincenzo Galan MD;  Location: [facility] ENDOSCOPY;  Service:     ENDOSCOPY      HEMORRHOIDECTOMY      REPORTS APPROXIMATELY 20 YEARS AGO    HERNIA REPAIR      UMBILICAL    NEPHRECTOMY Right 07/2017    SKIN BIOPSY      benign    SMALL INTESTINE SURGERY      HX OF COLON RUPTURE WITH PLACEMENT OF OSTOMY 16-17 years ago    URETEROSCOPY LASER LITHOTRIPSY WITH STENT INSERTION Left 07/03/2019    Procedure: CYSTOSCOPY, URETEROSCOPY, STONE BASKETING, RETROGRADE PYLEOGRAM,  LASER LITHOTRIPSY WITH STENT INSERTION;  Surgeon: Lazaro Velazquez MD;  Location: [facility] OR;  Service: Urology    URETEROSCOPY LASER LITHOTRIPSY WITH STENT INSERTION Left 09/20/2021    Procedure: URETEROSCOPY diagnostic WITH STENT INSERTION  with left retrograde;  Surgeon: Lazaro Velazquez MD;  Location: [facility] OR;  Service: Urology;  Laterality: Left;    WISDOM TOOTH EXTRACTION       WRIST SURGERY Right        Social History:    Social History     Socioeconomic History    Marital status:    Tobacco Use    Smoking status: Former     Packs/day: 2.00     Years: 3.00     Pack years: 6.00     Types: Cigarettes     Quit date:      Years since quittin.5    Smokeless tobacco: Former     Types: Snuff   Vaping Use    Vaping Use: Never used   Substance and Sexual Activity    Alcohol use: No     Comment: QUIT 30 PLUS YEARS    Drug use: No    Sexual activity: Defer       Family History:    Family History   Problem Relation Age of Onset    Heart disease Mother     Cancer Father     No Known Problems Sister     COPD Brother     COPD Sister     Crohn's disease Son     Colon cancer Neg Hx          Allergies:      Latex    Home Medications:    Prior to Admission Medications       Prescriptions Last Dose Informant Patient Reported? Taking?    balsalazide (COLAZAL) 750 MG capsule 2023 Self Yes Yes    Take 3 capsules by mouth 2 (Two) Times a Day.    benzonatate (TESSALON) 100 MG capsule Past Week  No Yes    Take 1 capsule by mouth 3 (Three) Times a Day As Needed for Cough.    bisacodyl (DULCOLAX) 5 MG EC tablet 2023  No Yes    Take as directed for colon prep    Diclofenac Sodium (VOLTAREN) 1 % gel gel 2023 Self Yes Yes    Apply 4 g topically to the appropriate area as directed 4 (Four) Times a Day As Needed.    Dupilumab (Dupixent) 200 MG/1.14ML solution pen-injector 2023 Self Yes Yes    Inject  under the skin into the appropriate area as directed Every 14 (Fourteen) Days.    ferrous sulfate 325 (65 FE) MG tablet 2023 Self Yes Yes    Take 1 tablet by mouth Daily With Breakfast.    fluocinonide (LIDEX) 0.05 % cream 2023 Self Yes Yes    Apply 1 application  topically to the appropriate area as directed 2 (Two) Times a Day.    HYDROcodone-acetaminophen (NORCO) 5-325 MG per tablet 2023 Self No Yes    Take 1 tablet by mouth Every 6 (Six) Hours As Needed for Severe Pain.     levothyroxine (SYNTHROID, LEVOTHROID) 125 MCG tablet 7/9/2023 Self Yes Yes    take 1 tablet by mouth once daily    metoclopramide (REGLAN) 10 MG tablet 7/9/2023 Self Yes Yes    Take 1 tablet by mouth 2 (Two) Times a Day.    Multiple Vitamin (MULTI VITAMIN MENS PO) 7/9/2023 Self Yes Yes    Take 1 tablet by mouth Daily.    ondansetron ODT (ZOFRAN-ODT) 4 MG disintegrating tablet Past Week Self No Yes    Place 1 tablet on the tongue Every 8 (Eight) Hours As Needed for Nausea.    ondansetron ODT (ZOFRAN-ODT) 4 MG disintegrating tablet Past Week  No Yes    Place 1 tablet on the tongue Every 8 (Eight) Hours As Needed for Nausea or Vomiting.    pantoprazole (PROTONIX) 40 MG EC tablet 7/9/2023 Self Yes Yes    Take 1 tablet by mouth Daily.    PEG-KCl-NaCl-NaSulf-Na Asc-C (MOVIPREP) 100 g reconstituted solution powder 7/9/2023  No Yes    Take 1,000 mL by mouth Take As Directed. Take as directed for colonoscopy prep.    polyethylene glycol (MiraLax) 17 GM/SCOOP powder 7/9/2023  No Yes    Take as directed for colonoscopy prep    tamsulosin (FLOMAX) 0.4 MG capsule 24 hr capsule 7/9/2023 Self No Yes    Take 1 capsule by mouth Daily.    Testosterone Cypionate (DEPOTESTOTERONE CYPIONATE) 200 MG/ML injection 7/9/2023 Self Yes Yes    inject 1 milliliter every 2 weeks    triamcinolone (KENALOG) 0.1 % cream 7/9/2023 Self Yes Yes    Apply 1 application  topically to the appropriate area as directed 2 (Two) Times a Day.                   Vital Signs:    Temp:  [97 °F (36.1 °C)-98.5 °F (36.9 °C)] 97.8 °F (36.6 °C)  Heart Rate:  [52-97] 74  Resp:  [16-18] 18  BP: (109-143)/(79-97) 109/79        01/11/23  1039 07/10/23  1319   Weight: 68 kg (150 lb) 65 kg (143 lb 4.8 oz)       Body mass index is 23.13 kg/m².    Physical Exam:      General Appearance:    Alert and cooperative,  And lying comfortably in bed   Head:    Atraumatic and normocephalic, without obvious abnormality.   Eyes:            PERRLA, conjunctivae and sclerae normal, no  Icterus. No pallor. Extraocular movements are within normal limits.   Ears:    Ears appear intact with no abnormalities noted.   Throat:   No oral lesions, no thrush, oral mucosa moist.   Neck:   Supple, trachea midline, no thyromegaly, no carotid bruit, no lymphadenopathy   Lungs:    Breath sounds heard bilaterally equally.  No crackles or wheezing. No Pleural rub or bronchial breathing.       Heart:    Normal S1 and S2, no murmur, no gallop, no rub. No JVD   Abdomen:   Significant tenderness which is diffuse more on the right quadrant, bowel sounds minimal, distention noted.  Ileostomy is in place   Extremities:   Moves all extremities well, no edema, no cyanosis, no          clubbing   Skin:   No  bruising or rash   Neurologic:   Cranial nerves 2 - 12 grossly intact, sensation intact, Motor power is normal and equal bilaterally.       EKG:      Not done    Labs:    Lab Results (last 24 hours)       Procedure Component Value Units Date/Time    Comprehensive Metabolic Panel [[id number]]  (Abnormal) Collected: 07/10/23 1436    Specimen: Blood Updated: 07/10/23 1500     Glucose 92 mg/dL      BUN 18 mg/dL      Creatinine 2.18 mg/dL      Sodium 141 mmol/L      Potassium 3.7 mmol/L      Chloride 107 mmol/L      CO2 21.5 mmol/L      Calcium 8.8 mg/dL      Total Protein 5.6 g/dL      Albumin 3.4 g/dL      ALT (SGPT) 18 U/L      AST (SGOT) 15 U/L      Alkaline Phosphatase 57 U/L      Total Bilirubin 0.9 mg/dL      Globulin 2.2 gm/dL      A/G Ratio 1.5 g/dL      BUN/Creatinine Ratio 8.3     Anion Gap 12.5 mmol/L      eGFR 33.6 mL/min/1.73     Narrative:      GFR Normal >60  Chronic Kidney Disease <60  Kidney Failure <15      CBC (No Diff) [[id number]]  (Abnormal) Collected: 07/10/23 1436    Specimen: Blood Updated: 07/10/23 1442     WBC 10.05 10*3/mm3      RBC 4.47 10*6/mm3      Hemoglobin 14.6 g/dL      Hematocrit 43.8 %      MCV 98.0 fL      MCH 32.7 pg      MCHC 33.3 g/dL      RDW 14.0 %      RDW-SD 50.7 fl      MPV 10.5  fL      Platelets 127 10*3/mm3     TISSUE EXAM, [facility] ([facility]) [[id number]] Collected: 07/10/23 0812    Specimen: Tissue from Gastric, Antrum; Tissue from Esophagus, Distal; Tissue from Large Intestine, Sigmoid Colon; Tissue from Large Intestine, Rectum; Tissue from Small Intestine, Ileum Updated: 07/10/23 1336            Radiology:    Imaging Results (Last 72 Hours)       Procedure Component Value Units Date/Time    CT Abdomen Pelvis Without Contrast [[id number]] Collected: 07/10/23 1319     Updated: 07/10/23 1339    Narrative:      PROCEDURE: CT ABDOMEN PELVIS WO CONTRAST-     HISTORY: Bowel perforation; Z12.11-Encounter for screening for malignant  neoplasm of colon; K50.90-Crohn's disease, unspecified, without  complications; D64.9-Anemia, unspecified        COMPARISON: February 15, 2022     TECHNIQUE: Axial CT images of the abdomen and pelvis were obtained  without contrast. Coronal reformatted images were also obtained.This  study was performed with techniques to keep radiation doses as low as  reasonably achievable, (ALARA). Individualized dose reduction techniques  using automated exposure control or adjustment of mA and/or kV according  to the patient size were employed.        FINDINGS:      ABDOMEN: The lung bases are clear.  Postoperative changes are seen from  right nephrectomy. There is no evidence of left renal stone or  hydronephrosis.  Multiple gallstones are seen in the gallbladder.  The  liver, spleen and pancreas have an unremarkable unenhanced appearance.   No mass or adenopathy is seen.  Postoperative changes are seen in the  right abdomen. A very large amount of pneumoperitoneum is seen measuring  up to 8 cm AP. This causes mass effect on the liver and stomach.  Pneumatosis is noted at the hepatic flexure of the colon and to a lesser  extent in the ascending colon.     PELVIS:  Images of the pelvis reveal a large amount of pneumoperitoneum.  There is also a moderate to large amount of  extraperitoneal air. A right  pelvis ostomy is present. Scattered diverticula are seen in the colon.   A small amount of pelvic free fluid is seen which is likely reactive.       Impression:       Large amount of pneumoperitoneum and moderate to large amount of  extraperitoneal air. Since there is pneumatosis of the hepatic flexure  of the colon and to a lesser extent the ascending colon, one of these  may represent the site of the perforation.                 This report was signed and finalized on 7/10/2023 1:24 PM by Vick Simpson MD.    XR Abdomen 2+ VW with Chest 1 VW [[id number]] Collected: 07/10/23 1205     Updated: 07/10/23 1304    Narrative:      PROCEDURE: XR ABDOMEN 2+ VIEWS W CHEST 1 VW-     HISTORY: Post colonosocpy with abdominal pain, distension;  Z12.11-Encounter for screening for malignant neoplasm of colon;  K50.90-Crohn's disease, unspecified, without complications;  D64.9-Anemia, unspecified     COMPARISON: Chest x-ray dated May 2023 and CT scan of the abdomen and  pelvis dated October 2022.     FINDINGS: Chest: A single view of the chest demonstrates the heart to be   normal in size . The mediastinum is normal. The lungs are clear.     Abdomen: Flat and upright views of the abdomen were obtained.  Postoperative changes are seen in the right abdomen and pelvis. There is  a large amount of intraperitoneal free air and apparent right abdominal  and pelvic extraperitoneal free air present.       Impression:      Large amount of free intraperitoneal air. Apparent right  abdominal and pelvic extraperitoneal free air. This finding was called  to the patient's nurse, Suyapa, on July 10, 2023 at 12:05 PM.                 Images were reviewed, interpreted, and dictated by Dr. Vick Simpson MD  Transcribed by Jaelyn Cortes PA-C.     This report was signed and finalized on 7/10/2023 1:02 PM by Vick Simpson MD.            Assessment:    Assessment & Plan       Bowel perforation    Acquired  hypothyroidism    Chronic kidney disease, stage III (moderate)    Hx of unilateral nephrectomy, right    Intractable pain    History of ileostomy    Encounter for screening for malignant neoplasm of colon    Anemia    Crohn's disease without complication    Colon perforation        Plan:     1.  Bowel perforations s/p colonoscopy-based on the CT scan finding it seems he had a large colon perforation which is not connected with the ileum as he does have a ileostomy  Based on the surgeons opinion he will be treated conservatively  IV fluids and IV pain medications have been started  He will be n.p.o.  Close clinical follow-up to be done and repeat scan in the next 24 to 48 hours based on the clinical needs    We will hold his other medications as he will be n.p.o. and nothing urgent needed except IV medicine will be given at this time    Details of the goals of treatment and mode of management has been addressed in details with the patient and the family    Yazan Corbett MD  07/10/23  18:37 EDT    Please note that portions of this note were completed with a voice recognition program.

## 2023-07-10 NOTE — PLAN OF CARE
Problem: Adult Inpatient Plan of Care  Goal: Plan of Care Review  Outcome: Ongoing, Progressing  Flowsheets  Taken 7/10/2023 1608 by Jeanette Garcia RNA  Progress: no change  Taken 7/10/2023 1315 by Roel Velez RN  Plan of Care Reviewed With:   patient   spouse   son   Goal Outcome Evaluation:           Progress: no change       Patient transferred to 3rd floor. Pain controlled as needed, see MAR. Will continue to monitor.

## 2023-07-11 LAB
ALBUMIN SERPL-MCNC: 3.1 G/DL (ref 3.5–5.2)
ALBUMIN/GLOB SERPL: 1.4 G/DL
ALP SERPL-CCNC: 55 U/L (ref 39–117)
ALT SERPL W P-5'-P-CCNC: 21 U/L (ref 1–41)
ANION GAP SERPL CALCULATED.3IONS-SCNC: 9.5 MMOL/L (ref 5–15)
AST SERPL-CCNC: 17 U/L (ref 1–40)
BILIRUB SERPL-MCNC: 1.6 MG/DL (ref 0–1.2)
BUN SERPL-MCNC: 19 MG/DL (ref 8–23)
BUN/CREAT SERPL: 7 (ref 7–25)
CALCIUM SPEC-SCNC: 8.1 MG/DL (ref 8.6–10.5)
CHLORIDE SERPL-SCNC: 109 MMOL/L (ref 98–107)
CO2 SERPL-SCNC: 20.5 MMOL/L (ref 22–29)
CREAT SERPL-MCNC: 2.7 MG/DL (ref 0.76–1.27)
DEPRECATED RDW RBC AUTO: 54.2 FL (ref 37–54)
EGFRCR SERPLBLD CKD-EPI 2021: 26 ML/MIN/1.73
ERYTHROCYTE [DISTWIDTH] IN BLOOD BY AUTOMATED COUNT: 14.4 % (ref 12.3–15.4)
GLOBULIN UR ELPH-MCNC: 2.2 GM/DL
GLUCOSE SERPL-MCNC: 124 MG/DL (ref 65–99)
HBA1C MFR BLD: 5 % (ref 4.8–5.6)
HCT VFR BLD AUTO: 43.9 % (ref 37.5–51)
HGB BLD-MCNC: 14.3 G/DL (ref 13–17.7)
MCH RBC QN AUTO: 33.1 PG (ref 26.6–33)
MCHC RBC AUTO-ENTMCNC: 32.6 G/DL (ref 31.5–35.7)
MCV RBC AUTO: 101.6 FL (ref 79–97)
PLATELET # BLD AUTO: 130 10*3/MM3 (ref 140–450)
PMV BLD AUTO: 10.7 FL (ref 6–12)
POTASSIUM SERPL-SCNC: 4.1 MMOL/L (ref 3.5–5.2)
PROT SERPL-MCNC: 5.3 G/DL (ref 6–8.5)
RBC # BLD AUTO: 4.32 10*6/MM3 (ref 4.14–5.8)
SODIUM SERPL-SCNC: 139 MMOL/L (ref 136–145)
TSH SERPL DL<=0.05 MIU/L-ACNC: 28.81 UIU/ML (ref 0.27–4.2)
WBC NRBC COR # BLD: 9.36 10*3/MM3 (ref 3.4–10.8)

## 2023-07-11 PROCEDURE — 99232 SBSQ HOSP IP/OBS MODERATE 35: CPT | Performed by: SURGERY

## 2023-07-11 PROCEDURE — 25010000002 HYDROMORPHONE 1 MG/ML SOLUTION: Performed by: INTERNAL MEDICINE

## 2023-07-11 PROCEDURE — 99232 SBSQ HOSP IP/OBS MODERATE 35: CPT | Performed by: INTERNAL MEDICINE

## 2023-07-11 PROCEDURE — 25010000002 PIPERACILLIN SOD-TAZOBACTAM PER 1 G: Performed by: INTERNAL MEDICINE

## 2023-07-11 PROCEDURE — 84443 ASSAY THYROID STIM HORMONE: CPT | Performed by: INTERNAL MEDICINE

## 2023-07-11 PROCEDURE — 83036 HEMOGLOBIN GLYCOSYLATED A1C: CPT | Performed by: INTERNAL MEDICINE

## 2023-07-11 PROCEDURE — 80053 COMPREHEN METABOLIC PANEL: CPT | Performed by: INTERNAL MEDICINE

## 2023-07-11 PROCEDURE — 85027 COMPLETE CBC AUTOMATED: CPT | Performed by: INTERNAL MEDICINE

## 2023-07-11 RX ADMIN — TAZOBACTAM SODIUM AND PIPERACILLIN SODIUM 3.38 G: 375; 3 INJECTION, SOLUTION INTRAVENOUS at 12:02

## 2023-07-11 RX ADMIN — HYDROMORPHONE HYDROCHLORIDE 0.5 MG: 1 INJECTION, SOLUTION INTRAMUSCULAR; INTRAVENOUS; SUBCUTANEOUS at 12:02

## 2023-07-11 RX ADMIN — HYDROMORPHONE HYDROCHLORIDE 0.5 MG: 1 INJECTION, SOLUTION INTRAMUSCULAR; INTRAVENOUS; SUBCUTANEOUS at 14:53

## 2023-07-11 RX ADMIN — HYDROMORPHONE HYDROCHLORIDE 0.5 MG: 1 INJECTION, SOLUTION INTRAMUSCULAR; INTRAVENOUS; SUBCUTANEOUS at 19:28

## 2023-07-11 RX ADMIN — TAZOBACTAM SODIUM AND PIPERACILLIN SODIUM 3.38 G: 375; 3 INJECTION, SOLUTION INTRAVENOUS at 04:31

## 2023-07-11 RX ADMIN — HYDROMORPHONE HYDROCHLORIDE 0.5 MG: 1 INJECTION, SOLUTION INTRAMUSCULAR; INTRAVENOUS; SUBCUTANEOUS at 06:47

## 2023-07-11 RX ADMIN — DEXTROSE AND SODIUM CHLORIDE 100 ML/HR: 5; 900 INJECTION, SOLUTION INTRAVENOUS at 09:27

## 2023-07-11 RX ADMIN — HYDROMORPHONE HYDROCHLORIDE 0.5 MG: 1 INJECTION, SOLUTION INTRAMUSCULAR; INTRAVENOUS; SUBCUTANEOUS at 04:31

## 2023-07-11 RX ADMIN — Medication 10 ML: at 09:10

## 2023-07-11 RX ADMIN — HYDROMORPHONE HYDROCHLORIDE 0.5 MG: 1 INJECTION, SOLUTION INTRAMUSCULAR; INTRAVENOUS; SUBCUTANEOUS at 09:27

## 2023-07-11 RX ADMIN — TAZOBACTAM SODIUM AND PIPERACILLIN SODIUM 3.38 G: 375; 3 INJECTION, SOLUTION INTRAVENOUS at 20:19

## 2023-07-11 NOTE — THERAPY EVALUATION
Attempted PT eval. Patient with c/o 10/10 abdominal pain and states he does not want to attempt increased activity at this time. Also states he is NPO for possible surgical intervention here or for transfer to higher level of care. PT will follow up with patient tomorrow and proceed with eval as tolerated and appropriate.

## 2023-07-11 NOTE — PAYOR COMM NOTE
"TO:HUMANA MK  FROM:CHARLEEN MATOS RN PHONE [phone]  IN CLINICALS REF# [id number]      Jorge Ramos (63 y.o. Male)       Date of Birth   1960    Social Security Number       Address   [address]  [place] [address]    Home Phone   [phone]    MRN   [id number]       Muslim   Faith    Marital Status                               Admission Date   7/10/23    Admission Type   Elective    Admitting Provider   Yazan Corbett MD    Attending Provider   Yazan Corbett MD    Department, Room/Bed   [facility] TELEMETRY 3, 319/1       Discharge Date       Discharge Disposition       Discharge Destination                                 Attending Provider: Yazan Corbett MD    Allergies: Latex    Isolation: None   Infection: COVID Screen (preop/placement) (22)   Code Status: CPR    Ht: 167.6 cm (66\")   Wt: 69.9 kg (154 lb 1.6 oz)    Admission Cmt: None   Principal Problem: Bowel perforation [K63.1]                   Active Insurance as of 7/10/2023       Primary Coverage       Payor Plan Insurance Group Employer/Plan Group    [id number]       Payor Plan Address Payor Plan Phone Number Payor Plan Fax Number Effective Dates    [employer] MEDICAL [address] [address] [phone]  2021 - None Entered    [facility] [id number]         Subscriber Name Subscriber Birth Date Member ID       JORGE RAMOS 1960 [id number]                     Emergency Contacts        (Rel.) Home Phone Work Phone Mobile Phone    Evelio Ramos (Son) [phone] -- --    LissetteMellisa (Spouse) [phone] -- --    LissetteAnthony (Son) -- -- [phone]                 History & Physical        Yazan Corbett MD at 07/10/23 1837              [facility] MEDICINE   HISTORY AND PHYSICAL      Name:  Jorge Ramos   Age:  63 y.o.  Sex:  male  :  1960  MRN:  [id number]   Visit Number:  " [id number]  Admission Date:  7/10/2023  Date Of Service:  07/10/23  Primary Care Physician:  Yazan Corbett MD     Admitting diagnosis:      Bowel perforation    Acquired hypothyroidism    Chronic kidney disease, stage III (moderate)    Hx of unilateral nephrectomy, right    Intractable pain    History of ileostomy    Encounter for screening for malignant neoplasm of colon    Anemia    Crohn's disease without complication    Colon perforation        History Of Presenting Illness:    63-year-old patient with past medical history of chronic Crohn's disease s/p ileostomy, history of right nephrectomy due to cancer in remission, hypothyroidism, anemia, chronic kidney disease stage III, who has been directly admitted after the the procedure for colonoscopy which was done leading to perforation.  He will in the morning underwent surveillance colonoscopy due to his history of Crohn's disease and who has had perforation of his bowel due to the colonoscopy in the past 2 times today after the procedure was done noted significant pain.  There was abdominal distention and the x-ray showed that there was pneumoperitoneum and there was signs of perforation which was confirmed on the CAT scan  Patient was given Dilaudid and further admitted as inpatient for further management of his complication  Surgical consult has been done and appreciate Dr. To's input  He is complaining of severe pain though Dilaudid has helped at present and his pain scale is 7 x 10  He has been n.p.o. and IV fluids have been started.  Denies any nausea vomiting  He does feel short of breath    Review Of Systems:     The following systems were reviewed and negative;  constitution, eyes, ENT, respiratory, cardiovascular, gastrointestinal, genitourinary, musculoskeletal, neurological and behavioral/psych,  Skin except as above.     Past Medical History:    Past Medical History:   Diagnosis Date    Altered bowel elimination due to intestinal ostomy      PATIENT REPORTS SECONDARY TO A COLON RUPTURE APPROXIMATELY 16-17 YEARS AGO    Arthritis     WRIST    Asthma     as a child    Bowel perforation     states that he had his bowel perforated twice from colonoscopies, and was told that a small scope should always be used in the future.    Cancer     Kidney    CKD (chronic kidney disease)     stage 3    COVID-19 vaccine series completed     Covid vaccine x4 doses    Crohn disease     Gallstones     GERD (gastroesophageal reflux disease)     History of MRSA infection 2017    nose    History of pneumonia     [place] (hard of hearing)     no hearing aids    Hypothyroid     Kidney stones     Seasonal allergies     Spinal headache     Wears glasses        Past Surgical history:    Past Surgical History:   Procedure Laterality Date    COLONOSCOPY      COLONOSCOPY N/A 07/31/2017    Procedure: Colonoscopy through ostomy site and rectum with biopsies;  Surgeon: Vincenzo Galan MD;  Location: [facility] ENDOSCOPY;  Service:     ENDOSCOPY      HEMORRHOIDECTOMY      REPORTS APPROXIMATELY 20 YEARS AGO    HERNIA REPAIR      UMBILICAL    NEPHRECTOMY Right 07/2017    SKIN BIOPSY      benign    SMALL INTESTINE SURGERY      HX OF COLON RUPTURE WITH PLACEMENT OF OSTOMY 16-17 years ago    URETEROSCOPY LASER LITHOTRIPSY WITH STENT INSERTION Left 07/03/2019    Procedure: CYSTOSCOPY, URETEROSCOPY, STONE BASKETING, RETROGRADE PYLEOGRAM,  LASER LITHOTRIPSY WITH STENT INSERTION;  Surgeon: Lazaro Velazquez MD;  Location: [facility] OR;  Service: Urology    URETEROSCOPY LASER LITHOTRIPSY WITH STENT INSERTION Left 09/20/2021    Procedure: URETEROSCOPY diagnostic WITH STENT INSERTION  with left retrograde;  Surgeon: Lazaro Velazquez MD;  Location: [facility] OR;  Service: Urology;  Laterality: Left;    WISDOM TOOTH EXTRACTION      WRIST SURGERY Right        Social History:    Social History     Socioeconomic History    Marital status:    Tobacco Use    Smoking status: Former     Packs/day: 2.00      Years: 3.00     Pack years: 6.00     Types: Cigarettes     Quit date:      Years since quittin.5    Smokeless tobacco: Former     Types: Snuff   Vaping Use    Vaping Use: Never used   Substance and Sexual Activity    Alcohol use: No     Comment: QUIT 30 PLUS YEARS    Drug use: No    Sexual activity: Defer       Family History:    Family History   Problem Relation Age of Onset    Heart disease Mother     Cancer Father     No Known Problems Sister     COPD Brother     COPD Sister     Crohn's disease Son     Colon cancer Neg Hx          Allergies:      Latex    Home Medications:    Prior to Admission Medications       Prescriptions Last Dose Informant Patient Reported? Taking?    balsalazide (COLAZAL) 750 MG capsule 2023 Self Yes Yes    Take 3 capsules by mouth 2 (Two) Times a Day.    benzonatate (TESSALON) 100 MG capsule Past Week  No Yes    Take 1 capsule by mouth 3 (Three) Times a Day As Needed for Cough.    bisacodyl (DULCOLAX) 5 MG EC tablet 2023  No Yes    Take as directed for colon prep    Diclofenac Sodium (VOLTAREN) 1 % gel gel 2023 Self Yes Yes    Apply 4 g topically to the appropriate area as directed 4 (Four) Times a Day As Needed.    Dupilumab (Dupixent) 200 MG/1.14ML solution pen-injector 2023 Self Yes Yes    Inject  under the skin into the appropriate area as directed Every 14 (Fourteen) Days.    ferrous sulfate 325 (65 FE) MG tablet 2023 Self Yes Yes    Take 1 tablet by mouth Daily With Breakfast.    fluocinonide (LIDEX) 0.05 % cream 2023 Self Yes Yes    Apply 1 application  topically to the appropriate area as directed 2 (Two) Times a Day.    HYDROcodone-acetaminophen (NORCO) 5-325 MG per tablet 2023 Self No Yes    Take 1 tablet by mouth Every 6 (Six) Hours As Needed for Severe Pain.    levothyroxine (SYNTHROID, LEVOTHROID) 125 MCG tablet 2023 Self Yes Yes    take 1 tablet by mouth once daily    metoclopramide (REGLAN) 10 MG tablet 2023 Self Yes Yes    Take  1 tablet by mouth 2 (Two) Times a Day.    Multiple Vitamin (MULTI VITAMIN MENS PO) 7/9/2023 Self Yes Yes    Take 1 tablet by mouth Daily.    ondansetron ODT (ZOFRAN-ODT) 4 MG disintegrating tablet Past Week Self No Yes    Place 1 tablet on the tongue Every 8 (Eight) Hours As Needed for Nausea.    ondansetron ODT (ZOFRAN-ODT) 4 MG disintegrating tablet Past Week  No Yes    Place 1 tablet on the tongue Every 8 (Eight) Hours As Needed for Nausea or Vomiting.    pantoprazole (PROTONIX) 40 MG EC tablet 7/9/2023 Self Yes Yes    Take 1 tablet by mouth Daily.    PEG-KCl-NaCl-NaSulf-Na Asc-C (MOVIPREP) 100 g reconstituted solution powder 7/9/2023  No Yes    Take 1,000 mL by mouth Take As Directed. Take as directed for colonoscopy prep.    polyethylene glycol (MiraLax) 17 GM/SCOOP powder 7/9/2023  No Yes    Take as directed for colonoscopy prep    tamsulosin (FLOMAX) 0.4 MG capsule 24 hr capsule 7/9/2023 Self No Yes    Take 1 capsule by mouth Daily.    Testosterone Cypionate (DEPOTESTOTERONE CYPIONATE) 200 MG/ML injection 7/9/2023 Self Yes Yes    inject 1 milliliter every 2 weeks    triamcinolone (KENALOG) 0.1 % cream 7/9/2023 Self Yes Yes    Apply 1 application  topically to the appropriate area as directed 2 (Two) Times a Day.                   Vital Signs:    Temp:  [97 °F (36.1 °C)-98.5 °F (36.9 °C)] 97.8 °F (36.6 °C)  Heart Rate:  [52-97] 74  Resp:  [16-18] 18  BP: (109-143)/(79-97) 109/79        01/11/23  1039 07/10/23  1319   Weight: 68 kg (150 lb) 65 kg (143 lb 4.8 oz)       Body mass index is 23.13 kg/m².    Physical Exam:      General Appearance:    Alert and cooperative,  And lying comfortably in bed   Head:    Atraumatic and normocephalic, without obvious abnormality.   Eyes:            PERRLA, conjunctivae and sclerae normal, no Icterus. No pallor. Extraocular movements are within normal limits.   Ears:    Ears appear intact with no abnormalities noted.   Throat:   No oral lesions, no thrush, oral mucosa moist.    Neck:   Supple, trachea midline, no thyromegaly, no carotid bruit, no lymphadenopathy   Lungs:    Breath sounds heard bilaterally equally.  No crackles or wheezing. No Pleural rub or bronchial breathing.       Heart:    Normal S1 and S2, no murmur, no gallop, no rub. No JVD   Abdomen:   Significant tenderness which is diffuse more on the right quadrant, bowel sounds minimal, distention noted.  Ileostomy is in place   Extremities:   Moves all extremities well, no edema, no cyanosis, no          clubbing   Skin:   No  bruising or rash   Neurologic:   Cranial nerves 2 - 12 grossly intact, sensation intact, Motor power is normal and equal bilaterally.       EKG:      Not done    Labs:    Lab Results (last 24 hours)       Procedure Component Value Units Date/Time    Comprehensive Metabolic Panel [[id number]]  (Abnormal) Collected: 07/10/23 1436    Specimen: Blood Updated: 07/10/23 1500     Glucose 92 mg/dL      BUN 18 mg/dL      Creatinine 2.18 mg/dL      Sodium 141 mmol/L      Potassium 3.7 mmol/L      Chloride 107 mmol/L      CO2 21.5 mmol/L      Calcium 8.8 mg/dL      Total Protein 5.6 g/dL      Albumin 3.4 g/dL      ALT (SGPT) 18 U/L      AST (SGOT) 15 U/L      Alkaline Phosphatase 57 U/L      Total Bilirubin 0.9 mg/dL      Globulin 2.2 gm/dL      A/G Ratio 1.5 g/dL      BUN/Creatinine Ratio 8.3     Anion Gap 12.5 mmol/L      eGFR 33.6 mL/min/1.73     Narrative:      GFR Normal >60  Chronic Kidney Disease <60  Kidney Failure <15      CBC (No Diff) [[id number]]  (Abnormal) Collected: 07/10/23 1436    Specimen: Blood Updated: 07/10/23 1442     WBC 10.05 10*3/mm3      RBC 4.47 10*6/mm3      Hemoglobin 14.6 g/dL      Hematocrit 43.8 %      MCV 98.0 fL      MCH 32.7 pg      MCHC 33.3 g/dL      RDW 14.0 %      RDW-SD 50.7 fl      MPV 10.5 fL      Platelets 127 10*3/mm3     TISSUE EXAM, [facility] ([facility]) [[id number]] Collected: 07/10/23 0812    Specimen: Tissue from Gastric, Antrum; Tissue from Esophagus, Distal; Tissue  from Large Intestine, Sigmoid Colon; Tissue from Large Intestine, Rectum; Tissue from Small Intestine, Ileum Updated: 07/10/23 1336            Radiology:    Imaging Results (Last 72 Hours)       Procedure Component Value Units Date/Time    CT Abdomen Pelvis Without Contrast [[id number]] Collected: 07/10/23 1319     Updated: 07/10/23 1339    Narrative:      PROCEDURE: CT ABDOMEN PELVIS WO CONTRAST-     HISTORY: Bowel perforation; Z12.11-Encounter for screening for malignant  neoplasm of colon; K50.90-Crohn's disease, unspecified, without  complications; D64.9-Anemia, unspecified        COMPARISON: February 15, 2022     TECHNIQUE: Axial CT images of the abdomen and pelvis were obtained  without contrast. Coronal reformatted images were also obtained.This  study was performed with techniques to keep radiation doses as low as  reasonably achievable, (ALARA). Individualized dose reduction techniques  using automated exposure control or adjustment of mA and/or kV according  to the patient size were employed.        FINDINGS:      ABDOMEN: The lung bases are clear.  Postoperative changes are seen from  right nephrectomy. There is no evidence of left renal stone or  hydronephrosis.  Multiple gallstones are seen in the gallbladder.  The  liver, spleen and pancreas have an unremarkable unenhanced appearance.   No mass or adenopathy is seen.  Postoperative changes are seen in the  right abdomen. A very large amount of pneumoperitoneum is seen measuring  up to 8 cm AP. This causes mass effect on the liver and stomach.  Pneumatosis is noted at the hepatic flexure of the colon and to a lesser  extent in the ascending colon.     PELVIS:  Images of the pelvis reveal a large amount of pneumoperitoneum.  There is also a moderate to large amount of extraperitoneal air. A right  pelvis ostomy is present. Scattered diverticula are seen in the colon.   A small amount of pelvic free fluid is seen which is likely reactive.       Impression:        Large amount of pneumoperitoneum and moderate to large amount of  extraperitoneal air. Since there is pneumatosis of the hepatic flexure  of the colon and to a lesser extent the ascending colon, one of these  may represent the site of the perforation.                 This report was signed and finalized on 7/10/2023 1:24 PM by Vick Simpson MD.    XR Abdomen 2+ VW with Chest 1 VW [[id number]] Collected: 07/10/23 1205     Updated: 07/10/23 1304    Narrative:      PROCEDURE: XR ABDOMEN 2+ VIEWS W CHEST 1 VW-     HISTORY: Post colonosocpy with abdominal pain, distension;  Z12.11-Encounter for screening for malignant neoplasm of colon;  K50.90-Crohn's disease, unspecified, without complications;  D64.9-Anemia, unspecified     COMPARISON: Chest x-ray dated May 2023 and CT scan of the abdomen and  pelvis dated October 2022.     FINDINGS: Chest: A single view of the chest demonstrates the heart to be   normal in size . The mediastinum is normal. The lungs are clear.     Abdomen: Flat and upright views of the abdomen were obtained.  Postoperative changes are seen in the right abdomen and pelvis. There is  a large amount of intraperitoneal free air and apparent right abdominal  and pelvic extraperitoneal free air present.       Impression:      Large amount of free intraperitoneal air. Apparent right  abdominal and pelvic extraperitoneal free air. This finding was called  to the patient's nurse, Suyapa, on July 10, 2023 at 12:05 PM.                 Images were reviewed, interpreted, and dictated by Dr. Vick Simpson MD  Transcribed by Jaelyn Cortes PA-C.     This report was signed and finalized on 7/10/2023 1:02 PM by Vick Simpson MD.            Assessment:    Assessment & Plan      Bowel perforation    Acquired hypothyroidism    Chronic kidney disease, stage III (moderate)    Hx of unilateral nephrectomy, right    Intractable pain    History of ileostomy    Encounter for screening for malignant neoplasm  of colon    Anemia    Crohn's disease without complication    Colon perforation        Plan:     1.  Bowel perforations s/p colonoscopy-based on the CT scan finding it seems he had a large colon perforation which is not connected with the ileum as he does have a ileostomy  Based on the surgeons opinion he will be treated conservatively  IV fluids and IV pain medications have been started  He will be n.p.o.  Close clinical follow-up to be done and repeat scan in the next 24 to 48 hours based on the clinical needs    We will hold his other medications as he will be n.p.o. and nothing urgent needed except IV medicine will be given at this time    Details of the goals of treatment and mode of management has been addressed in details with the patient and the family    Yazan Corbett MD  07/10/23  18:37 EDT    Please note that portions of this note were completed with a voice recognition program.    Electronically signed by Yazan Corbett MD at 07/10/23 1846       Yazmin Martin MD at 07/10/23 0800              [facility]  HISTORY AND PHYSICAL    Patient Name: Jorge Mclaughlin  : 1960  MRN: [id number]    Chief Complaint:   For EGD/surveillance colonoscopy    History Of Presenting Illness:    Epigastric pain  H/o PUD  H/o Crohnes    Past Medical History:   Diagnosis Date    Altered bowel elimination due to intestinal ostomy     PATIENT REPORTS SECONDARY TO A COLON RUPTURE APPROXIMATELY 16-17 YEARS AGO    Arthritis     WRIST    Asthma     as a child    Bowel perforation     states that he had his bowel perforated twice from colonoscopies, and was told that a small scope should always be used in the future.    Cancer     Kidney    CKD (chronic kidney disease)     stage 3    COVID-19 vaccine series completed     Covid vaccine x4 doses    Crohn disease     Gallstones     GERD (gastroesophageal reflux disease)     History of MRSA infection 2017    nose    History of pneumonia     [place] (hard of  hearing)     no hearing aids    Hypothyroid     Kidney stones     Seasonal allergies     Spinal headache     Wears glasses        Past Surgical History:   Procedure Laterality Date    COLONOSCOPY      COLONOSCOPY N/A 2017    Procedure: Colonoscopy through ostomy site and rectum with biopsies;  Surgeon: Vincenzo Galan MD;  Location: [facility] ENDOSCOPY;  Service:     ENDOSCOPY      HEMORRHOIDECTOMY      REPORTS APPROXIMATELY 20 YEARS AGO    HERNIA REPAIR      UMBILICAL    NEPHRECTOMY Right 2017    SKIN BIOPSY      benign    SMALL INTESTINE SURGERY      HX OF COLON RUPTURE WITH PLACEMENT OF OSTOMY 16-17 years ago    URETEROSCOPY LASER LITHOTRIPSY WITH STENT INSERTION Left 2019    Procedure: CYSTOSCOPY, URETEROSCOPY, STONE BASKETING, RETROGRADE PYLEOGRAM,  LASER LITHOTRIPSY WITH STENT INSERTION;  Surgeon: Lazaro Velazquez MD;  Location: [facility] OR;  Service: Urology    URETEROSCOPY LASER LITHOTRIPSY WITH STENT INSERTION Left 2021    Procedure: URETEROSCOPY diagnostic WITH STENT INSERTION  with left retrograde;  Surgeon: Lazaro Velazquez MD;  Location: [facility] OR;  Service: Urology;  Laterality: Left;    WISDOM TOOTH EXTRACTION      WRIST SURGERY Right        Social History     Socioeconomic History    Marital status:    Tobacco Use    Smoking status: Former     Packs/day: 2.00     Years: 3.00     Pack years: 6.00     Types: Cigarettes     Quit date:      Years since quittin.5    Smokeless tobacco: Former     Types: Snuff   Vaping Use    Vaping Use: Never used   Substance and Sexual Activity    Alcohol use: No     Comment: QUIT 30 PLUS YEARS    Drug use: No    Sexual activity: Defer       Family History   Problem Relation Age of Onset    Heart disease Mother     Cancer Father     No Known Problems Sister     COPD Brother     COPD Sister     Crohn's disease Son     Colon cancer Neg Hx        Prior to Admission Medications:  Medications Prior to Admission   Medication Sig Dispense  Refill Last Dose    balsalazide (COLAZAL) 750 MG capsule Take 3 capsules by mouth 2 (Two) Times a Day.   7/9/2023 at 2100    benzonatate (TESSALON) 100 MG capsule Take 1 capsule by mouth 3 (Three) Times a Day As Needed for Cough. 30 capsule 0 Past Week    bisacodyl (DULCOLAX) 5 MG EC tablet Take as directed for colon prep 4 tablet 0 7/9/2023 at 1800    Diclofenac Sodium (VOLTAREN) 1 % gel gel Apply 4 g topically to the appropriate area as directed 4 (Four) Times a Day As Needed.   7/9/2023 at 2100    Dupilumab (Dupixent) 200 MG/1.14ML solution pen-injector Inject  under the skin into the appropriate area as directed Every 14 (Fourteen) Days.   7/9/2023 at 2100    ferrous sulfate 325 (65 FE) MG tablet Take 1 tablet by mouth Daily With Breakfast.   7/9/2023 at 0700    fluocinonide (LIDEX) 0.05 % cream Apply 1 application  topically to the appropriate area as directed 2 (Two) Times a Day.   7/9/2023 at 0700    HYDROcodone-acetaminophen (NORCO) 5-325 MG per tablet Take 1 tablet by mouth Every 6 (Six) Hours As Needed for Severe Pain. 10 tablet 0 7/9/2023 at 0700    levothyroxine (SYNTHROID, LEVOTHROID) 125 MCG tablet take 1 tablet by mouth once daily  0 7/9/2023 at 0700    metoclopramide (REGLAN) 10 MG tablet Take 1 tablet by mouth 2 (Two) Times a Day.   7/9/2023 at 0700    Multiple Vitamin (MULTI VITAMIN MENS PO) Take 1 tablet by mouth Daily.   7/9/2023 at 0700    ondansetron ODT (ZOFRAN-ODT) 4 MG disintegrating tablet Place 1 tablet on the tongue Every 8 (Eight) Hours As Needed for Nausea. 12 tablet 0 Past Week    ondansetron ODT (ZOFRAN-ODT) 4 MG disintegrating tablet Place 1 tablet on the tongue Every 8 (Eight) Hours As Needed for Nausea or Vomiting. 20 tablet 0 Past Week    pantoprazole (PROTONIX) 40 MG EC tablet Take 1 tablet by mouth Daily.   7/9/2023 at 0700    PEG-KCl-NaCl-NaSulf-Na Asc-C (MOVIPREP) 100 g reconstituted solution powder Take 1,000 mL by mouth Take As Directed. Take as directed for colonoscopy  prep. 1 each 0 7/9/2023 at 1800    polyethylene glycol (MiraLax) 17 GM/SCOOP powder Take as directed for colonoscopy prep 238 g 0 7/9/2023 at 1700    tamsulosin (FLOMAX) 0.4 MG capsule 24 hr capsule Take 1 capsule by mouth Daily. 10 capsule 0 7/9/2023 at 0700    Testosterone Cypionate (DEPOTESTOTERONE CYPIONATE) 200 MG/ML injection inject 1 milliliter every 2 weeks  0 7/9/2023 at 0700    triamcinolone (KENALOG) 0.1 % cream Apply 1 application  topically to the appropriate area as directed 2 (Two) Times a Day.   7/9/2023 at 2100       Allergies:  Allergies   Allergen Reactions    Latex Rash        Vitals: Temp:  [97.4 °F (36.3 °C)] 97.4 °F (36.3 °C)  Heart Rate:  [63] 63  Resp:  [17] 17  BP: (139)/(96) 139/96    Review Of Systems:  Constitutional:  Negative for chills, fever, and unexpected weight change.  Respiratory:  Negative for cough, chest tightness, shortness of breath, and wheezing.  Cardiovascular:  Negative for chest pain, palpitations, and leg swelling.  Gastrointestinal:  Negative for abdominal distention, abdominal pain, nausea, vomiting.  Neurological:  Negative for weakness, numbness, and headaches.     Physical Exam:    General Appearance:  Alert, cooperative, in no acute distress.   Lungs:   Clear to auscultation, respirations regular, even and                 unlabored.   Heart:  Regular rhythm and normal rate.   Abdomen:   Normal bowel sounds, no masses, no organomegaly. Soft, nontender, nondistended   Neurologic: Alert and oriented x 3. Moves all four limbs equally       Assessment & Plan     Assessment:  Active Problems:    Encounter for screening for malignant neoplasm of colon    Anemia    Crohn's disease without complication      Plan: ESOPHAGOGASTRODUODENOSCOPY (N/A), COLONOSCOPY (N/A)     Yazmin Martin MD  7/10/2023        Electronically signed by Yazmin Martin MD at 07/10/23 0801       Vital Signs (last day)       Date/Time Temp Temp src Pulse Resp BP Patient Position SpO2     07/11/23 0654 98.4 (36.9) Oral -- 18 122/79 Lying --    07/11/23 0412 98.5 (36.9) Oral 83 18 105/67 Sitting --    07/11/23 0044 98 (36.7) Oral 95 18 110/78 Sitting --    07/10/23 2341 98.9 (37.2) Oral 106 18 129/82 Sitting 94    07/10/23 1950 97.2 (36.2) Oral 90 18 127/94 Lying --    07/10/23 1500 97.8 (36.6) Oral 74 18 109/79 Lying 94    07/10/23 1320 98.5 (36.9) Oral 97 18 126/89 Lying 96    07/10/23 1319 98.5 (36.9) Oral 97 18 126/89 Lying 96    07/10/23 1219 97.4 (36.3) Temporal 52 16 135/85 Lying 96    07/10/23 1119 98.5 (36.9) Temporal 68 16 138/83 Lying 97    07/10/23 1019 97 (36.1) Temporal 72 16 138/79 Lying 95    07/10/23 0919 98.3 (36.8) Temporal 74 16 134/80 Sitting 96    07/10/23 0849 97.3 (36.3) Temporal 83 16 143/97 Lying 94    07/10/23 0747 97.4 (36.3) Temporal 63 17 139/96 Sitting 97          Current Facility-Administered Medications   Medication Dose Route Frequency Provider Last Rate Last Admin    dextrose 5 % and sodium chloride 0.9 % infusion  100 mL/hr Intravenous Continuous Yifan Johnson  mL/hr at 07/11/23 0927 100 mL/hr at 07/11/23 0927    HYDROmorphone (DILAUDID) injection 0.5 mg  0.5 mg Intravenous Q2H PRN Yifan Johnson MD   0.5 mg at 07/11/23 0927    And    naloxone (NARCAN) injection 0.4 mg  0.4 mg Intravenous Q5 Min PRN Yifan Johnson MD        ondansetron (ZOFRAN) injection 4 mg  4 mg Intravenous Q6H PRN Yifan Johnson MD        Pharmacy to Dose Zosyn   Does not apply Continuous PRN Yifan Johnson MD        piperacillin-tazobactam (ZOSYN) 3.375 g in iso-osmotic dextrose 50 ml (premix)  3.375 g Intravenous Q8H Yifan Johnson MD   3.375 g at 07/11/23 0431    sodium chloride 0.9 % flush 10 mL  10 mL Intravenous Q12H Yifan Johnson MD   10 mL at 07/11/23 0910    sodium chloride 0.9 % flush 10 mL  10 mL Intravenous PRN Yifan Johnson MD        sodium chloride 0.9 % infusion 40 mL  40 mL Intravenous PRN Yifan Johnson MD         Lab Results (last 24 hours)       Procedure Component Value  Units Date/Time    TSH [[id number]]  (Abnormal) Collected: 07/11/23 0643    Specimen: Blood Updated: 07/11/23 0724     TSH 28.810 uIU/mL     Hemoglobin A1c [[id number]]  (Normal) Collected: 07/11/23 0643    Specimen: Blood Updated: 07/11/23 0718     Hemoglobin A1C 5.00 %     Narrative:      Hemoglobin A1C Ranges:    Increased Risk for Diabetes  5.7% to 6.4%  Diabetes                     >= 6.5%  Diabetic Goal                < 7.0%    Comprehensive Metabolic Panel [[id number]]  (Abnormal) Collected: 07/11/23 0643    Specimen: Blood Updated: 07/11/23 0711     Glucose 124 mg/dL      BUN 19 mg/dL      Creatinine 2.70 mg/dL      Sodium 139 mmol/L      Potassium 4.1 mmol/L      Chloride 109 mmol/L      CO2 20.5 mmol/L      Calcium 8.1 mg/dL      Total Protein 5.3 g/dL      Albumin 3.1 g/dL      ALT (SGPT) 21 U/L      AST (SGOT) 17 U/L      Alkaline Phosphatase 55 U/L      Total Bilirubin 1.6 mg/dL      Globulin 2.2 gm/dL      A/G Ratio 1.4 g/dL      BUN/Creatinine Ratio 7.0     Anion Gap 9.5 mmol/L      eGFR 26.0 mL/min/1.73     Narrative:      GFR Normal >60  Chronic Kidney Disease <60  Kidney Failure <15      CBC (No Diff) [[id number]]  (Abnormal) Collected: 07/11/23 0643    Specimen: Blood Updated: 07/11/23 0703     WBC 9.36 10*3/mm3      RBC 4.32 10*6/mm3      Hemoglobin 14.3 g/dL      Hematocrit 43.9 %      .6 fL      MCH 33.1 pg      MCHC 32.6 g/dL      RDW 14.4 %      RDW-SD 54.2 fl      MPV 10.7 fL      Platelets 130 10*3/mm3     T4, Free [[id number]]  (Normal) Collected: 07/10/23 1436    Specimen: Blood Updated: 07/10/23 1909     Free T4 1.07 ng/dL     Narrative:      Results may be falsely increased if patient taking Biotin.      Comprehensive Metabolic Panel [[id number]]  (Abnormal) Collected: 07/10/23 1436    Specimen: Blood Updated: 07/10/23 1500     Glucose 92 mg/dL      BUN 18 mg/dL      Creatinine 2.18 mg/dL      Sodium 141 mmol/L      Potassium 3.7 mmol/L      Chloride 107 mmol/L      CO2 21.5 mmol/L       Calcium 8.8 mg/dL      Total Protein 5.6 g/dL      Albumin 3.4 g/dL      ALT (SGPT) 18 U/L      AST (SGOT) 15 U/L      Alkaline Phosphatase 57 U/L      Total Bilirubin 0.9 mg/dL      Globulin 2.2 gm/dL      A/G Ratio 1.5 g/dL      BUN/Creatinine Ratio 8.3     Anion Gap 12.5 mmol/L      eGFR 33.6 mL/min/1.73     Narrative:      GFR Normal >60  Chronic Kidney Disease <60  Kidney Failure <15      CBC (No Diff) [[id number]]  (Abnormal) Collected: 07/10/23 1436    Specimen: Blood Updated: 07/10/23 1442     WBC 10.05 10*3/mm3      RBC 4.47 10*6/mm3      Hemoglobin 14.6 g/dL      Hematocrit 43.8 %      MCV 98.0 fL      MCH 32.7 pg      MCHC 33.3 g/dL      RDW 14.0 %      RDW-SD 50.7 fl      MPV 10.5 fL      Platelets 127 10*3/mm3     TISSUE EXAM, [facility] ([facility]) [[id number]] Collected: 07/10/23 0812    Specimen: Tissue from Gastric, Antrum; Tissue from Esophagus, Distal; Tissue from Large Intestine, Sigmoid Colon; Tissue from Large Intestine, Rectum; Tissue from Small Intestine, Ileum Updated: 07/10/23 1336          Imaging Results (Last 24 Hours)       Procedure Component Value Units Date/Time    CT Abdomen Pelvis Without Contrast [[id number]] Collected: 07/10/23 1319     Updated: 07/10/23 1339    Narrative:      PROCEDURE: CT ABDOMEN PELVIS WO CONTRAST-     HISTORY: Bowel perforation; Z12.11-Encounter for screening for malignant  neoplasm of colon; K50.90-Crohn's disease, unspecified, without  complications; D64.9-Anemia, unspecified        COMPARISON: February 15, 2022     TECHNIQUE: Axial CT images of the abdomen and pelvis were obtained  without contrast. Coronal reformatted images were also obtained.This  study was performed with techniques to keep radiation doses as low as  reasonably achievable, (ALARA). Individualized dose reduction techniques  using automated exposure control or adjustment of mA and/or kV according  to the patient size were employed.        FINDINGS:      ABDOMEN: The lung bases are clear.   Postoperative changes are seen from  right nephrectomy. There is no evidence of left renal stone or  hydronephrosis.  Multiple gallstones are seen in the gallbladder.  The  liver, spleen and pancreas have an unremarkable unenhanced appearance.   No mass or adenopathy is seen.  Postoperative changes are seen in the  right abdomen. A very large amount of pneumoperitoneum is seen measuring  up to 8 cm AP. This causes mass effect on the liver and stomach.  Pneumatosis is noted at the hepatic flexure of the colon and to a lesser  extent in the ascending colon.     PELVIS:  Images of the pelvis reveal a large amount of pneumoperitoneum.  There is also a moderate to large amount of extraperitoneal air. A right  pelvis ostomy is present. Scattered diverticula are seen in the colon.   A small amount of pelvic free fluid is seen which is likely reactive.       Impression:       Large amount of pneumoperitoneum and moderate to large amount of  extraperitoneal air. Since there is pneumatosis of the hepatic flexure  of the colon and to a lesser extent the ascending colon, one of these  may represent the site of the perforation.                 This report was signed and finalized on 7/10/2023 1:24 PM by Vick Simpson MD.    XR Abdomen 2+ VW with Chest 1 VW [[id number]] Collected: 07/10/23 1205     Updated: 07/10/23 1304    Narrative:      PROCEDURE: XR ABDOMEN 2+ VIEWS W CHEST 1 VW-     HISTORY: Post colonosocpy with abdominal pain, distension;  Z12.11-Encounter for screening for malignant neoplasm of colon;  K50.90-Crohn's disease, unspecified, without complications;  D64.9-Anemia, unspecified     COMPARISON: Chest x-ray dated May 2023 and CT scan of the abdomen and  pelvis dated October 2022.     FINDINGS: Chest: A single view of the chest demonstrates the heart to be   normal in size . The mediastinum is normal. The lungs are clear.     Abdomen: Flat and upright views of the abdomen were obtained.  Postoperative changes are  seen in the right abdomen and pelvis. There is  a large amount of intraperitoneal free air and apparent right abdominal  and pelvic extraperitoneal free air present.       Impression:      Large amount of free intraperitoneal air. Apparent right  abdominal and pelvic extraperitoneal free air. This finding was called  to the patient's nurse, Suyapa, on July 10, 2023 at 12:05 PM.                 Images were reviewed, interpreted, and dictated by Dr. Vick Simpson MD  Transcribed by Jaelyn Cortes PA-C.     This report was signed and finalized on 7/10/2023 1:02 PM by Vick Simpson MD.          Operative/Procedure Notes (last 24 hours)  Notes from 07/10/23 1032 through 07/11/23 1032   No notes of this type exist for this encounter.          Physician Progress Notes (last 24 hours)        Yazmin Martin MD at 07/10/23 1316          Patient had an elective EGD and colonoscopy this morning.  He has an extensive history of complicated Crohn's disease with active small bowel inflammation and large bowel strictures not on any treatment.     He had a moderate stenosis at the mid rectum, traverses with the gastroscope, however noted to have a severe stricture at mid sigmoid no attempt was made to pass the scope.  He also had a extensive sigmoid diverticulosis.  Patient had a prior right colectomy for perforation with end ileostomy with what appears to be a stapled left-sided colon with the blind end.     Postprocedure patient developed abdominal pain and abdominal distention.  Abdominal x-rays and chest x-ray done revealed free air.  Colonoscopy and x-ray findings discussed with the patient and his family.     Zosyn IV ordered  Patient has a prior lab work showing increased creatinine, will get urgent noncontrast CT to rule out any intra-abdominal collection (patient does not have any colon communicating with the small bowel).  Case discussed with hospitalist for admission and case discussed with on-call  surgery Dr. To.      Stricture area examined on withdrawal which did not reveal any obvious mucosal break.  Unclear whether patient has any proximal extensive bowel involvement with Crohn's. Given his extensive diverticular disease, diverticular perf also suspected      Electronically signed by Yazmin Martin MD at 07/10/23 1323          Consult Notes (last 24 hours)        Yaima To MD at 07/10/23 1743        Consult Orders    1. Inpatient General Surgery Consult [[id number]] ordered by Yifan Johnson MD at 07/10/23 1407                 General Surgery Consult     Name:Jorge Mclaughlin  Age: 63 y.o.  Gender: male  : 1960  MRN: [id number]  Visit Number: [id number]  Admit Date: 7/10/2023  Date of Service: 07/10/23    Patient Care Team:  Yazan Corbett MD as PCP - General    Reason for Consultation: Extensive pneumoperitoneum following colonoscopy    Chief complaint: Post colonoscopy abdominal pain      History of Present Illness:     Jorge Mclaughlin is a 63 y.o. male patient with a longstanding history of complicated Crohn's disease of nearly 40 years duration, who presented today for scheduled EGD and colonoscopy for surveillance purposes.  The patient was diagnosed with Crohn's in his late 20s (he is currently 63 years old), and has an end ileostomy after having undergone what sounds like an extended right hemicolectomy with a long Cain's type pouch 20 years ago by Dr Macias for management of a perforation secondary to his Crohn's disease.  Review of the patient's medical records suggest that he was treated with steroids and sulfasalazine early in his disease course, and is currently on Balsalazide.   Additionally, it appears that the patient has had multiple prior perforations secondary to colonoscopy, 3 in total.  It appears that he developed pneumomediastinum, and extensive retroperitoneal and mesenteric air as well as free air within the abdomen and pelvis following  "colonoscopy in 2014.  In 7/2017, he encountered similar difficulties with post colonoscopy films demonstrating pneumomediastinum with free intraperitoneal air and colonic pneumatosis.  Additional imaging found in care everywhere performed in November 2017 demonstrates a CT abdomen pelvis on 11/13/2017 ordered for \"colon perforation\" by Dr. Luis Alberto Candelaria colorectal surgery.  This CT scan demonstrated postsurgical changes without evidence of obstruction or perforation.  Unfortunately, no notes from colorectal surgery are available using the care everywhere link.  It appears that the patient was seen in routine follow-up by the gastroenterology service in January 2022, at which time it was noted that the patient was due for surveillance exam.  EGD was also recommended to rule out peptic ulcer disease due to some complaints the patient reported including epigastric pain, and nausea.  He was noted to have a prior history of duodenal ulcer found on EGD in 2015.  A number of laboratory studies were ordered and it appears that case requests were placed for both EGD and colonoscopy however, the patient was not seen again in follow-up by the gastroenterology service, and presented today, about 18 months later,  for the previously discussed EGD and colonoscopy procedures.  Additional review of the record demonstrates that the patient has had at least 1 visit to our emergency department in October 2022 where he was found to have an apparent Crohn's flare with CT proven inflammatory changes of the small bowel consistent with Crohn's disease as well as a possible inflammatory stricture.    Today, the patient underwent an EGD demonstrating LA grade a esophagitis without bleeding.  Biopsies were taken.  Patchy, mildly erythematous gastric mucosa was found in the posterior gastric wall and was biopsied as well.  The duodenum was unremarkable.  Colonoscopy demonstrated numerous areas of stenosis the first being about 10 cm from the " anal verge, which required transitioning from a colonoscope to a gastroscope in order to be traversed.  Extensive diverticulosis was found in the rectosigmoid and sigmoid colon.  A benign-appearing intrinsic severe stenosis was found in the distal sigmoid colon and was unable to be traversed even with a gastroscope.  The vascular pattern was noted to be diffusely decreased and featureless in terms of the colon mucosa.  The gastroscope was then used to perform ileoscopy, and the terminal ileum was noted to contain a few ulcers which were biopsied.  The terminal ileum contained a benign-appearing intrinsic severe stenosis that was not able to be traversed with the gastroscope.  The procedure was then concluded, and per report and discussion with the performing endoscopist, there was no obvious current inflammation in the rectum or colon, multiple areas of stricture as described above, but no clinical concern for perforation at the conclusion of the exam.  However, following the procedure, the patient developed abdominal pain and distention.  Plain films demonstrated pneumoperitoneum.  Based on this an urgent noncontrasted CT of the abdomen pelvis was ordered and I was asked to evaluate the patient in consultation.  Subsequent CT demonstrated a large amount of pneumoperitoneum measuring up to 8 cm anterior to posterior with mass effect on the liver and stomach.  Pneumatosis was seen in the hepatic flexure of the colon and to a lesser extent in the ascending colon.  There was large amount of pneumoperitoneum also seen in the pelvis with a moderate to large amount of extraperitoneal air.      The patient was admitted to the facility, kept n.p.o., and started on broad-spectrum IV antibiotic therapy with Zosyn.  At the time of my evaluation, he was found to be well-appearing but complained of severe abdominal pain.    Patient Active Problem List   Diagnosis    History of Crohn's disease    Epigastric pain    Inflammatory  arthritis    Crohn's disease of large intestine with complication    Acquired hypothyroidism    Chronic kidney disease, stage III (moderate)    Acute renal failure superimposed on stage 3 chronic kidney disease    Hx of unilateral nephrectomy, right    Primary osteoarthritis involving multiple joints    Acute renal failure    Intractable pain    Kidney stone    Ureteral stone    History of ileostomy    Encounter for screening for malignant neoplasm of colon    Nausea    Duodenal ulcer    Gastroesophageal reflux disease    Anemia    Diarrhea    Crohn's disease without complication    Bowel perforation    Colon perforation         Past Medical History:   Diagnosis Date    Altered bowel elimination due to intestinal ostomy     PATIENT REPORTS SECONDARY TO A COLON RUPTURE APPROXIMATELY 16-17 YEARS AGO    Arthritis     WRIST    Asthma     as a child    Bowel perforation     states that he had his bowel perforated twice from colonoscopies, and was told that a small scope should always be used in the future.    Cancer     Kidney    CKD (chronic kidney disease)     stage 3    COVID-19 vaccine series completed     Covid vaccine x4 doses    Crohn disease     Gallstones     GERD (gastroesophageal reflux disease)     History of MRSA infection 2017    nose    History of pneumonia     [place] (hard of hearing)     no hearing aids    Hypothyroid     Kidney stones     Seasonal allergies     Spinal headache     Wears glasses        Past Surgical History:   Procedure Laterality Date    COLONOSCOPY      COLONOSCOPY N/A 07/31/2017    Procedure: Colonoscopy through ostomy site and rectum with biopsies;  Surgeon: Vincenzo Galan MD;  Location: [facility] ENDOSCOPY;  Service:     ENDOSCOPY      HEMORRHOIDECTOMY      REPORTS APPROXIMATELY 20 YEARS AGO    HERNIA REPAIR      UMBILICAL    NEPHRECTOMY Right 07/2017    SKIN BIOPSY      benign    SMALL INTESTINE SURGERY      HX OF COLON RUPTURE WITH PLACEMENT OF OSTOMY 16-17 years ago    URETEROSCOPY  LASER LITHOTRIPSY WITH STENT INSERTION Left 2019    Procedure: CYSTOSCOPY, URETEROSCOPY, STONE BASKETING, RETROGRADE PYLEOGRAM,  LASER LITHOTRIPSY WITH STENT INSERTION;  Surgeon: Lazaro Velazquez MD;  Location: [facility] OR;  Service: Urology    URETEROSCOPY LASER LITHOTRIPSY WITH STENT INSERTION Left 2021    Procedure: URETEROSCOPY diagnostic WITH STENT INSERTION  with left retrograde;  Surgeon: Lazaro Velazquez MD;  Location: [facility] OR;  Service: Urology;  Laterality: Left;    WISDOM TOOTH EXTRACTION      WRIST SURGERY Right        Family History   Problem Relation Age of Onset    Heart disease Mother     Cancer Father     No Known Problems Sister     COPD Brother     COPD Sister     Crohn's disease Son     Colon cancer Neg Hx        Social History     Socioeconomic History    Marital status:    Tobacco Use    Smoking status: Former     Packs/day: 2.00     Years: 3.00     Pack years: 6.00     Types: Cigarettes     Quit date:      Years since quittin.5    Smokeless tobacco: Former     Types: Snuff   Vaping Use    Vaping Use: Never used   Substance and Sexual Activity    Alcohol use: No     Comment: QUIT 30 PLUS YEARS    Drug use: No    Sexual activity: Defer         Current Facility-Administered Medications:     dextrose 5 % and sodium chloride 0.9 % infusion, 100 mL/hr, Intravenous, Continuous, Yifan Johnson MD, Last Rate: 100 mL/hr at 07/10/23 1527, 100 mL/hr at 07/10/23 1527    HYDROmorphone (DILAUDID) injection 0.5 mg, 0.5 mg, Intravenous, Q2H PRN, 0.5 mg at 07/10/23 1730 **AND** naloxone (NARCAN) injection 0.4 mg, 0.4 mg, Intravenous, Q5 Min PRN, Yifan Johnson MD    ondansetron (ZOFRAN) injection 4 mg, 4 mg, Intravenous, Q6H PRN, Yifan Johnson MD    Pharmacy to Dose Zosyn, , Does not apply, Continuous PRN, Yifan Johnson MD    piperacillin-tazobactam (ZOSYN) 3.375 g in iso-osmotic dextrose 50 ml (premix), 3.375 g, Intravenous, Q8H, Yifan Johnson MD    sodium chloride 0.9 %  flush 10 mL, 10 mL, Intravenous, Q12H, Yifan Johnson MD    sodium chloride 0.9 % flush 10 mL, 10 mL, Intravenous, PRN, Yifan Johnson MD    sodium chloride 0.9 % infusion 40 mL, 40 mL, Intravenous, PRN, Yifan Johnson MD    Medications Prior to Admission   Medication Sig Dispense Refill Last Dose    balsalazide (COLAZAL) 750 MG capsule Take 3 capsules by mouth 2 (Two) Times a Day.   7/9/2023 at 2100    benzonatate (TESSALON) 100 MG capsule Take 1 capsule by mouth 3 (Three) Times a Day As Needed for Cough. 30 capsule 0 Past Week    bisacodyl (DULCOLAX) 5 MG EC tablet Take as directed for colon prep 4 tablet 0 7/9/2023 at 1800    Diclofenac Sodium (VOLTAREN) 1 % gel gel Apply 4 g topically to the appropriate area as directed 4 (Four) Times a Day As Needed.   7/9/2023 at 2100    Dupilumab (Dupixent) 200 MG/1.14ML solution pen-injector Inject  under the skin into the appropriate area as directed Every 14 (Fourteen) Days.   7/9/2023 at 2100    ferrous sulfate 325 (65 FE) MG tablet Take 1 tablet by mouth Daily With Breakfast.   7/9/2023 at 0700    fluocinonide (LIDEX) 0.05 % cream Apply 1 application  topically to the appropriate area as directed 2 (Two) Times a Day.   7/9/2023 at 0700    HYDROcodone-acetaminophen (NORCO) 5-325 MG per tablet Take 1 tablet by mouth Every 6 (Six) Hours As Needed for Severe Pain. 10 tablet 0 7/9/2023 at 0700    levothyroxine (SYNTHROID, LEVOTHROID) 125 MCG tablet take 1 tablet by mouth once daily  0 7/9/2023 at 0700    metoclopramide (REGLAN) 10 MG tablet Take 1 tablet by mouth 2 (Two) Times a Day.   7/9/2023 at 0700    Multiple Vitamin (MULTI VITAMIN MENS PO) Take 1 tablet by mouth Daily.   7/9/2023 at 0700    ondansetron ODT (ZOFRAN-ODT) 4 MG disintegrating tablet Place 1 tablet on the tongue Every 8 (Eight) Hours As Needed for Nausea. 12 tablet 0 Past Week    ondansetron ODT (ZOFRAN-ODT) 4 MG disintegrating tablet Place 1 tablet on the tongue Every 8 (Eight) Hours As Needed for Nausea or  Vomiting. 20 tablet 0 Past Week    pantoprazole (PROTONIX) 40 MG EC tablet Take 1 tablet by mouth Daily.   7/9/2023 at 0700    PEG-KCl-NaCl-NaSulf-Na Asc-C (MOVIPREP) 100 g reconstituted solution powder Take 1,000 mL by mouth Take As Directed. Take as directed for colonoscopy prep. 1 each 0 7/9/2023 at 1800    polyethylene glycol (MiraLax) 17 GM/SCOOP powder Take as directed for colonoscopy prep 238 g 0 7/9/2023 at 1700    tamsulosin (FLOMAX) 0.4 MG capsule 24 hr capsule Take 1 capsule by mouth Daily. 10 capsule 0 7/9/2023 at 0700    Testosterone Cypionate (DEPOTESTOTERONE CYPIONATE) 200 MG/ML injection inject 1 milliliter every 2 weeks  0 7/9/2023 at 0700    triamcinolone (KENALOG) 0.1 % cream Apply 1 application  topically to the appropriate area as directed 2 (Two) Times a Day.   7/9/2023 at 2100       Allergies   Allergen Reactions    Latex Rash       Review of Systems   Constitutional: Negative.    HENT: Negative.     Eyes: Negative.    Respiratory: Negative.     Cardiovascular: Negative.    Gastrointestinal:  Positive for abdominal distention and abdominal pain.   Endocrine: Negative.    Genitourinary: Negative.    Musculoskeletal: Negative.    Skin: Negative.    Allergic/Immunologic: Negative.    Neurological: Negative.    Hematological: Negative.    Psychiatric/Behavioral: Negative.       OBJECTIVE:     Vital Signs  Temp:  [97 °F (36.1 °C)-98.5 °F (36.9 °C)] 97.8 °F (36.6 °C)  Heart Rate:  [52-97] 74  Resp:  [16-18] 18  BP: (109-143)/(79-97) 109/79    I/O this shift:  In: 575 [P.O.:75; I.V.:500]  Out: -   No intake/output data recorded.      Physical Exam:      General Appearance:    Alert, cooperative, in mild distress secondary to pain, but otherwise does not appear ill or toxic.   Head:    Normocephalic, without obvious abnormality, atraumatic   Eyes:            Lids and lashes normal, conjunctivae and sclerae normal, no icterus   Ears:    Ears appear intact with no abnormalities noted   Lungs:      Respirations regular, even and unlabored    Heart:    Regular rhythm and normal rate   Abdomen:   Mildly distended, diffuse tenderness to palpation which is worse in the bilateral lower quadrants consistent with peritonitis.  Ileostomy in the right lower quadrant is functioning with noted gas in the appliance.   Genitalia:    Deferred   Extremities:   Moves all extremities well, no edema, no cyanosis, no  redness   Pulses:   Pulses palpable and equal bilaterally   Skin:   No bleeding, bruising or rash   Neurologic:   AAOx3, no gross deficits         Results Review:  I have reviewed the entirety of the patient's clinical lab results.  I have also personally reviewed the patient's imaging    Narrative & Impression   PROCEDURE: CT ABDOMEN PELVIS WO CONTRAST-     HISTORY: Bowel perforation; Z12.11-Encounter for screening for malignant  neoplasm of colon; K50.90-Crohn's disease, unspecified, without  complications; D64.9-Anemia, unspecified        COMPARISON: February 15, 2022     TECHNIQUE: Axial CT images of the abdomen and pelvis were obtained  without contrast. Coronal reformatted images were also obtained.This  study was performed with techniques to keep radiation doses as low as  reasonably achievable, (ALARA). Individualized dose reduction techniques  using automated exposure control or adjustment of mA and/or kV according  to the patient size were employed.        FINDINGS:      ABDOMEN: The lung bases are clear.  Postoperative changes are seen from  right nephrectomy. There is no evidence of left renal stone or  hydronephrosis.  Multiple gallstones are seen in the gallbladder.  The  liver, spleen and pancreas have an unremarkable unenhanced appearance.   No mass or adenopathy is seen.  Postoperative changes are seen in the  right abdomen. A very large amount of pneumoperitoneum is seen measuring  up to 8 cm AP. This causes mass effect on the liver and stomach.  Pneumatosis is noted at the hepatic flexure of the  colon and to a lesser  extent in the ascending colon.     PELVIS:  Images of the pelvis reveal a large amount of pneumoperitoneum.  There is also a moderate to large amount of extraperitoneal air. A right  pelvis ostomy is present. Scattered diverticula are seen in the colon.   A small amount of pelvic free fluid is seen which is likely reactive.     IMPRESSION:   Large amount of pneumoperitoneum and moderate to large amount of  extraperitoneal air. Since there is pneumatosis of the hepatic flexure  of the colon and to a lesser extent the ascending colon, one of these  may represent the site of the perforation.                 This report was signed and finalized on 7/10/2023 1:24 PM by Vick Simpson MD.     Lab Results (last 72 hours)       Procedure Component Value Units Date/Time    Comprehensive Metabolic Panel [[id number]]  (Abnormal) Collected: 07/10/23 1436    Specimen: Blood Updated: 07/10/23 1500     Glucose 92 mg/dL      BUN 18 mg/dL      Creatinine 2.18 mg/dL      Sodium 141 mmol/L      Potassium 3.7 mmol/L      Chloride 107 mmol/L      CO2 21.5 mmol/L      Calcium 8.8 mg/dL      Total Protein 5.6 g/dL      Albumin 3.4 g/dL      ALT (SGPT) 18 U/L      AST (SGOT) 15 U/L      Alkaline Phosphatase 57 U/L      Total Bilirubin 0.9 mg/dL      Globulin 2.2 gm/dL      A/G Ratio 1.5 g/dL      BUN/Creatinine Ratio 8.3     Anion Gap 12.5 mmol/L      eGFR 33.6 mL/min/1.73     Narrative:      GFR Normal >60  Chronic Kidney Disease <60  Kidney Failure <15      CBC (No Diff) [[id number]]  (Abnormal) Collected: 07/10/23 1436    Specimen: Blood Updated: 07/10/23 1442     WBC 10.05 10*3/mm3      RBC 4.47 10*6/mm3      Hemoglobin 14.6 g/dL      Hematocrit 43.8 %      MCV 98.0 fL      MCH 32.7 pg      MCHC 33.3 g/dL      RDW 14.0 %      RDW-SD 50.7 fl      MPV 10.5 fL      Platelets 127 10*3/mm3     TISSUE EXAM, [facility] ([facility]) [[id number]] Collected: 07/10/23 0812    Specimen: Tissue from Gastric, Antrum; Tissue from  Esophagus, Distal; Tissue from Large Intestine, Sigmoid Colon; Tissue from Large Intestine, Rectum; Tissue from Small Intestine, Ileum Updated: 07/10/23 7095                            ASSESSMENT/PLAN:      Bowel perforation    Encounter for screening for malignant neoplasm of colon    Anemia    Crohn's disease without complication    Colon perforation    Mr. Mclaughlin is a 63-year-old gentleman with a longstanding history of complicated Crohn's disease dating back at least 40 years, currently admitted following colon perforation secondary to colonoscopy performed earlier today.  Fortunately, the patient does not appear toxic and is overall well-appearing despite his extensive pneumoperitoneum.  He does have peritonitis on exam however.  I had a long discussion with the patient and his wife at the bedside regarding the management options.  Fortunately, it appears based on my extensive review of his prior history that was seems to be the source of his perforation is his defunctionalized remnant colon.  For this reason, I do think he warrants a trial of nonoperative management as he has successfully had similar issues resolve with nonsurgical treatment in the past.  I explained to him that his longstanding end ileostomy is protective, and given that his perforated colon is no longer in continuity with his small bowel, there should be minimal contamination despite the perforation.  The large amount of air is representative of insufflation, and should reabsorb over the next several days.  I agree with broad-spectrum antibiotic coverage, and I would continue with n.p.o. status for now.  Should his condition deteriorate any whatsoever, I would recommend that he be transferred to the [facility] for definitive care by the colorectal surgery service given his extensive and longstanding complicated Crohn's disease.  As discussed above he has seen them in the past.  I told the patient that my  recommendation for long-term management of this issue would be referral to colorectal surgery at  to be evaluated for completion colectomy.  This would eliminate the disease residual colon and eliminate the need for colonoscopy surveillance.  Certainly, completion colectomy could be accomplished with or without leaving a portion of the rectum and anus intact.  When discussing this with the patient and his wife, he indicated to me that the same recommendation was given to him at  several years ago, and it seems that APR was actually recommended and he was not comfortable with the loss of his anus and rectum.  For this reason, he declined the procedure, and has not had any additional follow-up with their service.  I discussed with him that any residual rectum left in place would need ongoing surveillance, but this would certainly be lower risk than having to proceed with surveillance on a long segment of defunctionalized colon.  Certainly, the usual recommendation would be ongoing surveillance of the residual colon as long as any remains given his increased risk for the development of colon cancer.  However, I explained to the patient that he is at a bit of a crossroads in terms of risks and benefits as it seems that this is now his fourth perforation in the last 9 years during colonoscopy.  It would be hard to make the argument that colonoscopy is providing significant benefit to him in terms of cancer screening/surveillance, especially in light of his numerous strictures, and the inability to evaluate the entire residual colon.  At the conclusion of our discussion, the patient understood, and was agreeable to both a trial of nonoperative management, and was open to the option of outpatient follow-up with colorectal surgery at  to revisit the idea of completion colectomy.  He also verbalizes understanding that should his condition deteriorate, that he would require transfer for a higher level of care.  At the  conclusion of the consultation, the patient and his wife had no additional questions for me, and were satisfied with the plan of care.    Yaima To MD  07/10/23  17:43 EDT          Electronically signed by Yaima To MD at 07/10/23 1737

## 2023-07-11 NOTE — PROGRESS NOTES
In Patient Consult      Date of Consultation: 2023  Patient Name: Jorge Mclaughlin  MRN: [id number]  : 1960     Referring provider: Yazan Corbett MD    Primary care provider:  Yazan Corbett MD    Reason for consultation: Postprocedure bowel preparation    Interval history:     Patient had a elective EGD followed by limited colonoscopy and ileoscopy on 7/10/2023 for Crohn's surveillance and history of peptic ulcer disease and epigastric pain.  Colonoscopy was incomplete due to severe stricture sigmoid colon.  Postprocedure patient developed abdominal pain and abdominal distention.  Subsequent work-up revealed intraperitoneal free air and was admitted.     Patient has a Crohn's disease of the small intestine and large intestine diagnosed over 20 years ago.  He has a complicated disease and had a prior right colon resection with end ileostomy.  He was known to have a small bowel strictures and small bowel Crohn's ulcerations.  As per record he was taking sulfasalazine initially when Crohn's was diagnosed many years ago. At least for the past 10 years patient was not on any medications.  He had multiple colon perforation during colonoscopy at least 3 times in the past, last one being in 2017.  No colonoscopy done since then.  Postprocedure patient developed again intraperitoneal air and had to be admitted.   CT abdomen pelvis done did not show any significant abdominal contamination. Currently he has been managed conservatively.     He states that his left-sided abdominal pain has improved however right side abdominal pain still persists.  Generally any nausea vomiting.  Liquid stool in the stoma bag without any blood.  No fever chills.  No further abdominal distention.  He normally empties the bag 10-15 times per day.    Subjective     Past Medical History:   Diagnosis Date    Altered bowel elimination due to intestinal ostomy     PATIENT REPORTS SECONDARY TO A COLON RUPTURE  APPROXIMATELY 16-17 YEARS AGO    Arthritis     WRIST    Asthma     as a child    Bowel perforation     states that he had his bowel perforated twice from colonoscopies, and was told that a small scope should always be used in the future.    Cancer     Kidney    CKD (chronic kidney disease)     stage 3    COVID-19 vaccine series completed     Covid vaccine x4 doses    Crohn disease     Gallstones     GERD (gastroesophageal reflux disease)     History of MRSA infection 2017    nose    History of pneumonia     [place] (hard of hearing)     no hearing aids    Hypothyroid     Kidney stones     Seasonal allergies     Spinal headache     Wears glasses        Past Surgical History:   Procedure Laterality Date    COLONOSCOPY      COLONOSCOPY N/A 07/31/2017    Procedure: Colonoscopy through ostomy site and rectum with biopsies;  Surgeon: Vincenzo Galan MD;  Location: [facility];  Service:     COLONOSCOPY N/A 7/10/2023    Procedure: COLONOSCOPY WITH BIOPSY;  Surgeon: Yazmin Martin MD;  Location: [facility];  Service: Gastroenterology;  Laterality: N/A;    ENDOSCOPY      ENDOSCOPY N/A 7/10/2023    Procedure: ESOPHAGOGASTRODUODENOSCOPY WITH BIOPSY;  Surgeon: Yazmin Martin MD;  Location: [facility];  Service: Gastroenterology;  Laterality: N/A;    HEMORRHOIDECTOMY      REPORTS APPROXIMATELY 20 YEARS AGO    HERNIA REPAIR      UMBILICAL    NEPHRECTOMY Right 07/2017    SKIN BIOPSY      benign    SMALL INTESTINE SURGERY      HX OF COLON RUPTURE WITH PLACEMENT OF OSTOMY 16-17 years ago    URETEROSCOPY LASER LITHOTRIPSY WITH STENT INSERTION Left 07/03/2019    Procedure: CYSTOSCOPY, URETEROSCOPY, STONE BASKETING, RETROGRADE PYLEOGRAM,  LASER LITHOTRIPSY WITH STENT INSERTION;  Surgeon: Lazaro Velazquez MD;  Location: [facility];  Service: Urology    URETEROSCOPY LASER LITHOTRIPSY WITH STENT INSERTION Left 09/20/2021    Procedure: URETEROSCOPY diagnostic WITH STENT INSERTION  with left retrograde;   Surgeon: Lazaro Velazquez MD;  Location: [facility];  Service: Urology;  Laterality: Left;    WISDOM TOOTH EXTRACTION      WRIST SURGERY Right        Family History   Problem Relation Age of Onset    Heart disease Mother     Cancer Father     No Known Problems Sister     COPD Brother     COPD Sister     Crohn's disease Son     Colon cancer Neg Hx        Social History     Socioeconomic History    Marital status:    Tobacco Use    Smoking status: Former     Packs/day: 2.00     Years: 3.00     Pack years: 6.00     Types: Cigarettes     Quit date:      Years since quittin.5    Smokeless tobacco: Former     Types: Snuff   Vaping Use    Vaping Use: Never used   Substance and Sexual Activity    Alcohol use: No     Comment: QUIT 30 PLUS YEARS    Drug use: No    Sexual activity: Defer         Current Facility-Administered Medications:     dextrose 5 % and sodium chloride 0.9 % infusion, 100 mL/hr, Intravenous, Continuous, Yifan Johnson MD, Last Rate: 100 mL/hr at 23, 100 mL/hr at 23    HYDROmorphone (DILAUDID) injection 0.5 mg, 0.5 mg, Intravenous, Q2H PRN, 0.5 mg at 23 1202 **AND** naloxone (NARCAN) injection 0.4 mg, 0.4 mg, Intravenous, Q5 Min PRN, Yifan Johnson MD    ondansetron (ZOFRAN) injection 4 mg, 4 mg, Intravenous, Q6H PRN, Yifan Johnson MD    Pharmacy to Dose Zosyn, , Does not apply, Continuous PRN, Yifan Johnson MD    piperacillin-tazobactam (ZOSYN) 3.375 g in iso-osmotic dextrose 50 ml (premix), 3.375 g, Intravenous, Q8H, Yifan Johnson MD, 3.375 g at 23 1202    sodium chloride 0.9 % flush 10 mL, 10 mL, Intravenous, Q12H, Yifan Johnson MD, 10 mL at 23 0910    sodium chloride 0.9 % flush 10 mL, 10 mL, Intravenous, PRN, Yifan Johnson MD    sodium chloride 0.9 % infusion 40 mL, 40 mL, Intravenous, PRN, Yifan Johnson MD    Allergies   Allergen Reactions    Latex Rash       Review of Systems   Constitutional:  Negative for fever.   HENT:  Negative for sore  throat and trouble swallowing.    Eyes:  Negative for visual disturbance.   Respiratory:  Negative for cough, chest tightness and shortness of breath.    Cardiovascular:  Negative for chest pain, palpitations and leg swelling.   Gastrointestinal:  Positive for abdominal pain. Negative for blood in stool, constipation, diarrhea, nausea, vomiting and indigestion.        Excessive stoma output   Endocrine: Negative for polyphagia.   Genitourinary:  Negative for dysuria and hematuria.   Musculoskeletal:  Negative for back pain, joint swelling and neck pain.   Skin:  Negative for rash, skin lesions and wound.   Neurological:  Negative for dizziness, seizures, speech difficulty, weakness, numbness and confusion.   Hematological:  Negative for adenopathy. Does not bruise/bleed easily.   Psychiatric/Behavioral:  Negative for hallucinations and depressed mood.      The following portions of the patient's history were reviewed and updated as appropriate: allergies, current medications, past family history, past medical history, past social history, past surgical history and problem list.    Objective     Vitals:    07/10/23 2341 07/11/23 0044 07/11/23 0412 07/11/23 0654   BP: 129/82 110/78 105/67 122/79   BP Location: Right arm Right arm Right arm Right arm   Patient Position: Sitting Sitting Sitting Lying   Pulse: 106 95 83    Resp: 18 18 18 18   Temp: 98.9 °F (37.2 °C) 98 °F (36.7 °C) 98.5 °F (36.9 °C) 98.4 °F (36.9 °C)   TempSrc: Oral Oral Oral Oral   SpO2: 94%      Weight:   69.9 kg (154 lb 1.6 oz)    Height:           Physical Exam  Vitals and nursing note reviewed.   Constitutional:       Appearance: Normal appearance. He is well-developed.   HENT:      Head: Normocephalic and atraumatic.      Right Ear: External ear normal.      Left Ear: External ear normal.   Eyes:      Conjunctiva/sclera: Conjunctivae normal.   Neck:      Thyroid: No thyromegaly.      Trachea: No tracheal deviation.   Cardiovascular:      Rate and  Rhythm: Normal rate and regular rhythm.      Heart sounds: No murmur heard.  Pulmonary:      Effort: Pulmonary effort is normal. No respiratory distress.      Breath sounds: Normal breath sounds.   Abdominal:      General: Bowel sounds are normal. There is no distension.      Palpations: Abdomen is soft. There is no mass.      Tenderness: There is abdominal tenderness (Moderate tenderness noted entire abdomen mostly on the right upper quadrant). There is rebound (Minimal rebound).      Hernia: No hernia is present.      Comments: Liquid stool in the stoma bag   Musculoskeletal:         General: Normal range of motion.      Cervical back: Normal range of motion.   Skin:     General: Skin is warm and dry.   Neurological:      Mental Status: He is alert and oriented to person, place, and time.      Cranial Nerves: No cranial nerve deficit.      Sensory: No sensory deficit.   Psychiatric:         Mood and Affect: Mood normal.         Behavior: Behavior normal.         Thought Content: Thought content normal.         Judgment: Judgment normal.       Results from last 7 days   Lab Units 07/11/23  0643 07/10/23  1436   SODIUM mmol/L 139 141   POTASSIUM mmol/L 4.1 3.7   CHLORIDE mmol/L 109* 107   CO2 mmol/L 20.5* 21.5*   BUN mg/dL 19 18   CREATININE mg/dL 2.70* 2.18*   CALCIUM mg/dL 8.1* 8.8   ALBUMIN g/dL 3.1* 3.4*   BILIRUBIN mg/dL 1.6* 0.9   ALK PHOS U/L 55 57   ALT (SGPT) U/L 21 18   AST (SGOT) U/L 17 15   GLUCOSE mg/dL 124* 92   WBC 10*3/mm3 9.36 10.05   HEMOGLOBIN g/dL 14.3 14.6   PLATELETS 10*3/mm3 130* 127*       Imaging Results (Last 24 Hours)       Procedure Component Value Units Date/Time    CT Abdomen Pelvis Without Contrast [[id number]] Collected: 07/10/23 1319     Updated: 07/10/23 1339    Narrative:      PROCEDURE: CT ABDOMEN PELVIS WO CONTRAST-     HISTORY: Bowel perforation; Z12.11-Encounter for screening for malignant  neoplasm of colon; K50.90-Crohn's disease, unspecified, without  complications;  D64.9-Anemia, unspecified        COMPARISON: February 15, 2022     TECHNIQUE: Axial CT images of the abdomen and pelvis were obtained  without contrast. Coronal reformatted images were also obtained.This  study was performed with techniques to keep radiation doses as low as  reasonably achievable, (ALARA). Individualized dose reduction techniques  using automated exposure control or adjustment of mA and/or kV according  to the patient size were employed.        FINDINGS:      ABDOMEN: The lung bases are clear.  Postoperative changes are seen from  right nephrectomy. There is no evidence of left renal stone or  hydronephrosis.  Multiple gallstones are seen in the gallbladder.  The  liver, spleen and pancreas have an unremarkable unenhanced appearance.   No mass or adenopathy is seen.  Postoperative changes are seen in the  right abdomen. A very large amount of pneumoperitoneum is seen measuring  up to 8 cm AP. This causes mass effect on the liver and stomach.  Pneumatosis is noted at the hepatic flexure of the colon and to a lesser  extent in the ascending colon.     PELVIS:  Images of the pelvis reveal a large amount of pneumoperitoneum.  There is also a moderate to large amount of extraperitoneal air. A right  pelvis ostomy is present. Scattered diverticula are seen in the colon.   A small amount of pelvic free fluid is seen which is likely reactive.       Impression:       Large amount of pneumoperitoneum and moderate to large amount of  extraperitoneal air. Since there is pneumatosis of the hepatic flexure  of the colon and to a lesser extent the ascending colon, one of these  may represent the site of the perforation.                 This report was signed and finalized on 7/10/2023 1:24 PM by Vick Simpson MD.    XR Abdomen 2+ VW with Chest 1 VW [[id number]] Collected: 07/10/23 1205     Updated: 07/10/23 1304    Narrative:      PROCEDURE: XR ABDOMEN 2+ VIEWS W CHEST 1 VW-     HISTORY: Post colonosocpy with  abdominal pain, distension;  Z12.11-Encounter for screening for malignant neoplasm of colon;  K50.90-Crohn's disease, unspecified, without complications;  D64.9-Anemia, unspecified     COMPARISON: Chest x-ray dated May 2023 and CT scan of the abdomen and  pelvis dated October 2022.     FINDINGS: Chest: A single view of the chest demonstrates the heart to be   normal in size . The mediastinum is normal. The lungs are clear.     Abdomen: Flat and upright views of the abdomen were obtained.  Postoperative changes are seen in the right abdomen and pelvis. There is  a large amount of intraperitoneal free air and apparent right abdominal  and pelvic extraperitoneal free air present.       Impression:      Large amount of free intraperitoneal air. Apparent right  abdominal and pelvic extraperitoneal free air. This finding was called  to the patient's nurse, Suyapa, on July 10, 2023 at 12:05 PM.                 Images were reviewed, interpreted, and dictated by Dr. Vick Simpson MD  Transcribed by Jaelyn Cortes PA-C.     This report was signed and finalized on 7/10/2023 1:02 PM by Vick Simpson MD.               Assessment / Plan    Assessment/Recommendations:    Postprocedure bowel perforation without sepsis  Complicated Crohn's disease involving small bowel and the large intestine  History of right colectomy with end ileostomy for complicated Crohn's disease  History of colonic and small bowel strictures  5.   Excessive stoma output  Patient has a complicated Crohn's disease involving the small bowel and the large intestine for over 20 years.  He had a prior right colectomy with the end ileostomy for Crohn's complication.  Prior colonoscopy revealed a left colon inflammation and the distal ileal inflammation suggesting Crohn's disease.  He is also known to have a distal ileal stricture and colonic strictures.  He had a multiple episodes of bowel perforation during colonoscopy at least 3 times in the past, last  one was in 2017, treated conservatively.     He had a surveillance limited colonoscopy and ileoscopy yesterday on 7/10/2023.  There was a moderate stricture noted in the mid rectum.  Unable to pass the colonoscope and the scope changed to gastroscope and scope was advanced..  He was noted to have a severe diverticulosis involving the sigmoid.  There was 1 more severe stricture at about 30 cm from anal verge and I was not able to advance the scope with the gastroscope.  No further attempt was made to advance the scope and scope was removed.  During withdrawal there was no obvious mucosal tear or perforation identified.  Biopsies obtained from the sigmoid colon and the rectum.    Postprocedure patient developed severe abdominal pain with abdominal distention and had x-rays followed by CT abdomen which revealed intra-abdominal free air.  As patient has a colon blind end Doximity and and a severe stricture distally I suspect likely had a closed-loop obstruction of the colonic loop with insufflation and perforation.  Other differentials include progression of the stricture site, perforation from the friable proximal colon with with ongoing Crohn's and air insufflation, rare possibility of diverticular perforation cannot be ruled out. Case was discussed with surgery and was admitted for further evaluation management.  He was managed conservatively with IV antibiotic and bowel rest and IV fluids.    Today his left side abdominal pain has improved however he still has significant right side abdominal pain mostly in the right upper quadrant.  No further abdominal distention.  No fever.  No nausea vomiting.  Stoma functioning normally. Stoma output liquid stool.  Patient appears to be responding well to the conservative therapy.    Patient has a significant fibrotic strictures involving the small bowel and the large intestine secondary to ongoing Crohn's inflammation without any therapy.  He needs Crohn's treatment with  Biologics.  Preferably would like to give him a immunomodulator along with the anti-TNF to begin with.  However given his CKD and possible absorption issues with rapid transit, we may not be able to give him any pills.  We need to discontinue his a Dupixent before starting any Biologics.  I have discussed this with the patient and he is agreeable.    As well as the remaining left side colon, would recommend subtotal colectomy as it is almost not possible to keep him on a surveillance with a colonoscopy with perforation multiple times in the recent past.  Patient wants to keep his anal sphincter intact and we can monitor with the endoscope for surveillance.    Continue management as per surgery. Surgery consult and recommendation appreciated.   Chronic hepatitis panel  TB Gold plus  We will work him up for anti-TNF therapy, possibly Remicade infusion +/- immunomodulators.  I have discussed the risk and benefits involved and patient is agreeable to proceed with the medication we will possibly consider at least a Remicade infusion for now    We will refer him to colorectal as OP for colectomy as above  Patient may need a segmental resection of the kilo terminal ileal stricture with the stoma revision in the near future.     Patient likely has excessive stoma output with renal insufficiency.  We will start him on loperamide 2 mg p.o. twice daily when once he is able to take p.o.  All of the above issues has been discussed with the patient today.  We will follow him up in the office in next 2 to 4 weeks    5.  History of peptic ulcer disease  6.  Intermittent epigastric pain  7.  Hypothyroidism      Thank you very much for letting me participate in the care of this patient.  Please do not hesitate to call me if you have any questions.    Yazmin Martin MD  [facility] [place]  7/11/2023  12:22 EDT    Please note that portions of this note may have been completed with a voice recognition program.

## 2023-07-11 NOTE — PROGRESS NOTES
[facility]  INTERNAL MEDICINE PROGRESS NOTE    Name:  Jorge Mclaughlin   Age:  63 y.o.  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  He will in the morning underwent surveillance colonoscopy due to his history of Crohn's disease and who has had perforation of his bowel due to the colonoscopy in the past 2 times today after the procedure was done noted significant pain.  There was abdominal distention and the x-ray showed that there was pneumoperitoneum and there was signs of perforation which was confirmed on the CAT scan  Patient was given Dilaudid and further admitted as inpatient for further management of his complication  Surgical consult has been done and Dr. To recommends conservative treatment and he is currently n.p.o. as well as on IV fluids and IV Zosyn    Patient seen on  and he does have significant pain over the right lower quadrant still.  He states his Dilaudid is there but not helping him enough.      Vital Signs:    Temp:  [97.2 °F (36.2 °C)-98.9 °F (37.2 °C)] 98.5 °F (36.9 °C)  Heart Rate:  [] 83  Resp:  [18] 18  BP: (105-129)/(65-94) 114/65    Intake and output:    I/O last 3 completed shifts:  In: 575 [P.O.:75; I.V.:500]  Out: -   I/O this shift:  In: 1240 [P.O.:240; I.V.:1000]  Out: -     Physical Examination:    General Appearance:    Alert and cooperative, not in any acute distress.   Head:    Atraumatic  and normocephalic, without obvious abnormality.   Eyes:            PERRLA,  No pallor. Extraocular movements are within normal limits.   Neck:   Supple,  No lymph glands, no bruit   Lungs:     Chest shape is normal. Breath sounds heard bilaterally equally.  No crackles or wheezing.     Heart:    Normal S1 and S2, no murmur,  No JVD   Abdomen:   Bowel sounds there, significant tenderness over the right quadrant, ileostomy in place   Extremities:   Moves all extremities well, no edema, no cyanosis,    Skin:   No  bruising or rash.   Neurologic:   Grossly nonfocal and moves all extremities.      Laboratory results:  Results from last 7 days   Lab Units 07/11/23  0643 07/10/23  1436   SODIUM mmol/L 139 141   POTASSIUM mmol/L 4.1 3.7   CHLORIDE mmol/L 109* 107   CO2 mmol/L 20.5* 21.5*   BUN mg/dL 19 18   CREATININE mg/dL 2.70* 2.18*   CALCIUM mg/dL 8.1* 8.8   BILIRUBIN mg/dL 1.6* 0.9   ALK PHOS U/L 55 57   ALT (SGPT) U/L 21 18   AST (SGOT) U/L 17 15   GLUCOSE mg/dL 124* 92     Results from last 7 days   Lab Units 07/11/23  0643 07/10/23  1436   WBC 10*3/mm3 9.36 10.05   HEMOGLOBIN g/dL 14.3 14.6   HEMATOCRIT % 43.9 43.8   PLATELETS 10*3/mm3 130* 127*                   Radiology results:    Imaging Results (Last 24 Hours)       ** No results found for the last 24 hours. **            I have reviewed the patient's radiology reports.    Medication Review:     I have reviewed the patients active and prn medications.     Assessment:      Bowel perforation    Acquired hypothyroidism    Chronic kidney disease, stage III (moderate)    Hx of unilateral nephrectomy, right    Intractable pain    History of ileostomy    Encounter for screening for malignant neoplasm of colon    Anemia    Crohn's disease without complication    Colon perforation          Plan:    1 Colon perforation s/p colonoscopy-he is being treated conservatively and appreciate surgical input and Dr. To's consult.  He would benefit from total colectomy as per  discussion but that would be not possible during this hospitalization and Dr. To recommends him to be referred to the colorectal surgeon at   Optimize pain control and continue him on IV fluids and n.p.o.    2.  Intractable pain-at present he is on Dilaudid 0.5 every 2 hours and we will continue that repeat a CT scan of the abdomen in a.m.    3.  Crohn's disease-management as per GI recommendations    Patient has been explained the goals of treatment and plan of care and see rest as per orders    Yazan Corbett MD  07/11/23  18:45 EDT      Please note that portions of this note were completed with a voice recognition program.

## 2023-07-11 NOTE — CASE MANAGEMENT/SOCIAL WORK
Discharge Planning Assessment  [facility]     Patient Name: Jorge Mclaughlin  MRN: [id number]  Today's Date: 7/11/2023    Admit Date: 7/10/2023    Plan: Pt discharging to home. No DME needed. Family to transport.   Discharge Needs Assessment       Row Name 07/11/23 1225       Living Environment    People in Home spouse    Current Living Arrangements home    Able to Return to Prior Arrangements yes       Transportation Needs    In the past 12 months, has lack of transportation kept you from medical appointments or from getting medications? no    In the past 12 months, has lack of transportation kept you from meetings, work, or from getting things needed for daily living? No       Food Insecurity    Within the past 12 months, you worried that your food would run out before you got the money to buy more. Never true    Within the past 12 months, the food you bought just didn't last and you didn't have money to get more. Never true       Transition Planning    Patient/Family Anticipates Transition to home    Patient/Family Anticipated Services at Transition none    Transportation Anticipated family or friend will provide       Discharge Needs Assessment    Equipment Currently Used at Home none    Concerns to be Addressed no discharge needs identified    Provided Post Acute Provider List? N/A    Provided Post Acute Provider Quality & Resource List? N/A    Patient's Choice of Community Agency(s) Pt to be discharged home                   Discharge Plan       Row Name 07/11/23 1226       Plan    Plan Pt discharging to home. No DME needed. Family to transport.    Patient/Family in Agreement with Plan yes    Final Discharge Disposition Code 01 - home or self-care                  Continued Care and Services - Admitted Since 7/10/2023    Coordination has not been started for this encounter.       Expected Discharge Date and Time       Expected Discharge Date Expected Discharge Time    Jul 10, 2023            Demographic  Summary       Row Name 07/11/23 1224       General Information    Admission Type inpatient    Arrived From home    Referral Source admission list    Reason for Consult discharge planning    Preferred Language English       Contact Information    Permission Granted to Share Info With     Contact Information Obtained for                    Functional Status       Row Name 07/11/23 1224       Functional Status    Usual Activity Tolerance moderate    Current Activity Tolerance moderate       Functional Status, IADL    Medications independent    Meal Preparation independent    Housekeeping independent    Laundry independent    Shopping independent       Mental Status    General Appearance WDL WDL                   Psychosocial       Row Name 07/11/23 1224       Values/Beliefs    Spiritual, Cultural Beliefs, Jain Practices, Values that Affect Care no       Intellectual Performance WDL    Level of Consciousness Alert       Coping/Stress    Major Change/Loss/Stressor illness    Patient Personal Strengths strong support system       Developmental Stage (Eriksson's)    Developmental Stage Stage 7 (35-65 years/Middle Adulthood) Generativity vs. Stagnation       C-SSRS (Recent)    Q1 Wished to be Dead (Past Month) no    Q2 Suicidal Thoughts (Past Month) no    Q6 Suicide Behavior (Lifetime) no       Violence Risk    Feels Like Hurting Others no    Previous Attempt to Harm Others no                   Abuse/Neglect    No documentation.                  Legal    No documentation.                  Substance Abuse    No documentation.                  Patient Forms    No documentation.                     Wendy Abdul

## 2023-07-11 NOTE — PROGRESS NOTES
LOS: 1 day   Patient Care Team:  Yazan Corbett MD as PCP - General    Chief Complaint:  follow up colon perforation    Subjective     Interval History:   No acute events reported overnight, however, review of the nursing notes demonstrates that the patient's nurse overnight identify the development of crepitus in the right lower leg on her assessment.  Today, the patient reports that his pain has improved overall.  He still has some discomfort in the right lateral abdomen.  His ileostomy has been functioning, with passage of both air, and liquid stool.  He reports no new complaints.     Review of Systems:   All systems were reviewed and negative except for:  Gastrointestinal: positive for  See HPI    Objective     Vital Signs  Temp:  [97.2 °F (36.2 °C)-98.9 °F (37.2 °C)] 98.5 °F (36.9 °C)  Heart Rate:  [] 83  Resp:  [18] 18  BP: (105-129)/(65-94) 114/65    Physical Exam:    General Appearance:    Alert, cooperative, in no acute distress   Head:    Normocephalic, without obvious abnormality, atraumatic   Eyes:            Lids and lashes normal, conjunctivae and sclerae normal, no   icterus, no pallor, corneas clear, PERRLA   Ears:    Ears appear intact with no abnormalities noted   Lungs:   Respirations regular, and unlabored    Heart:    Regular rhythm and normal rate   Abdomen:   Minimally distended, soft, improved tenderness throughout the abdomen.  Most significant tenderness to palpation is along the right lateral abdomen and in the peristomal region.   Extremities:   Moves all extremities well, no edema.  Palpation of the right lower extremity demonstrates the presence of crepitus along the anterior tibia and along the lateral aspect of the lower leg.  No skin changes.   Pulses:   Pulses palpable and equal bilaterally   Skin:   No bleeding, bruising or rash   Neurologic: Alert and oriented x3.  No gross deficits.        Results Review:    I reviewed the patient's new clinical results.    Results  "from last 7 days   Lab Units 07/11/23  0643 07/10/23  1436   SODIUM mmol/L 139 141   POTASSIUM mmol/L 4.1 3.7   CHLORIDE mmol/L 109* 107   CO2 mmol/L 20.5* 21.5*   BUN mg/dL 19 18   CREATININE mg/dL 2.70* 2.18*   CALCIUM mg/dL 8.1* 8.8   BILIRUBIN mg/dL 1.6* 0.9   ALK PHOS U/L 55 57   ALT (SGPT) U/L 21 18   AST (SGOT) U/L 17 15   GLUCOSE mg/dL 124* 92       Results from last 7 days   Lab Units 07/11/23  0643 07/10/23  1436   WBC 10*3/mm3 9.36 10.05   HEMOGLOBIN g/dL 14.3 14.6   HEMATOCRIT % 43.9 43.8   PLATELETS 10*3/mm3 130* 127*         Medication Review:  Scheduled Meds:piperacillin-tazobactam, 3.375 g, Intravenous, Q8H  sodium chloride, 10 mL, Intravenous, Q12H      Continuous Infusions:dextrose 5 % and sodium chloride 0.9 %, 100 mL/hr, Last Rate: 100 mL/hr (07/11/23 0927)  Pharmacy to Dose Zosyn,       PRN Meds:.  HYDROmorphone **AND** naloxone    ondansetron    Pharmacy to Dose Zosyn    sodium chloride    sodium chloride       Assessment & Plan       Bowel perforation    Acquired hypothyroidism    Chronic kidney disease, stage III (moderate)    Hx of unilateral nephrectomy, right    Intractable pain    History of ileostomy    Encounter for screening for malignant neoplasm of colon    Anemia    Crohn's disease without complication    Colon perforation    Mr. Mclaughlin is a 63-year-old gentleman with a longstanding history of complicated Crohn's disease dating back at least 40 years, currently admitted following a colon perforation secondary to colonoscopy performed yesterday.  Fortunately, the patient does seem to be improving with nonsurgical management including bowel rest, broad-spectrum IV antibiotic therapy, and pain control.  During today's evaluation, the patient indicated to me that he was \"ready to go ahead with that surgery you talked to me about yesterday.\"  This was a reference to my advice to the patient that he would benefit from a completion colectomy for the purposes of eliminating the diseased " residual colon along with the need for surveillance colonoscopy for colon cancer screening purposes in a high risk patient.  Certainly, as we discussed yesterday, whether or not a portion of the anus and rectum could be left intact would be up to the operating surgeon, but is not out of the realm of possibilities, as a short portion of the rectum could be followed with colonoscopic surveillance for cancer screening purposes.  I explained to the patient in great detail today that it would not be appropriate for him to have this procedure at our facility, and that he would need the expertise of a colorectal surgeon at a tertiary care facility.  I again reiterated to him my recommendation that he be seen at the [facility] by their colorectal surgery department for definitive evaluation and management of his longstanding issues with his residual defunctionalized colon, which has now been perforated at least 3-4 times with colonoscopy.  At that point he asked if he would be transferred today from our facility to the [facility], and I explained to him the process for initiating transfer between hospitals.  Certainly, if it were his desire to be transferred as an inpatient, we could contact the [facility], and place him on the list for transfer to their facility.  However, I did explain to him that this list is managed with the triage system, with the sickest patients taking priority for transfer, including those that need emergency surgical intervention.  In my opinion, he does not require emergent surgical intervention, and could be followed up by colorectal surgery as an outpatient in the coming weeks.  Once the transfer process was explained to the patient, he stated to me that he was ready to go home later tonight, and would be happy to follow-up with colorectal surgery as an outpatient, and would like to have this arranged.  I very clearly explained to  the patient that the ultimate determination regarding his appropriateness for discharge home will be made by the attending/admitting physician Dr. Corbett with input from his gastroenterologist, Dr. Martin, who performed his colonoscopy yesterday.  I discussed with the patient that both historically, and on the current admission he has demonstrated clinical improvement with nonoperative management of the perforation of his defunctionalized colon, and that my recommendation would be that once his pain abdominal pain has reached a level that can be controlled at home with oral medications that he be discharged home to complete a course of at least 10 days of oral antibiotic therapy, with outpatient colorectal consultation to be arranged by his gastroenterologist.  He understood this, and was agreeable.  At this juncture, I would recommend ongoing broad-spectrum IV antibiotics while he is inpatient.  Pain control seems to be the rate limiting step in terms of his appropriateness for discharge home, and I will defer adjustment of his pain medications to the primary service.  From my perspective, I have no plans for surgical intervention at our facility and will sign off for now.  Please feel free to call with questions or new concerns.    Yaima To MD  07/11/23

## 2023-07-11 NOTE — PLAN OF CARE
Goal Outcome Evaluation:               Vital signs currently stable on RA. Patient has rested well throughout the night. Upon assessment of patient, it was noted that patient had crepitus in right lower leg. No distress or discomfort was noted by the patient. Patient ambulated into the bathroom with x1 assist, where he emptied his illeostomy, it was noted that patient had loose stool in bag, no blood was seen with stool. There was some slight rectal bleeding noted on patient's chux pad when he ambulated to the restroom. Patient has been eating some ice chips since arrival to floor once clarification was recieved about patient's diet orders from MD. Pain controlled with PRN pain medications.

## 2023-07-11 NOTE — PLAN OF CARE
Problem: Adult Inpatient Plan of Care  Goal: Absence of Hospital-Acquired Illness or Injury  Intervention: Identify and Manage Fall Risk  Recent Flowsheet Documentation  Taken 7/11/2023 1600 by Benja Ortez RN  Safety Promotion/Fall Prevention:   activity supervised   assistive device/personal items within reach   clutter free environment maintained   fall prevention program maintained   gait belt   lighting adjusted   safety round/check completed   room organization consistent   nonskid shoes/slippers when out of bed  Taken 7/11/2023 1400 by Benja Ortez RN  Safety Promotion/Fall Prevention:   activity supervised   assistive device/personal items within reach   clutter free environment maintained   fall prevention program maintained   gait belt   lighting adjusted   nonskid shoes/slippers when out of bed   room organization consistent   safety round/check completed  Taken 7/11/2023 1200 by Benja Ortez RN  Safety Promotion/Fall Prevention:   activity supervised   assistive device/personal items within reach   clutter free environment maintained   gait belt   fall prevention program maintained   lighting adjusted   nonskid shoes/slippers when out of bed   room organization consistent   safety round/check completed  Taken 7/11/2023 1000 by Benja Ortez RN  Safety Promotion/Fall Prevention:   activity supervised   clutter free environment maintained   assistive device/personal items within reach   fall prevention program maintained   lighting adjusted   nonskid shoes/slippers when out of bed   room organization consistent   safety round/check completed   gait belt  Taken 7/11/2023 0800 by Benja Ortez RN  Safety Promotion/Fall Prevention:   activity supervised   assistive device/personal items within reach   clutter free environment maintained   fall prevention program maintained   gait belt   lighting adjusted   nonskid shoes/slippers when out of bed   room organization consistent   safety round/check  completed  Intervention: Prevent Skin Injury  Recent Flowsheet Documentation  Taken 7/11/2023 1600 by Benja Ortez RN  Body Position:   sitting up in bed   neutral head position   neutral body alignment  Taken 7/11/2023 1400 by Benja Ortez RN  Body Position:   turned   left   sitting up in bed  Taken 7/11/2023 1200 by Benja Ortez RN  Body Position:   right   turned   sitting up in bed   legs elevated  Taken 7/11/2023 1000 by Benja Ortez RN  Body Position:   sitting up in bed   left   turned  Taken 7/11/2023 0800 by Benja Ortez RN  Body Position:   sitting up in bed   neutral head position   neutral body alignment  Skin Protection:   adhesive use limited   tubing/devices free from skin contact   pulse oximeter probe site changed  Intervention: Prevent and Manage VTE (Venous Thromboembolism) Risk  Recent Flowsheet Documentation  Taken 7/11/2023 1600 by Benja Ortez RN  Activity Management: activity encouraged  Taken 7/11/2023 1400 by Benja Ortez RN  Activity Management: activity encouraged  Taken 7/11/2023 1200 by Benja Ortez RN  Activity Management: activity encouraged  Taken 7/11/2023 1000 by Benja Ortez RN  Activity Management: activity encouraged  Taken 7/11/2023 0800 by Benja Ortez RN  Activity Management: activity encouraged  VTE Prevention/Management:   bilateral   sequential compression devices off  Range of Motion: active ROM (range of motion) encouraged  Intervention: Prevent Infection  Recent Flowsheet Documentation  Taken 7/11/2023 1600 by Benja Ortez RN  Infection Prevention:   environmental surveillance performed   single patient room provided  Taken 7/11/2023 1400 by Benja Ortez RN  Infection Prevention:   environmental surveillance performed   single patient room provided  Taken 7/11/2023 1000 by Benja Ortez RN  Infection Prevention:   environmental surveillance performed   single patient room provided  Taken 7/11/2023 0800 by Benja Ortez RN  Infection Prevention:    environmental surveillance performed   single patient room provided  Goal: Optimal Comfort and Wellbeing  Intervention: Provide Person-Centered Care  Recent Flowsheet Documentation  Taken 7/11/2023 0800 by Benja Ortez RN  Trust Relationship/Rapport:   care explained   thoughts/feelings acknowledged   reassurance provided   questions encouraged   questions answered     Problem: Pain Acute  Goal: Acceptable Pain Control and Functional Ability  Intervention: Prevent or Manage Pain  Recent Flowsheet Documentation  Taken 7/11/2023 1600 by Benja Ortez RN  Medication Review/Management: medications reviewed  Taken 7/11/2023 1400 by Benja Ortez RN  Medication Review/Management: medications reviewed  Taken 7/11/2023 1200 by Benja Ortez RN  Medication Review/Management: medications reviewed  Taken 7/11/2023 1000 by Benja Ortez RN  Medication Review/Management: medications reviewed  Taken 7/11/2023 0800 by Benja Ortez RN  Medication Review/Management: medications reviewed  Intervention: Optimize Psychosocial Wellbeing  Recent Flowsheet Documentation  Taken 7/11/2023 0800 by Benja Ortez RN  Supportive Measures: active listening utilized  Diversional Activities:   television   smartphone     Problem: Fall Injury Risk  Goal: Absence of Fall and Fall-Related Injury  Intervention: Identify and Manage Contributors  Recent Flowsheet Documentation  Taken 7/11/2023 1600 by Benja Ortez RN  Medication Review/Management: medications reviewed  Taken 7/11/2023 1400 by Benja Ortez RN  Medication Review/Management: medications reviewed  Taken 7/11/2023 1200 by Benja Ortez RN  Medication Review/Management: medications reviewed  Taken 7/11/2023 1000 by Benja Ortez RN  Medication Review/Management: medications reviewed  Taken 7/11/2023 0800 by Benja Ortez RN  Medication Review/Management: medications reviewed  Self-Care Promotion: independence encouraged  Intervention: Promote Injury-Free Environment  Recent Flowsheet  Documentation  Taken 7/11/2023 1600 by Benja Ortez RN  Safety Promotion/Fall Prevention:   activity supervised   assistive device/personal items within reach   clutter free environment maintained   fall prevention program maintained   gait belt   lighting adjusted   safety round/check completed   room organization consistent   nonskid shoes/slippers when out of bed  Taken 7/11/2023 1400 by Benja Ortez RN  Safety Promotion/Fall Prevention:   activity supervised   assistive device/personal items within reach   clutter free environment maintained   fall prevention program maintained   gait belt   lighting adjusted   nonskid shoes/slippers when out of bed   room organization consistent   safety round/check completed  Taken 7/11/2023 1200 by Benja Ortez RN  Safety Promotion/Fall Prevention:   activity supervised   assistive device/personal items within reach   clutter free environment maintained   gait belt   fall prevention program maintained   lighting adjusted   nonskid shoes/slippers when out of bed   room organization consistent   safety round/check completed  Taken 7/11/2023 1000 by Benja Ortez RN  Safety Promotion/Fall Prevention:   activity supervised   clutter free environment maintained   assistive device/personal items within reach   fall prevention program maintained   lighting adjusted   nonskid shoes/slippers when out of bed   room organization consistent   safety round/check completed   gait belt  Taken 7/11/2023 0800 by Benja Ortez RN  Safety Promotion/Fall Prevention:   activity supervised   assistive device/personal items within reach   clutter free environment maintained   fall prevention program maintained   gait belt   lighting adjusted   nonskid shoes/slippers when out of bed   room organization consistent   safety round/check completed     Problem: Skin Injury Risk Increased  Goal: Skin Health and Integrity  Intervention: Optimize Skin Protection  Recent Flowsheet Documentation  Taken  7/11/2023 1600 by Benja Ortez RN  Head of Bed (HOB) Positioning:   HOB elevated   HOB at 60 degrees  Taken 7/11/2023 1400 by Benja Ortez RN  Head of Bed (HOB) Positioning:   HOB elevated   HOB at 60 degrees  Taken 7/11/2023 1200 by Benja Ortez RN  Head of Bed (HOB) Positioning:   HOB elevated   HOB at 60-90 degrees  Taken 7/11/2023 1000 by Benja Ortez RN  Head of Bed (HOB) Positioning:   HOB elevated   HOB at 30-45 degrees  Taken 7/11/2023 0800 by Benja Ortez RN  Pressure Reduction Techniques: frequent weight shift encouraged  Head of Bed (HOB) Positioning:   HOB elevated   HOB at 45 degrees  Skin Protection:   adhesive use limited   tubing/devices free from skin contact   pulse oximeter probe site changed     Problem: Adjustment to Illness (Bowel Disease, Inflammatory)  Goal: Optimal Adaptation to Chronic Illness  Intervention: Support Psychosocial Response to Illness  Recent Flowsheet Documentation  Taken 7/11/2023 0800 by Benja Ortez RN  Supportive Measures: active listening utilized  Family/Support System Care: support provided     Problem: Nutrition Impaired (Bowel Disease, Inflammatory)  Goal: Optimal Nutrition  Intervention: Promote and Optimize Nutrition Intake  Recent Flowsheet Documentation  Taken 7/11/2023 0800 by Benja Ortez RN  Environmental Support: calm environment promoted     Problem: Adjustment to Surgery (Ileostomy)  Goal: Optimal Coping  Intervention: Support Psychosocial Response to Surgery  Recent Flowsheet Documentation  Taken 7/11/2023 0800 by Benja Ortez RN  Supportive Measures: active listening utilized     Problem: Ongoing Anesthesia Effects (Ileostomy)  Goal: Anesthesia/Sedation Recovery  Intervention: Optimize Anesthesia Recovery  Recent Flowsheet Documentation  Taken 7/11/2023 1600 by Benja Ortez RN  Safety Promotion/Fall Prevention:   activity supervised   assistive device/personal items within reach   clutter free environment maintained   fall prevention program  maintained   gait belt   lighting adjusted   safety round/check completed   room organization consistent   nonskid shoes/slippers when out of bed  Taken 7/11/2023 1400 by Benja Ortez RN  Safety Promotion/Fall Prevention:   activity supervised   assistive device/personal items within reach   clutter free environment maintained   fall prevention program maintained   gait belt   lighting adjusted   nonskid shoes/slippers when out of bed   room organization consistent   safety round/check completed  Taken 7/11/2023 1200 by Benja Ortez RN  Safety Promotion/Fall Prevention:   activity supervised   assistive device/personal items within reach   clutter free environment maintained   gait belt   fall prevention program maintained   lighting adjusted   nonskid shoes/slippers when out of bed   room organization consistent   safety round/check completed  Taken 7/11/2023 1000 by Benja Ortez RN  Safety Promotion/Fall Prevention:   activity supervised   clutter free environment maintained   assistive device/personal items within reach   fall prevention program maintained   lighting adjusted   nonskid shoes/slippers when out of bed   room organization consistent   safety round/check completed   gait belt  Taken 7/11/2023 0800 by Benja Ortez RN  Safety Promotion/Fall Prevention:   activity supervised   assistive device/personal items within reach   clutter free environment maintained   fall prevention program maintained   gait belt   lighting adjusted   nonskid shoes/slippers when out of bed   room organization consistent   safety round/check completed     Problem: Pain (Ileostomy)  Goal: Acceptable Pain Control  Intervention: Prevent or Manage Pain  Recent Flowsheet Documentation  Taken 7/11/2023 0800 by Benja Ortez RN  Diversional Activities:   television   smartphone     Problem: Postoperative Stoma Care (Ileostomy)  Goal: Optimal Stoma Healing  Intervention: Provide Ostomy and Peristomal Skin Care  Recent Flowsheet  Documentation  Taken 7/11/2023 0800 by Benja Ortez RN  Skin Protection:   adhesive use limited   tubing/devices free from skin contact   pulse oximeter probe site changed     Problem: Respiratory Compromise (Ileostomy)  Goal: Effective Oxygenation and Ventilation  Intervention: Optimize Oxygenation and Ventilation  Recent Flowsheet Documentation  Taken 7/11/2023 1600 by Benja Ortez RN  Head of Bed (HOB) Positioning:   HOB elevated   HOB at 60 degrees  Taken 7/11/2023 1400 by Benja Ortez RN  Head of Bed (HOB) Positioning:   HOB elevated   HOB at 60 degrees  Taken 7/11/2023 1200 by Benja Ortez RN  Head of Bed (HOB) Positioning:   HOB elevated   HOB at 60-90 degrees  Taken 7/11/2023 1000 by Benja Ortez RN  Head of Bed (HOB) Positioning:   HOB elevated   HOB at 30-45 degrees  Taken 7/11/2023 0800 by Benja Ortez RN  Head of Bed (HOB) Positioning:   HOB elevated   HOB at 45 degrees   Goal Outcome Evaluation:  Plan of Care Reviewed With: patient        Progress: improving  Outcome Evaluation: Patient had some complaints of pain during shift; see MAR. Patient ambulated to the bathroom. Recieved IV fluids as ordered. Will continue to monitor.

## 2023-07-12 ENCOUNTER — APPOINTMENT (OUTPATIENT)
Dept: CARDIOLOGY | Facility: HOSPITAL | Age: 63
DRG: 393 | End: 2023-07-12
Payer: MEDICAID

## 2023-07-12 LAB
BH CV ECHO MEAS - AO MAX PG: 8.5 MMHG
BH CV ECHO MEAS - AO MEAN PG: 4 MMHG
BH CV ECHO MEAS - AO ROOT DIAM: 3 CM
BH CV ECHO MEAS - AO V2 MAX: 146 CM/SEC
BH CV ECHO MEAS - AO V2 VTI: 22.2 CM
BH CV ECHO MEAS - AVA(I,D): 2.42 CM2
BH CV ECHO MEAS - EDV(CUBED): 104.5 ML
BH CV ECHO MEAS - EDV(MOD-SP2): 66.6 ML
BH CV ECHO MEAS - EDV(MOD-SP4): 89.1 ML
BH CV ECHO MEAS - EF(MOD-BP): 46.8 %
BH CV ECHO MEAS - EF(MOD-SP2): 49.5 %
BH CV ECHO MEAS - EF(MOD-SP4): 46.5 %
BH CV ECHO MEAS - ESV(CUBED): 55.3 ML
BH CV ECHO MEAS - ESV(MOD-SP2): 33.6 ML
BH CV ECHO MEAS - ESV(MOD-SP4): 47.7 ML
BH CV ECHO MEAS - FS: 19.1 %
BH CV ECHO MEAS - IVS/LVPW: 0.78 CM
BH CV ECHO MEAS - IVSD: 0.69 CM
BH CV ECHO MEAS - LA DIMENSION: 3 CM
BH CV ECHO MEAS - LAT PEAK E' VEL: 9.9 CM/SEC
BH CV ECHO MEAS - LV DIASTOLIC VOL/BSA (35-75): 51.1 CM2
BH CV ECHO MEAS - LV MASS(C)D: 119.7 GRAMS
BH CV ECHO MEAS - LV MAX PG: 5.6 MMHG
BH CV ECHO MEAS - LV MEAN PG: 3 MMHG
BH CV ECHO MEAS - LV SYSTOLIC VOL/BSA (12-30): 27.3 CM2
BH CV ECHO MEAS - LV V1 MAX: 118 CM/SEC
BH CV ECHO MEAS - LV V1 VTI: 20 CM
BH CV ECHO MEAS - LVIDD: 4.7 CM
BH CV ECHO MEAS - LVIDS: 3.8 CM
BH CV ECHO MEAS - LVOT AREA: 2.7 CM2
BH CV ECHO MEAS - LVOT DIAM: 1.85 CM
BH CV ECHO MEAS - LVPWD: 0.88 CM
BH CV ECHO MEAS - MED PEAK E' VEL: 6.2 CM/SEC
BH CV ECHO MEAS - MV A MAX VEL: 88.6 CM/SEC
BH CV ECHO MEAS - MV DEC SLOPE: 517 CM/SEC2
BH CV ECHO MEAS - MV DEC TIME: 0.17 MSEC
BH CV ECHO MEAS - MV E MAX VEL: 89.7 CM/SEC
BH CV ECHO MEAS - MV E/A: 1.01
BH CV ECHO MEAS - MV MAX PG: 3.8 MMHG
BH CV ECHO MEAS - MV MEAN PG: 2 MMHG
BH CV ECHO MEAS - MV V2 VTI: 27.9 CM
BH CV ECHO MEAS - MVA(VTI): 1.93 CM2
BH CV ECHO MEAS - PA ACC TIME: 0.11 SEC
BH CV ECHO MEAS - PA V2 MAX: 107 CM/SEC
BH CV ECHO MEAS - RV MAX PG: 2.26 MMHG
BH CV ECHO MEAS - RV V1 MAX: 75.1 CM/SEC
BH CV ECHO MEAS - RV V1 VTI: 14.3 CM
BH CV ECHO MEAS - SI(MOD-SP2): 18.9 ML/M2
BH CV ECHO MEAS - SI(MOD-SP4): 23.7 ML/M2
BH CV ECHO MEAS - SV(LVOT): 53.8 ML
BH CV ECHO MEAS - SV(MOD-SP2): 33 ML
BH CV ECHO MEAS - SV(MOD-SP4): 41.4 ML
BH CV ECHO MEAS - TAPSE (>1.6): 1.64 CM
BH CV ECHO MEASUREMENTS AVERAGE E/E' RATIO: 11.14
BH CV XLRA - RV BASE: 2.9 CM
BH CV XLRA - RV LENGTH: 7.1 CM
BH CV XLRA - RV MID: 2.42 CM
BH CV XLRA - TDI S': 10.8 CM/SEC
LEFT ATRIUM VOLUME INDEX: 13.2 ML/M2

## 2023-07-12 PROCEDURE — 93306 TTE W/DOPPLER COMPLETE: CPT | Performed by: INTERNAL MEDICINE

## 2023-07-12 PROCEDURE — 86480 TB TEST CELL IMMUN MEASURE: CPT | Performed by: INTERNAL MEDICINE

## 2023-07-12 PROCEDURE — 86704 HEP B CORE ANTIBODY TOTAL: CPT | Performed by: INTERNAL MEDICINE

## 2023-07-12 PROCEDURE — 25010000002 PIPERACILLIN SOD-TAZOBACTAM PER 1 G: Performed by: INTERNAL MEDICINE

## 2023-07-12 PROCEDURE — 99232 SBSQ HOSP IP/OBS MODERATE 35: CPT | Performed by: INTERNAL MEDICINE

## 2023-07-12 PROCEDURE — 86708 HEPATITIS A ANTIBODY: CPT | Performed by: INTERNAL MEDICINE

## 2023-07-12 PROCEDURE — 93306 TTE W/DOPPLER COMPLETE: CPT

## 2023-07-12 PROCEDURE — 25010000002 HYDROMORPHONE 1 MG/ML SOLUTION: Performed by: INTERNAL MEDICINE

## 2023-07-12 PROCEDURE — 99222 1ST HOSP IP/OBS MODERATE 55: CPT | Performed by: INTERNAL MEDICINE

## 2023-07-12 RX ORDER — LOPERAMIDE HCL 1 MG/7.5ML
2 SOLUTION ORAL 2 TIMES DAILY
Status: DISCONTINUED | OUTPATIENT
Start: 2023-07-12 | End: 2023-07-14 | Stop reason: HOSPADM

## 2023-07-12 RX ADMIN — DEXTROSE AND SODIUM CHLORIDE 100 ML/HR: 5; 900 INJECTION, SOLUTION INTRAVENOUS at 10:43

## 2023-07-12 RX ADMIN — HYDROMORPHONE HYDROCHLORIDE 0.5 MG: 1 INJECTION, SOLUTION INTRAMUSCULAR; INTRAVENOUS; SUBCUTANEOUS at 05:12

## 2023-07-12 RX ADMIN — HYDROMORPHONE HYDROCHLORIDE 0.5 MG: 1 INJECTION, SOLUTION INTRAMUSCULAR; INTRAVENOUS; SUBCUTANEOUS at 19:27

## 2023-07-12 RX ADMIN — Medication 2 MG: at 21:45

## 2023-07-12 RX ADMIN — Medication 10 ML: at 08:02

## 2023-07-12 RX ADMIN — HYDROMORPHONE HYDROCHLORIDE 0.5 MG: 1 INJECTION, SOLUTION INTRAMUSCULAR; INTRAVENOUS; SUBCUTANEOUS at 10:43

## 2023-07-12 RX ADMIN — HYDROMORPHONE HYDROCHLORIDE 0.5 MG: 1 INJECTION, SOLUTION INTRAMUSCULAR; INTRAVENOUS; SUBCUTANEOUS at 23:49

## 2023-07-12 RX ADMIN — HYDROMORPHONE HYDROCHLORIDE 0.5 MG: 1 INJECTION, SOLUTION INTRAMUSCULAR; INTRAVENOUS; SUBCUTANEOUS at 02:56

## 2023-07-12 RX ADMIN — HYDROMORPHONE HYDROCHLORIDE 0.5 MG: 1 INJECTION, SOLUTION INTRAMUSCULAR; INTRAVENOUS; SUBCUTANEOUS at 00:30

## 2023-07-12 RX ADMIN — Medication 10 ML: at 21:46

## 2023-07-12 RX ADMIN — HYDROMORPHONE HYDROCHLORIDE 0.5 MG: 1 INJECTION, SOLUTION INTRAMUSCULAR; INTRAVENOUS; SUBCUTANEOUS at 12:48

## 2023-07-12 RX ADMIN — HYDROMORPHONE HYDROCHLORIDE 0.5 MG: 1 INJECTION, SOLUTION INTRAMUSCULAR; INTRAVENOUS; SUBCUTANEOUS at 14:49

## 2023-07-12 RX ADMIN — TAZOBACTAM SODIUM AND PIPERACILLIN SODIUM 3.38 G: 375; 3 INJECTION, SOLUTION INTRAVENOUS at 05:12

## 2023-07-12 RX ADMIN — DEXTROSE AND SODIUM CHLORIDE 100 ML/HR: 5; 900 INJECTION, SOLUTION INTRAVENOUS at 17:20

## 2023-07-12 RX ADMIN — HYDROMORPHONE HYDROCHLORIDE 0.5 MG: 1 INJECTION, SOLUTION INTRAMUSCULAR; INTRAVENOUS; SUBCUTANEOUS at 21:45

## 2023-07-12 RX ADMIN — HYDROMORPHONE HYDROCHLORIDE 0.5 MG: 1 INJECTION, SOLUTION INTRAMUSCULAR; INTRAVENOUS; SUBCUTANEOUS at 17:19

## 2023-07-12 RX ADMIN — TAZOBACTAM SODIUM AND PIPERACILLIN SODIUM 3.38 G: 375; 3 INJECTION, SOLUTION INTRAVENOUS at 21:45

## 2023-07-12 RX ADMIN — TAZOBACTAM SODIUM AND PIPERACILLIN SODIUM 3.38 G: 375; 3 INJECTION, SOLUTION INTRAVENOUS at 12:48

## 2023-07-12 RX ADMIN — HYDROMORPHONE HYDROCHLORIDE 0.5 MG: 1 INJECTION, SOLUTION INTRAMUSCULAR; INTRAVENOUS; SUBCUTANEOUS at 08:02

## 2023-07-12 NOTE — THERAPY DISCHARGE NOTE
Attempted PT eval. Patient states he is still having significant abdominal pain but has been performing bed mobility, sit to stand, transfers and ambulation independently and without difficulty. He states he has no skilled PT needs identified. Patient instructed to notify RN if his needs change and he feels like PT should become involved with his care. Patient voiced understanding.

## 2023-07-12 NOTE — PLAN OF CARE
Goal Outcome Evaluation:  Plan of Care Reviewed With: patient        Progress: improving  Outcome Evaluation: Patient had some complaints of pain today; see MAR. Participated in therapy this afternoon. Patient ambulated to bathroom today. Will continue to monitor.

## 2023-07-12 NOTE — PLAN OF CARE
Problem: Adult Inpatient Plan of Care  Goal: Plan of Care Review  Outcome: Ongoing, Progressing  Goal: Patient-Specific Goal (Individualized)  Outcome: Ongoing, Progressing  Goal: Absence of Hospital-Acquired Illness or Injury  Outcome: Ongoing, Progressing  Intervention: Identify and Manage Fall Risk  Recent Flowsheet Documentation  Taken 7/11/2023 2000 by Karyn Adams RN  Safety Promotion/Fall Prevention: safety round/check completed  Intervention: Prevent Skin Injury  Recent Flowsheet Documentation  Taken 7/11/2023 2000 by Karyn Adams RN  Body Position: sitting up in bed  Intervention: Prevent and Manage VTE (Venous Thromboembolism) Risk  Recent Flowsheet Documentation  Taken 7/11/2023 2000 by Karyn Adams RN  Activity Management: activity encouraged  Intervention: Prevent Infection  Recent Flowsheet Documentation  Taken 7/11/2023 2000 by Karyn Adams RN  Infection Prevention: environmental surveillance performed  Goal: Optimal Comfort and Wellbeing  Outcome: Ongoing, Progressing  Goal: Readiness for Transition of Care  Outcome: Ongoing, Progressing     Problem: Pain Acute  Goal: Acceptable Pain Control and Functional Ability  Outcome: Ongoing, Progressing     Problem: Fall Injury Risk  Goal: Absence of Fall and Fall-Related Injury  Outcome: Ongoing, Progressing  Intervention: Promote Injury-Free Environment  Recent Flowsheet Documentation  Taken 7/11/2023 2000 by Karyn Adams RN  Safety Promotion/Fall Prevention: safety round/check completed     Problem: Skin Injury Risk Increased  Goal: Skin Health and Integrity  Outcome: Ongoing, Progressing  Intervention: Optimize Skin Protection  Recent Flowsheet Documentation  Taken 7/11/2023 2000 by Karyn Adams RN  Head of Bed (HOB) Positioning: HOB elevated     Problem: Adjustment to Illness (Bowel Disease, Inflammatory)  Goal: Optimal Adaptation to Chronic Illness  Outcome: Ongoing, Progressing     Problem: Diarrhea (Bowel Disease,  Inflammatory)  Goal: Diarrhea Symptom Relief  Outcome: Ongoing, Progressing     Problem: Infection (Bowel Disease, Inflammatory)  Goal: Absence of Infection Signs and Symptoms  Outcome: Ongoing, Progressing     Problem: Nutrition Impaired (Bowel Disease, Inflammatory)  Goal: Optimal Nutrition  Outcome: Ongoing, Progressing     Problem: Pain (Bowel Disease, Inflammatory)  Goal: Acceptable Pain Control  Outcome: Ongoing, Progressing     Problem: Adjustment to Surgery (Ileostomy)  Goal: Optimal Coping  Outcome: Ongoing, Progressing     Problem: Bleeding (Ileostomy)  Goal: Absence of Bleeding  Outcome: Ongoing, Progressing     Problem: Fluid, Electrolyte and Nutrition Imbalance (Ileostomy)  Goal: Fluid, Electrolyte and Nutrition Balance  Outcome: Ongoing, Progressing     Problem: Infection (Ileostomy)  Goal: Absence of Infection Signs and Symptoms  Outcome: Ongoing, Progressing     Problem: Ongoing Anesthesia Effects (Ileostomy)  Goal: Anesthesia/Sedation Recovery  Outcome: Ongoing, Progressing  Intervention: Optimize Anesthesia Recovery  Recent Flowsheet Documentation  Taken 7/11/2023 2000 by Karyn Adams, RN  Safety Promotion/Fall Prevention: safety round/check completed     Problem: Pain (Ileostomy)  Goal: Acceptable Pain Control  Outcome: Ongoing, Progressing     Problem: Postoperative Nausea and Vomiting (Ileostomy)  Goal: Nausea and Vomiting Relief  Outcome: Ongoing, Progressing     Problem: Postoperative Stoma Care (Ileostomy)  Goal: Optimal Stoma Healing  Outcome: Ongoing, Progressing     Problem: Postoperative Urinary Retention (Ileostomy)  Goal: Effective Urinary Elimination  Outcome: Ongoing, Progressing     Problem: Respiratory Compromise (Ileostomy)  Goal: Effective Oxygenation and Ventilation  Outcome: Ongoing, Progressing  Intervention: Optimize Oxygenation and Ventilation  Recent Flowsheet Documentation  Taken 7/11/2023 2000 by Karyn Adams, RN  Head of Bed (HOB) Positioning: HOB elevated      Problem: Infection  Goal: Absence of Infection Signs and Symptoms  Outcome: Ongoing, Progressing   Goal Outcome Evaluation:

## 2023-07-12 NOTE — CASE MANAGEMENT/SOCIAL WORK
Case Management/Social Work    Patient Name:  Jorge Mclaughlin  YOB: 1960  MRN: [id number]  Admit Date:  7/10/2023      Pt. Will discharge home once medically stable. SW/CM will continue to follow for discharge planning.       Electronically signed by:  Erasto Ramirez  07/12/23 13:18 EDT

## 2023-07-12 NOTE — PROGRESS NOTES
In Patient Consult      Date of Consultation: 2023  Patient Name: Jorge Mclaughlin  MRN: [id number]  : 1960     Referring provider: Yazan Corbett MD    Primary care provider:  Yazan Corbett MD    Reason for consultation: Postprocedure bowel perforation    Interval history:   2023  Patient is doing pretty well.  Still has a abdominal pain when he gets up and walk around..  Stoma working well.  No fever or chills.  No abdominal pain.    2023  Patient had a elective EGD followed by limited colonoscopy and ileoscopy on 7/10/2023 for Crohn's surveillance and history of peptic ulcer disease and epigastric pain.  Colonoscopy was incomplete due to severe stricture sigmoid colon.  Postprocedure patient developed abdominal pain and abdominal distention.  Subsequent work-up revealed intraperitoneal free air and was admitted.      Patient has a Crohn's disease of the small intestine and large intestine diagnosed over 20 years ago.  He has a complicated disease and had a prior right colon resection with end ileostomy.  He was known to have a small bowel strictures and small bowel Crohn's ulcerations.  As per record he was taking sulfasalazine initially when Crohn's was diagnosed many years ago. At least for the past 10 years patient was not on any medications.  He had multiple colon perforation during colonoscopy at least 3 times in the past, last one being in 2017.  No colonoscopy done since then.  Postprocedure patient developed again intraperitoneal air and had to be admitted.   CT abdomen pelvis done did not show any significant abdominal contamination. Currently he has been managed conservatively.      He states that his left-sided abdominal pain has improved however right side abdominal pain still persists.  Generally any nausea vomiting.  Liquid stool in the stoma bag without any blood.  No fever chills.  No further abdominal distention.  He normally empties the bag 10-15 times  per day.       Subjective     Past Medical History:   Diagnosis Date    Altered bowel elimination due to intestinal ostomy     PATIENT REPORTS SECONDARY TO A COLON RUPTURE APPROXIMATELY 16-17 YEARS AGO    Arthritis     WRIST    Asthma     as a child    Bowel perforation     states that he had his bowel perforated twice from colonoscopies, and was told that a small scope should always be used in the future.    Cancer     Kidney    CKD (chronic kidney disease)     stage 3    COVID-19 vaccine series completed     Covid vaccine x4 doses    Crohn disease     Gallstones     GERD (gastroesophageal reflux disease)     History of MRSA infection 2017    nose    History of pneumonia     [place] (hard of hearing)     no hearing aids    Hypothyroid     Kidney stones     Seasonal allergies     Spinal headache     Wears glasses        Past Surgical History:   Procedure Laterality Date    COLONOSCOPY      COLONOSCOPY N/A 07/31/2017    Procedure: Colonoscopy through ostomy site and rectum with biopsies;  Surgeon: Vincenzo Galan MD;  Location: [facility] ENDOSCOPY;  Service:     COLONOSCOPY N/A 7/10/2023    Procedure: COLONOSCOPY WITH BIOPSY;  Surgeon: Yazmin Martin MD;  Location: [facility] ENDOSCOPY;  Service: Gastroenterology;  Laterality: N/A;    ENDOSCOPY      ENDOSCOPY N/A 7/10/2023    Procedure: ESOPHAGOGASTRODUODENOSCOPY WITH BIOPSY;  Surgeon: Yazmin Martin MD;  Location: [facility] ENDOSCOPY;  Service: Gastroenterology;  Laterality: N/A;    HEMORRHOIDECTOMY      REPORTS APPROXIMATELY 20 YEARS AGO    HERNIA REPAIR      UMBILICAL    NEPHRECTOMY Right 07/2017    SKIN BIOPSY      benign    SMALL INTESTINE SURGERY      HX OF COLON RUPTURE WITH PLACEMENT OF OSTOMY 16-17 years ago    URETEROSCOPY LASER LITHOTRIPSY WITH STENT INSERTION Left 07/03/2019    Procedure: CYSTOSCOPY, URETEROSCOPY, STONE BASKETING, RETROGRADE PYLEOGRAM,  LASER LITHOTRIPSY WITH STENT INSERTION;  Surgeon: Lazaro Velazquez MD;  Location: [facility] OR;   Service: Urology    URETEROSCOPY LASER LITHOTRIPSY WITH STENT INSERTION Left 2021    Procedure: URETEROSCOPY diagnostic WITH STENT INSERTION  with left retrograde;  Surgeon: Lazaro Velazquez MD;  Location: [facility];  Service: Urology;  Laterality: Left;    WISDOM TOOTH EXTRACTION      WRIST SURGERY Right        Family History   Problem Relation Age of Onset    Heart disease Mother     Cancer Father     No Known Problems Sister     COPD Brother     COPD Sister     Crohn's disease Son     Colon cancer Neg Hx        Social History     Socioeconomic History    Marital status:    Tobacco Use    Smoking status: Former     Packs/day: 2.00     Years: 3.00     Pack years: 6.00     Types: Cigarettes     Quit date:      Years since quittin.5    Smokeless tobacco: Former     Types: Snuff   Vaping Use    Vaping Use: Never used   Substance and Sexual Activity    Alcohol use: No     Comment: QUIT 30 PLUS YEARS    Drug use: No    Sexual activity: Defer         Current Facility-Administered Medications:     dextrose 5 % and sodium chloride 0.9 % infusion, 100 mL/hr, Intravenous, Continuous, Yifan Johnson MD, Stopped at 23 1249    HYDROmorphone (DILAUDID) injection 0.5 mg, 0.5 mg, Intravenous, Q2H PRN, 0.5 mg at 23 1248 **AND** naloxone (NARCAN) injection 0.4 mg, 0.4 mg, Intravenous, Q5 Min PRN, Yifan Johnson MD    ondansetron (ZOFRAN) injection 4 mg, 4 mg, Intravenous, Q6H PRN, Yifan Johnson MD    Pharmacy to Dose Zosyn, , Does not apply, Continuous PRN, Yifan Johnson MD    piperacillin-tazobactam (ZOSYN) 3.375 g in iso-osmotic dextrose 50 ml (premix), 3.375 g, Intravenous, Q8H, Yazan Corbett MD, 3.375 g at 23 1248    sodium chloride 0.9 % flush 10 mL, 10 mL, Intravenous, Q12H, Yifan Johnson MD, 10 mL at 23 0802    sodium chloride 0.9 % flush 10 mL, 10 mL, Intravenous, PRN, Yifan Johnson MD    sodium chloride 0.9 % infusion 40 mL, 40 mL, Intravenous, PRN, Yifan Johnson  "MD    Allergies   Allergen Reactions    Latex Rash       Review of Systems   Constitutional:  Negative for fever.   HENT:  Negative for sore throat and trouble swallowing.    Eyes:  Negative for visual disturbance.   Respiratory:  Negative for cough, chest tightness and shortness of breath.    Cardiovascular:  Negative for chest pain, palpitations and leg swelling.   Gastrointestinal:  Positive for abdominal pain. Negative for blood in stool, constipation, diarrhea, nausea, vomiting and indigestion.   Endocrine: Negative for polyphagia.   Genitourinary:  Negative for dysuria and hematuria.   Musculoskeletal:  Negative for back pain, joint swelling and neck pain.   Skin:  Negative for rash, skin lesions and wound.   Neurological:  Negative for dizziness, seizures, speech difficulty, weakness, numbness and confusion.   Hematological:  Negative for adenopathy. Does not bruise/bleed easily.   Psychiatric/Behavioral:  Negative for hallucinations and depressed mood.      The following portions of the patient's history were reviewed and updated as appropriate: allergies, current medications, past family history, past medical history, past social history, past surgical history and problem list.    Objective     Vitals:    07/12/23 0408 07/12/23 0845 07/12/23 1032 07/12/23 1100   BP: 126/85 134/81  138/87   BP Location: Right arm Right arm  Right arm   Patient Position: Lying Sitting  Sitting   Pulse: 75 95  79   Resp: 18 18  18   Temp: 98.2 °F (36.8 °C) 98.5 °F (36.9 °C)     TempSrc: Oral Oral  Oral   SpO2:  92%  91%   Weight: 66 kg (145 lb 8.1 oz)  66 kg (145 lb 8.1 oz)    Height:   167.6 cm (65.98\")        Physical Exam  Vitals and nursing note reviewed.   Constitutional:       Appearance: Normal appearance. He is well-developed.   HENT:      Head: Normocephalic and atraumatic.      Right Ear: External ear normal.      Left Ear: External ear normal.   Eyes:      Conjunctiva/sclera: Conjunctivae normal.   Neck:      " Thyroid: No thyromegaly.      Trachea: No tracheal deviation.   Cardiovascular:      Rate and Rhythm: Normal rate and regular rhythm.      Heart sounds: No murmur heard.  Pulmonary:      Effort: Pulmonary effort is normal. No respiratory distress.      Breath sounds: Normal breath sounds.   Abdominal:      General: Bowel sounds are normal. There is no distension.      Palpations: Abdomen is soft. There is no mass.      Tenderness: There is abdominal tenderness (Mild to moderate tenderness in both left and right upper and mid abdomen).      Hernia: No hernia is present.      Comments: Stoma looks well and stoma bag has liquid stool   Musculoskeletal:         General: Normal range of motion.      Cervical back: Normal range of motion.   Skin:     General: Skin is warm and dry.   Neurological:      Mental Status: He is alert and oriented to person, place, and time.      Cranial Nerves: No cranial nerve deficit.      Sensory: No sensory deficit.   Psychiatric:         Mood and Affect: Mood normal.         Behavior: Behavior normal.         Thought Content: Thought content normal.         Judgment: Judgment normal.       Results from last 7 days   Lab Units 07/11/23  0643 07/10/23  1436   SODIUM mmol/L 139 141   POTASSIUM mmol/L 4.1 3.7   CHLORIDE mmol/L 109* 107   CO2 mmol/L 20.5* 21.5*   BUN mg/dL 19 18   CREATININE mg/dL 2.70* 2.18*   CALCIUM mg/dL 8.1* 8.8   ALBUMIN g/dL 3.1* 3.4*   BILIRUBIN mg/dL 1.6* 0.9   ALK PHOS U/L 55 57   ALT (SGPT) U/L 21 18   AST (SGOT) U/L 17 15   GLUCOSE mg/dL 124* 92   WBC 10*3/mm3 9.36 10.05   HEMOGLOBIN g/dL 14.3 14.6   PLATELETS 10*3/mm3 130* 127*       Imaging Results (Last 24 Hours)       ** No results found for the last 24 hours. **               Assessment / Plan    Assessment/Recommendations:  7/12/2023  Patient is recovering well.  Abdominal pain improving but still has abdominal pain when he walks around.  He gets problem with the breathing when he walks around with the pain.   No fever or chills.  Abdominal distention.  No signs of sepsis.  Abdominal examination is benign.    We will start him on clear liquids today  I am also going to start him on loperamide 2 mg p.o. twice daily for his excessive stoma output  Continue analgesic as appropriate  Reduce IV fluids  CBC CMP in a.m.  Advance the diet in a.m. and possible discharge on Friday 7/11/2023  1.  Postprocedure bowel perforation without sepsis  2.  Complicated Crohn's disease involving small bowel and the large intestine  History of right colectomy with end ileostomy for complicated Crohn's disease  History of colonic and small bowel strictures  5.   Excessive stoma output  Patient has a complicated Crohn's disease involving the small bowel and the large intestine for over 20 years.  He had a prior right colectomy with the end ileostomy for Crohn's complication.  Prior colonoscopy revealed a left colon inflammation and the distal ileal inflammation suggesting Crohn's disease.  He is also known to have a distal ileal stricture and colonic strictures.  He had a multiple episodes of bowel perforation during colonoscopy at least 3 times in the past, last one was in 2017, treated conservatively.      He had a surveillance limited colonoscopy and ileoscopy yesterday on 7/10/2023.  There was a moderate stricture noted in the mid rectum.  Unable to pass the colonoscope and the scope changed to gastroscope and scope was advanced..  He was noted to have a severe diverticulosis involving the sigmoid.  There was 1 more severe stricture at about 30 cm from anal verge and I was not able to advance the scope with the gastroscope.  No further attempt was made to advance the scope and scope was removed.  During withdrawal there was no obvious mucosal tear or perforation identified.  Biopsies obtained from the sigmoid colon and the rectum.     Postprocedure patient developed severe abdominal pain with abdominal distention and had x-rays followed by  CT abdomen which revealed intra-abdominal free air.  As patient has a colon blind end Doximity and and a severe stricture distally I suspect likely had a closed-loop obstruction of the colonic loop with insufflation and perforation.  Other differentials include progression of the stricture site, perforation from the friable proximal colon with with ongoing Crohn's and air insufflation, rare possibility of diverticular perforation cannot be ruled out. Case was discussed with surgery and was admitted for further evaluation management.  He was managed conservatively with IV antibiotic and bowel rest and IV fluids.     Today his left side abdominal pain has improved however he still has significant right side abdominal pain mostly in the right upper quadrant.  No further abdominal distention.  No fever.  No nausea vomiting.  Stoma functioning normally. Stoma output liquid stool.  Patient appears to be responding well to the conservative therapy.     Patient has a significant fibrotic strictures involving the small bowel and the large intestine secondary to ongoing Crohn's inflammation without any therapy.  He needs Crohn's treatment with Biologics.  Preferably would like to give him a immunomodulator along with the anti-TNF to begin with.  However given his CKD and possible absorption issues with rapid transit, we may not be able to give him any pills.  We need to discontinue his a Dupixent before starting any Biologics.  I have discussed this with the patient and he is agreeable.     As well as the remaining left side colon, would recommend subtotal colectomy as it is almost not possible to keep him on a surveillance with a colonoscopy with perforation multiple times in the recent past.  Patient wants to keep his anal sphincter intact and we can monitor with the endoscope for surveillance.     Continue management as per surgery. Surgery consult and recommendation appreciated.   Chronic hepatitis panel  TB Gold plus  We  will work him up for anti-TNF therapy, possibly Remicade infusion +/- immunomodulators.  I have discussed the risk and benefits involved and patient is agreeable to proceed with the medication we will possibly consider at least a Remicade infusion for now     We will refer him to colorectal as OP for colectomy as above  Patient may need a segmental resection of the kilo terminal ileal stricture with the stoma revision in the near future.      Patient likely has excessive stoma output with renal insufficiency.  We will start him on loperamide 2 mg p.o. twice daily when once he is able to take p.o.  All of the above issues has been discussed with the patient today.  We will follow him up in the office in next 2 to 4 weeks     5.  History of peptic ulcer disease  6.  Intermittent epigastric pain  7.  Hypothyroidism         Thank you very much for letting me participate in the care of this patient.  Please do not hesitate to call me if you have any questions.    Yazmin Martin MD  [facility]  7/12/2023  13:52 EDT    Please note that portions of this note may have been completed with a voice recognition program.

## 2023-07-12 NOTE — CONSULTS
[facility] Cardiology Consult Note    Jorge Mclaughlin  1960  [id number]  23    Requesting Physician: Yazmin Martin, *    Chief Complaint: Abdominal pain    History of Present Illness:   Mr. Jorge Mclaughlin is a 63 y.o. male who is being seen by Cardiology for evaluation of ventricular ectopy.  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  He does not have any significant past cardiac history.  He initially presented to [facility] for outpatient colonoscopy.  Following the colonoscopy, he developed significant abdominal pain, and on CT imaging he was found to have bowel perforation.  General surgery was consulted, and nonoperative management was pursued.  The patient is clinically improved with antibiotics and conservative management.  On telemetry monitoring, the patient has been noted to have ventricular ectopy including periods of bigeminy.  He denies any associated palpitations.  No history of presyncopal or syncopal events.  He is active at home, exercising on a regular basis without chest pain or exertional angina.  His only complaint today is abdominal pain secondary to his colon perforation.  No other specific complaints.    Prior Cardiac Testin. Last Coronary Angio: None  2. Prior Stress Testing: None  3. Last Echo: None  4. Prior Holter Monitor: None    Review of Systems:   Review of Systems   Constitutional:  Negative for activity change, appetite change, chills, diaphoresis, fatigue, fever, unexpected weight gain and unexpected weight loss.   Eyes:  Negative for blurred vision and double vision.   Respiratory:  Negative for cough, chest tightness, shortness of breath and wheezing.    Cardiovascular:  Negative for chest pain, palpitations and leg swelling.   Gastrointestinal:  Positive for abdominal pain.  Negative for anal bleeding, blood in stool and GERD.   Endocrine: Negative for cold intolerance and heat intolerance.   Genitourinary:  Negative for hematuria.   Neurological:  Negative for dizziness, syncope, weakness and light-headedness.   Hematological:  Does not bruise/bleed easily.   Psychiatric/Behavioral:  Negative for depressed mood and stress. The patient is not nervous/anxious.      Past Medical History:   Past Medical History:   Diagnosis Date    Altered bowel elimination due to intestinal ostomy     PATIENT REPORTS SECONDARY TO A COLON RUPTURE APPROXIMATELY 16-17 YEARS AGO    Arthritis     WRIST    Asthma     as a child    Bowel perforation     states that he had his bowel perforated twice from colonoscopies, and was told that a small scope should always be used in the future.    Cancer     Kidney    CKD (chronic kidney disease)     stage 3    COVID-19 vaccine series completed     Covid vaccine x4 doses    Crohn disease     Gallstones     GERD (gastroesophageal reflux disease)     History of MRSA infection 2017    nose    History of pneumonia     [place] (hard of hearing[place]     no hearing aids    Hypothyroid     Kidney stones     Seasonal allergies     Spinal headache     Wears glasses        Past Surgical History:   Past Surgical History:   Procedure Laterality Date    COLONOSCOPY      COLONOSCOPY N/A 07/31/2017    Procedure: Colonoscopy through ostomy site and rectum with biopsies;  Surgeon: Vincenzo Galan MD;  Location: [facility] ENDOSCOPY;  Service:     COLONOSCOPY N/A 7/10/2023    Procedure: COLONOSCOPY WITH BIOPSY;  Surgeon: Yazmin Martin MD;  Location: [facility] ENDOSCOPY;  Service: Gastroenterology;  Laterality: N/A;    ENDOSCOPY      ENDOSCOPY N/A 7/10/2023    Procedure: ESOPHAGOGASTRODUODENOSCOPY WITH BIOPSY;  Surgeon: Yazmin Martin MD;  Location: [facility] ENDOSCOPY;  Service: Gastroenterology;  Laterality: N/A;    HEMORRHOIDECTOMY      REPORTS APPROXIMATELY 20 YEARS AGO    HERNIA REPAIR       UMBILICAL    NEPHRECTOMY Right 2017    SKIN BIOPSY      benign    SMALL INTESTINE SURGERY      HX OF COLON RUPTURE WITH PLACEMENT OF OSTOMY 16-17 years ago    URETEROSCOPY LASER LITHOTRIPSY WITH STENT INSERTION Left 2019    Procedure: CYSTOSCOPY, URETEROSCOPY, STONE BASKETING, RETROGRADE PYLEOGRAM,  LASER LITHOTRIPSY WITH STENT INSERTION;  Surgeon: Lazaro Velazquez MD;  Location: [facility];  Service: Urology    URETEROSCOPY LASER LITHOTRIPSY WITH STENT INSERTION Left 2021    Procedure: URETEROSCOPY diagnostic WITH STENT INSERTION  with left retrograde;  Surgeon: Lazaro Velazquez MD;  Location: [facility] OR;  Service: Urology;  Laterality: Left;    WISDOM TOOTH EXTRACTION      WRIST SURGERY Right        Family History:   Family History   Problem Relation Age of Onset    Heart disease Mother     Cancer Father     No Known Problems Sister     COPD Brother     COPD Sister     Crohn's disease Son     Colon cancer Neg Hx        Social History:   Social History     Socioeconomic History    Marital status:    Tobacco Use    Smoking status: Former     Packs/day: 2.00     Years: 3.00     Pack years: 6.00     Types: Cigarettes     Quit date:      Years since quittin.5    Smokeless tobacco: Former     Types: Snuff   Vaping Use    Vaping Use: Never used   Substance and Sexual Activity    Alcohol use: No     Comment: QUIT 30 PLUS YEARS    Drug use: No    Sexual activity: Defer       Medications:     Current Facility-Administered Medications:     dextrose 5 % and sodium chloride 0.9 % infusion, 100 mL/hr, Intravenous, Continuous, Yifan Johnson MD, Last Rate: 100 mL/hr at 23, 100 mL/hr at 23    HYDROmorphone (DILAUDID) injection 0.5 mg, 0.5 mg, Intravenous, Q2H PRN, 0.5 mg at 23 0802 **AND** naloxone (NARCAN) injection 0.4 mg, 0.4 mg, Intravenous, Q5 Min PRN, Yifan Johnson MD    ondansetron (ZOFRAN) injection 4 mg, 4 mg, Intravenous, Q6H PRN, Yifan Johnson MD     Pharmacy to Dose Zosyn, , Does not apply, Continuous PRN, Yifan Johnson MD    piperacillin-tazobactam (ZOSYN) 3.375 g in iso-osmotic dextrose 50 ml (premix), 3.375 g, Intravenous, Q8H, Yazan Corbett MD, 3.375 g at 07/12/23 0512    sodium chloride 0.9 % flush 10 mL, 10 mL, Intravenous, Q12H, Yifan Johnson MD, 10 mL at 07/12/23 0802    sodium chloride 0.9 % flush 10 mL, 10 mL, Intravenous, PRN, Yifan Johnson MD    sodium chloride 0.9 % infusion 40 mL, 40 mL, Intravenous, PRN, Yifan Johnson MD    Allergies:   Allergies   Allergen Reactions    Latex Rash       Physical Exam:  Vital Signs:   Vitals:    07/11/23 1954 07/12/23 0002 07/12/23 0408 07/12/23 0845   BP: 139/84 108/65 126/85 134/81   BP Location: Right arm Right arm Right arm Right arm   Patient Position: Lying Lying Lying Sitting   Pulse: 91 76 75 95   Resp: 18 18 18 18   Temp: 99.7 °F (37.6 °C) 98.8 °F (37.1 °C) 98.2 °F (36.8 °C) 98.5 °F (36.9 °C)   TempSrc: Oral Oral Oral Oral   SpO2:    92%   Weight:   66 kg (145 lb 8.1 oz)    Height:           Physical Exam  Vitals and nursing note reviewed.   Constitutional:       General: He is not in acute distress.     Appearance: Normal appearance. He is not diaphoretic.   HENT:      Head: Normocephalic and atraumatic.   Cardiovascular:      Rate and Rhythm: Normal rate and regular rhythm.      Heart sounds: No murmur heard.  Pulmonary:      Effort: Pulmonary effort is normal. No respiratory distress.      Breath sounds: Normal breath sounds. No stridor. No wheezing, rhonchi or rales.   Abdominal:      General: There is no distension.      Palpations: Abdomen is soft.      Tenderness: There is abdominal tenderness.      Comments: Ostomy in place   Musculoskeletal:         General: No swelling. Normal range of motion.      Cervical back: Neck supple. No tenderness.   Skin:     General: Skin is warm and dry.   Neurological:      General: No focal deficit present.      Mental Status: He is alert and oriented to person,  place, and time.   Psychiatric:         Mood and Affect: Mood normal.         Behavior: Behavior normal.       Results Review:   Results from last 7 days   Lab Units 07/11/23  0643   SODIUM mmol/L 139   POTASSIUM mmol/L 4.1   CHLORIDE mmol/L 109*   CO2 mmol/L 20.5*   BUN mg/dL 19   CREATININE mg/dL 2.70*   CALCIUM mg/dL 8.1*   BILIRUBIN mg/dL 1.6*   ALK PHOS U/L 55   ALT (SGPT) U/L 21   AST (SGOT) U/L 17   GLUCOSE mg/dL 124*         Results from last 7 days   Lab Units 07/11/23  0643 07/10/23  1436   WBC 10*3/mm3 9.36 10.05   HEMOGLOBIN g/dL 14.3 14.6   HEMATOCRIT % 43.9 43.8   PLATELETS 10*3/mm3 130* 127*                   I personally viewed and interpreted the patient's EKG/Telemetry data     Assessment:  1.  Ventricular ectopy  2.  Crohn's disease  3.  Colon perforation  4.  Stage III chronic kidney disease    Plan:  1.  Ventricular ectopy  --Noted to have frequent reticular ectopy occasionally in a pattern of bigeminy on telemetry monitoring  -- Appears to be asymptomatic  -- Will obtain baseline echocardiogram to evaluate LVEF  -- Pending LVEF preserved, given ectopy is asymptomatic then no indication for suppression of ectopy  -- Cardiology will be available on a as needed basis pending results of echocardiogram        Thank you for allowing me to participate in the care of your patient. Please do not hesitate to contact me with additional questions or concerns.     ALLISON Sesay MD  Interventional Cardiology   07/12/23  09:13 EDT

## 2023-07-12 NOTE — PROGRESS NOTES
[facility]  INTERNAL MEDICINE PROGRESS NOTE    Name:  Jorge Mclaughlin   Age:  63 y.o.  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  He will in the morning underwent surveillance colonoscopy due to his history of Crohn's disease and who has had perforation of his bowel due to the colonoscopy in the past 2 times today after the procedure was done noted significant pain.  There was abdominal distention and the x-ray showed that there was pneumoperitoneum and there was signs of perforation which was confirmed on the CAT scan  Patient was given Dilaudid and further admitted as inpatient for further management of his complication  Surgical consult has been done and Dr. To recommends conservative treatment and he is currently n.p.o. as well as on IV fluids and IV Zosyn    Patient seen on  and he does have significant pain over the right lower quadrant still.  He states his Dilaudid is there but not helping him enough.    Patient seen on .  He is some better though his pain is more on the left side and still has the right-sided pain.  The intensity seems to be some better.  He is thirsty he is passing liquid stool in his ileostomy bag      Vital Signs:    Temp:  [98 °F (36.7 °C)-99.7 °F (37.6 °C)] 98.5 °F (36.9 °C)  Heart Rate:  [75-95] 95  Resp:  [18] 18  BP: (108-139)/(65-85) 134/81    Intake and output:    I/O last 3 completed  shifts:  In: 1240 [P.O.:240; I.V.:1000]  Out: -   No intake/output data recorded.    Physical Examination:    General Appearance:    Alert and cooperative, not in any acute distress.   Head:    Atraumatic and normocephalic, without obvious abnormality.   Eyes:            PERRLA,  No pallor. Extraocular movements are within normal limits.   Neck:   Supple,  No lymph glands, no bruit   Lungs:     Chest shape is normal. Breath sounds heard bilaterally equally.  No crackles or wheezing.     Heart:    Normal S1 and S2, no murmur,  No JVD   Abdomen:   Bowel sounds there, significant tenderness over the right quadrant, ileostomy in place   Extremities:   Moves all extremities well, no edema, no cyanosis,    Skin:   No  bruising or rash.   Neurologic:   Grossly nonfocal and moves all extremities.      Laboratory results:  Results from last 7 days   Lab Units 07/11/23  0643 07/10/23  1436   SODIUM mmol/L 139 141   POTASSIUM mmol/L 4.1 3.7   CHLORIDE mmol/L 109* 107   CO2 mmol/L 20.5* 21.5*   BUN mg/dL 19 18   CREATININE mg/dL 2.70* 2.18*   CALCIUM mg/dL 8.1* 8.8   BILIRUBIN mg/dL 1.6* 0.9   ALK PHOS U/L 55 57   ALT (SGPT) U/L 21 18   AST (SGOT) U/L 17 15   GLUCOSE mg/dL 124* 92       Results from last 7 days   Lab Units 07/11/23  0643 07/10/23  1436   WBC 10*3/mm3 9.36 10.05   HEMOGLOBIN g/dL 14.3 14.6   HEMATOCRIT % 43.9 43.8   PLATELETS 10*3/mm3 130* 127*                     Radiology results:    Imaging Results (Last 24 Hours)       ** No results found for the last 24 hours. **            I have reviewed the patient's radiology reports.    Medication Review:     I have reviewed the patients active and prn medications.     Assessment:      Bowel perforation    Acquired hypothyroidism    Chronic kidney disease, stage III (moderate)    Hx of unilateral nephrectomy, right    Intractable pain    History of ileostomy    Encounter for screening for malignant neoplasm of colon    Anemia    Crohn's disease without complication     Colon perforation          Plan:    1 Colon perforation s/p colonoscopy-he is being treated conservatively and appreciate surgical input and Dr. To's consult.  He would benefit from total colectomy as per discussion but that would be not possible during this hospitalization and Dr. To recommends him to be referred to the colorectal surgeon at  in few weeks time  Optimize pain control and continue him on IV fluids   We will start him on clear liquids today and if he does well and tolerates he will be advanced to full liquid later    2.  Intractable pain-at present he is on Dilaudid 0.5 every 2 hours.  Pain is a stable and he would be decreased on the frequency as the pain improves     3.  Crohn's disease-management as per GI recommendations    Patient has been explained the goals of treatment and plan of care and see rest as per orders  Anticipated discharge on Friday after his diet is slowly advance and if he heals well without any complication    Yazan Corbett MD  07/12/23  09:19 EDT      Please note that portions of this note were completed with a voice recognition program.

## 2023-07-13 LAB
ALBUMIN SERPL-MCNC: 2.8 G/DL (ref 3.5–5.2)
ALBUMIN/GLOB SERPL: 1.2 G/DL
ALP SERPL-CCNC: 47 U/L (ref 39–117)
ALT SERPL W P-5'-P-CCNC: 17 U/L (ref 1–41)
ANION GAP SERPL CALCULATED.3IONS-SCNC: 7.5 MMOL/L (ref 5–15)
AST SERPL-CCNC: 12 U/L (ref 1–40)
BASOPHILS # BLD AUTO: 0.02 10*3/MM3 (ref 0–0.2)
BASOPHILS NFR BLD AUTO: 0.3 % (ref 0–1.5)
BILIRUB SERPL-MCNC: 0.6 MG/DL (ref 0–1.2)
BUN SERPL-MCNC: 13 MG/DL (ref 8–23)
BUN/CREAT SERPL: 5 (ref 7–25)
CALCIUM SPEC-SCNC: 8.2 MG/DL (ref 8.6–10.5)
CHLORIDE SERPL-SCNC: 109 MMOL/L (ref 98–107)
CO2 SERPL-SCNC: 22.5 MMOL/L (ref 22–29)
CREAT SERPL-MCNC: 2.61 MG/DL (ref 0.76–1.27)
DEPRECATED RDW RBC AUTO: 51.2 FL (ref 37–54)
EGFRCR SERPLBLD CKD-EPI 2021: 27.1 ML/MIN/1.73
EOSINOPHIL # BLD AUTO: 0.22 10*3/MM3 (ref 0–0.4)
EOSINOPHIL NFR BLD AUTO: 2.9 % (ref 0.3–6.2)
ERYTHROCYTE [DISTWIDTH] IN BLOOD BY AUTOMATED COUNT: 13.8 % (ref 12.3–15.4)
GLOBULIN UR ELPH-MCNC: 2.3 GM/DL
GLUCOSE SERPL-MCNC: 117 MG/DL (ref 65–99)
HAV AB SER QL IA: POSITIVE
HBV CORE AB SERPL QL IA: NEGATIVE
HBV SURFACE AB SER RIA-ACNC: NORMAL
HBV SURFACE AG SERPL QL IA: NORMAL
HCT VFR BLD AUTO: 39.1 % (ref 37.5–51)
HCV AB SER DONR QL: NORMAL
HGB BLD-MCNC: 12.8 G/DL (ref 13–17.7)
IMM GRANULOCYTES # BLD AUTO: 0.04 10*3/MM3 (ref 0–0.05)
IMM GRANULOCYTES NFR BLD AUTO: 0.5 % (ref 0–0.5)
LYMPHOCYTES # BLD AUTO: 1.25 10*3/MM3 (ref 0.7–3.1)
LYMPHOCYTES NFR BLD AUTO: 16.5 % (ref 19.6–45.3)
MCH RBC QN AUTO: 33.1 PG (ref 26.6–33)
MCHC RBC AUTO-ENTMCNC: 32.7 G/DL (ref 31.5–35.7)
MCV RBC AUTO: 101 FL (ref 79–97)
MONOCYTES # BLD AUTO: 0.71 10*3/MM3 (ref 0.1–0.9)
MONOCYTES NFR BLD AUTO: 9.4 % (ref 5–12)
NEUTROPHILS NFR BLD AUTO: 5.32 10*3/MM3 (ref 1.7–7)
NEUTROPHILS NFR BLD AUTO: 70.4 % (ref 42.7–76)
NRBC BLD AUTO-RTO: 0 /100 WBC (ref 0–0.2)
PLATELET # BLD AUTO: 133 10*3/MM3 (ref 140–450)
PMV BLD AUTO: 10.7 FL (ref 6–12)
POTASSIUM SERPL-SCNC: 3.7 MMOL/L (ref 3.5–5.2)
PROT SERPL-MCNC: 5.1 G/DL (ref 6–8.5)
RBC # BLD AUTO: 3.87 10*6/MM3 (ref 4.14–5.8)
REF LAB TEST METHOD: NORMAL
SODIUM SERPL-SCNC: 139 MMOL/L (ref 136–145)
WBC NRBC COR # BLD: 7.56 10*3/MM3 (ref 3.4–10.8)

## 2023-07-13 PROCEDURE — 25010000002 HYDROMORPHONE 1 MG/ML SOLUTION: Performed by: INTERNAL MEDICINE

## 2023-07-13 PROCEDURE — 86706 HEP B SURFACE ANTIBODY: CPT | Performed by: INTERNAL MEDICINE

## 2023-07-13 PROCEDURE — 86803 HEPATITIS C AB TEST: CPT | Performed by: INTERNAL MEDICINE

## 2023-07-13 PROCEDURE — 25010000002 PIPERACILLIN SOD-TAZOBACTAM PER 1 G: Performed by: INTERNAL MEDICINE

## 2023-07-13 PROCEDURE — 80053 COMPREHEN METABOLIC PANEL: CPT | Performed by: INTERNAL MEDICINE

## 2023-07-13 PROCEDURE — 99232 SBSQ HOSP IP/OBS MODERATE 35: CPT | Performed by: INTERNAL MEDICINE

## 2023-07-13 PROCEDURE — 87340 HEPATITIS B SURFACE AG IA: CPT | Performed by: INTERNAL MEDICINE

## 2023-07-13 PROCEDURE — 85025 COMPLETE CBC W/AUTO DIFF WBC: CPT | Performed by: INTERNAL MEDICINE

## 2023-07-13 RX ORDER — HYDROCODONE BITARTRATE AND ACETAMINOPHEN 7.5; 325 MG/1; MG/1
1 TABLET ORAL EVERY 4 HOURS PRN
Status: DISCONTINUED | OUTPATIENT
Start: 2023-07-13 | End: 2023-07-14 | Stop reason: HOSPADM

## 2023-07-13 RX ORDER — NALOXONE HCL 0.4 MG/ML
0.4 VIAL (ML) INJECTION
Status: DISCONTINUED | OUTPATIENT
Start: 2023-07-13 | End: 2023-07-14 | Stop reason: HOSPADM

## 2023-07-13 RX ADMIN — HYDROCODONE BITARTRATE AND ACETAMINOPHEN 1 TABLET: 7.5; 325 TABLET ORAL at 12:32

## 2023-07-13 RX ADMIN — HYDROMORPHONE HYDROCHLORIDE 0.5 MG: 1 INJECTION, SOLUTION INTRAMUSCULAR; INTRAVENOUS; SUBCUTANEOUS at 22:06

## 2023-07-13 RX ADMIN — DEXTROSE AND SODIUM CHLORIDE 50 ML/HR: 5; 900 INJECTION, SOLUTION INTRAVENOUS at 14:10

## 2023-07-13 RX ADMIN — TAZOBACTAM SODIUM AND PIPERACILLIN SODIUM 3.38 G: 375; 3 INJECTION, SOLUTION INTRAVENOUS at 22:06

## 2023-07-13 RX ADMIN — Medication 2 MG: at 08:24

## 2023-07-13 RX ADMIN — HYDROMORPHONE HYDROCHLORIDE 0.5 MG: 1 INJECTION, SOLUTION INTRAMUSCULAR; INTRAVENOUS; SUBCUTANEOUS at 08:19

## 2023-07-13 RX ADMIN — HYDROCODONE BITARTRATE AND ACETAMINOPHEN 1 TABLET: 7.5; 325 TABLET ORAL at 18:31

## 2023-07-13 RX ADMIN — HYDROMORPHONE HYDROCHLORIDE 0.5 MG: 1 INJECTION, SOLUTION INTRAMUSCULAR; INTRAVENOUS; SUBCUTANEOUS at 03:09

## 2023-07-13 RX ADMIN — HYDROMORPHONE HYDROCHLORIDE 0.5 MG: 1 INJECTION, SOLUTION INTRAMUSCULAR; INTRAVENOUS; SUBCUTANEOUS at 05:23

## 2023-07-13 RX ADMIN — Medication 10 ML: at 08:20

## 2023-07-13 RX ADMIN — TAZOBACTAM SODIUM AND PIPERACILLIN SODIUM 3.38 G: 375; 3 INJECTION, SOLUTION INTRAVENOUS at 12:30

## 2023-07-13 RX ADMIN — TAZOBACTAM SODIUM AND PIPERACILLIN SODIUM 3.38 G: 375; 3 INJECTION, SOLUTION INTRAVENOUS at 05:23

## 2023-07-13 RX ADMIN — Medication 2 MG: at 22:06

## 2023-07-13 NOTE — PROGRESS NOTES
In Patient Consult      Date of Consultation: 2023  Patient Name: Jorge Mclaughlin  MRN: [id number]  : 1960     Referring provider: Yazan Corbett MD    Primary care provider:  Yazan Corbett MD    Reason for consultation: Postprocedure bowel perforation.  History of Crohn's disease involving the small intestine and large intestine    Interval history:   2023  Patient still complains of abdominal pain especially when he walks around mostly on the right side.  Denies any fever chills.  No issues with the stoma.    2023  Patient is doing pretty well.  Still has a abdominal pain when he gets up and walk around..  Stoma working well.  No fever or chills.  No abdominal pain.     2023  Patient had a elective EGD followed by limited colonoscopy and ileoscopy on 7/10/2023 for Crohn's surveillance and history of peptic ulcer disease and epigastric pain.  Colonoscopy was incomplete due to severe stricture sigmoid colon.  Postprocedure patient developed abdominal pain and abdominal distention.  Subsequent work-up revealed intraperitoneal free air and was admitted.      Patient has a Crohn's disease of the small intestine and large intestine diagnosed over 20 years ago.  He has a complicated disease and had a prior right colon resection with end ileostomy.  He was known to have a small bowel strictures and small bowel Crohn's ulcerations.  As per record he was taking sulfasalazine initially when Crohn's was diagnosed many years ago. At least for the past 10 years patient was not on any medications.  He had multiple colon perforation during colonoscopy at least 3 times in the past, last one being in 2017.  No colonoscopy done since then.  Postprocedure patient developed again intraperitoneal air and had to be admitted.   CT abdomen pelvis done did not show any significant abdominal contamination. Currently he has been managed conservatively.      He states that his left-sided  abdominal pain has improved however right side abdominal pain still persists.  Generally any nausea vomiting.  Liquid stool in the stoma bag without any blood.  No fever chills.  No further abdominal distention.  He normally empties the bag 10-15 times per day.    Subjective     Past Medical History:   Diagnosis Date    Altered bowel elimination due to intestinal ostomy     PATIENT REPORTS SECONDARY TO A COLON RUPTURE APPROXIMATELY 16-17 YEARS AGO    Arthritis     WRIST    Asthma     as a child    Bowel perforation     states that he had his bowel perforated twice from colonoscopies, and was told that a small scope should always be used in the future.    Cancer     Kidney    CKD (chronic kidney disease)     stage 3    COVID-19 vaccine series completed     Covid vaccine x4 doses    Crohn disease     Gallstones     GERD (gastroesophageal reflux disease)     History of MRSA infection 2017    nose    History of pneumonia     [place] (hard of hearing)     no hearing aids    Hypothyroid     Kidney stones     Seasonal allergies     Spinal headache     Wears glasses        Past Surgical History:   Procedure Laterality Date    COLONOSCOPY      COLONOSCOPY N/A 07/31/2017    Procedure: Colonoscopy through ostomy site and rectum with biopsies;  Surgeon: Vincenzo Galan MD;  Location: [facility] ENDOSCOPY;  Service:     COLONOSCOPY N/A 7/10/2023    Procedure: COLONOSCOPY WITH BIOPSY;  Surgeon: Yazmin Martin MD;  Location: [facility] ENDOSCOPY;  Service: Gastroenterology;  Laterality: N/A;    ENDOSCOPY      ENDOSCOPY N/A 7/10/2023    Procedure: ESOPHAGOGASTRODUODENOSCOPY WITH BIOPSY;  Surgeon: Yazmin Martin MD;  Location: [facility] ENDOSCOPY;  Service: Gastroenterology;  Laterality: N/A;    HEMORRHOIDECTOMY      REPORTS APPROXIMATELY 20 YEARS AGO    HERNIA REPAIR      UMBILICAL    NEPHRECTOMY Right 07/2017    SKIN BIOPSY      benign    SMALL INTESTINE SURGERY      HX OF COLON RUPTURE WITH PLACEMENT OF OSTOMY 16-17 years ago     URETEROSCOPY LASER LITHOTRIPSY WITH STENT INSERTION Left 2019    Procedure: CYSTOSCOPY, URETEROSCOPY, STONE BASKETING, RETROGRADE PYLEOGRAM,  LASER LITHOTRIPSY WITH STENT INSERTION;  Surgeon: Lazaro Velazquez MD;  Location: [facility] OR;  Service: Urology    URETEROSCOPY LASER LITHOTRIPSY WITH STENT INSERTION Left 2021    Procedure: URETEROSCOPY diagnostic WITH STENT INSERTION  with left retrograde;  Surgeon: Lazaro Velazquez MD;  Location: [facility] OR;  Service: Urology;  Laterality: Left;    WISDOM TOOTH EXTRACTION      WRIST SURGERY Right        Family History   Problem Relation Age of Onset    Heart disease Mother     Cancer Father     No Known Problems Sister     COPD Brother     COPD Sister     Crohn's disease Son     Colon cancer Neg Hx        Social History     Socioeconomic History    Marital status:    Tobacco Use    Smoking status: Former     Packs/day: 2.00     Years: 3.00     Pack years: 6.00     Types: Cigarettes     Quit date:      Years since quittin.5    Smokeless tobacco: Former     Types: Snuff   Vaping Use    Vaping Use: Never used   Substance and Sexual Activity    Alcohol use: No     Comment: QUIT 30 PLUS YEARS    Drug use: No    Sexual activity: Defer         Current Facility-Administered Medications:     dextrose 5 % and sodium chloride 0.9 % infusion, 50 mL/hr, Intravenous, Continuous, Yazan Corbett MD, Last Rate: 50 mL/hr at 23, 50 mL/hr at 23 09    HYDROcodone-acetaminophen (NORCO) 7.5-325 MG per tablet 1 tablet, 1 tablet, Oral, Q4H PRN, Yazan Corbett MD    HYDROmorphone (DILAUDID) injection 0.5 mg, 0.5 mg, Intravenous, Q6H PRN **AND** naloxone (NARCAN) injection 0.4 mg, 0.4 mg, Intravenous, Q5 Min PRN, Yazan Corbett MD    loperamide (IMODIUM A-D) 1 MG/7.5ML suspension 2 mg, 2 mg, Oral, BID, Yazmin Martin MD, 2 mg at 23 0824    ondansetron (ZOFRAN) injection 4 mg, 4 mg, Intravenous, Q6H PRN, Yifan Johnson MD     Pharmacy to Dose Zosyn, , Does not apply, Continuous PRN, Yifan Johnson MD    piperacillin-tazobactam (ZOSYN) 3.375 g in iso-osmotic dextrose 50 ml (premix), 3.375 g, Intravenous, Q8H, Yazan Corbett MD, 3.375 g at 07/13/23 0523    sodium chloride 0.9 % flush 10 mL, 10 mL, Intravenous, Q12H, Yifan Johnson MD, 10 mL at 07/13/23 0820    sodium chloride 0.9 % flush 10 mL, 10 mL, Intravenous, PRN, Yifan Johnson MD    sodium chloride 0.9 % infusion 40 mL, 40 mL, Intravenous, PRN, Yifan Johnson MD    Allergies   Allergen Reactions    Latex Rash       Review of Systems   Constitutional:  Negative for fever.   HENT:  Negative for sore throat and trouble swallowing.    Eyes:  Negative for visual disturbance.   Respiratory:  Negative for cough, chest tightness and shortness of breath.    Cardiovascular:  Negative for chest pain, palpitations and leg swelling.   Gastrointestinal:  Positive for abdominal pain. Negative for blood in stool, constipation, diarrhea, nausea, vomiting and indigestion.   Endocrine: Negative for polyphagia.   Genitourinary:  Negative for dysuria and hematuria.   Musculoskeletal:  Negative for back pain, joint swelling and neck pain.   Skin:  Negative for rash, skin lesions and wound.   Neurological:  Negative for dizziness, seizures, speech difficulty, weakness, numbness and confusion.   Hematological:  Negative for adenopathy. Does not bruise/bleed easily.   Psychiatric/Behavioral:  Negative for hallucinations and depressed mood.      The following portions of the patient's history were reviewed and updated as appropriate: allergies, current medications, past family history, past medical history, past social history, past surgical history and problem list.    Objective     Vitals:    07/13/23 0452 07/13/23 0500 07/13/23 0706 07/13/23 1105   BP: 143/64  108/65 125/70   BP Location: Right arm  Right arm Right arm   Patient Position: Standing  Lying Lying   Pulse: 86  90 86   Resp: 18  20 20   Temp:  98.1 °F (36.7 °C)  97.8 °F (36.6 °C) 98.2 °F (36.8 °C)   TempSrc: Oral  Oral Oral   SpO2: 95%  94% 98%   Weight:  70.8 kg (156 lb 1.4 oz)     Height:           Physical Exam  Vitals and nursing note reviewed.   Constitutional:       Appearance: Normal appearance. He is well-developed.   HENT:      Head: Normocephalic and atraumatic.      Right Ear: External ear normal.      Left Ear: External ear normal.   Eyes:      Conjunctiva/sclera: Conjunctivae normal.   Neck:      Thyroid: No thyromegaly.      Trachea: No tracheal deviation.   Cardiovascular:      Rate and Rhythm: Normal rate and regular rhythm.      Heart sounds: No murmur heard.  Pulmonary:      Effort: Pulmonary effort is normal. No respiratory distress.      Breath sounds: Normal breath sounds.   Abdominal:      General: Bowel sounds are normal. There is no distension.      Palpations: Abdomen is soft. There is no mass.      Tenderness: There is abdominal tenderness (Still has a mild to moderate tenderness in the left lower quadrant on the right upper quadrant).      Hernia: No hernia is present.      Comments: Stoma site clean and stoma bag has a liquid stool without any blood   Musculoskeletal:         General: Normal range of motion.      Cervical back: Normal range of motion.   Skin:     General: Skin is warm and dry.   Neurological:      General: No focal deficit present.      Mental Status: He is alert and oriented to person, place, and time.      Cranial Nerves: No cranial nerve deficit.      Sensory: No sensory deficit.   Psychiatric:         Mood and Affect: Mood normal.         Behavior: Behavior normal.         Thought Content: Thought content normal.         Judgment: Judgment normal.       Results from last 7 days   Lab Units 07/13/23  0554 07/11/23  0643 07/10/23  1436   SODIUM mmol/L 139 139 141   POTASSIUM mmol/L 3.7 4.1 3.7   CHLORIDE mmol/L 109* 109* 107   CO2 mmol/L 22.5 20.5* 21.5*   BUN mg/dL 13 19 18   CREATININE mg/dL 2.61* 2.70*  2.18*   CALCIUM mg/dL 8.2* 8.1* 8.8   ALBUMIN g/dL 2.8* 3.1* 3.4*   BILIRUBIN mg/dL 0.6 1.6* 0.9   ALK PHOS U/L 47 55 57   ALT (SGPT) U/L 17 21 18   AST (SGOT) U/L 12 17 15   GLUCOSE mg/dL 117* 124* 92   WBC 10*3/mm3 7.56 9.36 10.05   HEMOGLOBIN g/dL 12.8* 14.3 14.6   PLATELETS 10*3/mm3 133* 130* 127*       Imaging Results (Last 24 Hours)       ** No results found for the last 24 hours. **               Assessment / Plan    Assessment/Recommendations:    Assessment/Recommendations:  7/13/2023  Clinically patient is doing well.  Still has a significant pain especially on ambulation.  No signs of sepsis.  Follow-up CBC CMP reviewed unremarkable except known creatinine elevation.  Tolerating liquids without any issues.  Advance the diet  Pain management   Consider discharging the patient in the next 24 hours if no further new issues  Recommend to continue 2 more days of p.o. antibiotic on discharge  Loperamide 2 mg p.o. twice daily, patient is aware of titrating the dose that has been discussed with him today..  We will follow him up in the office 2 to 4 weeks    7/12/2023  Patient is recovering well.  Abdominal pain improving but still has abdominal pain when he walks around.  He gets problem with the breathing when he walks around with the pain.  No fever or chills.  Abdominal distention.  No signs of sepsis.  Abdominal examination is benign.     We will start him on clear liquids today  I am also going to start him on loperamide 2 mg p.o. twice daily for his excessive stoma output  Continue analgesic as appropriate  Reduce IV fluids  CBC CMP in a.m.  Advance the diet in a.m. and possible discharge on Friday 7/11/2023  1.  Postprocedure bowel perforation without sepsis  2.  Complicated Crohn's disease involving small bowel and the large intestine  3. History of right colectomy with end ileostomy for complicated Crohn's disease  4.  History of colonic and small bowel strictures  5.   Excessive stoma  output  Patient has a complicated Crohn's disease involving the small bowel and the large intestine for over 20 years.  He had a prior right colectomy with the end ileostomy for Crohn's complication.  Prior colonoscopy revealed a left colon inflammation and the distal ileal inflammation suggesting Crohn's disease.  He is also known to have a distal ileal stricture and colonic strictures.  He had a multiple episodes of bowel perforation during colonoscopy at least 3 times in the past, last one was in 2017, treated conservatively.      He had a surveillance limited colonoscopy and ileoscopy yesterday on 7/10/2023.  There was a moderate stricture noted in the mid rectum.  Unable to pass the colonoscope and the scope changed to gastroscope and scope was advanced..  He was noted to have a severe diverticulosis involving the sigmoid.  There was 1 more severe stricture at about 30 cm from anal verge and I was not able to advance the scope with the gastroscope.  No further attempt was made to advance the scope and scope was removed.  During withdrawal there was no obvious mucosal tear or perforation identified.  Biopsies obtained from the sigmoid colon and the rectum.     Postprocedure patient developed severe abdominal pain with abdominal distention and had x-rays followed by CT abdomen which revealed intra-abdominal free air.  As patient has a colon blind end Doximity and and a severe stricture distally I suspect likely had a closed-loop obstruction of the colonic loop with insufflation and perforation.  Other differentials include progression of the stricture site, perforation from the friable proximal colon with with ongoing Crohn's and air insufflation, rare possibility of diverticular perforation cannot be ruled out. Case was discussed with surgery and was admitted for further evaluation management.  He was managed conservatively with IV antibiotic and bowel rest and IV fluids.     Today his left side abdominal pain  has improved however he still has significant right side abdominal pain mostly in the right upper quadrant.  No further abdominal distention.  No fever.  No nausea vomiting.  Stoma functioning normally. Stoma output liquid stool.  Patient appears to be responding well to the conservative therapy.     Patient has a significant fibrotic strictures involving the small bowel and the large intestine secondary to ongoing Crohn's inflammation without any therapy.  He needs Crohn's treatment with Biologics.  Preferably would like to give him a immunomodulator along with the anti-TNF to begin with.  However given his CKD and possible absorption issues with rapid transit, we may not be able to give him any pills.  We need to discontinue his a Dupixent before starting any Biologics.  I have discussed this with the patient and he is agreeable.     As well as the remaining left side colon, would recommend subtotal colectomy as it is almost not possible to keep him on a surveillance with a colonoscopy with perforation multiple times in the recent past.  Patient wants to keep his anal sphincter intact and we can monitor with the endoscope for surveillance.     Continue management as per surgery. Surgery consult and recommendation appreciated.   Chronic hepatitis panel  TB Gold plus  We will work him up for anti-TNF therapy, possibly Remicade infusion +/- immunomodulators.  I have discussed the risk and benefits involved and patient is agreeable to proceed with the medication we will possibly consider at least a Remicade infusion for now     We will refer him to colorectal as OP for colectomy as above  Patient may need a segmental resection of the kilo terminal ileal stricture with the stoma revision in the near future.      Patient likely has excessive stoma output with renal insufficiency.  We will start him on loperamide 2 mg p.o. twice daily when once he is able to take p.o.  All of the above issues has been discussed with the  patient today.  We will follow him up in the office in next 2 to 4 weeks     5.  History of peptic ulcer disease  6.  Intermittent epigastric pain  7.  Hypothyroidism          Thank you very much for letting me participate in the care of this patient.  Please do not hesitate to call me if you have any questions.    Yazmin Martin MD  [facility]  7/13/2023  12:28 EDT    Please note that portions of this note may have been completed with a voice recognition program.

## 2023-07-13 NOTE — PLAN OF CARE
Problem: Adult Inpatient Plan of Care  Goal: Plan of Care Review  Outcome: Ongoing, Progressing  Goal: Patient-Specific Goal (Individualized)  Outcome: Ongoing, Progressing  Goal: Absence of Hospital-Acquired Illness or Injury  Outcome: Ongoing, Progressing  Intervention: Identify and Manage Fall Risk  Recent Flowsheet Documentation  Taken 7/12/2023 2200 by Karyn Adams RN  Safety Promotion/Fall Prevention: safety round/check completed  Taken 7/12/2023 2000 by Karyn Adams RN  Safety Promotion/Fall Prevention: safety round/check completed  Intervention: Prevent Skin Injury  Recent Flowsheet Documentation  Taken 7/12/2023 2200 by Karyn Adams RN  Body Position: supine, legs elevated  Taken 7/12/2023 2000 by Karyn Adams RN  Body Position: sitting up in bed  Intervention: Prevent and Manage VTE (Venous Thromboembolism) Risk  Recent Flowsheet Documentation  Taken 7/12/2023 2200 by Karyn Adams RN  Activity Management: bedrest  Taken 7/12/2023 2000 by Karyn Adams RN  Activity Management: bedrest  Goal: Optimal Comfort and Wellbeing  Outcome: Ongoing, Progressing  Goal: Readiness for Transition of Care  Outcome: Ongoing, Progressing     Problem: Pain Acute  Goal: Acceptable Pain Control and Functional Ability  Outcome: Ongoing, Progressing     Problem: Fall Injury Risk  Goal: Absence of Fall and Fall-Related Injury  Outcome: Ongoing, Progressing  Intervention: Promote Injury-Free Environment  Recent Flowsheet Documentation  Taken 7/12/2023 2200 by Karyn Adams RN  Safety Promotion/Fall Prevention: safety round/check completed  Taken 7/12/2023 2000 by Karyn Adams RN  Safety Promotion/Fall Prevention: safety round/check completed     Problem: Skin Injury Risk Increased  Goal: Skin Health and Integrity  Outcome: Ongoing, Progressing  Intervention: Optimize Skin Protection  Recent Flowsheet Documentation  Taken 7/12/2023 2200 by Karyn Adams RN  Head of Bed (HOB) Positioning: HOB  elevated  Taken 7/12/2023 2000 by Karyn Adams, RN  Head of Bed (HOB) Positioning: HOB elevated     Problem: Adjustment to Illness (Bowel Disease, Inflammatory)  Goal: Optimal Adaptation to Chronic Illness  Outcome: Ongoing, Progressing     Problem: Diarrhea (Bowel Disease, Inflammatory)  Goal: Diarrhea Symptom Relief  Outcome: Ongoing, Progressing     Problem: Infection (Bowel Disease, Inflammatory)  Goal: Absence of Infection Signs and Symptoms  Outcome: Ongoing, Progressing     Problem: Nutrition Impaired (Bowel Disease, Inflammatory)  Goal: Optimal Nutrition  Outcome: Ongoing, Progressing     Problem: Pain (Bowel Disease, Inflammatory)  Goal: Acceptable Pain Control  Outcome: Ongoing, Progressing     Problem: Adjustment to Surgery (Ileostomy)  Goal: Optimal Coping  Outcome: Ongoing, Progressing     Problem: Bleeding (Ileostomy)  Goal: Absence of Bleeding  Outcome: Ongoing, Progressing     Problem: Fluid, Electrolyte and Nutrition Imbalance (Ileostomy)  Goal: Fluid, Electrolyte and Nutrition Balance  Outcome: Ongoing, Progressing     Problem: Infection (Ileostomy)  Goal: Absence of Infection Signs and Symptoms  Outcome: Ongoing, Progressing     Problem: Ongoing Anesthesia Effects (Ileostomy)  Goal: Anesthesia/Sedation Recovery  Outcome: Ongoing, Progressing  Intervention: Optimize Anesthesia Recovery  Recent Flowsheet Documentation  Taken 7/12/2023 2200 by Karyn Adams, RN  Safety Promotion/Fall Prevention: safety round/check completed  Taken 7/12/2023 2000 by Karyn Adams RN  Safety Promotion/Fall Prevention: safety round/check completed     Problem: Pain (Ileostomy)  Goal: Acceptable Pain Control  Outcome: Ongoing, Progressing     Problem: Postoperative Nausea and Vomiting (Ileostomy)  Goal: Nausea and Vomiting Relief  Outcome: Ongoing, Progressing     Problem: Postoperative Stoma Care (Ileostomy)  Goal: Optimal Stoma Healing  Outcome: Ongoing, Progressing     Problem: Postoperative Urinary Retention  (Ileostomy)  Goal: Effective Urinary Elimination  Outcome: Ongoing, Progressing     Problem: Respiratory Compromise (Ileostomy)  Goal: Effective Oxygenation and Ventilation  Outcome: Ongoing, Progressing  Intervention: Optimize Oxygenation and Ventilation  Recent Flowsheet Documentation  Taken 7/12/2023 2200 by Karyn Adams, RN  Head of Bed ([facility]) Positioning: HOB elevated  Taken 7/12/2023 2000 by Karyn Adams, RN  Head of Bed ([facility]) Positioning: HOB elevated     Problem: Infection  Goal: Absence of Infection Signs and Symptoms  Outcome: Ongoing, Progressing   Goal Outcome Evaluation:

## 2023-07-13 NOTE — CASE MANAGEMENT/SOCIAL WORK
Case Management/Social Work    Patient Name:  Jorge Mclaughlin  YOB: 1960  MRN: [id number]  Admit Date:  7/10/2023        Pt. Will discharge home once medically stable. SW/CM will continue to follow for discharge planning.        Electronically signed by:  Erasto Ramirez  07/13/23 13:31 EDT

## 2023-07-13 NOTE — PROGRESS NOTES
[facility]  INTERNAL MEDICINE PROGRESS NOTE    Name:  Jorge Mclaughlin   Age:  63 y.o.  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  He will in the morning underwent surveillance colonoscopy due to his history of Crohn's disease and who has had perforation of his bowel due to the colonoscopy in the past 2 times today after the procedure was done noted significant pain.  There was abdominal distention and the x-ray showed that there was pneumoperitoneum and there was signs of perforation which was confirmed on the CAT scan  Patient was given Dilaudid and further admitted as inpatient for further management of his complication  Surgical consult has been done and Dr. To recommends conservative treatment and he is currently n.p.o. as well as on IV fluids and IV Zosyn    Patient seen on  and he does have significant pain over the right lower quadrant still.  He states his Dilaudid is there but not helping him enough.    Patient seen on .  He is some better though his pain is more on the left side and still has the right-sided pain.  The intensity seems to be some better.  He is thirsty he is passing liquid stool in his ileostomy bag    Patient seen on .  He has tolerated clear liquids well.  He also walked yesterday few times and had some knee pain so he stopped.  He is feeling slightly better still using his pain medicine  every 2 hours      Vital Signs:    Temp:  [97.8 °F (36.6 °C)-98.5 °F (36.9 °C)] 97.8 °F (36.6 °C)  Heart Rate:  [] 90  Resp:  [16-20] 20  BP: (108-143)/(64-89) 108/65    Intake and output:    I/O last 3 completed shifts:  In: 1840 [P.O.:840; I.V.:1000]  Out: -   No intake/output data recorded.    Physical Examination:    General Appearance:    Alert and cooperative, not in any acute distress.   Head:    Atraumatic and normocephalic, without obvious abnormality.   Eyes:            PERRLA,  No pallor. Extraocular movements are within normal limits.   Neck:   Supple,  No lymph glands, no bruit   Lungs:     Chest shape is normal. Breath sounds heard bilaterally equally.  No crackles or wheezing.     Heart:    Normal S1 and S2, no murmur,  No JVD   Abdomen:   Bowel sounds present, mild tenderness over the right quadrant, ileostomy in place   Extremities:   Moves all extremities well, no edema, no cyanosis,    Skin:   No  bruising or rash.   Neurologic:   Grossly nonfocal and moves all extremities.      Laboratory results:  Results from last 7 days   Lab Units 07/13/23  0554 07/11/23  0643 07/10/23  1436   SODIUM mmol/L 139 139 141   POTASSIUM mmol/L 3.7 4.1 3.7   CHLORIDE mmol/L 109* 109* 107   CO2 mmol/L 22.5 20.5* 21.5*   BUN mg/dL 13 19 18   CREATININE mg/dL 2.61* 2.70* 2.18*   CALCIUM mg/dL 8.2* 8.1* 8.8   BILIRUBIN mg/dL 0.6 1.6* 0.9   ALK PHOS U/L 47 55 57   ALT (SGPT) U/L 17 21 18   AST (SGOT) U/L 12 17 15   GLUCOSE mg/dL 117* 124* 92       Results from last 7 days   Lab Units 07/13/23  0554 07/11/23  0643 07/10/23  1436   WBC 10*3/mm3 7.56 9.36 10.05   HEMOGLOBIN g/dL 12.8* 14.3 14.6   HEMATOCRIT % 39.1 43.9 43.8   PLATELETS 10*3/mm3 133* 130* 127*                     Radiology results:    Imaging Results (Last 24 Hours)       ** No results found for the last 24 hours. **            I have reviewed the patient's radiology reports.    Medication Review:     I have reviewed the patients active and prn  medications.     Assessment:      Bowel perforation    Acquired hypothyroidism    Chronic kidney disease, stage III (moderate)    Hx of unilateral nephrectomy, right    Intractable pain    History of ileostomy    Encounter for screening for malignant neoplasm of colon    Anemia    Crohn's disease without complication    Colon perforation          Plan:    1 Colon perforation s/p colonoscopy-he is being treated conservatively and appreciate surgical input and Dr. To's consult.  He would benefit from total colectomy as per discussion but that would be not possible during this hospitalization and Dr. To recommends him to be referred to the colorectal surgeon at  in few weeks time  Will taper down his IV Dilaudid and start with hydrocodone 7.5 every 4 as needed  Advance diet to full liquid and if he does well we will do regular diet in the evening and if stable then possible discharge tomorrow    2.  Intractable pain-at present he is on Dilaudid 0.5 every 2 hours.  From today we will taper it to every 6 as needed and add hydrocodone oral as needed    3.  Crohn's disease-management as per GI recommendations    Patient has been explained the goals of treatment and plan of care and see rest as per orders  Anticipated discharge on Friday after his diet is slowly advance and if he heals well without any complication    Yazan Corbett MD  07/13/23  08:39 EDT      Please note that portions of this note were completed with a voice recognition program.

## 2023-07-13 NOTE — PLAN OF CARE
Problem: Adult Inpatient Plan of Care  Goal: Absence of Hospital-Acquired Illness or Injury  Intervention: Identify and Manage Fall Risk  Recent Flowsheet Documentation  Taken 7/13/2023 1600 by Benja Ortez RN  Safety Promotion/Fall Prevention:   activity supervised   assistive device/personal items within reach   clutter free environment maintained   fall prevention program maintained   lighting adjusted   nonskid shoes/slippers when out of bed   room organization consistent   safety round/check completed  Taken 7/13/2023 1400 by Benja Ortez RN  Safety Promotion/Fall Prevention:   assistive device/personal items within reach   activity supervised   clutter free environment maintained   fall prevention program maintained   lighting adjusted   nonskid shoes/slippers when out of bed   room organization consistent   safety round/check completed  Taken 7/13/2023 1200 by Benja Ortez RN  Safety Promotion/Fall Prevention:   activity supervised   assistive device/personal items within reach   clutter free environment maintained   fall prevention program maintained   lighting adjusted   nonskid shoes/slippers when out of bed   room organization consistent   safety round/check completed  Taken 7/13/2023 1000 by Benja Ortez RN  Safety Promotion/Fall Prevention:   activity supervised   clutter free environment maintained   assistive device/personal items within reach   fall prevention program maintained   lighting adjusted   nonskid shoes/slippers when out of bed   room organization consistent   safety round/check completed  Taken 7/13/2023 0800 by Benja Ortez RN  Safety Promotion/Fall Prevention:   activity supervised   assistive device/personal items within reach   clutter free environment maintained   fall prevention program maintained   lighting adjusted   nonskid shoes/slippers when out of bed   room organization consistent   safety round/check completed  Intervention: Prevent Skin Injury  Recent Flowsheet  Documentation  Taken 7/13/2023 1600 by Benja Ortez RN  Body Position:   turned   left   sitting up in bed  Taken 7/13/2023 1400 by Benja Ortez RN  Body Position:   turned   right   sitting up in bed  Taken 7/13/2023 1200 by Benja Ortez RN  Body Position:   position changed independently   neutral body alignment   neutral head position   sitting up in bed  Taken 7/13/2023 1000 by Benja Ortez RN  Body Position:   turned   right   sitting up in bed  Taken 7/13/2023 0800 by Benja Ortez RN  Body Position:   sitting up in bed   neutral head position   neutral body alignment  Skin Protection: adhesive use limited  Intervention: Prevent and Manage VTE (Venous Thromboembolism) Risk  Recent Flowsheet Documentation  Taken 7/13/2023 1600 by Benja Ortez RN  Activity Management: activity encouraged  Taken 7/13/2023 1400 by Benja Ortez RN  Activity Management: activity encouraged  Taken 7/13/2023 1200 by Benja Ortez RN  Activity Management: activity encouraged  Taken 7/13/2023 1000 by Benja Ortez RN  Activity Management: activity encouraged  Taken 7/13/2023 0800 by Benja Ortez RN  Activity Management: activity encouraged  VTE Prevention/Management:   bilateral   sequential compression devices off  Range of Motion: active ROM (range of motion) encouraged  Intervention: Prevent Infection  Recent Flowsheet Documentation  Taken 7/13/2023 1600 by Benja Ortez RN  Infection Prevention:   environmental surveillance performed   single patient room provided  Taken 7/13/2023 1400 by Benja Ortez RN  Infection Prevention:   environmental surveillance performed   single patient room provided  Taken 7/13/2023 1200 by Benja Ortez RN  Infection Prevention:   environmental surveillance performed   single patient room provided  Taken 7/13/2023 1000 by Benja Ortez RN  Infection Prevention:   environmental surveillance performed   single patient room provided  Taken 7/13/2023 0800 by Benja Ortez RN  Infection  Prevention:   environmental surveillance performed   single patient room provided  Goal: Optimal Comfort and Wellbeing  Intervention: Provide Person-Centered Care  Recent Flowsheet Documentation  Taken 7/13/2023 0800 by Benja Ortez RN  Trust Relationship/Rapport:   care explained   thoughts/feelings acknowledged   reassurance provided   questions encouraged   questions answered     Problem: Pain Acute  Goal: Acceptable Pain Control and Functional Ability  Intervention: Prevent or Manage Pain  Recent Flowsheet Documentation  Taken 7/13/2023 1600 by Benja Ortez RN  Medication Review/Management: medications reviewed  Taken 7/13/2023 1400 by Benja Ortez RN  Medication Review/Management: medications reviewed  Taken 7/13/2023 1200 by Benja Ortez RN  Medication Review/Management: medications reviewed  Taken 7/13/2023 1000 by Benja Ortez RN  Medication Review/Management: medications reviewed  Taken 7/13/2023 0800 by Benja Ortez RN  Medication Review/Management: medications reviewed  Intervention: Optimize Psychosocial Wellbeing  Recent Flowsheet Documentation  Taken 7/13/2023 0800 by Benja Ortez RN  Supportive Measures: active listening utilized  Diversional Activities:   television   smartphone     Problem: Fall Injury Risk  Goal: Absence of Fall and Fall-Related Injury  Intervention: Identify and Manage Contributors  Recent Flowsheet Documentation  Taken 7/13/2023 1600 by Benja Ortez RN  Medication Review/Management: medications reviewed  Taken 7/13/2023 1400 by Benja Ortez RN  Medication Review/Management: medications reviewed  Taken 7/13/2023 1200 by Benja Ortez RN  Medication Review/Management: medications reviewed  Taken 7/13/2023 1000 by Benja Ortez RN  Medication Review/Management: medications reviewed  Taken 7/13/2023 0800 by Benja Otrez RN  Medication Review/Management: medications reviewed  Self-Care Promotion: independence encouraged  Intervention: Promote Injury-Free Environment  Recent  Flowsheet Documentation  Taken 7/13/2023 1600 by Benja Ortez RN  Safety Promotion/Fall Prevention:   activity supervised   assistive device/personal items within reach   clutter free environment maintained   fall prevention program maintained   lighting adjusted   nonskid shoes/slippers when out of bed   room organization consistent   safety round/check completed  Taken 7/13/2023 1400 by Benja Ortez RN  Safety Promotion/Fall Prevention:   assistive device/personal items within reach   activity supervised   clutter free environment maintained   fall prevention program maintained   lighting adjusted   nonskid shoes/slippers when out of bed   room organization consistent   safety round/check completed  Taken 7/13/2023 1200 by Benja Ortez RN  Safety Promotion/Fall Prevention:   activity supervised   assistive device/personal items within reach   clutter free environment maintained   fall prevention program maintained   lighting adjusted   nonskid shoes/slippers when out of bed   room organization consistent   safety round/check completed  Taken 7/13/2023 1000 by Benja Ortez RN  Safety Promotion/Fall Prevention:   activity supervised   clutter free environment maintained   assistive device/personal items within reach   fall prevention program maintained   lighting adjusted   nonskid shoes/slippers when out of bed   room organization consistent   safety round/check completed  Taken 7/13/2023 0800 by Benja Ortez RN  Safety Promotion/Fall Prevention:   activity supervised   assistive device/personal items within reach   clutter free environment maintained   fall prevention program maintained   lighting adjusted   nonskid shoes/slippers when out of bed   room organization consistent   safety round/check completed     Problem: Skin Injury Risk Increased  Goal: Skin Health and Integrity  Intervention: Optimize Skin Protection  Recent Flowsheet Documentation  Taken 7/13/2023 1600 by Benja Ortez RN  Head of Bed (HOB)  Positioning:   HOB elevated   HOB at 45 degrees  Taken 7/13/2023 1400 by Benja Ortez RN  Head of Bed (HOB) Positioning:   HOB elevated   HOB at 30-45 degrees  Taken 7/13/2023 1200 by Benja Ortez RN  Head of Bed (HOB) Positioning: HOB at 60-90 degrees  Taken 7/13/2023 1000 by Benja Ortez RN  Head of Bed (HOB) Positioning:   HOB elevated   HOB at 60-90 degrees  Taken 7/13/2023 0800 by Benja Ortez RN  Pressure Reduction Techniques: frequent weight shift encouraged  Head of Bed (HOB) Positioning:   HOB elevated   HOB at 60-90 degrees  Skin Protection: adhesive use limited     Problem: Adjustment to Illness (Bowel Disease, Inflammatory)  Goal: Optimal Adaptation to Chronic Illness  Intervention: Support Psychosocial Response to Illness  Recent Flowsheet Documentation  Taken 7/13/2023 0800 by Benja Ortez RN  Supportive Measures: active listening utilized  Family/Support System Care: support provided     Problem: Nutrition Impaired (Bowel Disease, Inflammatory)  Goal: Optimal Nutrition  Intervention: Promote and Optimize Nutrition Intake  Recent Flowsheet Documentation  Taken 7/13/2023 0800 by Benja Ortez RN  Environmental Support: calm environment promoted     Problem: Adjustment to Surgery (Ileostomy)  Goal: Optimal Coping  Intervention: Support Psychosocial Response to Surgery  Recent Flowsheet Documentation  Taken 7/13/2023 0800 by Benja Ortez RN  Supportive Measures: active listening utilized     Problem: Ongoing Anesthesia Effects (Ileostomy)  Goal: Anesthesia/Sedation Recovery  Intervention: Optimize Anesthesia Recovery  Recent Flowsheet Documentation  Taken 7/13/2023 1600 by Benja Ortez RN  Safety Promotion/Fall Prevention:   activity supervised   assistive device/personal items within reach   clutter free environment maintained   fall prevention program maintained   lighting adjusted   nonskid shoes/slippers when out of bed   room organization consistent   safety round/check completed  Taken  7/13/2023 1400 by Benja Ortez RN  Safety Promotion/Fall Prevention:   assistive device/personal items within reach   activity supervised   clutter free environment maintained   fall prevention program maintained   lighting adjusted   nonskid shoes/slippers when out of bed   room organization consistent   safety round/check completed  Taken 7/13/2023 1200 by Benja Ortez RN  Safety Promotion/Fall Prevention:   activity supervised   assistive device/personal items within reach   clutter free environment maintained   fall prevention program maintained   lighting adjusted   nonskid shoes/slippers when out of bed   room organization consistent   safety round/check completed  Taken 7/13/2023 1000 by Benja Ortez RN  Safety Promotion/Fall Prevention:   activity supervised   clutter free environment maintained   assistive device/personal items within reach   fall prevention program maintained   lighting adjusted   nonskid shoes/slippers when out of bed   room organization consistent   safety round/check completed  Taken 7/13/2023 0800 by Benja Ortez RN  Safety Promotion/Fall Prevention:   activity supervised   assistive device/personal items within reach   clutter free environment maintained   fall prevention program maintained   lighting adjusted   nonskid shoes/slippers when out of bed   room organization consistent   safety round/check completed     Problem: Pain (Ileostomy)  Goal: Acceptable Pain Control  Intervention: Prevent or Manage Pain  Recent Flowsheet Documentation  Taken 7/13/2023 0800 by Benja Ortez RN  Diversional Activities:   television   smartphone     Problem: Postoperative Stoma Care (Ileostomy)  Goal: Optimal Stoma Healing  Intervention: Provide Ostomy and Peristomal Skin Care  Recent Flowsheet Documentation  Taken 7/13/2023 0800 by Benja Ortez RN  Skin Protection: adhesive use limited     Problem: Respiratory Compromise (Ileostomy)  Goal: Effective Oxygenation and Ventilation  Intervention:  Optimize Oxygenation and Ventilation  Recent Flowsheet Documentation  Taken 7/13/2023 1600 by Benja Ortez RN  Head of Bed (HOB) Positioning:   HOB elevated   HOB at 45 degrees  Taken 7/13/2023 1400 by Benja Ortez RN  Head of Bed (HOB) Positioning:   HOB elevated   HOB at 30-45 degrees  Taken 7/13/2023 1200 by Benja Ortez RN  Head of Bed (HOB) Positioning: HOB at 60-90 degrees  Taken 7/13/2023 1000 by Benja Ortez RN  Head of Bed (HOB) Positioning:   HOB elevated   HOB at 60-90 degrees  Taken 7/13/2023 0800 by Benja Ortez RN  Head of Bed (HOB) Positioning:   HOB elevated   HOB at 60-90 degrees   Goal Outcome Evaluation:  Plan of Care Reviewed With: patient        Progress: improving  Outcome Evaluation: Patient has go up and walked some today. Took a shower. Some complaints of pain; see MAR. Patient det advanced as tolerated per Dr. Corbett . Fuids maintained as ordered. Will continue to monitor.

## 2023-07-13 NOTE — PLAN OF CARE
Goal Outcome Evaluation:      Pt. Walked several laps in the hallway last night, says he feels better this morning. Tolerated clear liquids

## 2023-07-14 VITALS
RESPIRATION RATE: 18 BRPM | DIASTOLIC BLOOD PRESSURE: 94 MMHG | HEART RATE: 86 BPM | TEMPERATURE: 98.1 F | HEIGHT: 66 IN | BODY MASS INDEX: 25.26 KG/M2 | WEIGHT: 157.19 LBS | OXYGEN SATURATION: 97 % | SYSTOLIC BLOOD PRESSURE: 125 MMHG

## 2023-07-14 LAB
ALBUMIN SERPL-MCNC: 2.6 G/DL (ref 3.5–5.2)
ALBUMIN/GLOB SERPL: 1.2 G/DL
ALP SERPL-CCNC: 40 U/L (ref 39–117)
ALT SERPL W P-5'-P-CCNC: 14 U/L (ref 1–41)
ANION GAP SERPL CALCULATED.3IONS-SCNC: 9.7 MMOL/L (ref 5–15)
AST SERPL-CCNC: 9 U/L (ref 1–40)
BILIRUB SERPL-MCNC: 0.3 MG/DL (ref 0–1.2)
BUN SERPL-MCNC: 13 MG/DL (ref 8–23)
BUN/CREAT SERPL: 5.1 (ref 7–25)
CALCIUM SPEC-SCNC: 8.2 MG/DL (ref 8.6–10.5)
CHLORIDE SERPL-SCNC: 111 MMOL/L (ref 98–107)
CO2 SERPL-SCNC: 21.3 MMOL/L (ref 22–29)
CREAT SERPL-MCNC: 2.53 MG/DL (ref 0.76–1.27)
DEPRECATED RDW RBC AUTO: 51.5 FL (ref 37–54)
EGFRCR SERPLBLD CKD-EPI 2021: 28.1 ML/MIN/1.73
ERYTHROCYTE [DISTWIDTH] IN BLOOD BY AUTOMATED COUNT: 14 % (ref 12.3–15.4)
GAMMA INTERFERON BACKGROUND BLD IA-ACNC: 0.01 IU/ML
GLOBULIN UR ELPH-MCNC: 2.2 GM/DL
GLUCOSE SERPL-MCNC: 112 MG/DL (ref 65–99)
HCT VFR BLD AUTO: 38.6 % (ref 37.5–51)
HGB BLD-MCNC: 12.7 G/DL (ref 13–17.7)
M TB IFN-G BLD-IMP: NEGATIVE
M TB IFN-G CD4+ T-CELLS BLD-ACNC: 0.01 IU/ML
M TBIFN-G CD4+ CD8+T-CELLS BLD-ACNC: 0.01 IU/ML
MCH RBC QN AUTO: 33.1 PG (ref 26.6–33)
MCHC RBC AUTO-ENTMCNC: 32.9 G/DL (ref 31.5–35.7)
MCV RBC AUTO: 100.5 FL (ref 79–97)
MITOGEN IGNF BLD-ACNC: 3.13 IU/ML
PLATELET # BLD AUTO: 138 10*3/MM3 (ref 140–450)
PMV BLD AUTO: 10.5 FL (ref 6–12)
POTASSIUM SERPL-SCNC: 4.2 MMOL/L (ref 3.5–5.2)
PROT SERPL-MCNC: 4.8 G/DL (ref 6–8.5)
QUANTIFERON INCUBATION: NORMAL
RBC # BLD AUTO: 3.84 10*6/MM3 (ref 4.14–5.8)
SERVICE CMNT-IMP: NORMAL
SODIUM SERPL-SCNC: 142 MMOL/L (ref 136–145)
WBC NRBC COR # BLD: 7.55 10*3/MM3 (ref 3.4–10.8)

## 2023-07-14 PROCEDURE — 85027 COMPLETE CBC AUTOMATED: CPT | Performed by: INTERNAL MEDICINE

## 2023-07-14 PROCEDURE — 25010000002 PIPERACILLIN SOD-TAZOBACTAM PER 1 G: Performed by: INTERNAL MEDICINE

## 2023-07-14 PROCEDURE — 80053 COMPREHEN METABOLIC PANEL: CPT | Performed by: INTERNAL MEDICINE

## 2023-07-14 PROCEDURE — 25010000002 HYDROMORPHONE 1 MG/ML SOLUTION: Performed by: INTERNAL MEDICINE

## 2023-07-14 RX ORDER — HYDROCODONE BITARTRATE AND ACETAMINOPHEN 5; 325 MG/1; MG/1
1 TABLET ORAL EVERY 4 HOURS PRN
Qty: 30 TABLET | Refills: 0 | Status: SHIPPED | OUTPATIENT
Start: 2023-07-14

## 2023-07-14 RX ADMIN — Medication 2 MG: at 08:57

## 2023-07-14 RX ADMIN — Medication 10 ML: at 08:51

## 2023-07-14 RX ADMIN — TAZOBACTAM SODIUM AND PIPERACILLIN SODIUM 3.38 G: 375; 3 INJECTION, SOLUTION INTRAVENOUS at 05:04

## 2023-07-14 RX ADMIN — HYDROCODONE BITARTRATE AND ACETAMINOPHEN 1 TABLET: 7.5; 325 TABLET ORAL at 05:05

## 2023-07-14 RX ADMIN — HYDROMORPHONE HYDROCHLORIDE 0.5 MG: 1 INJECTION, SOLUTION INTRAMUSCULAR; INTRAVENOUS; SUBCUTANEOUS at 08:57

## 2023-07-14 RX ADMIN — TAZOBACTAM SODIUM AND PIPERACILLIN SODIUM 3.38 G: 375; 3 INJECTION, SOLUTION INTRAVENOUS at 13:30

## 2023-07-14 NOTE — PLAN OF CARE
Problem: Adult Inpatient Plan of Care  Goal: Plan of Care Review  Outcome: Ongoing, Progressing  Goal: Patient-Specific Goal (Individualized)  Outcome: Ongoing, Progressing  Goal: Absence of Hospital-Acquired Illness or Injury  Outcome: Ongoing, Progressing  Intervention: Identify and Manage Fall Risk  Recent Flowsheet Documentation  Taken 7/13/2023 2200 by Karyn Adams RN  Safety Promotion/Fall Prevention: safety round/check completed  Taken 7/13/2023 2000 by Karyn Adams RN  Safety Promotion/Fall Prevention: safety round/check completed  Intervention: Prevent Skin Injury  Recent Flowsheet Documentation  Taken 7/13/2023 2200 by Karyn Adams RN  Body Position: position changed independently  Taken 7/13/2023 2000 by Karyn Adams RN  Body Position: sitting up in bed  Intervention: Prevent and Manage VTE (Venous Thromboembolism) Risk  Recent Flowsheet Documentation  Taken 7/13/2023 2200 by Karyn Adams RN  Activity Management: up ad skyler  Taken 7/13/2023 2000 by Karyn Adasm RN  Activity Management: up ad skyler  Intervention: Prevent Infection  Recent Flowsheet Documentation  Taken 7/13/2023 2200 by Karyn Adams RN  Infection Prevention: environmental surveillance performed  Taken 7/13/2023 2000 by Karyn Adams RN  Infection Prevention: environmental surveillance performed  Goal: Optimal Comfort and Wellbeing  Outcome: Ongoing, Progressing  Goal: Readiness for Transition of Care  Outcome: Ongoing, Progressing     Problem: Pain Acute  Goal: Acceptable Pain Control and Functional Ability  Outcome: Ongoing, Progressing     Problem: Fall Injury Risk  Goal: Absence of Fall and Fall-Related Injury  Outcome: Ongoing, Progressing  Intervention: Promote Injury-Free Environment  Recent Flowsheet Documentation  Taken 7/13/2023 2200 by Karyn Adams RN  Safety Promotion/Fall Prevention: safety round/check completed  Taken 7/13/2023 2000 by Karyn Adams RN  Safety Promotion/Fall Prevention: safety  round/check completed     Problem: Skin Injury Risk Increased  Goal: Skin Health and Integrity  Outcome: Ongoing, Progressing  Intervention: Optimize Skin Protection  Recent Flowsheet Documentation  Taken 7/13/2023 2200 by Karyn Adams RN  Head of Bed ([facility]) Positioning: HOB elevated  Taken 7/13/2023 2000 by Karyn Adams RN  Head of Bed ([facility]) Positioning: HOB elevated     Problem: Adjustment to Illness (Bowel Disease, Inflammatory)  Goal: Optimal Adaptation to Chronic Illness  Outcome: Ongoing, Progressing     Problem: Diarrhea (Bowel Disease, Inflammatory)  Goal: Diarrhea Symptom Relief  Outcome: Ongoing, Progressing     Problem: Infection (Bowel Disease, Inflammatory)  Goal: Absence of Infection Signs and Symptoms  Outcome: Ongoing, Progressing     Problem: Nutrition Impaired (Bowel Disease, Inflammatory)  Goal: Optimal Nutrition  Outcome: Ongoing, Progressing     Problem: Pain (Bowel Disease, Inflammatory)  Goal: Acceptable Pain Control  Outcome: Ongoing, Progressing     Problem: Adjustment to Surgery (Ileostomy)  Goal: Optimal Coping  Outcome: Ongoing, Progressing     Problem: Bleeding (Ileostomy)  Goal: Absence of Bleeding  Outcome: Ongoing, Progressing     Problem: Fluid, Electrolyte and Nutrition Imbalance (Ileostomy)  Goal: Fluid, Electrolyte and Nutrition Balance  Outcome: Ongoing, Progressing     Problem: Infection (Ileostomy)  Goal: Absence of Infection Signs and Symptoms  Outcome: Ongoing, Progressing     Problem: Ongoing Anesthesia Effects (Ileostomy)  Goal: Anesthesia/Sedation Recovery  Outcome: Ongoing, Progressing  Intervention: Optimize Anesthesia Recovery  Recent Flowsheet Documentation  Taken 7/13/2023 2200 by Karyn Adams RN  Safety Promotion/Fall Prevention: safety round/check completed  Taken 7/13/2023 2000 by Karyn Adams RN  Safety Promotion/Fall Prevention: safety round/check completed     Problem: Pain (Ileostomy)  Goal: Acceptable Pain Control  Outcome: Ongoing, Progressing      Problem: Postoperative Nausea and Vomiting (Ileostomy)  Goal: Nausea and Vomiting Relief  Outcome: Ongoing, Progressing     Problem: Postoperative Stoma Care (Ileostomy)  Goal: Optimal Stoma Healing  Outcome: Ongoing, Progressing     Problem: Postoperative Urinary Retention (Ileostomy)  Goal: Effective Urinary Elimination  Outcome: Ongoing, Progressing     Problem: Respiratory Compromise (Ileostomy)  Goal: Effective Oxygenation and Ventilation  Outcome: Ongoing, Progressing  Intervention: Optimize Oxygenation and Ventilation  Recent Flowsheet Documentation  Taken 7/13/2023 2200 by Karyn Adams RN  Head of Bed (HOB) Positioning: HOB elevated  Taken 7/13/2023 2000 by Karyn Adams, RN  Head of Bed ([facility]) Positioning: HOB elevated     Problem: Infection  Goal: Absence of Infection Signs and Symptoms  Outcome: Ongoing, Progressing   Goal Outcome Evaluation:

## 2023-07-14 NOTE — CASE MANAGEMENT/SOCIAL WORK
Case Management Discharge Note      Final Note: Pt. will be discharing home. No discharge needs. Pt. famly will provide discharge transport.    Provided Post Acute Provider List?: N/A  Provided Post Acute Provider Quality & Resource List?: N/A    Selected Continued Care - Admitted Since 7/10/2023       Destination    No services have been selected for the patient.                Durable Medical Equipment    No services have been selected for the patient.                Dialysis/Infusion    No services have been selected for the patient.                Home Medical Care    No services have been selected for the patient.                Therapy    No services have been selected for the patient.                Community Resources    No services have been selected for the patient.                Community & DME    No services have been selected for the patient.                    Transportation Services  Private: Car    Final Discharge Disposition Code: 01 - home or self-care

## 2023-07-14 NOTE — DISCHARGE SUMMARY
[facility]   INTERNAL MEDICINE DISCHARGE SUMMARY      Name:  Jorge Mclaughlin   Age:  63 y.o.  Sex:  male  :  1960  MRN:  [id number]   Visit Number:  [id number]  Primary Care Physician:  Yazan Corbett MD  Date of Discharge:  2023  Admission Date:  7/10/2023      Discharge Diagnosis:         Bowel perforation    Acquired hypothyroidism    Chronic kidney disease, stage III (moderate)    Hx of unilateral nephrectomy, right    Intractable pain    History of ileostomy    Encounter for screening for malignant neoplasm of colon    Anemia    Crohn's disease without complication    Colon perforation          Consults:     Consults       Date and Time Order Name Status Description    2023  8:02 AM Inpatient Cardiology Consult Completed     2023  6:35 AM Inpatient General Surgery Consult      7/10/2023  2:07 PM Inpatient General Surgery Consult Completed             Procedures Performed:      Procedure(s):  ESOPHAGOGASTRODUODENOSCOPY WITH BIOPSY  COLONOSCOPY WITH BIOPSY           Hospital Course:   The patient was admitted on 7/10/2023  Please see H&P for details on admission HPI and ROS.    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  He will in the morning underwent surveillance colonoscopy due to his history of Crohn's disease and who has had perforation of his bowel due to the colonoscopy in the past 2 times today after the procedure was done noted significant pain.  There was abdominal distention and the x-ray showed that there was pneumoperitoneum and there was signs of perforation which was confirmed on the CAT scan  Patient was given Dilaudid and further admitted as inpatient for further management of his  complication  Surgical consult has been done and Dr. To recommends conservative treatment and he is currently n.p.o. as well as on IV fluids and IV Zosyn     Patient seen on July 11 and he does have significant pain over the right lower quadrant still.  He states his Dilaudid is there but not helping him enough.     Patient seen on July 12.  He is some better though his pain is more on the left side and still has the right-sided pain.  The intensity seems to be some better.  He is thirsty he is passing liquid stool in his ileostomy bag     Patient seen on July 13.  He has tolerated clear liquids well.  He also walked yesterday few times and had some knee pain so he stopped.  He is feeling slightly better still using his pain medicine every 2 hours    He has been stable and tolerated solid food. Pain is tolereated and stable for dc .  Vital Signs:     Temp:  [97.8 °F (36.6 °C)-98.5 °F (36.9 °C)] 97.8 °F (36.6 °C)  Heart Rate:  [] 65  Resp:  [16-20] 18  BP: (120-138)/(70-94) 120/75    Physical Exam:     General Appearance:    Alert and cooperative, not in any acute distress.   Head:    Atraumatic and normocephalic, without obvious abnormality.   Eyes:            PERRLA,  No pallor. Extraocular movements are within normal limits.   Neck:   Supple,  No lymph glands, no bruit   Lungs:     Chest shape is normal. Breath sounds heard bilaterally equally.  No crackles or wheezing.     Heart:    Normal S1 and S2, no murmur,  No JVD   Abdomen:     Normal bowel sounds, no masses, no organomegaly. Soft     tenderness mild, no guarding,  ileostomy   Extremities:   Moves all extremities well, no edema, no cyanosis,    Skin:   No  bruising or rash.   Neurologic:   Grossly nonfocal and moves all extremities.        Pertinent Lab Results:     Results from last 7 days   Lab Units 07/14/23  0644 07/13/23  0554 07/11/23  0643   SODIUM mmol/L 142 139 139   POTASSIUM mmol/L 4.2 3.7 4.1   CHLORIDE mmol/L 111* 109* 109*   CO2  mmol/L 21.3* 22.5 20.5*   BUN mg/dL 13 13 19   CREATININE mg/dL 2.53* 2.61* 2.70*   CALCIUM mg/dL 8.2* 8.2* 8.1*   BILIRUBIN mg/dL 0.3 0.6 1.6*   ALK PHOS U/L 40 47 55   ALT (SGPT) U/L 14 17 21   AST (SGOT) U/L 9 12 17   GLUCOSE mg/dL 112* 117* 124*     Results from last 7 days   Lab Units 07/14/23  0644 07/13/23  0554 07/11/23  0643   WBC 10*3/mm3 7.55 7.56 9.36   HEMOGLOBIN g/dL 12.7* 12.8* 14.3   HEMATOCRIT % 38.6 39.1 43.9   PLATELETS 10*3/mm3 138* 133* 130*         No results found for: BLOODCX, URINECX, WOUNDCX, MRSACX    Pertinent Radiology Results:    Imaging Results (Most Recent)       Procedure Component Value Units Date/Time    CT Abdomen Pelvis Without Contrast [[id number]] Collected: 07/10/23 1319     Updated: 07/10/23 1339    Narrative:      PROCEDURE: CT ABDOMEN PELVIS WO CONTRAST-     HISTORY: Bowel perforation; Z12.11-Encounter for screening for malignant  neoplasm of colon; K50.90-Crohn's disease, unspecified, without  complications; D64.9-Anemia, unspecified        COMPARISON: February 15, 2022     TECHNIQUE: Axial CT images of the abdomen and pelvis were obtained  without contrast. Coronal reformatted images were also obtained.This  study was performed with techniques to keep radiation doses as low as  reasonably achievable, (ALARA). Individualized dose reduction techniques  using automated exposure control or adjustment of mA and/or kV according  to the patient size were employed.        FINDINGS:      ABDOMEN: The lung bases are clear.  Postoperative changes are seen from  right nephrectomy. There is no evidence of left renal stone or  hydronephrosis.  Multiple gallstones are seen in the gallbladder.  The  liver, spleen and pancreas have an unremarkable unenhanced appearance.   No mass or adenopathy is seen.  Postoperative changes are seen in the  right abdomen. A very large amount of pneumoperitoneum is seen measuring  up to 8 cm AP. This causes mass effect on the liver and  stomach.  Pneumatosis is noted at the hepatic flexure of the colon and to a lesser  extent in the ascending colon.     PELVIS:  Images of the pelvis reveal a large amount of pneumoperitoneum.  There is also a moderate to large amount of extraperitoneal air. A right  pelvis ostomy is present. Scattered diverticula are seen in the colon.   A small amount of pelvic free fluid is seen which is likely reactive.       Impression:       Large amount of pneumoperitoneum and moderate to large amount of  extraperitoneal air. Since there is pneumatosis of the hepatic flexure  of the colon and to a lesser extent the ascending colon, one of these  may represent the site of the perforation.                 This report was signed and finalized on 7/10/2023 1:24 PM by Vick Simpson MD.    XR Abdomen 2+ VW with Chest 1 VW [[id number]] Collected: 07/10/23 1205     Updated: 07/10/23 1304    Narrative:      PROCEDURE: XR ABDOMEN 2+ VIEWS W CHEST 1 VW-     HISTORY: Post colonosocpy with abdominal pain, distension;  Z12.11-Encounter for screening for malignant neoplasm of colon;  K50.90-Crohn's disease, unspecified, without complications;  D64.9-Anemia, unspecified     COMPARISON: Chest x-ray dated May 2023 and CT scan of the abdomen and  pelvis dated October 2022.     FINDINGS: Chest: A single view of the chest demonstrates the heart to be   normal in size . The mediastinum is normal. The lungs are clear.     Abdomen: Flat and upright views of the abdomen were obtained.  Postoperative changes are seen in the right abdomen and pelvis. There is  a large amount of intraperitoneal free air and apparent right abdominal  and pelvic extraperitoneal free air present.       Impression:      Large amount of free intraperitoneal air. Apparent right  abdominal and pelvic extraperitoneal free air. This finding was called  to the patient's nurse, Suyapa, on July 10, 2023 at 12:05 PM.                 Images were reviewed, interpreted, and dictated  by Dr. Vick Simpson MD  Transcribed by Jaelyn Cortes PA-C.     This report was signed and finalized on 7/10/2023 1:02 PM by Vick Simpson MD.                    Discharge Disposition:      Home or Self Care    Discharge Medication:         Discharge Medications        Changes to Medications        Instructions Start Date   HYDROcodone-acetaminophen 5-325 MG per tablet  Commonly known as: NORCO  What changed: when to take this   1 tablet, Oral, Every 4 Hours PRN             Continue These Medications        Instructions Start Date   balsalazide 750 MG capsule  Commonly known as: COLAZAL   2,250 mg, Oral, 2 Times Daily      Dupixent 200 MG/1.14ML solution pen-injector  Generic drug: Dupilumab   200 mg, Subcutaneous, Every 14 Days      ferrous sulfate 325 (65 FE) MG tablet   325 mg, Oral, Daily With Breakfast      levothyroxine 125 MCG tablet  Commonly known as: SYNTHROID, LEVOTHROID   Take 1 tablet by mouth Daily.      multivitamin tablet tablet  Commonly known as: THERAGRAN   1 tablet, Oral, Daily      pantoprazole 40 MG EC tablet  Commonly known as: PROTONIX   40 mg, Oral, Daily      tamsulosin 0.4 MG capsule 24 hr capsule  Commonly known as: FLOMAX   0.4 mg, Oral, Daily      Testosterone Cypionate 200 MG/ML injection  Commonly known as: DEPOTESTOTERONE CYPIONATE   1 mL Every 14 (Fourteen) Days.             Stop These Medications      benzonatate 100 MG capsule  Commonly known as: TESSALON     bisacodyl 5 MG EC tablet  Commonly known as: DULCOLAX     Diclofenac Sodium 1 % gel gel  Commonly known as: VOLTAREN     fluocinonide 0.05 % cream  Commonly known as: LIDEX     metoclopramide 10 MG tablet  Commonly known as: REGLAN     ondansetron ODT 4 MG disintegrating tablet  Commonly known as: ZOFRAN-ODT     PEG-KCl-NaCl-NaSulf-Na Asc-C 100 g reconstituted solution powder  Commonly known as: MOVIPREP     polyethylene glycol 17 GM/SCOOP powder  Commonly known as: MiraLax     triamcinolone 0.1 % cream  Commonly  known as: KENALOG              Discharge Diet:             Follow-up Appointments:      Future Appointments   Date Time Provider Department Center   9/18/2023  4:15 PM Yazmin Martin MD [facility]         Test Results Pending at Discharge:             Yazan Corbett MD  07/14/23  09:44 EDT    Time:  Discharge 34 min    Please note that portions of this note were completed with a voice recognition program.

## 2023-07-15 NOTE — PAYOR COMM NOTE
"  D/C SUMMARY  UR MANAGER; SHENA GOMEZ [phone] AND [phone]      Jorge Ramos (63 y.o. Male)       Date of Birth   1960    Social Security Number       Address   [place] [place] [address]    Home Phone   [phone]    MRN   [id number]       Yazdanism   Yazdanism    Marital Status                               Admission Date   7/10/23    Admission Type   Elective    Admitting Provider   Yazan Corbett MD    Attending Provider       Department, Room/Bed   [facility] TELEMETRY 3, 319/1       Discharge Date   7/14/2023    Discharge Disposition   Home or Self Care    Discharge Destination   Home                              Attending Provider: (none)   Allergies: Latex    Isolation: None   Infection: Hepatitis A (07/14/23)   Code Status: Prior    Ht: 167.6 cm (65.98\")   Wt: 71.3 kg (157 lb 3 oz)    Admission Cmt: None   Principal Problem: Bowel perforation [K63.1]                   Active Insurance as of 7/10/2023       Primary Coverage       Payor Plan Insurance Group Employer/Plan Group    [place] [id number]       Payor Plan Address Payor Plan Phone Number Payor Plan Fax Number Effective Dates    [employer] [address] [address] [phone]  1/1/2021 - None Entered    [facility] [id number]         Subscriber Name Subscriber Birth Date Member ID       JORGE RAMOS 1960 [id number]                     Emergency Contacts        (Rel.) Home Phone Work Phone Mobile Phone    Evelio Ramos (Son) [phone] -- --    Mellisa Ramos (Spouse) [phone] -- --    Anthony Ramos (Son) -- -- [phone]              Physician Progress Notes (last 24 hours)  Notes from 07/14/23 1309 through 07/15/23 1309   No notes of this type exist for this encounter.          Discharge Summary        Yazan Corbett MD at 07/14/23 0943              [facility]   INTERNAL MEDICINE DISCHARGE SUMMARY      Name:  Jorge" David Mclaughlin   Age:  63 y.o.  Sex:  male  :  1960  MRN:  [id number]   Visit Number:  [id number]  Primary Care Physician:  Yazan Corbett MD  Date of Discharge:  2023  Admission Date:  7/10/2023      Discharge Diagnosis:         Bowel perforation    Acquired hypothyroidism    Chronic kidney disease, stage III (moderate)    Hx of unilateral nephrectomy, right    Intractable pain    History of ileostomy    Encounter for screening for malignant neoplasm of colon    Anemia    Crohn's disease without complication    Colon perforation          Consults:     Consults       Date and Time Order Name Status Description    2023  8:02 AM Inpatient Cardiology Consult Completed     2023  6:35 AM Inpatient General Surgery Consult      7/10/2023  2:07 PM Inpatient General Surgery Consult Completed             Procedures Performed:      Procedure(s):  ESOPHAGOGASTRODUODENOSCOPY WITH BIOPSY  COLONOSCOPY WITH BIOPSY           Hospital Course:   The patient was admitted on 7/10/2023  Please see H&P for details on admission HPI and ROS.    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  He will in the morning underwent surveillance colonoscopy due to his history of Crohn's disease and who has had perforation of his bowel due to the colonoscopy in the past 2 times today after the procedure was done noted significant pain.  There was abdominal distention and the x-ray showed that there was pneumoperitoneum and there was signs of perforation which was confirmed on the CAT scan  Patient was given Dilaudid and further admitted as inpatient for further management of his complication  Surgical consult has been done and Dr. To recommends conservative  treatment and he is currently n.p.o. as well as on IV fluids and IV Zosyn     Patient seen on July 11 and he does have significant pain over the right lower quadrant still.  He states his Dilaudid is there but not helping him enough.     Patient seen on July 12.  He is some better though his pain is more on the left side and still has the right-sided pain.  The intensity seems to be some better.  He is thirsty he is passing liquid stool in his ileostomy bag     Patient seen on July 13.  He has tolerated clear liquids well.  He also walked yesterday few times and had some knee pain so he stopped.  He is feeling slightly better still using his pain medicine every 2 hours    He has been stable and tolerated solid food. Pain is tolereated and stable for dc .  Vital Signs:     Temp:  [97.8 °F (36.6 °C)-98.5 °F (36.9 °C)] 97.8 °F (36.6 °C)  Heart Rate:  [] 65  Resp:  [16-20] 18  BP: (120-138)/(70-94) 120/75    Physical Exam:     General Appearance:    Alert and cooperative, not in any acute distress.   Head:    Atraumatic and normocephalic, without obvious abnormality.   Eyes:            PERRLA,  No pallor. Extraocular movements are within normal limits.   Neck:   Supple,  No lymph glands, no bruit   Lungs:     Chest shape is normal. Breath sounds heard bilaterally equally.  No crackles or wheezing.     Heart:    Normal S1 and S2, no murmur,  No JVD   Abdomen:     Normal bowel sounds, no masses, no organomegaly. Soft     tenderness mild, no guarding,  ileostomy   Extremities:   Moves all extremities well, no edema, no cyanosis,    Skin:   No  bruising or rash.   Neurologic:   Grossly nonfocal and moves all extremities.        Pertinent Lab Results:     Results from last 7 days   Lab Units 07/14/23  0644 07/13/23  0554 07/11/23  0643   SODIUM mmol/L 142 139 139   POTASSIUM mmol/L 4.2 3.7 4.1   CHLORIDE mmol/L 111* 109* 109*   CO2 mmol/L 21.3* 22.5 20.5*   BUN mg/dL 13 13 19   CREATININE mg/dL 2.53* 2.61* 2.70*    CALCIUM mg/dL 8.2* 8.2* 8.1*   BILIRUBIN mg/dL 0.3 0.6 1.6*   ALK PHOS U/L 40 47 55   ALT (SGPT) U/L 14 17 21   AST (SGOT) U/L 9 12 17   GLUCOSE mg/dL 112* 117* 124*     Results from last 7 days   Lab Units 07/14/23  0644 07/13/23  0554 07/11/23  0643   WBC 10*3/mm3 7.55 7.56 9.36   HEMOGLOBIN g/dL 12.7* 12.8* 14.3   HEMATOCRIT % 38.6 39.1 43.9   PLATELETS 10*3/mm3 138* 133* 130*         No results found for: BLOODCX, URINECX, WOUNDCX, MRSACX    Pertinent Radiology Results:    Imaging Results (Most Recent)       Procedure Component Value Units Date/Time    CT Abdomen Pelvis Without Contrast [[id number]] Collected: 07/10/23 1319     Updated: 07/10/23 1339    Narrative:      PROCEDURE: CT ABDOMEN PELVIS WO CONTRAST-     HISTORY: Bowel perforation; Z12.11-Encounter for screening for malignant  neoplasm of colon; K50.90-Crohn's disease, unspecified, without  complications; D64.9-Anemia, unspecified        COMPARISON: February 15, 2022     TECHNIQUE: Axial CT images of the abdomen and pelvis were obtained  without contrast. Coronal reformatted images were also obtained.This  study was performed with techniques to keep radiation doses as low as  reasonably achievable, (ALARA). Individualized dose reduction techniques  using automated exposure control or adjustment of mA and/or kV according  to the patient size were employed.        FINDINGS:      ABDOMEN: The lung bases are clear.  Postoperative changes are seen from  right nephrectomy. There is no evidence of left renal stone or  hydronephrosis.  Multiple gallstones are seen in the gallbladder.  The  liver, spleen and pancreas have an unremarkable unenhanced appearance.   No mass or adenopathy is seen.  Postoperative changes are seen in the  right abdomen. A very large amount of pneumoperitoneum is seen measuring  up to 8 cm AP. This causes mass effect on the liver and stomach.  Pneumatosis is noted at the hepatic flexure of the colon and to a lesser  extent in the  ascending colon.     PELVIS:  Images of the pelvis reveal a large amount of pneumoperitoneum.  There is also a moderate to large amount of extraperitoneal air. A right  pelvis ostomy is present. Scattered diverticula are seen in the colon.   A small amount of pelvic free fluid is seen which is likely reactive.       Impression:       Large amount of pneumoperitoneum and moderate to large amount of  extraperitoneal air. Since there is pneumatosis of the hepatic flexure  of the colon and to a lesser extent the ascending colon, one of these  may represent the site of the perforation.                 This report was signed and finalized on 7/10/2023 1:24 PM by Vick Simpson MD.    XR Abdomen 2+ VW with Chest 1 VW [[id number]] Collected: 07/10/23 1205     Updated: 07/10/23 1304    Narrative:      PROCEDURE: XR ABDOMEN 2+ VIEWS W CHEST 1 VW-     HISTORY: Post colonosocpy with abdominal pain, distension;  Z12.11-Encounter for screening for malignant neoplasm of colon;  K50.90-Crohn's disease, unspecified, without complications;  D64.9-Anemia, unspecified     COMPARISON: Chest x-ray dated May 2023 and CT scan of the abdomen and  pelvis dated October 2022.     FINDINGS: Chest: A single view of the chest demonstrates the heart to be   normal in size . The mediastinum is normal. The lungs are clear.     Abdomen: Flat and upright views of the abdomen were obtained.  Postoperative changes are seen in the right abdomen and pelvis. There is  a large amount of intraperitoneal free air and apparent right abdominal  and pelvic extraperitoneal free air present.       Impression:      Large amount of free intraperitoneal air. Apparent right  abdominal and pelvic extraperitoneal free air. This finding was called  to the patient's nurse, Suyapa, on July 10, 2023 at 12:05 PM.                 Images were reviewed, interpreted, and dictated by Dr. Vick Simpson MD  Transcribed by Jaelyn Cortes PA-C.     This report was signed and  finalized on 7/10/2023 1:02 PM by Vick Simpson MD.                    Discharge Disposition:      Home or Self Care    Discharge Medication:         Discharge Medications        Changes to Medications        Instructions Start Date   HYDROcodone-acetaminophen 5-325 MG per tablet  Commonly known as: NORCO  What changed: when to take this   1 tablet, Oral, Every 4 Hours PRN             Continue These Medications        Instructions Start Date   balsalazide 750 MG capsule  Commonly known as: COLAZAL   2,250 mg, Oral, 2 Times Daily      Dupixent 200 MG/1.14ML solution pen-injector  Generic drug: Dupilumab   200 mg, Subcutaneous, Every 14 Days      ferrous sulfate 325 (65 FE) MG tablet   325 mg, Oral, Daily With Breakfast      levothyroxine 125 MCG tablet  Commonly known as: SYNTHROID, LEVOTHROID   Take 1 tablet by mouth Daily.      multivitamin tablet tablet  Commonly known as: THERAGRAN   1 tablet, Oral, Daily      pantoprazole 40 MG EC tablet  Commonly known as: PROTONIX   40 mg, Oral, Daily      tamsulosin 0.4 MG capsule 24 hr capsule  Commonly known as: FLOMAX   0.4 mg, Oral, Daily      Testosterone Cypionate 200 MG/ML injection  Commonly known as: DEPOTESTOTERONE CYPIONATE   1 mL Every 14 (Fourteen) Days.             Stop These Medications      benzonatate 100 MG capsule  Commonly known as: TESSALON     bisacodyl 5 MG EC tablet  Commonly known as: DULCOLAX     Diclofenac Sodium 1 % gel gel  Commonly known as: VOLTAREN     fluocinonide 0.05 % cream  Commonly known as: LIDEX     metoclopramide 10 MG tablet  Commonly known as: REGLAN     ondansetron ODT 4 MG disintegrating tablet  Commonly known as: ZOFRAN-ODT     PEG-KCl-NaCl-NaSulf-Na Asc-C 100 g reconstituted solution powder  Commonly known as: MOVIPREP     polyethylene glycol 17 GM/SCOOP powder  Commonly known as: MiraLax     triamcinolone 0.1 % cream  Commonly known as: KENALOG              Discharge Diet:             Follow-up Appointments:      Future  Appointments   Date Time Provider Department Center   9/18/2023  4:15 PM Yazmin Martin MD [facility]         Test Results Pending at Discharge:             Yazan Corbett MD  07/14/23  09:44 EDT    Time:  Discharge 34 min    Please note that portions of this note were completed with a voice recognition program.        Electronically signed by Yazan Corbett MD at 07/14/23 0931

## 2023-07-22 ENCOUNTER — APPOINTMENT (OUTPATIENT)
Dept: CT IMAGING | Facility: HOSPITAL | Age: 63
End: 2023-07-22
Payer: MEDICAID

## 2023-07-22 ENCOUNTER — HOSPITAL ENCOUNTER (EMERGENCY)
Facility: HOSPITAL | Age: 63
Discharge: ANOTHER HOSPITAL | End: 2023-07-22
Attending: EMERGENCY MEDICINE | Admitting: EMERGENCY MEDICINE
Payer: MEDICAID

## 2023-07-22 VITALS
DIASTOLIC BLOOD PRESSURE: 93 MMHG | HEART RATE: 84 BPM | WEIGHT: 145 LBS | BODY MASS INDEX: 22.76 KG/M2 | RESPIRATION RATE: 16 BRPM | HEIGHT: 67 IN | OXYGEN SATURATION: 95 % | SYSTOLIC BLOOD PRESSURE: 135 MMHG | TEMPERATURE: 98.5 F

## 2023-07-22 DIAGNOSIS — K66.1 HEMOPERITONEUM: Primary | ICD-10-CM

## 2023-07-22 DIAGNOSIS — K63.1 COLON PERFORATION: ICD-10-CM

## 2023-07-22 DIAGNOSIS — K65.9 PERITONITIS: ICD-10-CM

## 2023-07-22 LAB
ALBUMIN SERPL-MCNC: 3.9 G/DL (ref 3.5–5.2)
ALBUMIN/GLOB SERPL: 1.5 G/DL
ALP SERPL-CCNC: 59 U/L (ref 39–117)
ALT SERPL W P-5'-P-CCNC: 19 U/L (ref 1–41)
ANION GAP SERPL CALCULATED.3IONS-SCNC: 15.1 MMOL/L (ref 5–15)
AST SERPL-CCNC: 17 U/L (ref 1–40)
BACTERIA UR QL AUTO: ABNORMAL /HPF
BASOPHILS # BLD AUTO: 0.09 10*3/MM3 (ref 0–0.2)
BASOPHILS NFR BLD AUTO: 0.6 % (ref 0–1.5)
BILIRUB SERPL-MCNC: 0.3 MG/DL (ref 0–1.2)
BILIRUB UR QL STRIP: NEGATIVE
BUN SERPL-MCNC: 23 MG/DL (ref 8–23)
BUN/CREAT SERPL: 6.8 (ref 7–25)
CALCIUM SPEC-SCNC: 9.5 MG/DL (ref 8.6–10.5)
CHLORIDE SERPL-SCNC: 102 MMOL/L (ref 98–107)
CLARITY UR: CLEAR
CO2 SERPL-SCNC: 21.9 MMOL/L (ref 22–29)
COLOR UR: YELLOW
CREAT SERPL-MCNC: 3.4 MG/DL (ref 0.76–1.27)
D-LACTATE SERPL-SCNC: 1.4 MMOL/L (ref 0.5–2)
DEPRECATED RDW RBC AUTO: 47.5 FL (ref 37–54)
EGFRCR SERPLBLD CKD-EPI 2021: 19.7 ML/MIN/1.73
EOSINOPHIL # BLD AUTO: 0.45 10*3/MM3 (ref 0–0.4)
EOSINOPHIL NFR BLD AUTO: 2.8 % (ref 0.3–6.2)
ERYTHROCYTE [DISTWIDTH] IN BLOOD BY AUTOMATED COUNT: 13.7 % (ref 12.3–15.4)
GLOBULIN UR ELPH-MCNC: 2.6 GM/DL
GLUCOSE SERPL-MCNC: 140 MG/DL (ref 65–99)
GLUCOSE UR STRIP-MCNC: NEGATIVE MG/DL
HCT VFR BLD AUTO: 41.9 % (ref 37.5–51)
HGB BLD-MCNC: 14.5 G/DL (ref 13–17.7)
HGB UR QL STRIP.AUTO: NEGATIVE
HOLD SPECIMEN: NORMAL
HOLD SPECIMEN: NORMAL
HYALINE CASTS UR QL AUTO: ABNORMAL /LPF
IMM GRANULOCYTES # BLD AUTO: 0.09 10*3/MM3 (ref 0–0.05)
IMM GRANULOCYTES NFR BLD AUTO: 0.6 % (ref 0–0.5)
KETONES UR QL STRIP: ABNORMAL
LEUKOCYTE ESTERASE UR QL STRIP.AUTO: NEGATIVE
LIPASE SERPL-CCNC: 67 U/L (ref 13–60)
LYMPHOCYTES # BLD AUTO: 2.02 10*3/MM3 (ref 0.7–3.1)
LYMPHOCYTES NFR BLD AUTO: 12.5 % (ref 19.6–45.3)
MCH RBC QN AUTO: 32.6 PG (ref 26.6–33)
MCHC RBC AUTO-ENTMCNC: 34.6 G/DL (ref 31.5–35.7)
MCV RBC AUTO: 94.2 FL (ref 79–97)
MONOCYTES # BLD AUTO: 1.02 10*3/MM3 (ref 0.1–0.9)
MONOCYTES NFR BLD AUTO: 6.3 % (ref 5–12)
NEUTROPHILS NFR BLD AUTO: 12.5 10*3/MM3 (ref 1.7–7)
NEUTROPHILS NFR BLD AUTO: 77.2 % (ref 42.7–76)
NITRITE UR QL STRIP: NEGATIVE
NRBC BLD AUTO-RTO: 0 /100 WBC (ref 0–0.2)
PH UR STRIP.AUTO: 5.5 [PH] (ref 5–8)
PLATELET # BLD AUTO: 270 10*3/MM3 (ref 140–450)
PMV BLD AUTO: 9.9 FL (ref 6–12)
POTASSIUM SERPL-SCNC: 3.9 MMOL/L (ref 3.5–5.2)
PROCALCITONIN SERPL-MCNC: 0.22 NG/ML (ref 0–0.25)
PROT SERPL-MCNC: 6.5 G/DL (ref 6–8.5)
PROT UR QL STRIP: ABNORMAL
RBC # BLD AUTO: 4.45 10*6/MM3 (ref 4.14–5.8)
RBC # UR STRIP: ABNORMAL /HPF
REF LAB TEST METHOD: ABNORMAL
SODIUM SERPL-SCNC: 139 MMOL/L (ref 136–145)
SP GR UR STRIP: 1.02 (ref 1–1.03)
SQUAMOUS #/AREA URNS HPF: ABNORMAL /HPF
UROBILINOGEN UR QL STRIP: ABNORMAL
WBC # UR STRIP: ABNORMAL /HPF
WBC NRBC COR # BLD: 16.17 10*3/MM3 (ref 3.4–10.8)
WHOLE BLOOD HOLD COAG: NORMAL
WHOLE BLOOD HOLD SPECIMEN: NORMAL

## 2023-07-22 PROCEDURE — 25010000002 PIPERACILLIN SOD-TAZOBACTAM PER 1 G: Performed by: NURSE PRACTITIONER

## 2023-07-22 PROCEDURE — 74176 CT ABD & PELVIS W/O CONTRAST: CPT

## 2023-07-22 PROCEDURE — 81001 URINALYSIS AUTO W/SCOPE: CPT | Performed by: NURSE PRACTITIONER

## 2023-07-22 PROCEDURE — 96375 TX/PRO/DX INJ NEW DRUG ADDON: CPT

## 2023-07-22 PROCEDURE — 25010000002 HYDROMORPHONE 1 MG/ML SOLUTION: Performed by: NURSE PRACTITIONER

## 2023-07-22 PROCEDURE — 84145 PROCALCITONIN (PCT): CPT | Performed by: NURSE PRACTITIONER

## 2023-07-22 PROCEDURE — 85025 COMPLETE CBC W/AUTO DIFF WBC: CPT | Performed by: EMERGENCY MEDICINE

## 2023-07-22 PROCEDURE — 83690 ASSAY OF LIPASE: CPT | Performed by: EMERGENCY MEDICINE

## 2023-07-22 PROCEDURE — 96365 THER/PROPH/DIAG IV INF INIT: CPT

## 2023-07-22 PROCEDURE — 25010000002 MORPHINE PER 10 MG: Performed by: NURSE PRACTITIONER

## 2023-07-22 PROCEDURE — 83605 ASSAY OF LACTIC ACID: CPT | Performed by: NURSE PRACTITIONER

## 2023-07-22 PROCEDURE — 36415 COLL VENOUS BLD VENIPUNCTURE: CPT

## 2023-07-22 PROCEDURE — 25010000002 ONDANSETRON PER 1 MG: Performed by: NURSE PRACTITIONER

## 2023-07-22 PROCEDURE — 99284 EMERGENCY DEPT VISIT MOD MDM: CPT

## 2023-07-22 PROCEDURE — 80053 COMPREHEN METABOLIC PANEL: CPT | Performed by: EMERGENCY MEDICINE

## 2023-07-22 RX ORDER — ONDANSETRON 2 MG/ML
4 INJECTION INTRAMUSCULAR; INTRAVENOUS ONCE
Status: COMPLETED | OUTPATIENT
Start: 2023-07-22 | End: 2023-07-22

## 2023-07-22 RX ORDER — SODIUM CHLORIDE 0.9 % (FLUSH) 0.9 %
10 SYRINGE (ML) INJECTION AS NEEDED
Status: DISCONTINUED | OUTPATIENT
Start: 2023-07-22 | End: 2023-07-22 | Stop reason: HOSPADM

## 2023-07-22 RX ADMIN — HYDROMORPHONE HYDROCHLORIDE 1 MG: 1 INJECTION, SOLUTION INTRAMUSCULAR; INTRAVENOUS; SUBCUTANEOUS at 19:48

## 2023-07-22 RX ADMIN — ONDANSETRON 4 MG: 2 INJECTION INTRAMUSCULAR; INTRAVENOUS at 19:48

## 2023-07-22 RX ADMIN — TAZOBACTAM SODIUM AND PIPERACILLIN SODIUM 3.38 G: 375; 3 INJECTION, SOLUTION INTRAVENOUS at 19:38

## 2023-07-22 RX ADMIN — MORPHINE SULFATE 4 MG: 4 INJECTION, SOLUTION INTRAMUSCULAR; INTRAVENOUS at 18:06

## 2023-07-22 NOTE — ED NOTES
[facility] Admissions called per MOHSEN Welch for a possible transfers. General Surgery to be paged at this time.

## 2023-07-23 NOTE — ED PROVIDER NOTES
Subjective  History of Present Illness:    Chief Complaint: Abdominal pain  History of Present Illness: 63-year-old male patient coming into the ED today complaining of abdominal pain.  Patient has a history of colon rupture was seen by Dr. Reanna Venegas, GI here had colonoscopy and EGD approximately 2 weeks ago.  Patient has history of ileostomy.  Patient states that his pain has been bad for the last 2 to 3 days and is progressively worsened over that interval.      Nurses Notes reviewed and agree, including vitals, allergies, social history and prior medical history.       Allergies:    Latex      Past Surgical History:   Procedure Laterality Date    COLONOSCOPY      COLONOSCOPY N/A 07/31/2017    Procedure: Colonoscopy through ostomy site and rectum with biopsies;  Surgeon: Vincenzo Galan MD;  Location: [facility] ENDOSCOPY;  Service:     COLONOSCOPY N/A 7/10/2023    Procedure: COLONOSCOPY WITH BIOPSY;  Surgeon: Yazmin Martin MD;  Location: [facility] ENDOSCOPY;  Service: Gastroenterology;  Laterality: N/A;    ENDOSCOPY      ENDOSCOPY N/A 7/10/2023    Procedure: ESOPHAGOGASTRODUODENOSCOPY WITH BIOPSY;  Surgeon: Yazmin Martin MD;  Location: [facility] ENDOSCOPY;  Service: Gastroenterology;  Laterality: N/A;    HEMORRHOIDECTOMY      REPORTS APPROXIMATELY 20 YEARS AGO    HERNIA REPAIR      UMBILICAL    NEPHRECTOMY Right 07/2017    SKIN BIOPSY      benign    SMALL INTESTINE SURGERY      HX OF COLON RUPTURE WITH PLACEMENT OF OSTOMY 16-17 years ago    URETEROSCOPY LASER LITHOTRIPSY WITH STENT INSERTION Left 07/03/2019    Procedure: CYSTOSCOPY, URETEROSCOPY, STONE BASKETING, RETROGRADE PYLEOGRAM,  LASER LITHOTRIPSY WITH STENT INSERTION;  Surgeon: Lazaro Velazquez MD;  Location: [facility] OR;  Service: Urology    URETEROSCOPY LASER LITHOTRIPSY WITH STENT INSERTION Left 09/20/2021    Procedure: URETEROSCOPY diagnostic WITH STENT INSERTION  with left retrograde;  Surgeon: Lazaro Velazquez MD;  Location: [facility]  OR;  Service: Urology;  Laterality: Left;    WISDOM TOOTH EXTRACTION      WRIST SURGERY Right          Social History     Socioeconomic History    Marital status:    Tobacco Use    Smoking status: Former     Packs/day: 2.00     Years: 3.00     Pack years: 6.00     Types: Cigarettes     Quit date:      Years since quittin.5    Smokeless tobacco: Former     Types: Snuff   Vaping Use    Vaping Use: Never used   Substance and Sexual Activity    Alcohol use: No     Comment: QUIT 30 PLUS YEARS    Drug use: No    Sexual activity: Defer         Family History   Problem Relation Age of Onset    Heart disease Mother     Cancer Father     No Known Problems Sister     COPD Brother     COPD Sister     Crohn's disease Son     Colon cancer Neg Hx        REVIEW OF SYSTEMS: All systems reviewed and not pertinent unless noted.    Review of Systems   Gastrointestinal:  Positive for abdominal pain.   All other systems reviewed and are negative.    Objective    Physical Exam  Vitals and nursing note reviewed.   Constitutional:       Appearance: Normal appearance.   HENT:      Head: Normocephalic and atraumatic.   Eyes:      Extraocular Movements: Extraocular movements intact.      Pupils: Pupils are equal, round, and reactive to light.   Cardiovascular:      Rate and Rhythm: Normal rate and regular rhythm.      Pulses: Normal pulses.      Heart sounds: Normal heart sounds.   Pulmonary:      Effort: Pulmonary effort is normal.      Breath sounds: Normal breath sounds.   Abdominal:      General: Bowel sounds are normal.      Palpations: Abdomen is soft.      Tenderness: There is abdominal tenderness in the right upper quadrant, right lower quadrant, left upper quadrant and left lower quadrant.   Musculoskeletal:         General: Normal range of motion.      Cervical back: Normal range of motion and neck supple.   Skin:     General: Skin is warm and dry.      Capillary Refill: Capillary refill takes less than 2 seconds.    Neurological:      Mental Status: He is alert.      GCS: GCS eye subscore is 4. GCS verbal subscore is 5. GCS motor subscore is 6.      Sensory: Sensation is intact.      Motor: Motor function is intact.   Psychiatric:         Attention and Perception: Attention and perception normal.         Mood and Affect: Mood and affect normal.         Speech: Speech normal.         Procedures    ED Course:    ED Course as of 07/22/23 2102   Sat Jul 22, 2023 1931 Poke with Dr. Garza, Dr. Garza is reviewing patient chart, CT scan will keep patient n.p.o. start IV Zosyn. [KH]   1942 Spoke with [facility] surgeon on-call states patient can be taken care of and regimen will not except. [KH]   1948 Dr. Garza has reviewed patient's chart, CT scan states patient needs to have a total Sub colectomy and has been seen by GI surgery at [facility] and request patient be transferred there. [KH]      ED Course User Index  [KH] Rachid Navarro APRN       Lab Results (last 24 hours)       Procedure Component Value Units Date/Time    CBC & Differential [[id number]]  (Abnormal) Collected: 07/22/23 1734    Specimen: Blood Updated: 07/22/23 1746    Narrative:      The following orders were created for panel order CBC & Differential.  Procedure                               Abnormality         Status                     ---------                               -----------         ------                     CBC Auto Differential[[id number]]        Abnormal            Final result                 Please view results for these tests on the individual orders.    Comprehensive Metabolic Panel [[id number]]  (Abnormal) Collected: 07/22/23 1734    Specimen: Blood Updated: 07/22/23 1816     Glucose 140 mg/dL      BUN 23 mg/dL      Creatinine 3.40 mg/dL      Sodium 139 mmol/L      Potassium 3.9 mmol/L      Chloride 102 mmol/L      CO2 21.9 mmol/L      Calcium 9.5 mg/dL      Total Protein 6.5 g/dL      Albumin 3.9 g/dL      ALT  (SGPT) 19 U/L      AST (SGOT) 17 U/L      Alkaline Phosphatase 59 U/L      Total Bilirubin 0.3 mg/dL      Globulin 2.6 gm/dL      A/G Ratio 1.5 g/dL      BUN/Creatinine Ratio 6.8     Anion Gap 15.1 mmol/L      eGFR 19.7 mL/min/1.73     Narrative:      GFR Normal >60  Chronic Kidney Disease <60  Kidney Failure <15      Lipase [[id number]]  (Abnormal) Collected: 07/22/23 1734    Specimen: Blood Updated: 07/22/23 1805     Lipase 67 U/L     CBC Auto Differential [[id number]]  (Abnormal) Collected: 07/22/23 1734    Specimen: Blood Updated: 07/22/23 1746     WBC 16.17 10*3/mm3      RBC 4.45 10*6/mm3      Hemoglobin 14.5 g/dL      Hematocrit 41.9 %      MCV 94.2 fL      MCH 32.6 pg      MCHC 34.6 g/dL      RDW 13.7 %      RDW-SD 47.5 fl      MPV 9.9 fL      Platelets 270 10*3/mm3      Neutrophil % 77.2 %      Lymphocyte % 12.5 %      Monocyte % 6.3 %      Eosinophil % 2.8 %      Basophil % 0.6 %      Immature Grans % 0.6 %      Neutrophils, Absolute 12.50 10*3/mm3      Lymphocytes, Absolute 2.02 10*3/mm3      Monocytes, Absolute 1.02 10*3/mm3      Eosinophils, Absolute 0.45 10*3/mm3      Basophils, Absolute 0.09 10*3/mm3      Immature Grans, Absolute 0.09 10*3/mm3      nRBC 0.0 /100 WBC     Procalcitonin [[id number]]  (Normal) Collected: 07/22/23 1734    Specimen: Blood Updated: 07/22/23 1812     Procalcitonin 0.22 ng/mL     Narrative:      As a Marker for Sepsis (Non-Neonates):    1. <0.5 ng/mL represents a low risk of severe sepsis and/or septic shock.  2. >2 ng/mL represents a high risk of severe sepsis and/or septic shock.    As a Marker for Lower Respiratory Tract Infections that require antibiotic therapy:    PCT on Admission    Antibiotic Therapy       6-12 Hrs later    >0.5                Strongly Recommended  >0.25 - <0.5        Recommended   0.1 - 0.25          Discouraged              Remeasure/reassess PCT  <0.1                Strongly Discouraged     Remeasure/reassess PCT    As 28 day mortality risk marker:  "\"Change in Procalcitonin Result\" (>80% or <=80%) if Day 0 (or Day 1) and Day 4 values are available. Refer to http://www.[facility]-pct-calculator.com    Change in PCT <=80%  A decrease of PCT levels below or equal to 80% defines a positive change in PCT test result representing a higher risk for 28-day all-cause mortality of patients diagnosed with severe sepsis for septic shock.    Change in PCT >80%  A decrease of PCT levels of more than 80% defines a negative change in PCT result representing a lower risk for 28-day all-cause mortality of patients diagnosed with severe sepsis or septic shock.       Lactic Acid, Plasma [[id number]]  (Normal) Collected: 07/22/23 1741    Specimen: Blood Updated: 07/22/23 1800     Lactate 1.4 mmol/L     Urinalysis With Culture If Indicated - Urine, Clean Catch [[id number]]  (Abnormal) Collected: 07/22/23 1804    Specimen: Urine, Clean Catch Updated: 07/22/23 1809     Color, UA Yellow     Appearance, UA Clear     pH, UA 5.5     Specific Gravity, UA 1.025     Glucose, UA Negative     Ketones, UA Trace     Bilirubin, UA Negative     Blood, UA Negative     Protein,  mg/dL (2+)     Leuk Esterase, UA Negative     Nitrite, UA Negative     Urobilinogen, UA 0.2 E.U./dL    Narrative:      In absence of clinical symptoms, the presence of pyuria, bacteria, and/or nitrites on the urinalysis result does not correlate with infection.    Urinalysis, Microscopic Only - Urine, Clean Catch [[id number]]  (Abnormal) Collected: 07/22/23 1804    Specimen: Urine, Clean Catch Updated: 07/22/23 1821     RBC, UA 0-2 /HPF      WBC, UA 0-2 /HPF      Comment: Urine culture not indicated.        Bacteria, UA Trace /HPF      Squamous Epithelial Cells, UA None Seen /HPF      Hyaline Casts, UA None Seen /LPF      Methodology Manual Light Microscopy             CT Abdomen Pelvis Without Contrast    Result Date: 7/22/2023  FINAL REPORT TECHNIQUE: Axial CT images were performed from the lung bases through the symphysis " pubis without IV contrast.  This study was performed with techniques to keep radiation doses as low as reasonably achievable (ALARA). Individualized dose reduction techniques using automated exposure control or adjustment of mA and/or kV according to the patient's size were employed. CLINICAL HISTORY: Abdominal pain  RECENT COLONOSCOPY WITH PERFORATION FINDINGS: Lack of intravenous contrast limits evaluation of solid abdominal and pelvic organs.  LOWER CHEST: The heart is normal size.  The lung bases are clear.  ABDOMEN/PELVIS:  Liver, gallbladder and bile ducts: The unenhanced liver is grossly unremarkable without focal abnormality.  There are gallstones without cholecystitis.  There is no definite biliary duct dilatation.  Adrenal glands: The adrenal glands are morphologically unremarkable without suspicious lesion. Kidneys, ureter and urinary bladder: There has been a prior right nephrectomy.  No nephrolithiasis.  No hydronephrosis. Urinary bladder is unremarkable.  Spleen: The spleen is normal size.  Pancreas: The pancreas is grossly unremarkable.  GI systems and mesentery: There is a right lower quadrant ostomy that appears to be an end ileostomy.  There are postoperative changes of the cecum.  No evidence of bowel obstruction.  The appendix is presumably surgically absent.  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  Lymph nodes: No definite pathologically enlarged abdominal or pelvic lymph nodes present within the limits of this noncontrast enhanced exam.  Vessels: The abdominal aorta is normal in caliber.  The inferior vena cava is unremarkable. Peritoneum: There is large volume pneumoperitoneum.  Pelvic viscera: No acute findings.  Body wall: No body wall contusion. Bones: No acute fracture.     Impression: Findings are compatible with colon perforation likely at the level of the hepatic flexure or proximal  transverse colon with large volume pneumoperitoneum. Authenticated and Electronically Signed by Mark Li MD on 07/22/2023 07:19:21 PM      Medical Decision Making  63-year-old male patient comes into the ED today complaining of abdominal pain    Physical examination: Neuro GCS 15 alert and orient x3 responds appropriate questions cardiac regular rate and rhythm S1-S2 positive pulses bilateral upper and lower extremity respiratory clear to auscultation bilaterally decreased in the bases abdomen mild tenderness to palpation right upper and lower quadrant, moderate tenderness palpation left upper and lower quadrant, ostomy appliance present on the right lower quadrant    DDX: includes but is not limited to: Cholecystitis, diverticulitis, diverticulosis, bowel obstruction, ileus, perforated colon, Crohn's disease, other    Problems Addressed:  Colon perforation: complicated acute illness or injury  Hemoperitoneum: complicated acute illness or injury  Peritonitis: complicated acute illness or injury    Amount and/or Complexity of Data Reviewed  Labs: ordered. Decision-making details documented in ED Course.  Radiology: ordered. Decision-making details documented in ED Course.  Discussion of management or test interpretation with external provider(s): Discussed assessment, treatment and plan with ER attending, general surgeon on-call at [facility], surgery on-call [facility] transfer center.    Risk  Prescription drug management.  Risk Details: Patient has been accepted this transfer to the [facility] ER at [facility].  Patient was started on 3.5 mg IV Zosyn for his colon perforation.                  Final diagnoses:   Hemoperitoneum   Colon perforation   Peritonitis          Rachid Navarro APRN  07/22/23 2010       Rachid Navarro APRN  07/22/23 2101

## 2023-08-01 ENCOUNTER — TELEPHONE (OUTPATIENT)
Dept: GASTROENTEROLOGY | Facility: CLINIC | Age: 63
End: 2023-08-01
Payer: MEDICAID

## 2023-08-24 ENCOUNTER — APPOINTMENT (OUTPATIENT)
Dept: GENERAL RADIOLOGY | Facility: HOSPITAL | Age: 63
End: 2023-08-24
Payer: MEDICAID

## 2023-08-24 ENCOUNTER — HOSPITAL ENCOUNTER (EMERGENCY)
Facility: HOSPITAL | Age: 63
Discharge: HOME | End: 2023-08-24
Attending: STUDENT IN AN ORGANIZED HEALTH CARE EDUCATION/TRAINING PROGRAM
Payer: MEDICAID

## 2023-08-24 VITALS
OXYGEN SATURATION: 99 % | DIASTOLIC BLOOD PRESSURE: 78 MMHG | HEIGHT: 67 IN | HEART RATE: 94 BPM | BODY MASS INDEX: 21.82 KG/M2 | TEMPERATURE: 98 F | SYSTOLIC BLOOD PRESSURE: 136 MMHG | RESPIRATION RATE: 16 BRPM | WEIGHT: 139 LBS

## 2023-08-24 DIAGNOSIS — R07.9 CHEST PAIN, UNSPECIFIED TYPE: Primary | ICD-10-CM

## 2023-08-24 LAB
ALBUMIN SERPL-MCNC: 4.1 G/DL (ref 3.5–5.2)
ALBUMIN/GLOB SERPL: 1.6 G/DL
ALP SERPL-CCNC: 66 U/L (ref 39–117)
ALT SERPL W P-5'-P-CCNC: 16 U/L (ref 1–41)
ANION GAP SERPL CALCULATED.3IONS-SCNC: 14.2 MMOL/L (ref 5–15)
AST SERPL-CCNC: 16 U/L (ref 1–40)
BASOPHILS # BLD AUTO: 0.05 10*3/MM3 (ref 0–0.2)
BASOPHILS NFR BLD AUTO: 0.4 % (ref 0–1.5)
BILIRUB SERPL-MCNC: 0.2 MG/DL (ref 0–1.2)
BUN SERPL-MCNC: 28 MG/DL (ref 8–23)
BUN/CREAT SERPL: 10.8 (ref 7–25)
CALCIUM SPEC-SCNC: 9.3 MG/DL (ref 8.6–10.5)
CHLORIDE SERPL-SCNC: 98 MMOL/L (ref 98–107)
CO2 SERPL-SCNC: 22.8 MMOL/L (ref 22–29)
CREAT SERPL-MCNC: 2.6 MG/DL (ref 0.76–1.27)
D DIMER PPP FEU-MCNC: 0.27 MCGFEU/ML (ref 0–0.61)
DEPRECATED RDW RBC AUTO: 46.7 FL (ref 37–54)
EGFRCR SERPLBLD CKD-EPI 2021: 27.2 ML/MIN/1.73
EOSINOPHIL # BLD AUTO: 0.27 10*3/MM3 (ref 0–0.4)
EOSINOPHIL NFR BLD AUTO: 1.9 % (ref 0.3–6.2)
ERYTHROCYTE [DISTWIDTH] IN BLOOD BY AUTOMATED COUNT: 14.3 % (ref 12.3–15.4)
GEN 5 2HR TROPONIN T REFLEX: 31 NG/L
GLOBULIN UR ELPH-MCNC: 2.5 GM/DL
GLUCOSE SERPL-MCNC: 114 MG/DL (ref 65–99)
HCT VFR BLD AUTO: 29.7 % (ref 37.5–51)
HGB BLD-MCNC: 10.3 G/DL (ref 13–17.7)
HOLD SPECIMEN: NORMAL
HOLD SPECIMEN: NORMAL
IMM GRANULOCYTES # BLD AUTO: 0.06 10*3/MM3 (ref 0–0.05)
IMM GRANULOCYTES NFR BLD AUTO: 0.4 % (ref 0–0.5)
LYMPHOCYTES # BLD AUTO: 1.91 10*3/MM3 (ref 0.7–3.1)
LYMPHOCYTES NFR BLD AUTO: 13.8 % (ref 19.6–45.3)
MCH RBC QN AUTO: 32.2 PG (ref 26.6–33)
MCHC RBC AUTO-ENTMCNC: 34.7 G/DL (ref 31.5–35.7)
MCV RBC AUTO: 92.8 FL (ref 79–97)
MONOCYTES # BLD AUTO: 1.17 10*3/MM3 (ref 0.1–0.9)
MONOCYTES NFR BLD AUTO: 8.4 % (ref 5–12)
NEUTROPHILS NFR BLD AUTO: 10.41 10*3/MM3 (ref 1.7–7)
NEUTROPHILS NFR BLD AUTO: 75.1 % (ref 42.7–76)
NRBC BLD AUTO-RTO: 0 /100 WBC (ref 0–0.2)
PLATELET # BLD AUTO: 281 10*3/MM3 (ref 140–450)
PMV BLD AUTO: 10 FL (ref 6–12)
POTASSIUM SERPL-SCNC: 3.8 MMOL/L (ref 3.5–5.2)
PROT SERPL-MCNC: 6.6 G/DL (ref 6–8.5)
RBC # BLD AUTO: 3.2 10*6/MM3 (ref 4.14–5.8)
SODIUM SERPL-SCNC: 135 MMOL/L (ref 136–145)
TROPONIN T DELTA: -3 NG/L
TROPONIN T SERPL HS-MCNC: 34 NG/L
WBC NRBC COR # BLD: 13.87 10*3/MM3 (ref 3.4–10.8)
WHOLE BLOOD HOLD COAG: NORMAL
WHOLE BLOOD HOLD SPECIMEN: NORMAL

## 2023-08-24 PROCEDURE — 96375 TX/PRO/DX INJ NEW DRUG ADDON: CPT

## 2023-08-24 PROCEDURE — 85379 FIBRIN DEGRADATION QUANT: CPT | Performed by: PHYSICIAN ASSISTANT

## 2023-08-24 PROCEDURE — 85025 COMPLETE CBC W/AUTO DIFF WBC: CPT | Performed by: STUDENT IN AN ORGANIZED HEALTH CARE EDUCATION/TRAINING PROGRAM

## 2023-08-24 PROCEDURE — 93005 ELECTROCARDIOGRAM TRACING: CPT | Performed by: PHYSICIAN ASSISTANT

## 2023-08-24 PROCEDURE — 80053 COMPREHEN METABOLIC PANEL: CPT | Performed by: STUDENT IN AN ORGANIZED HEALTH CARE EDUCATION/TRAINING PROGRAM

## 2023-08-24 PROCEDURE — 93005 ELECTROCARDIOGRAM TRACING: CPT | Performed by: STUDENT IN AN ORGANIZED HEALTH CARE EDUCATION/TRAINING PROGRAM

## 2023-08-24 PROCEDURE — 96374 THER/PROPH/DIAG INJ IV PUSH: CPT

## 2023-08-24 PROCEDURE — 25010000002 ONDANSETRON PER 1 MG: Performed by: STUDENT IN AN ORGANIZED HEALTH CARE EDUCATION/TRAINING PROGRAM

## 2023-08-24 PROCEDURE — 99284 EMERGENCY DEPT VISIT MOD MDM: CPT

## 2023-08-24 PROCEDURE — 71045 X-RAY EXAM CHEST 1 VIEW: CPT

## 2023-08-24 PROCEDURE — 84484 ASSAY OF TROPONIN QUANT: CPT | Performed by: STUDENT IN AN ORGANIZED HEALTH CARE EDUCATION/TRAINING PROGRAM

## 2023-08-24 PROCEDURE — 25010000002 MORPHINE PER 10 MG: Performed by: STUDENT IN AN ORGANIZED HEALTH CARE EDUCATION/TRAINING PROGRAM

## 2023-08-24 PROCEDURE — 36415 COLL VENOUS BLD VENIPUNCTURE: CPT

## 2023-08-24 RX ORDER — ONDANSETRON 2 MG/ML
4 INJECTION INTRAMUSCULAR; INTRAVENOUS ONCE
Status: COMPLETED | OUTPATIENT
Start: 2023-08-24 | End: 2023-08-24

## 2023-08-24 RX ORDER — ASPIRIN 325 MG
325 TABLET ORAL ONCE
Status: COMPLETED | OUTPATIENT
Start: 2023-08-24 | End: 2023-08-24

## 2023-08-24 RX ORDER — SODIUM CHLORIDE 0.9 % (FLUSH) 0.9 %
10 SYRINGE (ML) INJECTION AS NEEDED
Status: DISCONTINUED | OUTPATIENT
Start: 2023-08-24 | End: 2023-08-24 | Stop reason: HOSPADM

## 2023-08-24 RX ADMIN — ONDANSETRON 4 MG: 2 INJECTION INTRAMUSCULAR; INTRAVENOUS at 19:43

## 2023-08-24 RX ADMIN — MORPHINE SULFATE 4 MG: 4 INJECTION, SOLUTION INTRAMUSCULAR; INTRAVENOUS at 19:43

## 2023-08-24 RX ADMIN — ASPIRIN 325 MG: 325 TABLET ORAL at 18:18

## 2023-08-24 NOTE — ED PROVIDER NOTES
Subjective  History of Present Illness:    Chief Complaint:   Chief Complaint   Patient presents with    Chest Pain      History of Present Illness: Jorge Mclaughlin is a 63 y.o. male who presents to the emergency department complaining of central chest pain.  States it radiated into his left arm.  This has been intermittent for 2 days.  He has had an intermittent left arm pain for over a week but the chest pain has been intermittent for 2 days.  Nothing seems to make the pain better or worse.  He cannot describe it other than it is painful and it makes him feel short of breath.  He does have a history of inflammatory bowel disease and had some surgical complications a month or so ago with a bowel perforation after colonoscopy and has an ileostomy at this time.  Denies abdominal pain, has good output through ileostomy.  Currently has no pain in his chest or arm.  Onset: 2 days ago  Duration: Intermittent  Exacerbating / Alleviating factors: None  Associated symptoms: Shortness of breath      Nurses Notes reviewed and agree, including vitals, allergies, social history and prior medical history.     Review of Systems   Constitutional: Negative.    HENT: Negative.     Eyes: Negative.    Respiratory:  Positive for shortness of breath.    Cardiovascular:  Positive for chest pain.   Gastrointestinal: Negative.    Genitourinary: Negative.    Musculoskeletal: Negative.    Skin: Negative.    Allergic/Immunologic: Negative.    Neurological: Negative.    Psychiatric/Behavioral: Negative.     All other systems reviewed and are negative.    Past Medical History:   Diagnosis Date    Altered bowel elimination due to intestinal ostomy     PATIENT REPORTS SECONDARY TO A COLON RUPTURE APPROXIMATELY 16-17 YEARS AGO    Arthritis     WRIST    Asthma     as a child    Bowel perforation     states that he had his bowel perforated twice from colonoscopies, and was told that a small scope should always be used in the future.    Cancer      Kidney    CKD (chronic kidney disease)     stage 3    COVID-19 vaccine series completed     Covid vaccine x4 doses    Crohn disease     Gallstones     GERD (gastroesophageal reflux disease)     History of MRSA infection 2017    nose    History of pneumonia     [place] (hard of hearing)     no hearing aids    Hypothyroid     Kidney stones     Seasonal allergies     Spinal headache     Wears glasses        Allergies:    Latex      Past Surgical History:   Procedure Laterality Date    COLONOSCOPY      COLONOSCOPY N/A 07/31/2017    Procedure: Colonoscopy through ostomy site and rectum with biopsies;  Surgeon: Vincenzo Galan MD;  Location: [facility] ENDOSCOPY;  Service:     COLONOSCOPY N/A 7/10/2023    Procedure: COLONOSCOPY WITH BIOPSY;  Surgeon: Yazmin Martin MD;  Location: [facility] ENDOSCOPY;  Service: Gastroenterology;  Laterality: N/A;    ENDOSCOPY      ENDOSCOPY N/A 7/10/2023    Procedure: ESOPHAGOGASTRODUODENOSCOPY WITH BIOPSY;  Surgeon: Yazmin Martin MD;  Location: [facility] ENDOSCOPY;  Service: Gastroenterology;  Laterality: N/A;    HEMORRHOIDECTOMY      REPORTS APPROXIMATELY 20 YEARS AGO    HERNIA REPAIR      UMBILICAL    NEPHRECTOMY Right 07/2017    SKIN BIOPSY      benign    SMALL INTESTINE SURGERY      HX OF COLON RUPTURE WITH PLACEMENT OF OSTOMY 16-17 years ago    URETEROSCOPY LASER LITHOTRIPSY WITH STENT INSERTION Left 07/03/2019    Procedure: CYSTOSCOPY, URETEROSCOPY, STONE BASKETING, RETROGRADE PYLEOGRAM,  LASER LITHOTRIPSY WITH STENT INSERTION;  Surgeon: Lazaro Velazquez MD;  Location: [facility] OR;  Service: Urology    URETEROSCOPY LASER LITHOTRIPSY WITH STENT INSERTION Left 09/20/2021    Procedure: URETEROSCOPY diagnostic WITH STENT INSERTION  with left retrograde;  Surgeon: Lazaro Velazquez MD;  Location: [facility] OR;  Service: Urology;  Laterality: Left;    WISDOM TOOTH EXTRACTION      WRIST SURGERY Right          Social History     Socioeconomic History    Marital status:   "  Tobacco Use    Smoking status: Former     Packs/day: 2.00     Years: 3.00     Pack years: 6.00     Types: Cigarettes     Quit date:      Years since quittin.6    Smokeless tobacco: Former     Types: Snuff   Vaping Use    Vaping Use: Never used   Substance and Sexual Activity    Alcohol use: No     Comment: QUIT 30 PLUS YEARS    Drug use: No    Sexual activity: Defer         Family History   Problem Relation Age of Onset    Heart disease Mother     Cancer Father     No Known Problems Sister     COPD Brother     COPD Sister     Crohn's disease Son     Colon cancer Neg Hx        Objective  Physical Exam:  /78   Pulse 94   Temp 98 øF (36.7 øC)   Resp 16   Ht 170.2 cm (67\")   Wt 63 kg (139 lb)   SpO2 99%   BMI 21.77 kg/mý      Physical Exam  Vitals and nursing note reviewed.   Constitutional:       General: He is not in acute distress.     Appearance: He is well-developed and normal weight. He is not ill-appearing, toxic-appearing or diaphoretic.   HENT:      Head: Normocephalic and atraumatic.   Eyes:      Extraocular Movements: Extraocular movements intact.   Cardiovascular:      Rate and Rhythm: Normal rate and regular rhythm.      Heart sounds: Normal heart sounds.   Pulmonary:      Effort: Pulmonary effort is normal. No respiratory distress.      Breath sounds: Normal breath sounds. No decreased breath sounds, wheezing, rhonchi or rales.   Abdominal:      Palpations: Abdomen is soft.   Musculoskeletal:         General: Normal range of motion.      Cervical back: Normal range of motion.      Right lower leg: No tenderness. No edema.      Left lower leg: No tenderness. No edema.   Skin:     General: Skin is warm.   Neurological:      General: No focal deficit present.      Mental Status: He is alert.   Psychiatric:         Mood and Affect: Mood normal.         Behavior: Behavior normal.         Procedures    ED Course:    ED Course as of 23 210   Thu Aug 24, 2023   181 EKG interpreted " by me, sinus tachycardia with no concerning ST changes noted, rate of 100 [JE]   1839 HS Troponin T(!): 34 [TM]   1900 Repeat EKG with no significant changes, normal sinus rhythm no concerning ST changes noted, rate of 97, PVC noted on lead [JE]   2100 MCH: 32.2 [TM]      ED Course User Index  [JE] Eduardo Brown MD  [TM] Esdras Hills PA-C       Lab Results (last 24 hours)       Procedure Component Value Units Date/Time    CBC & Differential [[id number]]  (Abnormal) Collected: 08/24/23 1803    Specimen: Blood Updated: 08/24/23 1812    Narrative:      The following orders were created for panel order CBC & Differential.  Procedure                               Abnormality         Status                     ---------                               -----------         ------                     CBC Auto Differential[[id number]]        Abnormal            Final result                 Please view results for these tests on the individual orders.    Comprehensive Metabolic Panel [[id number]]  (Abnormal) Collected: 08/24/23 1803    Specimen: Blood Updated: 08/24/23 1834     Glucose 114 mg/dL      BUN 28 mg/dL      Creatinine 2.60 mg/dL      Sodium 135 mmol/L      Potassium 3.8 mmol/L      Chloride 98 mmol/L      CO2 22.8 mmol/L      Calcium 9.3 mg/dL      Total Protein 6.6 g/dL      Albumin 4.1 g/dL      ALT (SGPT) 16 U/L      AST (SGOT) 16 U/L      Alkaline Phosphatase 66 U/L      Total Bilirubin 0.2 mg/dL      Globulin 2.5 gm/dL      A/G Ratio 1.6 g/dL      BUN/Creatinine Ratio 10.8     Anion Gap 14.2 mmol/L      eGFR 27.2 mL/min/1.73     Narrative:      GFR Normal >60  Chronic Kidney Disease <60  Kidney Failure <15      High Sensitivity Troponin T [[id number]]  (Abnormal) Collected: 08/24/23 1803    Specimen: Blood Updated: 08/24/23 1837     HS Troponin T 34 ng/L     Narrative:      High Sensitive Troponin T Reference Range:  <10.0 ng/L- Negative Female for AMI  <15.0 ng/L- Negative Male for AMI  >=10 -  Abnormal Female indicating possible myocardial injury.  >=15 - Abnormal Male indicating possible myocardial injury.   Clinicians would have to utilize clinical acumen, EKG, Troponin, and serial changes to determine if it is an Acute Myocardial Infarction or myocardial injury due to an underlying chronic condition.         CBC Auto Differential [[id number]]  (Abnormal) Collected: 08/24/23 1803    Specimen: Blood Updated: 08/24/23 1812     WBC 13.87 10*3/mm3      RBC 3.20 10*6/mm3      Hemoglobin 10.3 g/dL      Hematocrit 29.7 %      MCV 92.8 fL      MCH 32.2 pg      MCHC 34.7 g/dL      RDW 14.3 %      RDW-SD 46.7 fl      MPV 10.0 fL      Platelets 281 10*3/mm3      Neutrophil % 75.1 %      Lymphocyte % 13.8 %      Monocyte % 8.4 %      Eosinophil % 1.9 %      Basophil % 0.4 %      Immature Grans % 0.4 %      Neutrophils, Absolute 10.41 10*3/mm3      Lymphocytes, Absolute 1.91 10*3/mm3      Monocytes, Absolute 1.17 10*3/mm3      Eosinophils, Absolute 0.27 10*3/mm3      Basophils, Absolute 0.05 10*3/mm3      Immature Grans, Absolute 0.06 10*3/mm3      nRBC 0.0 /100 WBC     D-dimer, Quantitative [[id number]]  (Normal) Collected: 08/24/23 1803    Specimen: Blood Updated: 08/24/23 1846     D-Dimer, Quantitative 0.27 MCGFEU/mL     Narrative:      According to the assay 's published package insert, a normal (<0.50 MCGFEU/mL) D-dimer result in conjunction with a non-high clinical probability assessment, excludes deep vein thrombosis (DVT) and pulmonary embolism (PE) with high sensitivity.    D-dimer values increase with age and this can make VTE exclusion of an older population difficult. To address this, the American College of [employer], based on best available evidence and recent guidelines, recommends that clinicians use age-adjusted D-dimer thresholds in patients greater than 50 years of age with: a) a low probability of PE who do not meet all Pulmonary Embolism Rule Out Criteria, or b) in those with  "intermediate probability of PE.   The formula for an age-adjusted D-dimer cut-off is \"age/100\".  For example, a 60 year old patient would have an age-adjusted cut-off of 0.60 MCGFEU/mL and an 80 year old 0.80 MCGFEU/mL.    High Sensitivity Troponin T 2Hr [[id number]]  (Abnormal) Collected: 08/24/23 2006    Specimen: Blood Updated: 08/24/23 2034     HS Troponin T 31 ng/L      Troponin T Delta -3 ng/L     Narrative:      High Sensitive Troponin T Reference Range:  <10.0 ng/L- Negative Female for AMI  <15.0 ng/L- Negative Male for AMI  >=10 - Abnormal Female indicating possible myocardial injury.  >=15 - Abnormal Male indicating possible myocardial injury.   Clinicians would have to utilize clinical acumen, EKG, Troponin, and serial changes to determine if it is an Acute Myocardial Infarction or myocardial injury due to an underlying chronic condition.                  No radiology results from the last 24 hrs       Medical Decision Making  Amount and/or Complexity of Data Reviewed  Labs: ordered. Decision-making details documented in ED Course.  Radiology: ordered.  ECG/medicine tests: ordered.    Risk  OTC drugs.  Prescription drug management.        Jorge Mclaughlin is a 63 y.o. male who presents to the emergency department for evaluation of chest pain, left arm pain    Differential diagnosis includes ACS, musculoskeletal pain, angina among other etiologies.    CBC, CMP, troponin x2, EKG x2, chest x-ray ordered for further evaluation of the patient's presentation.    Chart review if available included outside testing, previous visits, prior labs, prior imaging, available notes from prior evaluations or visits with specialists, medication list, allergies, past medical history, past surgical history when applicable.    Patient was treated with morphine and Zofran    Patient resting comfortably at this time, symptoms are markedly improved, offered admission for serial troponins and cardiology eval however patient " declined and wishes to be discharged home and follow-up outpatient.  His PCP is Dr. Corbett, he feels confident he can get into see him soon and will also refer to cardiology.    HEART Score for Major Cardiac Events from [email or web address]  on 8/24/2023  ** All calculations should be rechecked by clinician prior to use **    RESULT SUMMARY:  2 points  Low Score (0-3 points)    Risk of MACE of 0.9-1.7%.    If troponin is positive, many experts recommend further workup and admission even with a low HEART Score.      INPUTS:  History --> 0 = Slightly suspicious  EKG --> 0 = Normal  Age --> 1 = 45-64  Risk factors --> 0 = No known risk factors  Initial troponin --> 1 = 1-3x normal limit      Plan for disposition is discharged home.  Patient/family comfortable with and understanding of the plan.      Final diagnoses:   Chest pain, unspecified type          Esdras Hills PA-C  08/24/23 [id number]

## 2023-09-11 ENCOUNTER — LAB (OUTPATIENT)
Dept: LAB | Facility: HOSPITAL | Age: 63
End: 2023-09-11
Payer: MEDICAID

## 2023-09-11 ENCOUNTER — TRANSCRIBE ORDERS (OUTPATIENT)
Dept: LAB | Facility: HOSPITAL | Age: 63
End: 2023-09-11
Payer: MEDICAID

## 2023-09-11 DIAGNOSIS — D51.9 ANEMIA DUE TO VITAMIN B12 DEFICIENCY, UNSPECIFIED B12 DEFICIENCY TYPE: ICD-10-CM

## 2023-09-11 DIAGNOSIS — E03.4 IDIOPATHIC ATROPHIC HYPOTHYROIDISM: ICD-10-CM

## 2023-09-11 DIAGNOSIS — E55.9 VITAMIN D DEFICIENCY: Primary | ICD-10-CM

## 2023-09-11 DIAGNOSIS — E55.9 VITAMIN D DEFICIENCY: ICD-10-CM

## 2023-09-11 LAB
ALBUMIN SERPL-MCNC: 3.9 G/DL (ref 3.5–5.2)
ALBUMIN/GLOB SERPL: 1.6 G/DL
ALP SERPL-CCNC: 67 U/L (ref 39–117)
ALT SERPL W P-5'-P-CCNC: 15 U/L (ref 1–41)
ANION GAP SERPL CALCULATED.3IONS-SCNC: 10 MMOL/L (ref 5–15)
AST SERPL-CCNC: 18 U/L (ref 1–40)
BASOPHILS # BLD AUTO: 0.06 10*3/MM3 (ref 0–0.2)
BASOPHILS NFR BLD AUTO: 1 % (ref 0–1.5)
BILIRUB SERPL-MCNC: 0.3 MG/DL (ref 0–1.2)
BUN SERPL-MCNC: 16 MG/DL (ref 8–23)
BUN/CREAT SERPL: 8 (ref 7–25)
CALCIUM SPEC-SCNC: 9.5 MG/DL (ref 8.6–10.5)
CHLORIDE SERPL-SCNC: 107 MMOL/L (ref 98–107)
CO2 SERPL-SCNC: 25 MMOL/L (ref 22–29)
CORTIS SERPL-MCNC: 6.95 MCG/DL
CREAT SERPL-MCNC: 2 MG/DL (ref 0.76–1.27)
DEPRECATED RDW RBC AUTO: 45.4 FL (ref 37–54)
EGFRCR SERPLBLD CKD-EPI 2021: 37.3 ML/MIN/1.73
EOSINOPHIL # BLD AUTO: 0.18 10*3/MM3 (ref 0–0.4)
EOSINOPHIL NFR BLD AUTO: 3 % (ref 0.3–6.2)
ERYTHROCYTE [DISTWIDTH] IN BLOOD BY AUTOMATED COUNT: 13.3 % (ref 12.3–15.4)
GLOBULIN UR ELPH-MCNC: 2.4 GM/DL
GLUCOSE SERPL-MCNC: 95 MG/DL (ref 65–99)
HCT VFR BLD AUTO: 31.1 % (ref 37.5–51)
HGB BLD-MCNC: 10.4 G/DL (ref 13–17.7)
IMM GRANULOCYTES # BLD AUTO: 0.02 10*3/MM3 (ref 0–0.05)
IMM GRANULOCYTES NFR BLD AUTO: 0.3 % (ref 0–0.5)
IRON 24H UR-MRATE: 35 MCG/DL (ref 59–158)
IRON SATN MFR SERPL: 8 % (ref 20–50)
LYMPHOCYTES # BLD AUTO: 1.26 10*3/MM3 (ref 0.7–3.1)
LYMPHOCYTES NFR BLD AUTO: 20.7 % (ref 19.6–45.3)
MCH RBC QN AUTO: 31.5 PG (ref 26.6–33)
MCHC RBC AUTO-ENTMCNC: 33.4 G/DL (ref 31.5–35.7)
MCV RBC AUTO: 94.2 FL (ref 79–97)
MONOCYTES # BLD AUTO: 0.53 10*3/MM3 (ref 0.1–0.9)
MONOCYTES NFR BLD AUTO: 8.7 % (ref 5–12)
NEUTROPHILS NFR BLD AUTO: 4.03 10*3/MM3 (ref 1.7–7)
NEUTROPHILS NFR BLD AUTO: 66.3 % (ref 42.7–76)
NRBC BLD AUTO-RTO: 0 /100 WBC (ref 0–0.2)
PLATELET # BLD AUTO: 227 10*3/MM3 (ref 140–450)
PMV BLD AUTO: 10.3 FL (ref 6–12)
POTASSIUM SERPL-SCNC: 3.9 MMOL/L (ref 3.5–5.2)
PROT SERPL-MCNC: 6.3 G/DL (ref 6–8.5)
RBC # BLD AUTO: 3.3 10*6/MM3 (ref 4.14–5.8)
SODIUM SERPL-SCNC: 142 MMOL/L (ref 136–145)
TIBC SERPL-MCNC: 428 MCG/DL (ref 298–536)
TRANSFERRIN SERPL-MCNC: 287 MG/DL (ref 200–360)
TSH SERPL DL<=0.05 MIU/L-ACNC: 1.99 UIU/ML (ref 0.27–4.2)
WBC NRBC COR # BLD: 6.08 10*3/MM3 (ref 3.4–10.8)

## 2023-09-11 PROCEDURE — 82306 VITAMIN D 25 HYDROXY: CPT

## 2023-09-11 PROCEDURE — 84466 ASSAY OF TRANSFERRIN: CPT

## 2023-09-11 PROCEDURE — 82607 VITAMIN B-12: CPT

## 2023-09-11 PROCEDURE — 80053 COMPREHEN METABOLIC PANEL: CPT

## 2023-09-11 PROCEDURE — 84443 ASSAY THYROID STIM HORMONE: CPT

## 2023-09-11 PROCEDURE — 82533 TOTAL CORTISOL: CPT

## 2023-09-11 PROCEDURE — 84402 ASSAY OF FREE TESTOSTERONE: CPT

## 2023-09-11 PROCEDURE — 36415 COLL VENOUS BLD VENIPUNCTURE: CPT

## 2023-09-11 PROCEDURE — 83540 ASSAY OF IRON: CPT

## 2023-09-11 PROCEDURE — 84403 ASSAY OF TOTAL TESTOSTERONE: CPT

## 2023-09-11 PROCEDURE — 85025 COMPLETE CBC W/AUTO DIFF WBC: CPT

## 2023-09-12 LAB
25(OH)D3 SERPL-MCNC: 30.1 NG/ML (ref 30–100)
VIT B12 BLD-MCNC: 1503 PG/ML (ref 211–946)

## 2023-09-17 LAB
TESTOST FREE SERPL-MCNC: 1 PG/ML (ref 6.6–18.1)
TESTOST SERPL-MCNC: 55 NG/DL (ref 264–916)

## 2024-02-18 ENCOUNTER — HOSPITAL ENCOUNTER (EMERGENCY)
Facility: HOSPITAL | Age: 64
Discharge: HOME | End: 2024-02-18
Attending: STUDENT IN AN ORGANIZED HEALTH CARE EDUCATION/TRAINING PROGRAM | Admitting: STUDENT IN AN ORGANIZED HEALTH CARE EDUCATION/TRAINING PROGRAM
Payer: MEDICAID

## 2024-02-18 VITALS
OXYGEN SATURATION: 99 % | DIASTOLIC BLOOD PRESSURE: 105 MMHG | HEART RATE: 72 BPM | TEMPERATURE: 97.9 F | BODY MASS INDEX: 23.07 KG/M2 | SYSTOLIC BLOOD PRESSURE: 156 MMHG | WEIGHT: 147 LBS | RESPIRATION RATE: 18 BRPM | HEIGHT: 67 IN

## 2024-02-18 DIAGNOSIS — T81.41XA INFECTION OF SUPERFICIAL INCISIONAL SURGICAL SITE AFTER PROCEDURE, INITIAL ENCOUNTER: Primary | ICD-10-CM

## 2024-02-18 PROCEDURE — 99283 EMERGENCY DEPT VISIT LOW MDM: CPT

## 2024-02-18 RX ORDER — SULFAMETHOXAZOLE AND TRIMETHOPRIM 800; 160 MG/1; MG/1
1 TABLET ORAL 2 TIMES DAILY
Qty: 20 TABLET | Refills: 0 | Status: SHIPPED | OUTPATIENT
Start: 2024-02-18 | End: 2024-02-18 | Stop reason: ALTCHOICE

## 2024-02-18 RX ORDER — DOXYCYCLINE 100 MG/1
100 CAPSULE ORAL 2 TIMES DAILY
Qty: 14 CAPSULE | Refills: 0 | Status: SHIPPED | OUTPATIENT
Start: 2024-02-18 | End: 2024-02-25

## 2024-02-18 RX ORDER — SULFAMETHOXAZOLE AND TRIMETHOPRIM 800; 160 MG/1; MG/1
1 TABLET ORAL ONCE
Status: DISCONTINUED | OUTPATIENT
Start: 2024-02-18 | End: 2024-02-18

## 2024-02-18 RX ORDER — DOXYCYCLINE 100 MG/1
100 CAPSULE ORAL ONCE
Status: COMPLETED | OUTPATIENT
Start: 2024-02-18 | End: 2024-02-18

## 2024-02-18 RX ADMIN — DOXYCYCLINE 100 MG: 100 CAPSULE ORAL at 18:02

## 2024-02-18 NOTE — DISCHARGE INSTRUCTIONS
You were evaluated from a discharge purulence from a postoperative wound.  We did physical exam and were not concerned for worsening infection.  We we will treat you with antibiotics and you are now stable for discharge.  As we discussed, it is important that you follow-up with urologist for further evaluation and management of this chronic wound.  As we also discussed, we would not close this off at this time given that it is still expressing some purulence.  You have fever in spite of antibiotic therapy or worsening erythema tracking up your scrotum please come back to emergency department for further evaluation.  You are now stable for discharge.  We had originally prescribed you Bactrim however, given concerns for your kidney disease which we noted on your history, we have changed his prescription to doxycycline.  Would take this medicine as directed and follow-up with urology as we discussed.  Would only take doxycycline if your pharmacy does have the Bactrim.

## 2024-02-18 NOTE — Clinical Note
[facility] EMERGENCY DEPARTMENT  [address] [address]  Phone: [phone]    Jorge Mclaughlin was seen and treated in our emergency department on 2/18/2024.  He may return to work on 02/21/2024.         Thank you for choosing [facility].    Eduardo Brown MD

## 2024-02-18 NOTE — ED PROVIDER NOTES
Subjective:  History of Present Illness:    Patient is a 64-year-old male with history of inflammatory bowel disease now with ileostomy, CKD stage III, kidney cancer.  Presents today with purulence from scrotum.  States that he had trauma to his testicle in [facility], had operative repair done there.  States he has been following up with urology here in [place] in [place].  Says that he has began to note purulence from his scrotum from a surgical site which has remained open after drain removal.  Says that he called his urologist who recommended that he present here for further evaluation and consideration of antibiotics.  Patient with mild purulence able to be expressed from the wound.  No surrounding erythema or cellulitis.  Patient without fever.  No other distress.      Nurses Notes reviewed and agree, including vitals, allergies, social history and prior medical history.     REVIEW OF SYSTEMS: All systems reviewed and not pertinent unless noted.  Review of Systems   Constitutional:  Negative for activity change, appetite change, chills, fatigue and fever.   HENT:  Negative for congestion, sinus pressure, sneezing and trouble swallowing.    Eyes:  Negative for discharge and itching.   Respiratory:  Negative for cough and shortness of breath.    Cardiovascular:  Negative for chest pain and palpitations.   Gastrointestinal:  Negative for abdominal distention and abdominal pain.   Endocrine: Negative for cold intolerance and heat intolerance.   Genitourinary:  Negative for decreased urine volume, dysuria and urgency.   Musculoskeletal:  Negative for gait problem, neck pain and neck stiffness.   Skin:  Positive for wound. Negative for color change and rash.   Allergic/Immunologic: Negative for immunocompromised state.   Neurological:  Negative for facial asymmetry and headaches.   Hematological:  Negative for adenopathy.   Psychiatric/Behavioral:  Negative for self-injury and suicidal ideas.        Past  Medical History:   Diagnosis Date    Altered bowel elimination due to intestinal ostomy     PATIENT REPORTS SECONDARY TO A COLON RUPTURE APPROXIMATELY 16-17 YEARS AGO    Arthritis     WRIST    Asthma     as a child    Bowel perforation     states that he had his bowel perforated twice from colonoscopies, and was told that a small scope should always be used in the future.    Cancer     Kidney    CKD (chronic kidney disease)     stage 3    COVID-19 vaccine series completed     Covid vaccine x4 doses    Crohn disease     Gallstones     GERD (gastroesophageal reflux disease)     History of MRSA infection 2017    nose    History of pneumonia     [place] (hard of hearing)     no hearing aids    Hypothyroid     Kidney stones     Seasonal allergies     Spinal headache     Wears glasses        Allergies:    Latex      Past Surgical History:   Procedure Laterality Date    COLONOSCOPY      COLONOSCOPY N/A 07/31/2017    Procedure: Colonoscopy through ostomy site and rectum with biopsies;  Surgeon: Vincenzo Galan MD;  Location: [facility] ENDOSCOPY;  Service:     COLONOSCOPY N/A 7/10/2023    Procedure: COLONOSCOPY WITH BIOPSY;  Surgeon: Yazmin Martin MD;  Location: [facility] ENDOSCOPY;  Service: Gastroenterology;  Laterality: N/A;    ENDOSCOPY      ENDOSCOPY N/A 7/10/2023    Procedure: ESOPHAGOGASTRODUODENOSCOPY WITH BIOPSY;  Surgeon: Yazmin Martin MD;  Location: [facility] ENDOSCOPY;  Service: Gastroenterology;  Laterality: N/A;    HEMORRHOIDECTOMY      REPORTS APPROXIMATELY 20 YEARS AGO    HERNIA REPAIR      UMBILICAL    NEPHRECTOMY Right 07/2017    SKIN BIOPSY      benign    SMALL INTESTINE SURGERY      HX OF COLON RUPTURE WITH PLACEMENT OF OSTOMY 16-17 years ago    URETEROSCOPY LASER LITHOTRIPSY WITH STENT INSERTION Left 07/03/2019    Procedure: CYSTOSCOPY, URETEROSCOPY, STONE BASKETING, RETROGRADE PYLEOGRAM,  LASER LITHOTRIPSY WITH STENT INSERTION;  Surgeon: Lazaro Velazquez MD;  Location: [facility] OR;  Service:  "Urology    URETEROSCOPY LASER LITHOTRIPSY WITH STENT INSERTION Left 2021    Procedure: URETEROSCOPY diagnostic WITH STENT INSERTION  with left retrograde;  Surgeon: Lazaro Velazquez MD;  [facility];  Service: Urology;  Laterality: Left;    WISDOM TOOTH EXTRACTION      WRIST SURGERY Right          Social History     Socioeconomic History    Marital status:    Tobacco Use    Smoking status: Former     Packs/day: 2.00     Years: 3.00     Additional pack years: 0.00     Total pack years: 6.00     Types: Cigarettes     Quit date:      Years since quittin.1    Smokeless tobacco: Former     Types: Snuff   Vaping Use    Vaping Use: Never used   Substance and Sexual Activity    Alcohol use: No     Comment: QUIT 30 PLUS YEARS    Drug use: No    Sexual activity: Defer         Family History   Problem Relation Age of Onset    Heart disease Mother     Cancer Father     No Known Problems Sister     COPD Brother     COPD Sister     Crohn's disease Son     Colon cancer Neg Hx        Objective  Physical Exam:  BP (!) 156/105 (BP Location: Right arm, Patient Position: Sitting)   Pulse 72   Temp 97.9 °F (36.6 °C) (Oral)   Resp 18   Ht 170.2 cm (67\")   Wt 66.7 kg (147 lb)   SpO2 99%   BMI 23.02 kg/m²      Physical Exam  Constitutional:       General: He is not in acute distress.     Appearance: Normal appearance. He is normal weight. He is not ill-appearing.   HENT:      Head: Normocephalic and atraumatic.      Nose: Nose normal. No congestion or rhinorrhea.      Mouth/Throat:      Mouth: Mucous membranes are moist.      Pharynx: Oropharynx is clear.   Eyes:      Extraocular Movements: Extraocular movements intact.      Conjunctiva/sclera: Conjunctivae normal.      Pupils: Pupils are equal, round, and reactive to light.   Cardiovascular:      Rate and Rhythm: Normal rate and regular rhythm.      Pulses: Normal pulses.   Pulmonary:      Effort: Pulmonary effort is normal. No respiratory " distress.      Breath sounds: Normal breath sounds.   Abdominal:      General: Abdomen is flat. Bowel sounds are normal. There is no distension.      Palpations: Abdomen is soft.      Tenderness: There is no abdominal tenderness.   Genitourinary:     Penis: Normal.       Comments: Patient with pinhole from the scrotum which she is able to express some purulence.  No induration noted to the scrotum, no deformity noted of the penis, no overlying cellulitic lesion, no erythema tracking up to the scrotum or pelvis.  Musculoskeletal:         General: No swelling or tenderness. Normal range of motion.      Cervical back: Normal range of motion and neck supple. No rigidity or tenderness.   Skin:     General: Skin is warm and dry.      Capillary Refill: Capillary refill takes less than 2 seconds.   Neurological:      General: No focal deficit present.      Mental Status: He is alert and oriented to person, place, and time. Mental status is at baseline.      Cranial Nerves: No cranial nerve deficit.      Sensory: No sensory deficit.      Motor: No weakness.   Psychiatric:         Mood and Affect: Mood normal.         Behavior: Behavior normal.         Thought Content: Thought content normal.         Judgment: Judgment normal.         Procedures    ED Course:         Lab Results (last 24 hours)       ** No results found for the last 24 hours. **             No radiology results from the last 24 hrs       MDM      Initial impression of presenting illness: Postoperative wound    DDX: includes but is not limited to: Suppurative wound, cellulitis, sepsis    Patient arrives stable with vitals interpreted by myself.     Pertinent features from physical exam: Scrotal exam as above, scant purulence able to be expressed from wound after drain removal, no obvious cellulitis, deformity noted to the area.    Initial diagnostic plan: No further workup needed    Results from initial plan were reviewed and interpreted by me revealing see  above    Diagnostic information from other sources: Reviewed past medical records and discussed with patient at bedside    Interventions / Re-evaluation: Given first dose of doxycycline in the emergency department    Results/clinical rationale were discussed with patient at bedside    Consultations/Discussion of results with other physicians: Discussed my concern for possible postop infection.  Encourage patient to follow-up with his urologist for further evaluation of this wound.  In the meantime, have prescribed doxycycline given the patient's concern for kidney disease and solitary kidney.  Strict turn precaution for advancing erythema, fevers at home    Disposition plan: Discharge  -----        Final diagnoses:   Infection of superficial incisional surgical site after procedure, initial encounter          Eduardo Brown MD  02/18/24 [id number]

## 2024-02-18 NOTE — Clinical Note
[facility] EMERGENCY DEPARTMENT  [address] [address]  Phone: [phone]    Jorge Mclaughlin was seen and treated in our emergency department on 2/18/2024.  He may return to work on 02/21/2024.         Thank you for choosing [facility].    Eduardo Brown MD

## 2024-06-24 ENCOUNTER — OFFICE VISIT (OUTPATIENT)
Age: 64
End: 2024-06-24

## 2024-06-24 VITALS
WEIGHT: 135 LBS | SYSTOLIC BLOOD PRESSURE: 160 MMHG | OXYGEN SATURATION: 97 % | BODY MASS INDEX: 21.69 KG/M2 | DIASTOLIC BLOOD PRESSURE: 70 MMHG | HEART RATE: 79 BPM | HEIGHT: 66 IN

## 2024-06-24 DIAGNOSIS — G56.01 CARPAL TUNNEL SYNDROME OF RIGHT WRIST: Primary | ICD-10-CM

## 2024-06-24 DIAGNOSIS — M79.641 PAIN OF RIGHT HAND: ICD-10-CM

## 2024-06-24 DIAGNOSIS — M19.039 WRIST ARTHRITIS: ICD-10-CM

## 2024-06-24 RX ORDER — MONTELUKAST SODIUM 10 MG/1
1 TABLET ORAL DAILY
COMMUNITY

## 2024-06-24 RX ORDER — ONDANSETRON 4 MG/1
TABLET, ORALLY DISINTEGRATING ORAL
COMMUNITY

## 2024-06-24 RX ORDER — MELOXICAM 15 MG/1
TABLET ORAL
COMMUNITY

## 2024-06-24 RX ORDER — SILDENAFIL 100 MG/1
TABLET, FILM COATED ORAL
COMMUNITY
Start: 2023-09-18

## 2024-06-24 RX ORDER — PREDNISONE 5 MG/1
TABLET ORAL
COMMUNITY

## 2024-06-24 RX ORDER — LEVOTHYROXINE SODIUM 0.15 MG/1
1 TABLET ORAL DAILY
COMMUNITY
Start: 2024-05-02

## 2024-06-24 NOTE — PROGRESS NOTES
[facility]opedic     Office Visit       Date: 06/24/2024   Patient Name: Jorge Mclaughlin  MRN: [id number]  YOB: 1960    Referring Physician: Gianfranco Peña MD     Chief Complaint:   Chief Complaint   Patient presents with    Right Hand - Pain       History of Present Illness:   Jorge Mclaughlin is a 64 y.o. male presents with right wrist pain of over 20 years duration as well as more recent right hand numbness and tingling and hand pain has been present and worsening over the last several months.  He reports he had a history of a right wrist fracture when he was in high school that required surgery.  Reports he has had progressive right wrist pain over the last several decades.  Reports that he has tried bracing, corticosteroid injections and therapy with only mild improvement in his right wrist pain.  He reports that recently over the last several months he has noticed increasing numbness and tingling in his right thumb index middle finger that radiates proximally into his hand.  It is associated with a different kind of pain that he finds more bothersome than the right wrist pain.  He reports it is worse when using his hands at work and occasionally wakes him from sleep.  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  He has a history of Crohn's disease and is on colazal for that.  He is also recently placed on oral prednisone for his wrist pain.  He denies smoking      Subjective   Review of Systems:   Review of Systems   Musculoskeletal:  Positive for arthralgias.        Pertinent review of systems per HPI.     I reviewed the patient's chief complaint, history of present illness, review of systems, past medical history, surgical history, family history, social history, medications and allergy list in the EMR on 06/24/2024 and agree with the findings above.    Objective    Vital Signs:   Vitals:  "   06/24/24 1603   BP: 160/70   Pulse: 79   SpO2: 97%   Weight: 61.2 kg (135 lb)   Height: 167.6 cm (66\")     BMI: Body mass index is 21.79 kg/m².    General Appearance: No acute distress. Alert and oriented.     Chest:  Non-labored breathing on room air. Regular rate and rhythm.    Upper Extremity Exam:    Tender palpation of the right radiocarpal joint.  Patient has approximately only 10 degrees flexion and 10 degrees extension of the right wrist.     Positive Tinel at the right carpal tunnel.  Positive right carpal compression test.  No thenar wasting.  Negative Tinel at the right cubital tunnel.  Negative elbow flexion compression test.  No intrinsic wasting.    Fingers are warm, well-perfused with appropriate capillary refill.  Palpable radial pulse.    Sensation intact to light touch in median, radial and ulnar nerve distributions.    Motor- Fires [employer], ulnar intrinsics, EPL/EDC w/ full active and passive range of motion. Strength intact.    Non-tender except for in the areas highlighted    CTS-6 Questionnaire         R  Symptoms and History  Numbness in the median nerve territory  y (3.5)   Nocturnal numbness    y (4)   Physical Examination  Thenar atrophy and/or weakness   n (5)    Positive Phalen's test    y (5)   Loss of 2-point Discrimination >5 mm  n (4.5)  Positive Tinel sign     y (4)                Total    16.5 (26)      Imaging/Studies:   Imaging Results (Last 24 Hours)       Procedure Component Value Units Date/Time    US Guided Injection [[id number]] Resulted: 06/24/24 1711     Updated: 06/24/24 1712    Narrative:      LIMITED POINT OF CARE ULTRASOUND PROCEDURE:     INDICATION: Right carpal tunnel syndrome    Musculoskeletal ultrasound of the right wrist was performed and   independently interpreted today for the indication of right hand carpal   tunnel syndrome.    FINDINGS: Median nerve cross sectional area of 15 mm^2 just proximal to   the carpal tunnel      Impression:       Right median nerve " just proximal to the carpal tunnel c/w   right carpal tunnel syndrome    XR Hand 3+ View Right [[id number]] Resulted: 06/24/24 1626     Updated: 06/24/24 1628    Narrative:      Right Hand X-Ray    Indication: Pain    Views:  AP, Lateral, and Oblique     Comparison:  None    Findings:  There is degenerative changes of the right radiocarpal and midcarpal   joints  Ulnar positive variance with some changes to the proximal ulnar lunate as   well consistent with ulnar impaction  Degenerative changes of the right thumb MCP with ulnar subluxation of the   joint    Impression:   Severe right wrist arthritis with involvement of the radiocarpal and   midcarpal joints as well as some ulnar impaction syndrome.  There is also   degenerative changes of the right thumb MCP              Procedures:  Procedures    Quality Measures:   ACP:   ACP discussion was deferred.    Tobacco:   Jorge Mclaughlin  reports that he quit smoking about 40 years ago. His smoking use included cigarettes. He started smoking about 43 years ago. He has a 6 pack-year smoking history. He has quit using smokeless tobacco.  His smokeless tobacco use included snuff.      Assessment / Plan    Assessment/Plan:     There are no diagnoses linked to this encounter.     Jorge Mclaughlinis a 64 y.o. male who presents with:      ICD-10-CM ICD-9-CM   1. Carpal tunnel syndrome of right wrist  G56.01 354.0   2. Pain of right hand  M79.641 729.5   3. Wrist arthritis  M19.039 716.93         Patient presents with severe right wrist arthritis as well as right carpal tunnel syndrome.  We discussed the pathophysiology of his wrist arthritis as well as the options for treatment.  At this point he would not benefit from a salvage procedure such as a proximal row carpectomy or 4 corner fusion given his presence of both midcarpal and radiocarpal arthritis.  We discussed posterior Arthroplasty and wrist arthrodesis.  Wrist arthroplasty is not a good option for the patient has  had he would be condones a 5 to 10 pound lifting restriction for the rest of his life and he believes he would not be able to be compliant with this.  We discussed wrist arthrodesis.  He has limited range of motion of his wrist at baseline and wrist arthrodesis would be a good option to treat his right wrist pain.  At this time I believe that he has more pain and discomfort from his right carpal tunnel syndrome so we will plan on addressing that first and then reassessing his wrist.    Carpal tunnel syndrome, he has clear carpal tunnel syndrome based on his CTS 6 as well as his increased cross-sectional area of the median nerve.  I discussed getting an EMG nerve conduction study of the right wrist however the patient refuses EMG and nerve study at this time as he would like to proceed with surgery.  Will plan to book him for right carpal tunnel release at the patient's convenience.  Recommend patient to discontinue his oral steroids in the meantime to decrease the risk of postoperative infection.  The patient is sitting agreement with the plan.    Consent-carpal tunnel release    The risks and benefits of the procedure were discussed with the patient and or appropriate guardian, which include but are not limited to the risk of bleeding, infection, neurovascular damage, post-operative stiffness, recurrence, tendon and/or ligament retears, recurrent instability, continued pain, arthritic pain, need for further revision surgeries in the future, deep venous thrombosis, and general risks from anesthesia. We also discussed the post-operative rehabilitation, the need for physical therapy, and the overall expected outcomes from the procedure. We also discussed the possible use of biologics including allograft. We allowed proper time and answered the patient's questions regarding the procedure. The patient expressed understanding. Knowing what the risks are and what the conservative treatment is, the patient elected to forgo  any further conservative treatment options and proceed with the surgical intervention.      Follow Up:   Return for Follow Up after Post Op.        Zachariah Valencia MD  [facility] Hand [facility] Upper Extremity Surgeon

## 2024-06-25 ENCOUNTER — TELEPHONE (OUTPATIENT)
Age: 64
End: 2024-06-25
Payer: MEDICAID

## 2024-06-25 NOTE — PLAN OF CARE
Goal Outcome Evaluation:                  Vomiting started yesterday. Denies abd pain or diarrhea. Denies urinary sx.

## 2024-06-25 NOTE — TELEPHONE ENCOUNTER
I called patient to get his procedure scheduled with Dr. Valencia. He did not answer at this time, I let a voicemail asking him to give me a call back at [phone] at his convenience to get his surgery scheduled. Emilie Medrano CMA

## 2024-06-27 ENCOUNTER — TELEPHONE (OUTPATIENT)
Dept: ORTHOPEDIC SURGERY | Facility: CLINIC | Age: 64
End: 2024-06-27
Payer: MEDICAID

## 2024-06-27 NOTE — TELEPHONE ENCOUNTER
I called the patient to let him know that per  --The oral steroids significantly increases his risk of wound infection and healing problems after surgery so I definitely want him off those for a couple weeks prior to surgery. Would recommend rotating between tylenol and ibuprofen prior to surgery.     He was agreeable to this . I also recommended he ice and elevate when he can .

## 2024-06-27 NOTE — TELEPHONE ENCOUNTER
Caller: Jorge Mclaughlin    Relationship: Self    Best call back number: [phone]    Requested Prescriptions: predniSONE (DELTASONE) 5 MG tablet   Requested Prescriptions      No prescriptions requested or ordered in this encounter        Pharmacy where request should be sent:      [address] [place] [address]  [phone]    Last office visit with prescribing clinician: 6/24/2024   Last telemedicine visit with prescribing clinician: Visit date not found   Next office visit with prescribing clinician: 8/1/2024     Does the patient have less than a 3 day supply:  [x] Yes  [] No    Would you like a call back once the refill request has been completed: [x] Yes [] No    If the office needs to give you a call back, can they leave a voicemail: [x] Yes [] No

## 2024-07-16 ENCOUNTER — OUTSIDE FACILITY SERVICE (OUTPATIENT)
Dept: ORTHOPEDIC SURGERY | Facility: CLINIC | Age: 64
End: 2024-07-16
Payer: MEDICAID

## 2024-07-16 ENCOUNTER — DOCUMENTATION (OUTPATIENT)
Dept: ORTHOPEDIC SURGERY | Facility: CLINIC | Age: 64
End: 2024-07-16

## 2024-07-16 DIAGNOSIS — Z98.890 POST-OPERATIVE STATE: Primary | ICD-10-CM

## 2024-07-16 RX ORDER — OXYCODONE HYDROCHLORIDE 5 MG/1
5 TABLET ORAL EVERY 6 HOURS PRN
Qty: 8 TABLET | Refills: 0 | Status: SHIPPED | OUTPATIENT
Start: 2024-07-16

## 2024-07-16 NOTE — PROGRESS NOTES
DATE OF PROCEDURE: 07/16/2024    LOCATION: [facility]     PROCEDURES PERFORMED:    1. Right open carpal tunnel release CPT 03934    SURGEON: Zachariah Valencia MD      ASSISTANTS:    1. None        * Surgery not found *      ANESTHESIA: Local    PREOPERATIVE DIAGNOSES:  1. Right carpal tunnel syndrome     POSTOPERATIVE DIAGNOSES:    Same     ESTIMATED BLOOD LOSS: 5 mL.    SPECIMENS: none    IMPLANTS: none    COMPLICATIONS: None     INDICATIONS:  Jorge Mcalughlin is a 64 y.o. male who initially presented with right carpal tunnel syndrome that has failed conservative therapy.  The risks, benefits, alternatives and potential complications of surgery were discussed with the patient including but not limited to bleeding scarring, infection, recurrence, stiffness, damage to surrounding structures and postoperative pain.  The patient understands the risks and agreed to proceed with surgery.  A surgical informed consent was signed prior to the procedure.      DESCRIPTION OF PROCEDURE:  The patient was greeted in the pre-operative holding area and the surgical site was marked and consent confirmed prior to bringing the patient to the operating room.   In the pre-operative holding area a combination of 1% lidocaine with epinephrine and 0.5% Marcaine was injected at the site of the incision for local anesthesia. The patient was then taken to the operating room and a timeout was performed including the patient's name, procedure and antibiotic administration .  The patient was positioned supine on the operative room table and a non-sterile tourniquet was applied to the right upper extremity and it was then prepped and draped in the usual sterile fashion.   No antibiotics were given.     A 2.5 cm incision was made over the palm on the ulnar border the anticipated location of the transverse carpal ligament.  Superficial palmar fascia was then incised sharply.  Retractors were placed to allow  visualization of the transverse fibers of the transverse carpal ligament.  This was then carefully incised using a 15 blade.  Proximally the transverse carpal ligament and antebrachial fascia were incised under direct visualization using careful retraction and a tenotomy scissor.  Wound was then irrigated with copious amounts normal saline solution.  Incision was closed with 4-0 nylon in horizontal mattress fashion.  Soft dressing was applied.     At the end of the procedure the patient was awoken from anesthesia and transferred to the PACU in stable condition.  I participated in all parts of the case.    POSTOPERATIVE PLAN:  No lifting greater than 5 pounds with the operative extremity.  2.  Over the counter Tylenol and/or Advil/Aleve/Motrin for pain control.   3.  Dressings may be removed in 3 days and replaced with a dry daily dressing.  4.  Follow up in 10-14 days as scheduled.

## 2024-10-11 ENCOUNTER — TRANSCRIBE ORDERS (OUTPATIENT)
Dept: LAB | Facility: HOSPITAL | Age: 64
End: 2024-10-11

## 2024-10-11 ENCOUNTER — LAB (OUTPATIENT)
Dept: LAB | Facility: HOSPITAL | Age: 64
End: 2024-10-11
Payer: MEDICAID

## 2024-10-11 DIAGNOSIS — R53.83 TIREDNESS: ICD-10-CM

## 2024-10-11 DIAGNOSIS — E29.0 TESTICULAR HYPERFUNCTION: ICD-10-CM

## 2024-10-11 DIAGNOSIS — L20.9 ATOPIC DERMATITIS, UNSPECIFIED TYPE: Primary | ICD-10-CM

## 2024-10-11 DIAGNOSIS — R53.83 TIREDNESS: Primary | ICD-10-CM

## 2024-10-11 DIAGNOSIS — L20.9 ATOPIC DERMATITIS, UNSPECIFIED TYPE: ICD-10-CM

## 2024-10-11 LAB
ALBUMIN SERPL-MCNC: 3.5 G/DL (ref 3.5–5.2)
ALBUMIN/GLOB SERPL: 1.5 G/DL
ALP SERPL-CCNC: 54 U/L (ref 39–117)
ALT SERPL W P-5'-P-CCNC: 23 U/L (ref 1–41)
ANION GAP SERPL CALCULATED.3IONS-SCNC: 9.3 MMOL/L (ref 5–15)
AST SERPL-CCNC: 15 U/L (ref 1–40)
BILIRUB SERPL-MCNC: 0.6 MG/DL (ref 0–1.2)
BUN SERPL-MCNC: 19 MG/DL (ref 8–23)
BUN/CREAT SERPL: 7.8 (ref 7–25)
CALCIUM SPEC-SCNC: 8.8 MG/DL (ref 8.6–10.5)
CHLORIDE SERPL-SCNC: 110 MMOL/L (ref 98–107)
CO2 SERPL-SCNC: 20.7 MMOL/L (ref 22–29)
CREAT SERPL-MCNC: 2.43 MG/DL (ref 0.76–1.27)
EGFRCR SERPLBLD CKD-EPI 2021: 29.3 ML/MIN/1.73
GLOBULIN UR ELPH-MCNC: 2.4 GM/DL
GLUCOSE SERPL-MCNC: 111 MG/DL (ref 65–99)
HBA1C MFR BLD: 5.6 % (ref 4.8–5.6)
IRON 24H UR-MRATE: 72 MCG/DL (ref 59–158)
IRON SATN MFR SERPL: 17 % (ref 20–50)
POTASSIUM SERPL-SCNC: 3.4 MMOL/L (ref 3.5–5.2)
PROT SERPL-MCNC: 5.9 G/DL (ref 6–8.5)
SODIUM SERPL-SCNC: 140 MMOL/L (ref 136–145)
T4 SERPL-MCNC: 10.8 MCG/DL (ref 4.5–11.7)
TIBC SERPL-MCNC: 419 MCG/DL (ref 298–536)
TRANSFERRIN SERPL-MCNC: 281 MG/DL (ref 200–360)
TSH SERPL DL<=0.05 MIU/L-ACNC: 1.56 UIU/ML (ref 0.27–4.2)

## 2024-10-11 PROCEDURE — 36415 COLL VENOUS BLD VENIPUNCTURE: CPT

## 2024-10-11 PROCEDURE — 84466 ASSAY OF TRANSFERRIN: CPT

## 2024-10-11 PROCEDURE — 86480 TB TEST CELL IMMUN MEASURE: CPT

## 2024-10-11 PROCEDURE — 80053 COMPREHEN METABOLIC PANEL: CPT

## 2024-10-11 PROCEDURE — 83036 HEMOGLOBIN GLYCOSYLATED A1C: CPT

## 2024-10-11 PROCEDURE — 83540 ASSAY OF IRON: CPT

## 2024-10-11 PROCEDURE — 84436 ASSAY OF TOTAL THYROXINE: CPT

## 2024-10-11 PROCEDURE — 84403 ASSAY OF TOTAL TESTOSTERONE: CPT

## 2024-10-11 PROCEDURE — 84443 ASSAY THYROID STIM HORMONE: CPT

## 2024-10-11 PROCEDURE — 84402 ASSAY OF FREE TESTOSTERONE: CPT

## 2024-10-15 LAB
GAMMA INTERFERON BACKGROUND BLD IA-ACNC: 0.12 IU/ML
M TB IFN-G BLD-IMP: NEGATIVE
M TB IFN-G CD4+ BCKGRND COR BLD-ACNC: 0.13 IU/ML
M TB IFN-G CD4+CD8+ BCKGRND COR BLD-ACNC: 0.12 IU/ML
MITOGEN IGNF BCKGRD COR BLD-ACNC: 3.31 IU/ML
QUANTIFERON INCUBATION: NORMAL
SERVICE CMNT-IMP: NORMAL

## 2024-10-17 LAB
TESTOST FREE SERPL-MCNC: 31.8 PG/ML (ref 6.6–18.1)
TESTOST SERPL-MCNC: 1043 NG/DL (ref 264–916)

## 2025-02-17 ENCOUNTER — TRANSCRIBE ORDERS (OUTPATIENT)
Dept: LAB | Facility: HOSPITAL | Age: 65
End: 2025-02-17
Payer: MEDICAID

## 2025-02-17 ENCOUNTER — LAB (OUTPATIENT)
Dept: LAB | Facility: HOSPITAL | Age: 65
End: 2025-02-17
Payer: MEDICAID

## 2025-02-17 DIAGNOSIS — M79.10 MYALGIA: ICD-10-CM

## 2025-02-17 DIAGNOSIS — R74.01 TRANSAMINITIS: ICD-10-CM

## 2025-02-17 DIAGNOSIS — Z79.899 NEED FOR PROPHYLACTIC CHEMOTHERAPY: ICD-10-CM

## 2025-02-17 DIAGNOSIS — E06.9 THYROIDITIS, UNSPECIFIED: Primary | ICD-10-CM

## 2025-02-17 DIAGNOSIS — R53.83 TIREDNESS: ICD-10-CM

## 2025-02-17 DIAGNOSIS — E06.9 THYROIDITIS, UNSPECIFIED: ICD-10-CM

## 2025-02-17 DIAGNOSIS — R53.81 MALAISE: ICD-10-CM

## 2025-02-17 LAB
ALBUMIN SERPL-MCNC: 3.6 G/DL (ref 3.5–5.2)
ALBUMIN/GLOB SERPL: 1.7 G/DL
ALP SERPL-CCNC: 68 U/L (ref 39–117)
ALT SERPL W P-5'-P-CCNC: 21 U/L (ref 1–41)
ANION GAP SERPL CALCULATED.3IONS-SCNC: 11.3 MMOL/L (ref 5–15)
AST SERPL-CCNC: 17 U/L (ref 1–40)
BASOPHILS # BLD AUTO: 0.02 10*3/MM3 (ref 0–0.2)
BASOPHILS NFR BLD AUTO: 0.1 % (ref 0–1.5)
BILIRUB CONJ SERPL-MCNC: 0.2 MG/DL (ref 0–0.3)
BILIRUB SERPL-MCNC: 0.4 MG/DL (ref 0–1.2)
BUN SERPL-MCNC: 24 MG/DL (ref 8–23)
BUN/CREAT SERPL: 11.1 (ref 7–25)
CALCIUM SPEC-SCNC: 8.4 MG/DL (ref 8.6–10.5)
CHLORIDE SERPL-SCNC: 109 MMOL/L (ref 98–107)
CHOLEST SERPL-MCNC: 164 MG/DL (ref 0–200)
CO2 SERPL-SCNC: 21.7 MMOL/L (ref 22–29)
CREAT SERPL-MCNC: 2.17 MG/DL (ref 0.76–1.27)
DEPRECATED RDW RBC AUTO: 52.9 FL (ref 37–54)
EGFRCR SERPLBLD CKD-EPI 2021: 33.4 ML/MIN/1.73
EOSINOPHIL # BLD AUTO: 0.05 10*3/MM3 (ref 0–0.4)
EOSINOPHIL NFR BLD AUTO: 0.4 % (ref 0.3–6.2)
ERYTHROCYTE [DISTWIDTH] IN BLOOD BY AUTOMATED COUNT: 14.3 % (ref 12.3–15.4)
FERRITIN SERPL-MCNC: 229 NG/ML (ref 30–400)
GGT SERPL-CCNC: 50 U/L (ref 8–61)
GLOBULIN UR ELPH-MCNC: 2.1 GM/DL
GLUCOSE SERPL-MCNC: 95 MG/DL (ref 65–99)
HCT VFR BLD AUTO: 39.5 % (ref 37.5–51)
HGB BLD-MCNC: 12.4 G/DL (ref 13–17.7)
IMM GRANULOCYTES # BLD AUTO: 0.13 10*3/MM3 (ref 0–0.05)
IMM GRANULOCYTES NFR BLD AUTO: 0.9 % (ref 0–0.5)
IRON 24H UR-MRATE: 110 MCG/DL (ref 59–158)
LYMPHOCYTES # BLD AUTO: 3.65 10*3/MM3 (ref 0.7–3.1)
LYMPHOCYTES NFR BLD AUTO: 26.2 % (ref 19.6–45.3)
MCH RBC QN AUTO: 31.4 PG (ref 26.6–33)
MCHC RBC AUTO-ENTMCNC: 31.4 G/DL (ref 31.5–35.7)
MCV RBC AUTO: 100 FL (ref 79–97)
MONOCYTES # BLD AUTO: 0.96 10*3/MM3 (ref 0.1–0.9)
MONOCYTES NFR BLD AUTO: 6.9 % (ref 5–12)
NEUTROPHILS NFR BLD AUTO: 65.5 % (ref 42.7–76)
NEUTROPHILS NFR BLD AUTO: 9.13 10*3/MM3 (ref 1.7–7)
NRBC BLD AUTO-RTO: 0.2 /100 WBC (ref 0–0.2)
PLATELET # BLD AUTO: 216 10*3/MM3 (ref 140–450)
PMV BLD AUTO: 10.8 FL (ref 6–12)
POTASSIUM SERPL-SCNC: 4.1 MMOL/L (ref 3.5–5.2)
PROT SERPL-MCNC: 5.7 G/DL (ref 6–8.5)
PSA SERPL-MCNC: 2.15 NG/ML (ref 0–4)
RBC # BLD AUTO: 3.95 10*6/MM3 (ref 4.14–5.8)
SODIUM SERPL-SCNC: 142 MMOL/L (ref 136–145)
T3 SERPL-MCNC: 66.5 NG/DL (ref 80–200)
T4 SERPL-MCNC: 6.46 MCG/DL (ref 4.5–11.7)
TRIGL SERPL-MCNC: 166 MG/DL (ref 0–150)
TSH SERPL DL<=0.05 MIU/L-ACNC: 11.4 UIU/ML (ref 0.27–4.2)
WBC NRBC COR # BLD AUTO: 13.94 10*3/MM3 (ref 3.4–10.8)

## 2025-02-17 PROCEDURE — 84436 ASSAY OF TOTAL THYROXINE: CPT

## 2025-02-17 PROCEDURE — 82248 BILIRUBIN DIRECT: CPT

## 2025-02-17 PROCEDURE — 80053 COMPREHEN METABOLIC PANEL: CPT

## 2025-02-17 PROCEDURE — 84480 ASSAY TRIIODOTHYRONINE (T3): CPT

## 2025-02-17 PROCEDURE — 82465 ASSAY BLD/SERUM CHOLESTEROL: CPT

## 2025-02-17 PROCEDURE — 36415 COLL VENOUS BLD VENIPUNCTURE: CPT

## 2025-02-17 PROCEDURE — 82728 ASSAY OF FERRITIN: CPT

## 2025-02-17 PROCEDURE — 82977 ASSAY OF GGT: CPT

## 2025-02-17 PROCEDURE — 85025 COMPLETE CBC W/AUTO DIFF WBC: CPT

## 2025-02-17 PROCEDURE — 84443 ASSAY THYROID STIM HORMONE: CPT

## 2025-02-17 PROCEDURE — 84478 ASSAY OF TRIGLYCERIDES: CPT

## 2025-02-17 PROCEDURE — 84153 ASSAY OF PSA TOTAL: CPT

## 2025-02-17 PROCEDURE — 83540 ASSAY OF IRON: CPT

## 2025-02-17 PROCEDURE — 84439 ASSAY OF FREE THYROXINE: CPT | Performed by: INTERNAL MEDICINE

## 2025-02-20 ENCOUNTER — LAB REQUISITION (OUTPATIENT)
Dept: LAB | Facility: HOSPITAL | Age: 65
End: 2025-02-20
Payer: MEDICAID

## 2025-02-20 DIAGNOSIS — N18.30 CHRONIC KIDNEY DISEASE, STAGE 3 UNSPECIFIED: ICD-10-CM

## 2025-02-20 LAB — T4 FREE SERPL-MCNC: 1.21 NG/DL (ref 0.92–1.68)

## 2025-05-08 ENCOUNTER — APPOINTMENT (OUTPATIENT)
Facility: HOSPITAL | Age: 65
End: 2025-05-08
Payer: MEDICAID

## 2025-05-08 ENCOUNTER — HOSPITAL ENCOUNTER (EMERGENCY)
Facility: HOSPITAL | Age: 65
Discharge: HOME | End: 2025-05-08
Attending: FAMILY MEDICINE
Payer: MEDICAID

## 2025-05-08 VITALS
SYSTOLIC BLOOD PRESSURE: 141 MMHG | HEART RATE: 69 BPM | RESPIRATION RATE: 17 BRPM | DIASTOLIC BLOOD PRESSURE: 99 MMHG | BODY MASS INDEX: 22.82 KG/M2 | WEIGHT: 142 LBS | HEIGHT: 66 IN | TEMPERATURE: 98.3 F | OXYGEN SATURATION: 98 %

## 2025-05-08 DIAGNOSIS — M19.90 ARTHRITIS: ICD-10-CM

## 2025-05-08 DIAGNOSIS — S70.01XA CONTUSION OF RIGHT HIP, INITIAL ENCOUNTER: Primary | ICD-10-CM
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
## 2025-05-08 DIAGNOSIS — N18.9 CHRONIC KIDNEY DISEASE, UNSPECIFIED CKD STAGE: ICD-10-CM

## 2025-05-08 LAB
ALBUMIN SERPL-MCNC: 3.9 G/DL (ref 3.5–5.2)
ALBUMIN/GLOB SERPL: 1.6 G/DL
ALP SERPL-CCNC: 87 U/L (ref 39–117)
ALT SERPL W P-5'-P-CCNC: 18 U/L (ref 1–41)
ANION GAP SERPL CALCULATED.3IONS-SCNC: 14.9 MMOL/L (ref 5–15)
AST SERPL-CCNC: 19 U/L (ref 1–40)
BASOPHILS # BLD AUTO: 0.02 10*3/MM3 (ref 0–0.2)
BASOPHILS NFR BLD AUTO: 0.2 % (ref 0–1.5)
BILIRUB SERPL-MCNC: 0.2 MG/DL (ref 0–1.2)
BUN SERPL-MCNC: 27 MG/DL (ref 8–23)
BUN/CREAT SERPL: 13.8 (ref 7–25)
CALCIUM SPEC-SCNC: 9.8 MG/DL (ref 8.6–10.5)
CHLORIDE SERPL-SCNC: 105 MMOL/L (ref 98–107)
CO2 SERPL-SCNC: 21.1 MMOL/L (ref 22–29)
CREAT SERPL-MCNC: 1.96 MG/DL (ref 0.76–1.27)
DEPRECATED RDW RBC AUTO: 44 FL (ref 37–54)
EGFRCR SERPLBLD CKD-EPI 2021: 37.7 ML/MIN/1.73
EOSINOPHIL # BLD AUTO: 0.22 10*3/MM3 (ref 0–0.4)
EOSINOPHIL NFR BLD AUTO: 2 % (ref 0.3–6.2)
ERYTHROCYTE [DISTWIDTH] IN BLOOD BY AUTOMATED COUNT: 12.8 % (ref 12.3–15.4)
GLOBULIN UR ELPH-MCNC: 2.5 GM/DL
GLUCOSE SERPL-MCNC: 83 MG/DL (ref 65–99)
HCT VFR BLD AUTO: 44.3 % (ref 37.5–51)
HGB BLD-MCNC: 14.9 G/DL (ref 13–17.7)
IMM GRANULOCYTES # BLD AUTO: 0.25 10*3/MM3 (ref 0–0.05)
IMM GRANULOCYTES NFR BLD AUTO: 2.3 % (ref 0–0.5)
LYMPHOCYTES # BLD AUTO: 2.02 10*3/MM3 (ref 0.7–3.1)
LYMPHOCYTES NFR BLD AUTO: 18.5 % (ref 19.6–45.3)
MCH RBC QN AUTO: 31.2 PG (ref 26.6–33)
MCHC RBC AUTO-ENTMCNC: 33.6 G/DL (ref 31.5–35.7)
MCV RBC AUTO: 92.7 FL (ref 79–97)
MONOCYTES # BLD AUTO: 0.88 10*3/MM3 (ref 0.1–0.9)
MONOCYTES NFR BLD AUTO: 8.1 % (ref 5–12)
NEUTROPHILS NFR BLD AUTO: 68.9 % (ref 42.7–76)
NEUTROPHILS NFR BLD AUTO: 7.53 10*3/MM3 (ref 1.7–7)
PLATELET # BLD AUTO: 208 10*3/MM3 (ref 140–450)
PMV BLD AUTO: 10.2 FL (ref 6–12)
POTASSIUM SERPL-SCNC: 3.9 MMOL/L (ref 3.5–5.2)
PROT SERPL-MCNC: 6.4 G/DL (ref 6–8.5)
RBC # BLD AUTO: 4.78 10*6/MM3 (ref 4.14–5.8)
SODIUM SERPL-SCNC: 141 MMOL/L (ref 136–145)
WBC NRBC COR # BLD AUTO: 10.92 10*3/MM3 (ref 3.4–10.8)

## 2025-05-08 PROCEDURE — 85025 COMPLETE CBC W/AUTO DIFF WBC: CPT

## 2025-05-08 PROCEDURE — 72192 CT PELVIS W/O DYE: CPT

## 2025-05-08 PROCEDURE — 99284 EMERGENCY DEPT VISIT MOD MDM: CPT | Performed by: FAMILY MEDICINE

## 2025-05-08 PROCEDURE — 25010000002 MORPHINE PER 10 MG: Performed by: FAMILY MEDICINE

## 2025-05-08 PROCEDURE — 96375 TX/PRO/DX INJ NEW DRUG ADDON: CPT

## 2025-05-08 PROCEDURE — 25010000002 ONDANSETRON PER 1 MG: Performed by: FAMILY MEDICINE

## 2025-05-08 PROCEDURE — 96374 THER/PROPH/DIAG INJ IV PUSH: CPT

## 2025-05-08 PROCEDURE — 80053 COMPREHEN METABOLIC PANEL: CPT

## 2025-05-08 PROCEDURE — 36415 COLL VENOUS BLD VENIPUNCTURE: CPT

## 2025-05-08 RX ORDER — SODIUM CHLORIDE 0.9 % (FLUSH) 0.9 %
10 SYRINGE (ML) INJECTION AS NEEDED
Status: DISCONTINUED | OUTPATIENT
Start: 2025-05-08 | End: 2025-05-08 | Stop reason: HOSPADM

## 2025-05-08 RX ORDER — OXYCODONE AND ACETAMINOPHEN 5; 325 MG/1; MG/1
1 TABLET ORAL EVERY 6 HOURS PRN
Qty: 12 TABLET | Refills: 0 | Status: SHIPPED | OUTPATIENT
Start: 2025-05-08 | End: 2025-05-08

## 2025-05-08 RX ORDER — ONDANSETRON 2 MG/ML
4 INJECTION INTRAMUSCULAR; INTRAVENOUS ONCE
Status: COMPLETED | OUTPATIENT
Start: 2025-05-08 | End: 2025-05-08

## 2025-05-08 RX ORDER — LIDOCAINE 50 MG/G
1 PATCH TOPICAL EVERY 24 HOURS
Qty: 14 PATCH | Refills: 0 | Status: SHIPPED | OUTPATIENT
Start: 2025-05-08 | End: 2025-05-08

## 2025-05-08 RX ORDER — LIDOCAINE 50 MG/G
1 PATCH TOPICAL EVERY 24 HOURS
Qty: 14 PATCH | Refills: 0 | Status: SHIPPED | OUTPATIENT
Start: 2025-05-08

## 2025-05-08 RX ORDER — ACETAMINOPHEN 500 MG
1000 TABLET ORAL ONCE
Status: COMPLETED | OUTPATIENT
Start: 2025-05-08 | End: 2025-05-08

## 2025-05-08 RX ORDER — ONDANSETRON 4 MG/1
4 TABLET, ORALLY DISINTEGRATING ORAL 4 TIMES DAILY PRN
Qty: 15 TABLET | Refills: 0 | Status: SHIPPED | OUTPATIENT
Start: 2025-05-08

## 2025-05-08 RX ORDER — OXYCODONE AND ACETAMINOPHEN 5; 325 MG/1; MG/1
1 TABLET ORAL EVERY 6 HOURS PRN
Qty: 12 TABLET | Refills: 0 | Status: SHIPPED | OUTPATIENT
Start: 2025-05-08

## 2025-05-08 RX ADMIN — ACETAMINOPHEN 1000 MG: 500 TABLET, FILM COATED ORAL at 16:39

## 2025-05-08 RX ADMIN — MORPHINE SULFATE 4 MG: 4 INJECTION, SOLUTION INTRAMUSCULAR; INTRAVENOUS at 14:03

## 2025-05-08 RX ADMIN — ONDANSETRON 4 MG: 2 INJECTION INTRAMUSCULAR; INTRAVENOUS at 14:03

## 2025-05-08 NOTE — FSED PROVIDER NOTE
Subjective  History of Present Illness:    Patient is a 65-year-old male who presented to the emergency department today with complaints of right hip pain.  Patient states he slipped and fell on a wet spot on the floor at work a week ago.  Patient states he was originally evaluated at [facility] and diagnosed with a fractured rib.  Patient states he has had x-rays of his hips that were nonactionable.  Patient states he has been unable to ambulate on his own without severe pain that he rates a 10 out of 10 on the pain scale.  Patient states he has been using his wife's cane due to the pain.  Patient states he cannot be off work due to the pain.  Patient has a large contusion noted to the lateral aspect of the right hip.  Patient admits to groin pain as well.  Patient denies any loss of bladder control.  Given the patient's ostomy he denies any loss of bowel control.  Patient denies any saddle paresthesias.  Patient has been taking Lortabs with little to no relief.  Patient states he becomes very nauseous when taking the pain medications.      Nurses Notes reviewed and agree, including vitals, allergies, social history and prior medical history.     REVIEW OF SYSTEMS: All systems reviewed and not pertinent unless noted.  Review of Systems   Constitutional:  Negative for chills, diaphoresis, fatigue and fever.   HENT:  Negative for ear pain, rhinorrhea and sore throat.    Respiratory:  Negative for cough, chest tightness, shortness of breath and wheezing.    Cardiovascular:  Negative for chest pain and palpitations.   Gastrointestinal:  Negative for abdominal pain, anal bleeding, constipation, diarrhea, nausea and vomiting.   Genitourinary:  Negative for decreased urine volume, penile pain and urgency.        Positive for groin pain   Musculoskeletal:  Positive for back pain and myalgias.   Skin:  Positive for wound.   Neurological:  Negative for dizziness and numbness.       Past Medical History:   Diagnosis  Date    Altered bowel elimination due to intestinal ostomy     PATIENT REPORTS SECONDARY TO A COLON RUPTURE APPROXIMATELY 16-17 YEARS AGO    Arthritis     WRIST    Asthma     as a child    Bowel perforation     states that he had his bowel perforated twice from colonoscopies, and was told that a small scope should always be used in the future.    Cancer     Kidney    CKD (chronic kidney disease)     stage 3    COVID-19 vaccine series completed     Covid vaccine x4 doses    Crohn disease     Gallstones     GERD (gastroesophageal reflux disease)     History of MRSA infection 2017    nose    History of pneumonia     [place]hard of hearing[place]     no hearing aids    Hypothyroid     Kidney stones     Seasonal allergies     Spinal headache     Wears glasses        Allergies:    Latex      Past Surgical History:   Procedure Laterality Date    COLONOSCOPY      COLONOSCOPY N/A 07/31/2017    Procedure: Colonoscopy through ostomy site and rectum with biopsies;  Surgeon: Vincenzo Galan MD;  Location: [facility];  Service:     COLONOSCOPY N/A 7/10/2023    Procedure: COLONOSCOPY WITH BIOPSY;  Surgeon: Yazmin Martin MD;  Location: [facility];  Service: Gastroenterology;  Laterality: N/A;    ENDOSCOPY      ENDOSCOPY N/A 7/10/2023    Procedure: ESOPHAGOGASTRODUODENOSCOPY WITH BIOPSY;  Surgeon: Yazmin Martin MD;  Location: [facility]COPY;  Service: Gastroenterology;  Laterality: N/A;    HEMORRHOIDECTOMY      REPORTS APPROXIMATELY 20 YEARS AGO    HERNIA REPAIR      UMBILICAL    NEPHRECTOMY Right 07/2017    SKIN BIOPSY      benign    SMALL INTESTINE SURGERY      HX OF COLON RUPTURE WITH PLACEMENT OF OSTOMY 16-17 years ago    URETEROSCOPY LASER LITHOTRIPSY WITH STENT INSERTION Left 07/03/2019    Procedure: CYSTOSCOPY, URETEROSCOPY, STONE BASKETING, RETROGRADE PYLEOGRAM,  LASER LITHOTRIPSY WITH STENT INSERTION;  Surgeon: Lazaro Velazquez MD;  Location: [facility] OR;  Service: Urology    URETEROSCOPY LASER  "LITHOTRIPSY WITH STENT INSERTION Left 2021    Procedure: URETEROSCOPY diagnostic WITH STENT INSERTION  with left retrograde;  Surgeon: Lazaro Velazquez MD;  Location: [facility];  Service: Urology;  Laterality: Left;    WISDOM TOOTH EXTRACTION      WRIST SURGERY Right          Social History     Socioeconomic History    Marital status:    Tobacco Use    Smoking status: Former     Current packs/day: 0.00     Average packs/day: 2.0 packs/day for 3.0 years (6.0 ttl pk-yrs)     Types: Cigarettes     Start date:      Quit date:      Years since quittin.3    Smokeless tobacco: Former     Types: Snuff   Vaping Use    Vaping status: Never Used   Substance and Sexual Activity    Alcohol use: No     Comment: QUIT 30 PLUS YEARS    Drug use: No    Sexual activity: Defer         Family History   Problem Relation Age of Onset    Heart disease Mother     Cancer Father     No Known Problems Sister     COPD Brother     COPD Sister     Crohn's disease Son     Colon cancer Neg Hx        Objective  Physical Exam:  /99   Pulse 69   Temp 98.3 °F (36.8 °C) (Oral)   Resp 17   Ht 167.6 cm (66\")   Wt 64.4 kg (142 lb)   SpO2 98%   BMI 22.92 kg/m²      Physical Exam  Vitals and nursing note reviewed.   Constitutional:       Appearance: Normal appearance. He is well-developed and normal weight.   HENT:      Head: Normocephalic and atraumatic.      Nose: Nose normal.   Eyes:      Pupils: Pupils are equal, round, and reactive to light.   Cardiovascular:      Rate and Rhythm: Normal rate and regular rhythm.      Pulses: Normal pulses.      Heart sounds: Normal heart sounds.   Pulmonary:      Effort: Pulmonary effort is normal.      Breath sounds: Normal breath sounds.   Abdominal:      General: Abdomen is flat. Bowel sounds are normal. There is no distension.      Palpations: Abdomen is soft.      Tenderness: There is no abdominal tenderness.      Comments: Ostomy present   Musculoskeletal:         " General: Normal range of motion.      Cervical back: Normal range of motion and neck supple.      Right ankle: Normal. Normal range of motion. Normal pulse.      Left ankle: Normal range of motion. Normal pulse.      Right foot: Normal.      Left foot: Normal.        Legs:       Comments: Patient has severe pain with palpation of right hip.  Patient has a large contusion noted to the lateral aspect of the right hip.   Skin:     General: Skin is warm and dry.      Capillary Refill: Capillary refill takes less than 2 seconds.   Neurological:      General: No focal deficit present.      Mental Status: He is alert and oriented to person, place, and time. Mental status is at baseline.      Gait: Gait abnormal.   Psychiatric:         Mood and Affect: Mood normal.         Behavior: Behavior normal.         Thought Content: Thought content normal.         Judgment: Judgment normal.         Procedures    ED Course:         Lab Results (last 24 hours)       Procedure Component Value Units Date/Time    CBC & Differential [[id number]]  (Abnormal) Collected: 05/08/25 1549    Specimen: Blood Updated: 05/08/25 2484    Narrative:      The following orders were created for panel order CBC & Differential.  Procedure                               Abnormality         Status                     ---------                               -----------         ------                     CBC Auto Differential[[id number]]        Abnormal            Final result                 Please view results for these tests on the individual orders.    Comprehensive Metabolic Panel [[id number]]  (Abnormal) Collected: 05/08/25 1549    Specimen: Blood Updated: 05/08/25 1610     Glucose 83 mg/dL      BUN 27 mg/dL      Creatinine 1.96 mg/dL      Sodium 141 mmol/L      Potassium 3.9 mmol/L      Chloride 105 mmol/L      CO2 21.1 mmol/L      Calcium 9.8 mg/dL      Total Protein 6.4 g/dL      Albumin 3.9 g/dL      ALT (SGPT) 18 U/L      AST (SGOT) 19 U/L      Alkaline  Phosphatase 87 U/L      Total Bilirubin 0.2 mg/dL      Globulin 2.5 gm/dL      A/G Ratio 1.6 g/dL      BUN/Creatinine Ratio 13.8     Anion Gap 14.9 mmol/L      eGFR 37.7 mL/min/1.73     Narrative:      GFR Categories in Chronic Kidney Disease (CKD)              GFR Category          GFR (mL/min/1.73)    Interpretation  G1                    90 or greater        Normal or high (1)  G2                    60-89                Mild decrease (1)  G3a                   45-59                Mild to moderate decrease  G3b                   30-44                Moderate to severe decrease  G4                    15-29                Severe decrease  G5                    14 or less           Kidney failure    (1)In the absence of evidence of kidney disease, neither GFR category G1 or G2 fulfill the criteria for CKD.    eGFR calculation 2021 CKD-EPI creatinine equation, which does not include race as a factor    CBC Auto Differential [[id number]]  (Abnormal) Collected: 05/08/25 1549    Specimen: Blood Updated: 05/08/25 155     WBC 10.92 10*3/mm3      RBC 4.78 10*6/mm3      Hemoglobin 14.9 g/dL      Hematocrit 44.3 %      MCV 92.7 fL      MCH 31.2 pg      MCHC 33.6 g/dL      RDW 12.8 %      RDW-SD 44.0 fl      MPV 10.2 fL      Platelets 208 10*3/mm3      Neutrophil % 68.9 %      Lymphocyte % 18.5 %      Monocyte % 8.1 %      Eosinophil % 2.0 %      Basophil % 0.2 %      Immature Grans % 2.3 %      Neutrophils, Absolute 7.53 10*3/mm3      Lymphocytes, Absolute 2.02 10*3/mm3      Monocytes, Absolute 0.88 10*3/mm3      Eosinophils, Absolute 0.22 10*3/mm3      Basophils, Absolute 0.02 10*3/mm3      Immature Grans, Absolute 0.25 10*3/mm3              CT Pelvis Without Contrast  Result Date: 5/8/2025  CT PELVIS WO CONTRAST Date of Exam: 5/8/2025 2:37 PM EDT Indication: Right hip pain post fall, inability to bear weight on right hip. Comparison: CT abdomen pelvis 7/22/2023. Technique: Axial CT images were obtained of the pelvis  without contrast administration.  Reconstructed coronal and sagittal images were also obtained. Automated exposure control and iterative construction methods were used. Findings: Small and large bowel: Colonic diverticulosis. Appendectomy. Partially imaged partial colectomy and right lower quadrant ostomy. Peritoneal cavity: No free fluid or free air. Bladder: No significant abnormality.  Pelvic organs: Enlarged prostate gland. Penile prosthesis.  Vasculature: Atherosclerotic calcifications.  Lymph nodes: No pathologic appearing lymph nodes by imaging criteria.  Bones and soft tissues: No acute osseous abnormality. Mild osteoarthrosis of the bilateral hips, sacroiliac joints, pubic symphysis and the imaged lower lumbar spine. Right os acetabulum versus chronic fracture, unchanged. Small fat-containing umbilical hernia.     Impression: Impression: No acute osseous abnormality. Electronically Signed: Navdeep Otero MD  5/8/2025 3:11 PM EDT  Workstation ID: [id number]         MDM     Amount and/or Complexity of Data Reviewed  Clinical lab tests: reviewed  Tests in the radiology section of CPT®: reviewed        Initial impression of presenting illness: Pain post fall, right hip    DDX: includes but is not limited to: Sprain versus strain versus fracture versus contusion    Patient arrives private vehicle with vitals interpreted by myself.     Pertinent features from physical exam: Contusion noted to the lateral hip.    Initial diagnostic plan: CT pelvis, CBC, CMP    Results from initial plan were reviewed and interpreted by me revealing patient CT scan was nonspecific and the findings with concerns for a right os acetabulum versus chronic fracture however I spoke with orthopedic physician on-call Dr. Ling reviewed the patient's CT scan.  She did not believe the patient has a fracture at this time.  She reviewed the patient's images herself.  Patient had concerns for significant arthritis.  She recommended that the  patient follow-up with physical therapy as an outpatient in addition to have adequate pain control and a walker.    Diagnostic information from other sources: Previous medical records    Interventions / Re-evaluation: Reviewed patient's previous medical records including his kidney function.  Patient's creatinine is at baseline for him.  Patient's pain had mild improvement after IV morphine and p.o. Tylenol.    Medications   sodium chloride 0.9 % flush 10 mL (has no administration in time range)   sodium chloride 0.9 % flush 10 mL (has no administration in time range)   morphine injection 4 mg (4 mg Intravenous Given 5/8/25 1403)   ondansetron (ZOFRAN) injection 4 mg (4 mg Intravenous Given 5/8/25 1403)   acetaminophen (TYLENOL) tablet 1,000 mg (1,000 mg Oral Given 5/8/25 1639)       Results/clinical rationale were discussed with patient    Consultations/Discussion of results with other physicians: Dr. Ling reviewed the patient's CT scan.  She did not believe the patient has a fracture at this time.  She reviewed the patient's images herself.  Patient had concerns for significant arthritis.  She recommended that the patient follow-up with physical therapy as an outpatient in addition to have adequate pain control and a walker.    Data interpreted: Nursing notes reviewed, vital signs reviewed.  Labs independently interpreted by me (CBC, CMP).  Imaging independently interpreted by me (CT scan).  O2 saturation: 98% on room air    Counseling: Discussed the results above with the patient regarding need for admission or discharge.  Patient understands and agrees plan of care.      Patient is a 65-year-old male who presented to the emergency department today with complaints of pain noted to his right hip.  Patient states he had pain in his right hip and right groin after a fall last week.  Patient has a contusion noted to the lateral aspect of his right hip.  Patient has reproducible pain with the anterior aspect of  his right groin.  Patient's CT pelvis had no acute osseous abnormality noted in the impression however and the findings that were concerning for a right os acetabulum versus chronic fracture that were unchanged.  I spoke with the orthopedic provider on-call and she did not believe this was an acute fracture at this time but rather arthritis.  She recommend the patient be discharged home with a prescription for a walker in addition to pain control and have him follow-up with physical therapy as an outpatient.  She also stated he can follow-up with orthopedic physicians as an outpatient.  Patient CBC is nonactionable.  Patient's CMP has an elevated creatinine that is consistent with his baseline and known history of chronic kidney disease.  Patient's pain improved with IV morphine and p.o. acetaminophen in the emergency department.  I had a long discussion with the patient regarding the need for outpatient follow-up and treatment.  Patient was given a prescription for a walker in addition to Lidoderm patches in addition to oxycodone and Zofran tablets.  Patient was encouraged to follow-up with orthopedic physicians for continued medical monitoring.  Patient's [employer] was checked with the requisition number being [id number].  Patient was informed that narcotic medication is addictive.  Patient is not to share his medication with anyone.  Patient was agreeable to follow-up.  Patient also encouraged to follow-up with his primary care physician within the next 3 to 5 days.  Patient was informed of concerning symptoms that require immediate return to emergency department.    -----  ED Disposition       ED Disposition   Discharge    Condition   Stable    Comment   --             Final diagnoses:   Contusion of right hip, initial encounter   Fall, initial encounter   Chronic kidney disease, unspecified CKD stage   Arthritis   History of Crohn's disease      Your Follow-Up Providers       Yazan Corbett MD In 1 week.     Specialties: Internal Medicine, Hospitalist  Follow up details: If symptoms worsen, As needed  [address] [place] [address] [phone]                       Contact information for after-discharge care    Follow-up information has not been specified.                    Your medication list        START taking these medications        Instructions Last Dose Given Next Dose Due   lidocaine 5 %  Commonly known as: LIDODERM      Place 1 patch on the skin as directed by provider Daily. Remove & Discard patch within 12 hours or as directed by MD       oxyCODONE-acetaminophen 5-325 MG per tablet  Commonly known as: PERCOCET      Take 1 tablet by mouth Every 6 (Six) Hours As Needed for Severe Pain or Moderate Pain.              CHANGE how you take these medications        Instructions Last Dose Given Next Dose Due   ondansetron ODT 4 MG disintegrating tablet  Commonly known as: ZOFRAN-ODT  What changed: You were already taking a medication with the same name, and this prescription was added. Make sure you understand how and when to take each.      Take 1 tablet by mouth 4 (Four) Times a Day As Needed for Nausea or Vomiting.       ondansetron ODT 4 MG disintegrating tablet  Commonly known as: ZOFRAN-ODT  What changed: Another medication with the same name was added. Make sure you understand how and when to take each.      DISSOLVE 1 TABLET ON THE TONGUE EVERY 8 HOURS AS NEEDED FOR NAUSEA              CONTINUE taking these medications        Instructions Last Dose Given Next Dose Due   balsalazide 750 MG capsule  Commonly known as: COLAZAL      Take 3 capsules by mouth 2 (Two) Times a Day.       Dupixent 200 MG/1.14ML solution pen-injector  Generic drug: Dupilumab      Inject 200 mg under the skin into the appropriate area as directed Every 14 (Fourteen) Days.       ferrous sulfate 325 (65 FE) MG tablet      Take 1 tablet by mouth Daily With Breakfast.       HYDROcodone-acetaminophen 5-325 MG per  tablet  Commonly known as: NORCO      Take 1 tablet by mouth Every 4 (Four) Hours As Needed for Severe Pain.       levothyroxine 150 MCG tablet  Commonly known as: SYNTHROID, LEVOTHROID      Take 1 tablet by mouth Daily.       meloxicam 15 MG tablet  Commonly known as: MOBIC           montelukast 10 MG tablet  Commonly known as: SINGULAIR      Take 1 tablet by mouth Daily.       multivitamin tablet tablet  Commonly known as: THERAGRAN      Take 1 tablet by mouth Daily.       oxyCODONE 5 MG immediate release tablet  Commonly known as: ROXICODONE      Take 1 tablet by mouth Every 6 (Six) Hours As Needed for Severe Pain.       pantoprazole 40 MG EC tablet  Commonly known as: PROTONIX      Take 1 tablet by mouth Daily.       predniSONE 5 MG tablet  Commonly known as: DELTASONE      take 1 tablet by mouth every day for 14 days       sildenafil 100 MG tablet  Commonly known as: VIAGRA      Take 1 tablet every other day by oral route as needed for 30 days.       tamsulosin 0.4 MG capsule 24 hr capsule  Commonly known as: FLOMAX      Take 1 capsule by mouth Daily.       Testosterone Cypionate 200 MG/ML injection  Commonly known as: DEPOTESTOTERONE CYPIONATE      1 mL Every 14 (Fourteen) Days.                 Where to Get Your Medications        These medications were sent to [id number] - [place], [place] - [address] AT [facility] - [phone] PH - [phone] FX  [address] [place] [address] [place] [address]      Phone: [phone]   lidocaine 5 %  ondansetron ODT 4 MG disintegrating tablet  oxyCODONE-acetaminophen 5-325 MG per tablet

## 2025-05-20 ENCOUNTER — TELEPHONE (OUTPATIENT)
Dept: ORTHOPEDIC SURGERY | Facility: CLINIC | Age: 65
End: 2025-05-20
Payer: MEDICAID

## 2025-05-20 NOTE — TELEPHONE ENCOUNTER
Admin Notation: Patient called attempting to schedule an ER follow up visit (NEW PROBLEM - R HIP CONTUSION - NOPREV SX NOR TX - ED VISIT 5/8/25 (EPIC). Unfortunately, due to his limited availability, patient stated he would give our office a call back when he knows how he can accommodate his schedule. This is of informative nature only.

## (undated) DEVICE — NITINOL STONE RETRIEVAL BASKET: Brand: ZERO TIP

## (undated) DEVICE — URETERAL ACCESS SHEATH SET: Brand: NAVIGATOR HD

## (undated) DEVICE — ENDOGATOR AUXILIARY WATER JET CONNECTOR: Brand: ENDOGATOR

## (undated) DEVICE — GW AMPLTZ SUPRSTF PTFE JB .035 7X145CM

## (undated) DEVICE — VLV SXN AIR/H2O ORCAPOD3 1P/U STRL

## (undated) DEVICE — SOL IRR NACL 0.9PCT 3000ML

## (undated) DEVICE — GLV SURG SENSICARE LT W/ALOE PF LF 7 STRL

## (undated) DEVICE — ENDOSCOPY PORT CONNECTOR FOR OLYMPUS® SCOPES: Brand: ERBE

## (undated) DEVICE — Device

## (undated) DEVICE — NITINOL WIRE WITH HYDROPHILIC TIP: Brand: SENSOR

## (undated) DEVICE — CUFF SCD HEMOFORCE SEQ CALF STD MD

## (undated) DEVICE — FRCP BIOP COLD ENDOJAW ALLGTR W/NDL 2.8X2300MM BLU

## (undated) DEVICE — LUBE JELLY PK/2.75GM STRL BX/144

## (undated) DEVICE — FIBR LASR HOLMIUM SLIMLINE 200 DFL 550U SMOTH TP 1P/U

## (undated) DEVICE — CATH URETRL AP 18F

## (undated) DEVICE — RICH CYSTO: Brand: MEDLINE INDUSTRIES, INC.

## (undated) DEVICE — PAD GRND REM POLYHESIVE A/ DISP

## (undated) DEVICE — FRCP BX RADJAW4 NDL 2.8 240 STD OG

## (undated) DEVICE — DRAINBAG,ANTI-REFLUX TOWER,L/F,2000ML,LL: Brand: MEDLINE

## (undated) DEVICE — ADAPT UROLOK

## (undated) DEVICE — SCRB SURG BACTOSHIELD CHG 4PCT 4OZ

## (undated) DEVICE — DUAL LUMEN URETERAL CATHETER

## (undated) DEVICE — ST FLD IRR WARM

## (undated) DEVICE — JELLY,LUBE,STERILE,FLIP TOP,TUBE,2-OZ: Brand: MEDLINE

## (undated) DEVICE — SLV SCD CALF HEMOFORCE DVT THERP REPROC MD

## (undated) DEVICE — CONMED SCOPE SAVER BITE BLOCK, 20X27 MM: Brand: SCOPE SAVER

## (undated) DEVICE — TBG FLUID WARM ST SNGL

## (undated) DEVICE — HYBRID TUBING/CAP SET FOR OLYMPUS® SCOPES: Brand: ERBE